# Patient Record
Sex: FEMALE | Race: WHITE | NOT HISPANIC OR LATINO | Employment: OTHER | URBAN - METROPOLITAN AREA
[De-identification: names, ages, dates, MRNs, and addresses within clinical notes are randomized per-mention and may not be internally consistent; named-entity substitution may affect disease eponyms.]

---

## 2017-01-04 ENCOUNTER — GENERIC CONVERSION - ENCOUNTER (OUTPATIENT)
Dept: OTHER | Facility: OTHER | Age: 67
End: 2017-01-04

## 2017-01-25 ENCOUNTER — HOSPITAL ENCOUNTER (OUTPATIENT)
Dept: RADIOLOGY | Facility: HOSPITAL | Age: 67
Discharge: HOME | End: 2017-01-25
Payer: MEDICARE

## 2017-01-25 DIAGNOSIS — Z13.6 ENCOUNTER FOR SCREENING FOR CARDIOVASCULAR DISORDERS: ICD-10-CM

## 2017-01-29 ENCOUNTER — GENERIC CONVERSION - ENCOUNTER (OUTPATIENT)
Dept: OTHER | Facility: OTHER | Age: 67
End: 2017-01-29

## 2017-05-25 ENCOUNTER — TRANSCRIBE ORDERS (OUTPATIENT)
Dept: ADMINISTRATIVE | Facility: HOSPITAL | Age: 67
End: 2017-05-25

## 2017-05-25 DIAGNOSIS — E04.2 MULTIPLE THYROID NODULES: Primary | ICD-10-CM

## 2017-05-30 ENCOUNTER — HOSPITAL ENCOUNTER (OUTPATIENT)
Dept: RADIOLOGY | Facility: HOSPITAL | Age: 67
Discharge: HOME | End: 2017-05-30
Payer: MEDICARE

## 2017-05-30 DIAGNOSIS — E04.2 MULTIPLE THYROID NODULES: ICD-10-CM

## 2017-05-30 PROCEDURE — 76536 US EXAM OF HEAD AND NECK: CPT

## 2017-10-18 ENCOUNTER — ALLSCRIPTS OFFICE VISIT (OUTPATIENT)
Dept: OTHER | Facility: OTHER | Age: 67
End: 2017-10-18

## 2017-10-19 NOTE — PROGRESS NOTES
Assessment  Assessed    1  GERD without esophagitis (530 81) (K21 9)   2  Fatigue (780 79) (R53 83)   3  Benign essential hypertension (401 1) (I10)   4  Overweight (BMI 25 0-29 9) (278 02) (E66 3)   5  Dyslipidemia (272 4) (E78 5)   6  Osteoporosis (733 00) (M81 0)   7  Non-toxic multinodular goiter (241 1) (E04 2)   8  Chronic kidney disease, stage III (moderate) (585 3) (N18 3)    Plan  Benign essential hypertension    · (1) LIPID PANEL, FASTING; Status:Active; Requested for:01Oct2018; Perform:[facility]; EHZ:[id number];KIVQIAP; For:Benign essential hypertension; Ordered By:Félix Brito;  Benign essential hypertension, Fatigue    · EKG/ECG- POC; Status:Complete;   Done: 64LRK0126   Perform: In Office; Due:18Oct2018; Last Updated By:Amada Bains; 10/18/2017 8:19:47 AM;Ordered; For:Benign essential hypertension, Fatigue; Ordered By:Félix Brito;  Benign essential hypertension, Fatigue, Hypercalcemia    · (1) COMPREHENSIVE METABOLIC PANEL; Status:Active; Requested for:01Oct2018; Perform:[facility]; LZZ:85KNQ5209;FFQHUYD; For:Benign essential hypertension, Fatigue, Hypercalcemia; Ordered By:Félix Brito;  GERD without esophagitis    · From  Omeprazole 40 MG Oral Capsule Delayed Release TAKE 1 CAPSULE  Daily To Omeprazole 40 MG Oral Capsule Delayed Release TAKE 1 CAPSULE every  other morning   Rx By: Sharon Jaramillo; Dispense: 0 Days ; #:30 Capsule Delayed Release; Refill: 0;For: GERD without esophagitis; ROSA = N; Record   · From  Pepcid 40 MG Oral Tablet Take 1 tablet daily To Famotidine 40 MG Oral  Tablet (Pepcid) One tablet every other evening   Rx By: Sharon Jaramillo; Dispense: 0 Days ; #:30 Tablet; Refill: 0;For: GERD without esophagitis; ROSA = N; Record  Osteoporosis    · Alendronate Sodium 70 MG Oral Tablet (Fosamax); TAKE 1 TABLET ONCE  WEEKLY   Rx By: Sharon Jaramillo; Dispense: 28 Days ; #:4 Tablet; Refill: 5;For: Osteoporosis; ROSA = N; Record      1   Consider trial of verapamil hold for several weeks to see if fatigue improves  If it does, contact Dr Alea Timmons, who prescribed this medication for migraine headaches  2  Continue present medications otherwise  3  Follow-up in one year with EKG, lipids, CMP  Discussion/Summary  Cardiology Discussion Summary Free Text Note Form [facility]:     Exertional dyspnea with stair climbing but not with exercise at [facility], probably related to progressive overweight with 18 1 pound weight gain over the past 5 years  Controlled essential hypertensionControlled dyslipidemia with atorvastatinHistory of normal exercise stress test 10/13/15 and benign echocardiogram on 5/1/13Chronic nonproductive cough, possibly caused by [facility] hypercalcemia with parathyroidectomy 11/30/11Status post nasal polypectomy October, 2012 and [facility]  October, 2012 with right cataract extraction 9/13Chronic stable multinodular goiter with latest thyroid ultrasound 5/30/17Mild chronic kidney disease, stage IIIChronic fatigue and tiredness with consideration of adverse drug reaction to verapamil  Osteoarthritis of both knees, right more than left  Goals and Barriers: The patient has the current Goals: Maintenance of cardiovascular health  The patent has the current Barriers: None  Patient's Capacity to Self-Care: Patient is able to Self-Care  Medication SE Review and Pt Understands Tx: Possible side effects of new medications were reviewed with the patient/guardian today  The treatment plan was reviewed with the patient/guardian  The patient/guardian understands and agrees with the treatment plan   Counseling Documentation With Imm: The patient was counseled regarding diagnostic results,-- instructions for management,-- risk factor reductions,-- prognosis,-- patient and family education,-- impressions,-- risks and benefits of treatment options,-- importance of compliance with treatment     Self Referrals:   Self Referrals: Yes   Transitional Care: Co-manage care with PCP/Referring Provider  Chief Complaint  Chief Complaint Free Text Note Form: Yessy Amaya is here today for a followup  She has complaints of fatigue in the office today  EKG was done  JW      History of Present Illness  Cardiology HPI Free Text Note Form [facility]:   68-year-old female complains of chronic fatigue, which predates the use of the use of atorvastatin  She gets plenty of sleep and is adherent to medication  She continues to have chronic bilateral knee discomfort, right more than left  She attends the [employer] program and the [address] 5 days a week  She has a chronic nonproductive cough, occasional exertional dyspnea when climbing stairs and lightheadedness and dyspnea on one occasion when bending over  She has occasional ankle edema when she is on her feet for a long period of time  There is been no chest discomfort, orthopnea, paroxysmal nocturnal dyspnea, wheezing, sputum, fever, chills, syncope  No severe dizziness noted  Her dress size has increased by one size over the last several years  Review of Systems  ROS Reviewed:   ROS reviewed  All other systems negative, except as noted in history of present illness  Active Problems  Problems    1  Atherosclerosis of left carotid artery (433 10) (I65 22)   2  Benign essential hypertension (401 1) (I10)   3  Colon cancer screening (V76 51) (Z12 11)   4  Dark stools (792 1) (R19 5)   5  Encounter for routine pelvic examination (V72 31) (Z01 419)   6  Encounter for screening for cardiovascular disorders (V81 2) (Z13 6)   7  Encounter for screening for vascular disease (V81 2) (Z13 6)   8  Encounter for screening mammogram for breast cancer (V76 12) (Z12 31)   9  Fatigue (780 79) (R53 83)   10  Headache (784 0) (R51)   11  Hypercalcemia (275 42) (E83 52)   12  Migraine headache (346 90) (G43 909)   13  Need for immunization against influenza (V04 81) (Z23)   14  Never A Smoker   15  Osteoporosis (733 00) (M81 0)   16   Pain in left foot (729 5) (M75 672)   17  Pain in right foot (729 5) (M79 671)   18  Primary hyperparathyroidism (252 01) (E21 0)   19  Screening for diabetes mellitus (DM) (V77 1) (Z13 1)   20  Screening for thyroid disorder (V77 0) (Z13 29)   21  Vitamin D deficiency (268 9) (E55 9)    Past Medical History  Problems    1  History of Acute upper respiratory infection (465 9) (J06 9)   2  History of Acute upper respiratory infection (465 9) (J06 9)   3  History of Cough (786 2) (R05)   4  History of Cough (786 2) (R05)   5  History of Initial Medicare annual wellness visit (V70 0) (Z00 00)   6  Medicare annual wellness visit, subsequent (V70 0) (Z00 00)   7  Screening for genitourinary condition (V81 6) (Z13 89)   8  Screening for genitourinary condition (V81 6) (Z13 89)  Active Problems And Past Medical History Reviewed: The active problems and past medical history were reviewed and updated today  Surgical History  Problems    1  History of Parathyroid Complete Parathyroidectomy  Surgical History Reviewed: The surgical history was reviewed and updated today  Family History  Mother    1  Family history of congestive heart failure (V17 49) (Z82 49)  Father    2  Family history of prostate carcinoma (V16 42) (Z80 42)  Family History Reviewed: The family history was reviewed and updated today  Social History  Problems    · Never A Smoker  Social History Reviewed: The social history was reviewed and updated today  Current Meds   1  Atorvastatin Calcium 20 MG Oral Tablet; Take 1 tablet daily  Requested for: 65CHU7934;   Last Rx:48Vkh0576 Ordered   2  Fish Oil 1000 MG Oral Capsule; take 1 capsule daily; Therapy: 59WUW3765 to Recorded   3  Glucosamine Chondr 1500 Complx Oral Capsule; TAKE 1 CAPSULE Daily @ 8am   Recorded   4  Magnesium Oxide 400 MG Oral Capsule; 1 Every Day; Therapy: 67Tba9440 to  Requested for: 56ZCS1756 Recorded   5  Multivitamin Women 50+ Oral Tablet; TAKE 1 TABLET DAILY;    Therapy: 49DMG7369 to Recorded   6  Omeprazole 40 MG Oral Capsule Delayed Release; TAKE 1 CAPSULE Daily Recorded   7  Pepcid 40 MG Oral Tablet; Take 1 tablet daily Recorded   8  PreserVision AREDS Oral Capsule; TAKE AS DIRECTED; Therapy: 20IAA9450 to Recorded   9  Super B-Complex Oral Capsule; take 1 capsule daily; Therapy: 57LGL8814 to Recorded   10  Turmeric Complex/Black Pepper 500-3 MG Oral Capsule; take 1 capsule daily; Therapy: 35XKP3471 to Recorded   11  Verapamil HCl - 120 MG Oral Tablet; 1 tab twice daily Recorded   12  Vitamin C 1000 MG Oral Tablet; TAKE 1 TABLET DAILY; Therapy: 13VYI6258 to Recorded   13  Vitamin D3 2000 UNIT Oral Capsule; TAKE 6000 UNIT Daily; Therapy: (Recorded:34Ych8000) to Recorded  Medication List Reviewed: The medication list was reviewed and updated today  Allergies  Medication    1  No Known Drug Allergies    Vitals  Vital Signs    Recorded: 32CMU1524 08:18AM   Heart Rate 55, Apical   Systolic 242, RUE, Sitting   Diastolic 70, RUE, Sitting   Height 5 ft 9 in   Weight 185 lb 1 6 oz   BMI Calculated 27 33   BSA Calculated 2   O2 Saturation 98, RA     4 5 pound weight gain in one year, 6 7 pound weight gain in 3 years, 18 1 pound weight gain in 5 years  Physical Exam    Constitutional   General appearance: Abnormal  -- Mildly overweight white female in no acute distress  She is alert, oriented, cooperative  Eyes   Conjunctiva and Sclera examination: Conjunctiva pink, sclera anicteric  Ears, Nose, Mouth, and Throat - Oropharynx: Clear, nares are clear, mucous membranes are moist    Neck   Neck and thyroid: Normal, supple, trachea midline, no thyromegaly  Pulmonary   Respiratory effort: No increased work of breathing or signs of respiratory distress  Auscultation of lungs: Clear to auscultation, no rales, no rhonchi, no wheezing, good air movement  Cardiovascular   Auscultation of heart: Normal rate and rhythm, normal S1 and S2, no murmurs      Carotid pulses: Normal, 2+ bilaterally  Peripheral vascular exam: Normal pulses throughout, no tenderness, erythema or swelling  Pedal pulses: Normal, 2+ bilaterally  Examination of extremities for edema and/or varicosities: Normal     Abdomen   Abdomen: Non-tender and no distention  Liver and spleen: No hepatomegaly or splenomegaly  Musculoskeletal Gait and station: Normal gait  -- Digits and nails: Normal without clubbing or cyanosis  -- Inspection/palpation of joints, bones, and muscles: Normal, ROM normal     Skin - Skin and subcutaneous tissue: Normal without rashes or lesions  Skin is warm and well perfused, normal turgor  Neurologic - Cranial nerves: II - XII intact  -- Speech: Normal     Psychiatric - Orientation to person, place, and time: Normal -- Mood and affect: Normal       Results/Data  Diagnostic Studies Reviewed Cardio: I personally reviewed the recording/images in the office today  My interpretation follows  Lab Review: 192, , HDL 56, LDL 111LFTs  1 06 with  3normal FVN634, TG 70, HDL 61, LDL 56       ECG Report: 10/18/17bradycardiaconduction delayR-wave progressionheart rate with no other changes compared to 10/11/16    US THYROID [id number] 11:39AM EPIC, Provider   Test ordered by: Joyce Brown     Test Name Result Flag Reference   US THYROID (Report)     THYROID ULTRASOUND     INDICATION: 10-year-old female for follow-up thyroid nodules  COMPARISON: 9/22/2015  TECHNIQUE:  Ultrasound of the thyroid was performed with a high frequency linear transducer in transverse and sagittal planes including volumetric imaging sweeps as well as traditional still imaging technique  FINDINGS:   Thyroid parenchyma is diffusely heterogeneous in echotexture with focal nodule(s) as described below  Right gland: 4 3 x 1 7 x 1 2 cm  The following dominant nodule is again evaluated:   1  Midpole, 0 5 x 0 5 x 0 5 cm well circumscribed vascular nodule stable in size and morphology   No pleomorphic calcifications  Normal vascularity throughout the lobe  Left gland: 3 9 x 1 2 x 1 5 cm  The following dominant nodules are evaluated:   1  Upper pole, 0 9 x 0 5 x 0 8 cm complex cystic nodule with no internal vascularity or echogenic foci stable in size and morphology  2  Mid pole, 0 7 x 0 5 x 0 5 cm well-circumscribed echogenic nodule with hypoechoic capsule demonstrating internal vascularity no echogenic foci  Stable in size and morphology  3  Lower pole, 0 6 x 0 4 x 0 6 cm complex solid and cystic well-circumscribed nodule, stable in size and morphology containing no echogenic foci  Normal vascularity throughout the lobe  Isthmus: The isthmus is 0 4 cm in AP dimension  IMPRESSION:      Stable multinodular thyroid gland with no lesions requiring FNA utilizing current ELÍAS guidelines  Follow-up in 12-24 months recommended  Workstation performed: [id number]     Signed by:   Eden Mcbride MD   5/31/17     VAS SCREENING [id number] 08:44AM Cathmikaelaan Rosy Order Number: [id number]    - Patient Instructions: To schedule this appointment, please contact Central Scheduling at [phone]  Test Name Result Flag Reference   VAS SCREENING (Report)     [facility] REPORT   CLINICAL:   Indications: Self Pay Vascular Screening   Clinical   Right Pressure: 160 mm Hg, Left Pressure: 158 mm Hg  FINDINGS:      Celiac Artery Expiration     AP Diam (cm): 2 6     PSV: 79   Mid CCA, Right     PSV: 79   Post Tibial, Right     BERNIE: 1 14     Pressure: 182   Prox  ICA, Right     Impression: 1  No significant carotid disease     PSV: 57     EDV: 25     ICA/CCA: 0 72     PPS: 79 00   Dist  ICA, Right     PPS: 79 00   Mid CCA, Left     PSV: 84   Post Tibial, Left     BERNIE: 1 19     Pressure: 190   Prox  ICA, Left     Impression: 2   Abnormal plaque noted but no significant stenosis     PSV: 72     EDV: 25     ICA/CCA: 0 86     PPS: 84 00   Dist  ICA, Left     PPS: 84 00            CONCLUSION:      Impression   CAROTID SCREENING:   Evaluation reveals a normal internal carotid artery on the right  Evaluation reveals a <50% stenosis of the internal carotid artery on the left  Plaque is smooth and homogenous  AORTA SCREENING:   The abdominal aorta is patent and normal in caliber with the greatest diameter   measurement of 2 6 cm (Mid)  Mild intraluminal plaque noted with no significant stenosis  PAD SCREENING:   Resting right ankle/brachial index of 1 14 is within normal limits  Resting left ankle/brachial index of 1 19 is within normal limits  SIGNATURE:   Electronically Signed by: Imtiaz Hillman on 2017-01-25 04:53:10 PM     (1) CBC/PLT/DIFF [id number] 09:12AM Kaleigh Boydm     Test Name Result Flag Reference   WBC 6 8 x10E3/uL  3 4-10 8   RBC 4 64 x10E6/uL  3 77-5 28   Hemoglobin 12 7 g/dL  11 1-15 9   Hematocrit 38 8 %  34 0-46  6   MCV 84 fL  79-97   MCH 27 4 pg  26 6-33 0   MCHC 32 7 g/dL  31 5-35 7   RDW 13 6 %  12 3-15 4   Platelets 804 E10T7/ED  150-379   Neutrophils 58 %     Lymphs 24 %     Monocytes 10 %     Eos 7 %     Basos 1 %     Neutrophils (Absolute) 4 0 x10E3/uL  1 4-7 0   Lymphs (Absolute) 1 6 x10E3/uL  0 7-3 1   Monocytes(Absolute) 0 7 x10E3/uL  0 1-0 9   Eos (Absolute) 0 5 x10E3/uL H 0 0-0 4   Baso (Absolute) 0 0 x10E3/uL  0 0-0 2   Immature Granulocytes 0 %     Immature Grans (Abs) 0 0 x10E3/uL  0 0-0 1     (1) COMPREHENSIVE METABOLIC PANEL [id number] 25:51YL Kaleigh Tank     Test Name Result Flag Reference   Glucose, Serum 94 mg/dL  65-99   BUN 15 mg/dL  8-27   Creatinine, Serum 0 88 mg/dL  0 57-1 00   BUN/Creatinine Ratio 17  11-26   Sodium, Serum 141 mmol/L  134-144   Potassium, Serum 4 6 mmol/L  3 5-5 2   Chloride, Serum 101 mmol/L     Carbon Dioxide, Total 24 mmol/L  18-29   Calcium, Serum 9 3 mg/dL  8 7-10 3   Protein, Total, Serum 6 5 g/dL  6 0-8 5   Albumin, Serum 4 3 g/dL  3 6-4 8   Globulin, Total 2 2 g/dL  1 5-4 5 A/G Ratio 2 0  1 1-2 5   Bilirubin, Total 0 3 mg/dL  0 0-1 2   Alkaline Phosphatase, S 36 IU/L L    AST (SGOT) 24 IU/L  0-40   ALT (SGPT) 29 IU/L  0-32   eGFR If NonAfricn Am 68 mL/min/1 73  >59   eGFR If Africn Am 78 mL/min/1 73  >59     (1) TSH [id number] 09:12AM Srini Hutton     Test Name Result Flag Reference   TSH 3 820 uIU/mL  0 450-4 500     Health Management  Encounter for screening for vascular disease   VAS SCREENING; every 1 year; Last 81YVW3505; Next Due: 90OQZ3021; Active  Encounter for screening mammogram for breast cancer   * MAMMO SCREENING BILATERAL W CAD; every 2 years; Last 56Qer8358; Next Due: 68Owr4244; Active    Future Appointments    Date/Time Provider Specialty Site   02/07/2018 09:30 AM LUCRETIA Saravia   Neurology NEUROLOGY [facility]   Electronically signed by : LUCRETIA Banks ; Oct 18 2017 10:18AM EST                       (Author)

## 2017-11-21 ENCOUNTER — GENERIC CONVERSION - ENCOUNTER (OUTPATIENT)
Dept: OTHER | Facility: OTHER | Age: 67
End: 2017-11-21

## 2017-12-22 ENCOUNTER — HOSPITAL ENCOUNTER (OUTPATIENT)
Dept: RADIOLOGY | Facility: HOSPITAL | Age: 67
Discharge: HOME | End: 2017-12-22
Payer: MEDICARE

## 2017-12-22 DIAGNOSIS — Z12.31 ENCOUNTER FOR SCREENING MAMMOGRAM FOR MALIGNANT NEOPLASM OF BREAST: ICD-10-CM

## 2017-12-22 PROCEDURE — G0202 SCR MAMMO BI INCL CAD: HCPCS

## 2018-01-09 ENCOUNTER — ALLSCRIPTS OFFICE VISIT (OUTPATIENT)
Dept: OTHER | Facility: OTHER | Age: 68
End: 2018-01-09

## 2018-01-11 NOTE — RESULT NOTES
Verified Results  (1) CBC/PLT/DIFF [id number] 09:12AM AXON [facility]     Test Name Result Flag Reference   WBC 6 8 x10E3/uL  3 4-10 8   RBC 4 64 x10E6/uL  3 77-5 28   Hemoglobin 12 7 g/dL  11 1-15 9   Hematocrit 38 8 %  34 0-46  6   MCV 84 fL  79-97   MCH 27 4 pg  26 6-33 0   MCHC 32 7 g/dL  31 5-35 7   RDW 13 6 %  12 3-15 4   Platelets 552 V52R8/EX  150-379   Neutrophils 58 %     Lymphs 24 %     Monocytes 10 %     Eos 7 %     Basos 1 %     Neutrophils (Absolute) 4 0 x10E3/uL  1 4-7 0   Lymphs (Absolute) 1 6 x10E3/uL  0 7-3 1   Monocytes(Absolute) 0 7 x10E3/uL  0 1-0 9   Eos (Absolute) 0 5 x10E3/uL H 0 0-0 4   Baso (Absolute) 0 0 x10E3/uL  0 0-0 2   Immature Granulocytes 0 %     Immature Grans (Abs) 0 0 x10E3/uL  0 0-0 1     (1) COMPREHENSIVE METABOLIC PANEL 45FDQ0105 00:88ZN [facility]     Test Name Result Flag Reference   Glucose, Serum 94 mg/dL  65-99   BUN 15 mg/dL  8-27   Creatinine, Serum 0 88 mg/dL  0 57-1 00   eGFR If NonAfricn Am 68 mL/min/1 73  >59   eGFR If Africn Am 78 mL/min/1 73  >59   BUN/Creatinine Ratio 17  11-26   Sodium, Serum 141 mmol/L  134-144   Potassium, Serum 4 6 mmol/L  3 5-5 2   Chloride, Serum 101 mmol/L     Carbon Dioxide, Total 24 mmol/L  18-29   Calcium, Serum 9 3 mg/dL  8 7-10 3   Protein, Total, Serum 6 5 g/dL  6 0-8 5   Albumin, Serum 4 3 g/dL  3 6-4 8   Globulin, Total 2 2 g/dL  1 5-4 5   A/G Ratio 2 0  1 1-2 5   Bilirubin, Total 0 3 mg/dL  0 0-1 2   Alkaline Phosphatase, S 36 IU/L L    AST (SGOT) 24 IU/L  0-40   ALT (SGPT) 29 IU/L  0-32     (1) TSH [id number] 09:12AM Rondi Running     Test Name Result Flag Reference   TSH 3 820 uIU/mL  0 450-4 500       Discussion/Summary   Calderon Alvarez,   Your blood count, sugar, kidney function, liver function and thyroid level is normal    Dr Paula Clay

## 2018-01-12 NOTE — RESULT NOTES
Verified Results  (19[birth year] [facility]) Pap IG, HPV-hr [id number] 03:38PM Yovana Lyn     Test Name Result Flag Reference   DIAGNOSIS: Comment     NEGATIVE FOR INTRAEPITHELIAL LESION AND MALIGNANCY  CELLULAR CHANGES ASSOCIATED WITH ATROPHY ARE PRESENT  THIS SPECIMEN WAS RESCREENED AS PART OF OUR  PROGRAM   THE CYTOLOGY PROCESSING WAS PERFORMED AT THE [facility] LOCATED AT  [facility], [address] [place] [address], [address] [place] [id number]  Specimen adequacy: Comment     Satisfactory for evaluation  Endocervical component may not be  distinguished in cases of atrophy  Clinician provided ICD10: Comment     Z01 419   Performed by: Sylvester Watkins, Cytotechnologist (ASCP)   QC reviewed by: Jerrod Meyers, Cytotechnologist (ASCP)     Tatiana Casanova Note: Comment     The Pap smear is a screening test designed to aid in the detection of  premalignant and malignant conditions of the uterine cervix  It is not a  diagnostic procedure and should not be used as the sole means of detecting  cervical cancer  Both false-positive and false-negative reports do occur  Test Methodology: Comment     This liquid based ThinPrep(R) pap test was screened with the  use of an image guided system  HPV, high-risk SEELVR     The quantity of specimen remaining in the vial after Pap slide  preparation was less than the 4 mL minimum cell suspension required  Low sample cellularity may be the cause  See HPV, low volume rfx  test result  This high-risk HPV test detects thirteen high-risk types  (16/18/31/33/35/39/45/51/52/56/58/59/68) without differentiation  HPV, low volume rfx Negative  Negative   This test detects fourteen high-risk HPV types (16,18,31,33,35,39,45,  51,52,56,58,59,66,68) without differentiation  Discussion/Summary   January Askew,    Your pap smear is normal and HPV negative     Dr Beltran Sheldon

## 2018-01-13 VITALS
SYSTOLIC BLOOD PRESSURE: 110 MMHG | DIASTOLIC BLOOD PRESSURE: 70 MMHG | WEIGHT: 185.1 LBS | HEART RATE: 55 BPM | OXYGEN SATURATION: 98 % | BODY MASS INDEX: 27.42 KG/M2 | HEIGHT: 69 IN

## 2018-01-15 NOTE — RESULT NOTES
Verified Results  VAS SCREENING [id number] 08:44AM Alma Cortez Order Number: [id number]    - Patient Instructions: To schedule this appointment, please contact Central Scheduling at [phone]  Test Name Result Flag Reference   VAS SCREENING (Report)     [facility] REPORT   CLINICAL:   Indications: Self Pay Vascular Screening   Clinical   Right Pressure: 160 mm Hg, Left Pressure: 158 mm Hg  FINDINGS:      Celiac Artery Expiration     AP Diam (cm): 2 6     PSV: 79   Mid CCA, Right     PSV: 79   Post Tibial, Right     BERNIE: 1 14     Pressure: 182   Prox  ICA, Right     Impression: 1  No significant carotid disease     PSV: 57     EDV: 25     ICA/CCA: 0 72     PPS: 79 00   Dist  ICA, Right     PPS: 79 00   Mid CCA, Left     PSV: 84   Post Tibial, Left     BERNIE: 1 19     Pressure: 190   Prox  ICA, Left     Impression: 2  Abnormal plaque noted but no significant stenosis     PSV: 72     EDV: 25     ICA/CCA: 0 86     PPS: 84 00   Dist  ICA, Left     PPS: 84 00            CONCLUSION:      Impression   CAROTID SCREENING:   Evaluation reveals a normal internal carotid artery on the right  Evaluation reveals a <50% stenosis of the internal carotid artery on the left  Plaque is smooth and homogenous  AORTA SCREENING:   The abdominal aorta is patent and normal in caliber with the greatest diameter   measurement of 2 6 cm (Mid)  Mild intraluminal plaque noted with no significant stenosis  PAD SCREENING:   Resting right ankle/brachial index of 1 14 is within normal limits  Resting left ankle/brachial index of 1 19 is within normal limits        SIGNATURE:   Electronically Signed by: Desean Ferguson on 2017-01-25 04:53:10 PM

## 2018-01-15 NOTE — RESULT NOTES
Verified Results  * MAMMO SCREENING BILATERAL W CAD [id number] 12:29PM Angelica Ruelas Order Number: [id number]    - Patient Instructions: To schedule this appointment, please contact Central Scheduling at [phone]  Do not wear any perfume, powder, lotion or deodorant on breast or underarm area  Please bring your doctors order, referral (if needed) and insurance information with you on the day of the test  Failure to bring this information may result in this test being rescheduled  Arrive 15 minutes prior to your appointment time to register  On the day of your test, please bring any prior mammogram or breast studies with you that were not performed at a [facility]  Failure to bring prior exams may result in your test needing to be rescheduled  Test Name Result Flag Reference   MAMMO SCREENING BILATERAL W CAD (Report)     Patient History:   Patient is postmenopausal and is of Ashkenazi Mormon descent  Family history of breast cancer in paternal aunt at age 54 and    breast cancer in maternal cousin at age 48  Excisional biopsy of the right breast, January 1, 1980  Took progesterone beginning at age 45  Patient's BMI is 25 8  Reason for exam: screening (asymptomatic)  Screening     Mammo Screening Bilateral W CAD: December 21, 2016 - Check In #:    [de-identified]   Bilateral CC and MLO view(s) were taken  Technologist: Samra Yuan   Prior study comparison: October 23, 2015, mammogram  July 28, 2010, mammogram  July 22, 2009, mammogram  July 21, 2008,    mammogram  January 7, 2008, mammogram      There are scattered fibroglandular densities  Bilateral digital    mammography is performed  No dominant soft tissue mass,    architectural distortion or suspicious calcifications are noted  The skin and nipple contours are within normal limits  Scattered benign appearing calcifications are noted  No evidence    of malignancy   No significant changes when compared to prior    studies  1  No evidence of malignancy  2  No significant change when compared with the prior study  ASSESSMENT: BiRad:2 - Benign     Recommendation:   Routine screening mammogram of both breasts in 1 year  A reminder letter will be scheduled   Analyzed by CAD     8-10% of cancers will be missed on mammography  Management of a    palpable abnormality must be based on clinical grounds  Patients   will be notified of their results via letter from our facility  Accredited by [employer] and FDA  Transcription Location: [facility] 98: [id number]     Risk Value(s):   Tyrer-Cuzick 10 Year: 4 549%, Tyrer-Cuzick Lifetime: 8 151%,    Myriad Table: 8 2%, JOSUE 5 Year: 1 9%, NCI Lifetime: 6 3%, MRS    : Based on personal and/or family history,    consideration of hereditary risk assessment may be warranted     Signed by:   Joyce Mendoza MD   12/21/16       Discussion/Summary   Liberty Moura,   Your mammogram is normal    Dr Shira Rosales

## 2018-01-23 VITALS
TEMPERATURE: 97.5 F | HEART RATE: 88 BPM | WEIGHT: 185 LBS | BODY MASS INDEX: 27.4 KG/M2 | DIASTOLIC BLOOD PRESSURE: 70 MMHG | SYSTOLIC BLOOD PRESSURE: 138 MMHG | RESPIRATION RATE: 16 BRPM | HEIGHT: 69 IN

## 2018-01-23 NOTE — PROGRESS NOTES
Assessment    1  Medicare annual wellness visit, subsequent (V70 0) (Z00 00)   2  Never a smoker   3  Osteoporosis (733 00) (M81 0)    Plan  Medicare annual wellness visit, subsequent    · Follow-up visit in 1 year Evaluation and Treatment  Follow-up  Status: Complete  Done:  [id number]  Osteoporosis    · * DXA BONE DENSITY SPINE HIP AND PELVIS; Status:Active; Requested  for:00Iai9025;   Screening for genitourinary condition    · *VB - Urinary Incontinence Screen (Dx Z13 89 Screen for UI); Status:Complete -  Retrospective Authorization;   Done: 60BAG3884 03:00PM    Discussion/Summary    Care plan given  Impression: Subsequent Annual Wellness Visit, with preventive exam as well as age and risk appropriate counseling completed  Cardiovascular screening and counseling: screening is current  Diabetes screening and counseling: screening is current  Colorectal cancer screening and counseling: screening is current  Breast cancer screening and counseling: screening is current  Cervical cancer screening and counseling: screening not indicated  Osteoporosis screening and counseling: screening is current and DEXA due in February  Abdominal aortic aneurysm screening and counseling: screening not indicated  Glaucoma screening and counseling: screening is current  HIV screening and counseling: screening not indicated  Immunizations: influenza vaccine is up to date this year, the lifetime pneumococcal vaccine has been completed, hepatitis B vaccination series is not indicated at this time due to the patient's low risk of irina the disease and Zostavax vaccination up to date  Advance Directive Planning: not complete, paperwork and instructions were given to the patient, she was encouraged to follow-up with me to discuss her questions and/or decisions  Patient Discussion: plan discussed with the patient, follow-up visit needed in one year        Chief Complaint  annual wellness visit      History of Present Illness  Welcome to Medicare and Wellness Visits: The patient is being seen for the subsequent annual wellness visit  Medicare Screening and Risk Factors   Hospitalizations: no previous hospitalizations and she has been hospitalized 0 times  Once per lifetime medicare screening tests: ECG has not been done and AAA screening US has not yet been done  Medicare Screening Tests Risk Questions   Osteoporosis risk assessment: , female gender and over 48years of age  HIV risk assessment: none indicated  Drug and Alcohol Use: The patient has never smoked cigarettes  The patient reports never drinking alcohol  She has never used illicit drugs  Diet and Physical Activity: Current diet includes unhealthy food choices, frequent junk food, servings of fruit per day, servings of vegetables per day, servings of meat per day and servings of dairy products per day  She exercises 5 times per week  Exercise: stretching, strength training 30 minutes per day  Mood Disorder and Cognitive Impairment Screening: Anxiety screening denies anxiety  Depression screening  negative for symptoms  She denies feeling down, depressed, or hopeless over the past two weeks  She denies feeling little interest or pleasure in doing things over the past two weeks  Cognitive impairment screening: denies difficulty learning/retaining new information, denies difficulty handling complex tasks, denies difficulty with reasoning, denies difficulty with spatial ability and orientation, denies difficulty with language and denies difficulty with behavior  Functional Ability/Level of Safety: Hearing is slightly decreased  She reports hearing difficulties  She does not use a hearing aid   Activities of daily living details: does not need help using the phone, no transportation help needed, does not need help shopping, no meal preparation help needed, does not need help doing housework, does not need help doing laundry, does not need help managing medications and does not need help managing money  Fall risk factors: The patient fell 0 times in the past 12 months  Home safety risk factors:  no unfamiliar surroundings, no loose rugs, no poor household lighting, no uneven floors, no household clutter, grab bars in the bathroom and handrails on the stairs  Advance Directives: Advance directives: no living will, no durable power of  for health care directives and no advance directives  end of life decisions were reviewed with the patient and I agree with the patient's decisions  Co-Managers and Medical Equipment/Suppliers: See Patient Care Team   Reviewed Updated Jimy Howell:   Last Medicare Wellness Visit Information was reviewed, patient interviewed and updates made to the record today  Patient Care Team    Care Team Member Role Specialty Office Number   Cara Meredith MD Specialist Endocrinology [phone]   Rimma Perez MD Specialist Ophthalmology [phone]   Reba Trivedi MD Specialist Neurology [phone]   [facility] Specialist Allergy/Immunology [phone]   Ludwin Haywood MD Specialist Cardiology [phone]   Desean Raines MD Specialist Obstetrics/Gynecology [phone]   Chelo [facility] Referring Family Medicine [phone]   Mir Mendez MD Specialist Gastroenterology Adult [phone]     Active Problems    1  Atherosclerosis of left carotid artery (433 10) (I65 22)   2  Benign essential hypertension (401 1) (I10)   3  Chronic kidney disease, stage III (moderate) (585 3) (N18 3)   4  Colon cancer screening (V76 51) (Z12 11)   5  Dark stools (792 1) (R19 5)   6  Dyslipidemia (272 4) (E78 5)   7  Encounter for routine pelvic examination (V72 31) (Z01 419)   8  Encounter for screening for cardiovascular disorders (V81 2) (Z13 6)   9  Encounter for screening for vascular disease (V81 2) (Z13 6)   10   Encounter for screening mammogram for breast cancer (V76 12) (Z12 31)   11  Fatigue (780 79) (R53 83)   12  GERD without esophagitis (530 81) (K21 9)   13  Headache (784 0) (R51)   14  Hypercalcemia (275 42) (E83 52)   15  Migraine headache (346 90) (G43 909)   16  Need for immunization against influenza (V04 81) (Z23)   17  Never a smoker   18  Non-toxic multinodular goiter (241 1) (E04 2)   19  Osteoporosis (733 00) (M81 0)   20  Overweight (BMI 25 0-29 9) (278 02) (E66 3)   21  Pain in left foot (729 5) (M79 672)   22  Pain in right foot (729 5) (M79 671)   23  Primary hyperparathyroidism (252 01) (E21 0)   24  Screening for diabetes mellitus (DM) (V77 1) (Z13 1)   25  Screening for thyroid disorder (V77 0) (Z13 29)   26  Vitamin D deficiency (268 9) (E55 9)    Past Medical History    · History of Acute upper respiratory infection (465 9) (J06 9)   · History of Acute upper respiratory infection (465 9) (J06 9)   · History of Cough (786 2) (R05)   · History of Cough (786 2) (R05)   · History of Initial Medicare annual wellness visit (V70 0) (Z00 00)   · Medicare annual wellness visit, subsequent (V70 0) (Z00 00)   · Screening for genitourinary condition (V81 6) (Z13 89)    Surgical History    · History of Parathyroid Complete Parathyroidectomy    Family History  Mother    · Family history of    · Family history of congestive heart failure (V17 49) (Z82 49)  Father    · Family history of    · Family history of prostate carcinoma (V16 42) (Z80 45)  Cousin    · Family history of breast cancer (V16 3) (Z80 3)  Family History    · Denied: Family history of mental disorder    The family history was reviewed and updated today  Social History    · Never a smoker  The social history was reviewed and updated today  Current Meds   1  Alendronate Sodium 70 MG Oral Tablet; TAKE 1 TABLET ONCE WEEKLY; Therapy: 49OKA5640 to (Evaluate:2018); Last Rx:2017 Ordered   2  Atorvastatin Calcium 20 MG Oral Tablet;  Take 1 tablet daily  Requested for: 12SXI4413;   Last Rx:30Wbf2393 Ordered   3  Famotidine 40 MG Oral Tablet; One tablet every other evening; Last Rx:18Oct2017   Ordered   4  Fish Oil 1000 MG Oral Capsule; take 1 capsule daily; Therapy: 86HFW9941 to Recorded   5  Glucosamine Chondr 1500 Complx Oral Capsule; TAKE 1 CAPSULE Daily @ 8am   Recorded   6  Magnesium Oxide 400 MG Oral Capsule; 1 Every Day; Therapy: 53Ghf9255 to  Requested for: 11FGG6455 Recorded   7  Multivitamin Women 50+ Oral Tablet; TAKE 1 TABLET DAILY; Therapy: 98HOB2338 to Recorded   8  Omeprazole 40 MG Oral Capsule Delayed Release; TAKE 1 CAPSULE every other   morning; Last Rx:18Oct2017 Ordered   9  PreserVision AREDS Oral Capsule; TAKE AS DIRECTED; Therapy: 74AUR0730 to Recorded   10  Super B-Complex Oral Capsule; take 1 capsule daily; Therapy: 56XKX9635 to Recorded   11  Turmeric Complex/Black Pepper 500-3 MG Oral Capsule; take 1 capsule daily; Therapy: 18RNC3480 to Recorded   12  Verapamil HCl - 120 MG Oral Tablet; 1 tab twice daily Recorded   13  Vitamin C 1000 MG Oral Tablet; TAKE 1 TABLET DAILY; Therapy: 24XOR3745 to Recorded   14  Vitamin D3 2000 UNIT Oral Capsule; TAKE 6000 UNIT Daily; Therapy: (Recorded:18Oct2017) to Recorded    Allergies    1  No Known Drug Allergies    Immunizations   1 2 3 4    Influenza  22-Dec-2014 13-Nov-2015 05-Nov-2016 18-Oct-2017    PCV  13-Nov-2015       PPSV  01-Nov-2013       Zoster  24-Jan-2015        Vitals  Signs    Systolic: 113  Diastolic: 70   Temperature: 97 5 F  Heart Rate: 88  Respiration: 16  Systolic: 084  Diastolic: 78  Height: 5 ft 9 in  Weight: 185 lb   BMI Calculated: 27 32  BSA Calculated: 2    Physical Exam    Constitutional   General appearance: No acute distress, well appearing and well nourished  Ears, Nose, Mouth, and Throat   External inspection of ears and nose: Normal     Otoscopic examination: Tympanic membranes translucent with normal light reflex  Canals patent without erythema      Oropharynx: Normal with no erythema, edema, exudate or lesions  Pulmonary   Auscultation of lungs: Clear to auscultation  Cardiovascular   Auscultation of heart: Normal rate and rhythm, normal S1 and S2, no murmurs  Abdomen   Abdomen: Non-tender, no masses  Liver and spleen: No hepatomegaly or splenomegaly  Musculoskeletal   Gait and station: Normal        Results/Data  *VB - Urinary Incontinence Screen (Dx Z13 89 Screen for UI) 40HOP9514 03:00PM Maribell Aguirre   negative     Test Name Result Flag Reference   Urinary Incontinence Assessment 05UIO7870       PHQ-2 Adult Depression Screening 98YSW8361 02:59PM User, s     Test Name Result Flag Reference   PHQ-2 Adult Depression Score 0     Over the last two weeks, how often have you been bothered by any of the following problems? Little interest or pleasure in doing things: Not at all - 0  Feeling down, depressed, or hopeless: Not at all - 0   PHQ-2 Adult Depression Screening Negative       Falls Risk Assessment (Dx Z13 89 Screen for Neurologic Disorder) 62VDY6533 02:58PM User, [facility]     Test Name Result Flag Reference   Falls Risk      No falls in the past year     * [facility] SCREENING BILATERAL W CAD [id number] 11:50AM Javier King Order Number: [id number]    - Patient Instructions: To schedule this appointment, please contact Central Scheduling at [phone]  Do not wear any perfume, powder, lotion or deodorant on breast or underarm area  Please bring your doctors order, referral (if needed) and insurance information with you on the day of the test  Failure to bring this information may result in this test being rescheduled  Arrive 15 minutes prior to your appointment time to register  On the day of your test, please bring any prior mammogram or breast studies with you that were not performed at a [facility]  Failure to bring prior exams may result in your test needing to be rescheduled       Test Name Result Flag Reference   [facility] SCREENING BILATERAL W CAD (Report)     Patient History:   Patient is postmenopausal and is of Ashkenazi Am[employer] descent  Family history of breast cancer at age 54 in paternal aunt,    breast cancer at age 48 in maternal cousin  Excisional biopsy of the right breast, January 1, 1980  Took progesterone beginning at age 45  Patient's BMI is 25 8  Reason for exam: screening, asymptomatic  Screening     Mammo Screening Bilateral W CAD: December 22, 2017 - Check In #:    [de-identified]   Bilateral MLO, CC, and XCCL view(s) were taken  Technologist: HUMZA Callahan   Prior study comparison: December 21, 2016, mammo screening    bilateral W CAD performed at [place] [address]  October 23, 2015, mammogram  July 28, 2010, mammogram  July 22, 2009, mammogram  July 21, 2008, mammogram      There are scattered fibroglandular densities  Bilateral digital    mammography is performed  No dominant soft tissue mass,    architectural distortion or suspicious calcifications are noted  The skin and nipple contours are within normal limits  Scattered benign appearing calcifications are noted  No evidence    of malignancy  No significant changes when compared to prior    studies  1  No evidence of malignancy  2  No significant change when compared with the prior study  ACR BI-RADSï¾® Assessments: BiRad:2 - Benign     Recommendation:   Routine screening mammogram of both breasts in 1 year  Analyzed by CAD     The patient is scheduled in a reminder system for screening    mammography  8-10% of cancers will be missed on mammography  Management of a    palpable abnormality must be based on clinical grounds  Patients   will be notified of their results via letter from our facility  Accredited by [employer] and FDA       Transcription Location: [facility] 98: [id number]     Risk Value(s):   [place] 10 Year: 4 400%, Tyrer-Cuzick Lifetime: 7 400%,    Myriad Table: 8 2%, JOSUE 5 Year: 2 0%, NCI Lifetime: 6 0%, MRS    : Based on personal and/or family history,    consideration of hereditary risk assessment may be warranted  Signed by:   Lesley León MD   12/22/17     Future Appointments    Date/Time Provider Specialty Site   02/07/2018 09:30 AM LUCRETIA Jimenez   [facility]   Electronically signed by : Lo Rosales DO; Jan 9 2018  3:39PM EST                       (Author)

## 2018-01-24 NOTE — PROGRESS NOTES
Assessment   1  Medicare annual wellness visit, subsequent (V70 0) (Z00 00)  2  Encounter for screening for vascular disease (V81 2) (Z13 6)  3  Fatigue (780 79) (R53 83)  4  Dark stools (792 1) (R19 5)  5  Encounter for routine pelvic examination (V72 31) (Z01 419)    Plan  Encounter for routine pelvic examination    · (LC) Pap IG, HPV-hr; Status: In Progress - Specimen/Data Collected;   Done: [id number]  Encounter for screening for vascular disease    · VAS SCREENING; Status:Hold For - Scheduling; Requested for:12Tkg2045;   Fatigue    · (1) CBC/PLT/DIFF; Status:Active; Requested for:10Mwl2846;    · (1) COMPREHENSIVE METABOLIC PANEL; Status:Active; Requested for:96Rkb1551;    · (1) TSH; Status:Active; Requested for:23Pzw3758;   Screening for genitourinary condition    · *VB - Urinary Incontinence Screen (Dx Z13 89 Screen for UI); Status:Resulted - Requires  Verification;   Done: [id number] 03:59PM  Unlinked    · Stop: Vitamin D3 2000 UNIT Oral Tablet    Discussion/Summary    Dark stools - referred back to Dr Vadim Trivedi, reviewed recent EGD  Care plan given  Impression: Subsequent Annual Wellness Visit, with preventive exam as well as age and risk appropriate counseling completed  Cardiovascular screening and counseling: screening is current  Diabetes screening and counseling: screening is current  Colorectal cancer screening and counseling: screening is current  Breast cancer screening and counseling: screening is current  Cervical cancer screening and counseling: due for a cervical pap smear and pap smear performed today  Osteoporosis screening and counseling: screening is current  Abdominal aortic aneurysm screening and counseling: screening not indicated  Glaucoma screening and counseling: screening is current  HIV screening and counseling: screening not indicated   Immunizations: influenza vaccine is up to date this year, the lifetime pneumococcal vaccine has been completed, hepatitis B vaccination series is not indicated at this time due to the patient's low risk of irina the disease and Zostavax vaccination up to date  Advance Directive Planning: not complete, paperwork and instructions were given to the patient  Patient Discussion: follow-up as needed  Chief Complaint  AWV rmklpn      History of Present Illness  HPI: She has been having black stool for the past 2 weeks  Welcome to [employer] and Wellness Visits: The patient is being seen for the subsequent annual wellness visit  Medicare Screening and Risk Factors   Hospitalizations: no previous hospitalizations  Once per lifetime medicare screening tests: ECG  Medicare Screening Tests Risk Questions   Osteoporosis risk assessment: , female gender and over 48years of age  HIV risk assessment: none indicated  Drug and Alcohol Use: The patient has never smoked cigarettes  The patient reports never drinking alcohol  She has never used illicit drugs  Diet and Physical Activity: Current diet includes well balanced meals and limited junk food  She exercises 5 times per week  Exercise: walking, stretching, strength training 60 minutes per day  Mood Disorder and Cognitive Impairment Screening: Anxiety screening was done using GAURANG and score was 0  She denies feeling down, depressed, or hopeless over the past two weeks  She denies feeling little interest or pleasure in doing things over the past two weeks  Cognitive impairment screening: denies difficulty learning/retaining new information, denies difficulty handling complex tasks, denies difficulty with reasoning, denies difficulty with spatial ability and orientation, denies difficulty with language and denies difficulty with behavior  Functional Ability/Level of Safety: Hearing is slightly decreased and a hearing aid is not used   The patient is currently able to do activities of daily living without limitations, able to do instrumental activities of daily living without limitations, able to participate in social activities without limitations and able to drive without limitations  Activities of daily living details: does not need help using the phone, no transportation help needed, does not need help shopping, no meal preparation help needed, does not need help doing housework, does not need help doing laundry, does not need help managing medications and does not need help managing money  Home safety risk factors:  no grab bars in the bathroom, but no unfamiliar surroundings, no loose rugs, no poor household lighting, no uneven floors, no household clutter and handrails on the stairs  Advance Directives: Advance directives: no living will, no durable power of  for health care directives and no advance directives  end of life decisions were reviewed with the patient and I disagree with the patient's decisions  Co-Managers and Medical Equipment/Suppliers: See Patient Care Team   Reviewed Updated [facility]:   Last Medicare Wellness Visit Information was reviewed, patient interviewed and updates made to the record today  Preventive Quality Program 65 and Older: Falls Risk: The patient fell 0 times in the past 12 months  The patient currently has no urinary incontinence symptoms  Patient Care Team    Care Team Member Role Specialty Office Number   Muna Awad MD Specialist Endocrinology [phone]   Catalina Steven MD Specialist Ophthalmology [phone]   Jim Osborn MD Specialist Neurology [phone]   [place] [address] Specialist Allergy/Immunology [phone]   Candy Grey MD Specialist Cardiology [phone]   Christa Langford MD Specialist Obstetrics/Gynecology [phone], [facility] Medicine [phone]   Marcus Meyer MD Specialist Gastroenterology Adult [phone]     Review of Systems    Constitutional: fatigue  Head and Face: negative  Eyes: negative  ENT: negative  Cardiovascular: negative  Respiratory: negative  Gastrointestinal: occasional black stools  Active Problems   1  Benign essential hypertension (401 1) (I10)  2  Encounter for screening for cardiovascular disorders (V81 2) (Z13 6)  3  Encounter for screening mammogram for breast cancer (V76 12) (Z12 31)  4  Headache (784 0) (R51)  5  Hypercalcemia (275 42) (E83 52)  6  Migraine headache (346 90) (G43 909)  7  Need for immunization against influenza (V04 81) (Z23)  8  Never A Smoker  9  Osteoporosis (733 00) (M81 0)  10  Pain in left foot (729 5) (M79 672)  11  Pain in right foot (729 5) (M79 671)  12  Primary hyperparathyroidism (252 01) (E21 0)  13  Screening for diabetes mellitus (DM) (V77 1) (Z13 1)  14  Screening for thyroid disorder (V77 0) (Z13 29)  15  Vitamin D deficiency (268 9) (E55 9)    Past Medical History    · History of Acute upper respiratory infection (465 9) (J06 9)   · History of Acute upper respiratory infection (465 9) (J06 9)   · History of Cough (786 2) (R05)   · History of Cough (786 2) (R05)   · History of Initial Medicare annual wellness visit (V70 0) (Z00 00)   · Screening for genitourinary condition (V81 6) (Z13 89)    Surgical History    · History of Parathyroid Complete Parathyroidectomy    Family History  Mother    · Family history of congestive heart failure (V17 49) (Z82 49)  Father    · Family history of prostate carcinoma (V16 42) (Z80 45)    Social History    · Never A Smoker    Current Meds  1  Acidophilus Probiotic CAPS; Take as directed Recorded  2  Atorvastatin Calcium 20 MG Oral Tablet; Take 1 tablet daily Recorded  3  Cinnamon 500 MG Oral Capsule; Take as directed Recorded  4  [employer] Flaxseed Oil CAPS; 1000 mg daily Recorded  5  [employer] Jber-3 CAPS; Take as directed Recorded  6  [employer] Vision Formula TABS; Take 1 tablet daily Recorded  7  Daily Value Multivitamin TABS; Take 1 tablet daily Recorded  8  Folic Acid 662 MCG Oral Tablet; 1 Every Day;    Therapy: 11Ggf9501 to Requested for: 72HYI4273 Recorded  9  Fosamax TABS; take 1 tablet once weekly; Therapy: (Recorded:14Mar2014) to Recorded  10  Glucosamine Chondr 1500 Complx Oral Capsule; TAKE 1 CAPSULE Daily @ 8am    Recorded  11  Magnesium Oxide 400 MG Oral Capsule; 1 Every Day; Therapy: 86Wyl8845 to  Requested for: 34QBU3738 Recorded  12  Omeprazole 40 MG Oral Capsule Delayed Release; TAKE 1 CAPSULE Daily Recorded  13  Pepcid 40 MG Oral Tablet; Take 1 tablet daily Recorded  14  Verapamil HCl - 120 MG Oral Tablet; 1 tab twice daily Recorded  15  Vitamin D3 2000 UNIT Oral Capsule; 1 every day Recorded  16  Vitamin D3 2000 UNIT Oral Tablet; 2 in am, 1 in pm Recorded    Allergies   1  No Known Drug Allergies    Immunizations   1 2 3    Influenza  22-Dec-2014 13-Nov-2015 05-Nov-2016    PCV  13-Nov-2015      PPSV  01-Nov-2013      Zoster  24-Jan-2015       Vitals  Signs    Temperature: 98 2 F  Heart Rate: 76  Respiration: 18  Systolic: 333  Diastolic: 72  Height: 5 ft 9 in  Weight: 183 lb   BMI Calculated: 27 02  BSA Calculated: 1 99    Physical Exam    Constitutional   General appearance: No acute distress, well appearing and well nourished  Ears, Nose, Mouth, and Throat   External inspection of ears and nose: Normal     Otoscopic examination: Tympanic membranes translucent with normal light reflex  Canals patent without erythema  Oropharynx: Normal with no erythema, edema, exudate or lesions  Neck   Neck: Supple, symmetric, trachea midline, no masses  Pulmonary   Auscultation of lungs: Clear to auscultation  Cardiovascular   Auscultation of heart: Normal rate and rhythm, normal S1 and S2, no murmurs  Chest   Breasts: Normal, no dimpling or skin changes appreciated  Palpation of breasts and axillae: Normal, no masses palpated  Abdomen   Abdomen: Non-tender, no masses  Liver and spleen: No hepatomegaly or splenomegaly  Anus, perineum, and rectum: Normal sphincter tone, no masses, no prolapse     Stool sample for occult blood: Negative  Genitourinary   External genitalia and vagina: Normal, no lesions appreciated  Urethra: Abnormal   The urethra had a urethrocele  Bladder: Not distended, no tenderness  Cervix: Normal, no lesions  Uterus: Normal size, no tenderness, no masses  Adnexa/Parametria: Normal, no masses or tenderness  Lymphatic   Palpation of lymph nodes in neck: No lymphadenopathy  Musculoskeletal   Gait and station: Normal     Skin   Skin and subcutaneous tissue: Normal without rashes or lesions  Neurologic   Cortical function: Normal mental status  Reflexes: 2+ and symmetric  Psychiatric   Mood and affect: Normal        Results/Data  *VB - Urinary Incontinence Screen (Dx Z13 89 Screen for UI) 07Gen9854 03:59PM Mozelle Guise     Test Name Result Flag Reference   Urinary Incontinence Assessment 90Gxi9633                   PHQ-2 Adult Depression Screening 07Hmq2344 03:58PM User, Ahs     Test Name Result Flag Reference   PHQ-2 Adult Depression Score 0     Over the last two weeks, how often have you been bothered by any of the following problems? Little interest or pleasure in doing things: Not at all - 0  Feeling down, depressed, or hopeless: Not at all - 0   PHQ-2 Adult Depression Screening Negative       Falls Risk Assessment (Dx Z13 89 Screen for Neurologic Disorder) 88SAI0062 03:58PM User, Ahs     Test Name Result Flag Reference   Falls Risk      No falls in the past year       Health Management  Encounter for screening mammogram for breast cancer   * MAMMO SCREENING BILATERAL W CAD; every 2 years; Last 27Ass3831; Next Due:  55Jxu8881; Active    Future Appointments    Date/Time Provider Specialty Site   04/12/2017 10:45 AM LUCRETIA Shell   Neurology NEUROLOGY [facility]   Electronically signed by : Steffany Villaseñor DO; Dec 29 2016  4:11PM EST                       (Author)

## 2018-02-15 ENCOUNTER — TELEPHONE (OUTPATIENT)
Dept: FAMILY MEDICINE CLINIC | Facility: CLINIC | Age: 68
End: 2018-02-15

## 2018-02-15 DIAGNOSIS — Z20.828 EXPOSURE TO INFLUENZA: Primary | ICD-10-CM

## 2018-02-15 DIAGNOSIS — M81.0 AGE-RELATED OSTEOPOROSIS WITHOUT CURRENT PATHOLOGICAL FRACTURE: ICD-10-CM

## 2018-02-15 RX ORDER — OSELTAMIVIR PHOSPHATE 75 MG/1
75 CAPSULE ORAL EVERY 12 HOURS SCHEDULED
Qty: 10 CAPSULE | Refills: 0 | Status: SHIPPED | OUTPATIENT
Start: 2018-02-15 | End: 2018-02-20

## 2018-02-15 NOTE — TELEPHONE ENCOUNTER
Yes, she should  New prescription for Tamiflu sent to [facility]  Please let her know it was done     Robbin Andersen, DO

## 2018-02-15 NOTE — TELEPHONE ENCOUNTER
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 Please call back

## 2018-02-21 ENCOUNTER — HOSPITAL ENCOUNTER (OUTPATIENT)
Dept: RADIOLOGY | Facility: HOSPITAL | Age: 68
Discharge: HOME | End: 2018-02-21
Payer: MEDICARE

## 2018-02-21 DIAGNOSIS — M81.0 AGE-RELATED OSTEOPOROSIS WITHOUT CURRENT PATHOLOGICAL FRACTURE: ICD-10-CM

## 2018-02-21 PROCEDURE — 77080 DXA BONE DENSITY AXIAL: CPT

## 2018-03-02 DIAGNOSIS — E78.5 HYPERLIPIDEMIA, UNSPECIFIED HYPERLIPIDEMIA TYPE: Primary | ICD-10-CM

## 2018-03-05 RX ORDER — ATORVASTATIN CALCIUM 20 MG/1
1 TABLET, FILM COATED ORAL DAILY
COMMUNITY
End: 2018-03-05 | Stop reason: SDUPTHER

## 2018-03-05 RX ORDER — ATORVASTATIN CALCIUM 20 MG/1
20 TABLET, FILM COATED ORAL DAILY
Qty: 30 TABLET | Refills: 5 | Status: SHIPPED | OUTPATIENT
Start: 2018-03-05 | End: 2018-11-12 | Stop reason: SDUPTHER

## 2018-03-28 ENCOUNTER — OFFICE VISIT (OUTPATIENT)
Dept: NEUROLOGY | Facility: CLINIC | Age: 68
End: 2018-03-28
Payer: MEDICARE

## 2018-03-28 VITALS
HEIGHT: 71 IN | HEART RATE: 88 BPM | DIASTOLIC BLOOD PRESSURE: 80 MMHG | SYSTOLIC BLOOD PRESSURE: 128 MMHG | WEIGHT: 185 LBS | BODY MASS INDEX: 25.9 KG/M2

## 2018-03-28 DIAGNOSIS — I10 ESSENTIAL HYPERTENSION: ICD-10-CM

## 2018-03-28 DIAGNOSIS — G43.709 CHRONIC MIGRAINE WITHOUT AURA WITHOUT STATUS MIGRAINOSUS, NOT INTRACTABLE: Primary | ICD-10-CM

## 2018-03-28 PROCEDURE — 99214 OFFICE O/P EST MOD 30 MIN: CPT | Performed by: PSYCHIATRY & NEUROLOGY

## 2018-03-28 RX ORDER — VERAPAMIL HYDROCHLORIDE 120 MG/1
120 TABLET, FILM COATED ORAL 2 TIMES DAILY
Qty: 180 TABLET | Refills: 0 | Status: SHIPPED | OUTPATIENT
Start: 2018-03-28 | End: 2018-08-22 | Stop reason: SDUPTHER

## 2018-03-28 RX ORDER — FAMOTIDINE 40 MG/1
1 TABLET, FILM COATED ORAL EVERY OTHER DAY
COMMUNITY
End: 2021-01-06 | Stop reason: ALTCHOICE

## 2018-03-28 RX ORDER — OMEGA-3/DHA/EPA/FISH OIL 60 MG-90MG
1 CAPSULE ORAL DAILY
COMMUNITY
Start: 2017-10-18 | End: 2021-05-06

## 2018-03-28 RX ORDER — MAGNESIUM OXIDE 400 MG/1
1 TABLET ORAL DAILY
COMMUNITY
Start: 2010-12-02

## 2018-03-28 RX ORDER — ALENDRONATE SODIUM 70 MG/1
1 TABLET ORAL WEEKLY
COMMUNITY
Start: 2018-01-29 | End: 2018-10-22

## 2018-03-28 RX ORDER — VIT A/VIT C/VIT E/ZINC/COPPER 4296-226
1 CAPSULE ORAL DAILY
COMMUNITY
Start: 2017-10-18

## 2018-03-28 RX ORDER — VERAPAMIL HYDROCHLORIDE 120 MG/1
1 TABLET, FILM COATED ORAL 2 TIMES DAILY
COMMUNITY
End: 2018-03-28 | Stop reason: SDUPTHER

## 2018-03-28 RX ORDER — OMEPRAZOLE 40 MG/1
1 CAPSULE, DELAYED RELEASE ORAL DAILY
COMMUNITY
End: 2020-01-29 | Stop reason: ALTCHOICE

## 2018-03-28 RX ORDER — MAG HYDROX/ALUMINUM HYD/SIMETH 400-400-40
1 SUSPENSION, ORAL (FINAL DOSE FORM) ORAL DAILY
COMMUNITY

## 2018-03-28 RX ORDER — MULTIVIT-MIN/IRON/FOLIC ACID/K 18-600-40
1 CAPSULE ORAL DAILY
COMMUNITY
Start: 2017-10-18 | End: 2022-01-04 | Stop reason: DRUGHIGH

## 2018-03-28 NOTE — PROGRESS NOTES
Patient ID: Freddy Shah is a 69 y o  female  Assessment/Plan:    Migraine headache without aura    Essential hypertension    Plan continue verapamil extended release 120 mg twice a day    Patient to write the diary with the dates and severity of the headache and food intake  Return to office visit in 4    [facility] with 0 refills  Subjective:    68-year-old female with history of migraine headache without aura  Patient reported average 2 headaches per month  It can start either on the vertex head region, radiating to back of the head or it can start in the front of the head and radiates to the side of the head  Pain has been described as throbbing pain without nausea vomiting or photo phonophobia  Headache is triggered predominantly by the food such as cheese, fuentes, hot dog and chocolate  Occasionally headache can be triggered by lack of sleep also  Patient does not take any medications for her headache and it last up to several hours  No associated visual aura such as spots in front of the eyes or zigzag lines  Also denies any spinning dizzy with the headache  Denies family history of migraine headache  Pt denies double vision, blurred vision, transient monocular blindness, vertigo, tinnitus, hearing loss, slurred speech, difficulty expressing and understanding, dysphasia, and focal weakness, numbness, imbalance and incoordination  Pt denies bladder and bowel urgency, frequency and incontinence  Pt denies double vision, blurred vision, transient monocular blindness, vertigo, tinnitus, hearing loss, slurred speech, difficulty expressing and understanding, dysphasia, and focal weakness, numbness, imbalance and incoordination  Pt denies bladder and bowel urgency, frequency and incontinence         Past Medical History:   Diagnosis Date    Borderline high cholesterol     Borderline hypertension     Migraine        Family History Problem Relation Age of Onset    Angina Mother     Heart failure Mother     Prostate cancer Father    ,    Social History     Social History    Marital status: /Civil Union     Spouse name: N/A    Number of children: N/A    Years of education: N/A     Occupational History    Not on file  Social History Main Topics    Smoking status: Never Smoker    Smokeless tobacco: Never Used    Alcohol use No    Drug use: No    Sexual activity: Not on file     Other Topics Concern    Not on file     Social History Narrative    No narrative on file       Current Outpatient Prescriptions on File Prior to Visit   Medication Sig Dispense Refill    atorvastatin (LIPITOR) 20 mg tablet Take 1 tablet (20 mg total) by mouth daily 30 tablet 5     No current facility-administered medications on file prior to visit  Objective:    Blood pressure 128/80, pulse 88, height 5' 10 5" (1 791 m), weight 83 9 kg (185 lb)  Physical Exam   Eyes: EOM are normal  Pupils are equal, round, and reactive to light  Neck: Normal carotid pulses present  Carotid bruit is not present  Neurological: She has normal reflexes  Gait normal    Psychiatric: Her speech is normal        Neurological Exam    Mental Status  The patient is and oriented to person, place, time, and situation  Her recent and remote memory are normal  She has no visuospatial neglect  Her speech is normal  Her language is fluent with no aphasia  She follows multi-step commands  She has a normal fund of knowledge  Cranial Nerves    CN II: The patient's visual acuity and visual fields are normal   CN III, IV, VI: The patient's pupils are equally round and reactive to light and ocular movements are normal   CN V: The patient has normal facial sensation  CN VII:  The patient has symmetric facial movement  CN VIII:  The patient's hearing is normal   CN IX, X: The patient has symmetric palate movement and normal gag reflex    CN XI: The patient's shoulder shrug strength is normal   CN XII: The patient's tongue is midline without atrophy or fasciculations  Motor  The patient has normal muscle bulk throughout  Her overall muscle tone is normal throughout  Her strength is 5/5 in all four extremities except as noted  Sensory  The patient's sensation is normal in all four extremities to light touch, pinprick and proprioception  She has normal cortical sensation    Reflexes  Deep tendon reflexes are 2+ and symmetric in all four extremities with downgoing toes bilaterally  Gait and Coordination  The patient has normal gait and station and normal casual, toe, heel, and tandem gait  She has normal right heel to shin and normal left heel to shin coordination  She has normal right finger to nose and normal left finger to nose coordination  ROS:    Review of Systems   Constitutional: Negative  HENT: Negative  Eyes: Negative  Respiratory: Negative  Cardiovascular: Negative  Gastrointestinal: Negative  Endocrine: Negative  Genitourinary: Negative  Musculoskeletal: Negative  Skin: Negative  Allergic/Immunologic: Negative  Neurological: Negative  Hematological: Negative  Psychiatric/Behavioral: Negative

## 2018-08-22 ENCOUNTER — OFFICE VISIT (OUTPATIENT)
Dept: NEUROLOGY | Facility: CLINIC | Age: 68
End: 2018-08-22
Payer: MEDICARE

## 2018-08-22 VITALS
HEART RATE: 64 BPM | BODY MASS INDEX: 26.48 KG/M2 | WEIGHT: 185 LBS | SYSTOLIC BLOOD PRESSURE: 128 MMHG | HEIGHT: 70 IN | DIASTOLIC BLOOD PRESSURE: 84 MMHG

## 2018-08-22 DIAGNOSIS — G43.709 CHRONIC MIGRAINE WITHOUT AURA WITHOUT STATUS MIGRAINOSUS, NOT INTRACTABLE: Primary | ICD-10-CM

## 2018-08-22 DIAGNOSIS — I10 ESSENTIAL HYPERTENSION: ICD-10-CM

## 2018-08-22 PROCEDURE — 99214 OFFICE O/P EST MOD 30 MIN: CPT | Performed by: PSYCHIATRY & NEUROLOGY

## 2018-08-22 RX ORDER — VERAPAMIL HYDROCHLORIDE 120 MG/1
120 TABLET, FILM COATED ORAL 2 TIMES DAILY
Qty: 180 TABLET | Refills: 1 | Status: SHIPPED | OUTPATIENT
Start: 2018-08-22 | End: 2018-12-12 | Stop reason: SDUPTHER

## 2018-08-22 NOTE — PROGRESS NOTES
Patient ID: Denae Davila is a 77 y o  female  Assessment/Plan:    Migraine headache without aura    Recurrent headache triggered by either lack of sleep, food or it could be high blood pressure  Plan continue verapamil 120 mg extended release twice a day  Patient has been advised to take the blood pressure when she has a headache  Prescription has been sent to her pharmacy three-month supply mail  in pharmacy, with 1 refill  Patient has been advised to write the dates of the headache with severity    Return to office visit in 4 months  Subjective:    68-year-old female with a migraine without aura and now however years ago she did have a visual aura in the past   Patient was last seen on March 28, 2018 and is 5 months she has only 3 headaches most of them were caused by lack of sleep however patient reports there might be contributory factor by certain food that she eats, chocolate, fuentes and cheese  Her last headache which was this morning started unilaterally on the right side front to back followed by a pain on the left side  Headache has been described as throbbing pain at scale 5 not associated with nausea, vomiting, light sensitivity or noise sensitivity  Her usual headache duration is 3 hr but today headache is lasting several hours  She denies any visual aura nor also denies any vertigo  Patient blood pressure this afternoon is 128/84 however this morning when [facility] took it at the gym it was 164/100  This morning patient was having in fact headache during the time when blood pressure was high  Pt denies double vision, blurred vision, transient monocular blindness, vertigo, tinnitus, hearing loss, slurred speech, difficulty expressing and understanding, dysphasia, and focal weakness, numbness, imbalance and incoordination  Pt denies bladder and bowel urgency, frequency and incontinence     Pt denies double vision, blurred vision, transient monocular blindness, vertigo, tinnitus, hearing loss, slurred speech, difficulty expressing and understanding, dysphasia, and focal weakness, numbness, imbalance and incoordination  Pt denies bladder and bowel urgency, frequency and incontinence  Past Medical History:   Diagnosis Date    Borderline high cholesterol     Borderline hypertension     Migraine        Family History   Problem Relation Age of Onset    Angina Mother     Heart failure Mother     Prostate cancer Father     Diabetes Sister     Sudden death Sister     Hypertension Brother     No Known Problems Son     No Known Problems Daughter     Prostate cancer Brother     Lung cancer Brother     Sudden death Brother     COPD Sister    ,    Social History     Social History    Marital status: /Civil Union     Spouse name: N/A    Number of children: N/A    Years of education: N/A     Occupational History    Not on file       Social History Main Topics    Smoking status: Never Smoker    Smokeless tobacco: Never Used    Alcohol use No    Drug use: No    Sexual activity: Not on file     Other Topics Concern    Not on file     Social History Narrative    No narrative on file       Current Outpatient Prescriptions on File Prior to Visit   Medication Sig Dispense Refill    Ascorbic Acid (VITAMIN C) 100 MG tablet Take 1 tablet by mouth daily      atorvastatin (LIPITOR) 20 mg tablet Take 1 tablet (20 mg total) by mouth daily 30 tablet 5    Cholecalciferol (VITAMIN D3) 5000 units CAPS Take 1 capsule by mouth Daily      famotidine (PEPCID) 40 MG tablet Take 1 tablet by mouth every other day        magnesium oxide (MAG-OX) 400 mg tablet Take 1 tablet by mouth daily      Misc Natural Products (GLUCOSAMINE CHOND COMPLEX/MSM PO) Take 1 tablet by mouth Daily      Multiple Vitamin (MULTI-VITAMIN DAILY PO) Take 1 tablet by mouth daily      Multiple Vitamins-Minerals (PRESERVISION AREDS) CAPS Take 1 capsule by mouth daily      Omega-3 Fatty Acids (FISH OIL) 1,000 mg Take 1 capsule by mouth daily      omeprazole (PriLOSEC) 40 MG capsule Take 1 capsule by mouth daily      [DISCONTINUED] verapamil (CALAN) 120 mg tablet Take 1 tablet (120 mg total) by mouth 2 (two) times a day 180 tablet 0    alendronate (FOSAMAX) 70 mg tablet Take 1 tablet by mouth once a week       No current facility-administered medications on file prior to visit  Objective:    Blood pressure 128/84, pulse 64, height 5' 10" (1 778 m), weight 83 9 kg (185 lb)  Physical Exam   Eyes: EOM are normal  Pupils are equal, round, and reactive to light  Neck: Normal carotid pulses present  Carotid bruit is not present  Neurological: She has normal reflexes  Gait normal    Psychiatric: Her speech is normal        Neurological Exam    Mental Status  The patient is and oriented to person, place, time, and situation  Her recent and remote memory are normal  She has no visuospatial neglect  Her speech is normal  Her language is fluent with no aphasia  She follows multi-step commands  She has a normal fund of knowledge  Cranial Nerves    CN II: The patient's visual acuity and visual fields are normal   CN III, IV, VI: The patient's pupils are equally round and reactive to light and ocular movements are normal   CN V: The patient has normal facial sensation  CN VII:  The patient has symmetric facial movement  CN VIII:  The patient's hearing is normal   CN IX, X: The patient has symmetric palate movement and normal gag reflex  CN XI: The patient's shoulder shrug strength is normal   CN XII: The patient's tongue is midline without atrophy or fasciculations  Motor  The patient has normal muscle bulk throughout  Her overall muscle tone is normal throughout  Her strength is 5/5 in all four extremities except as noted  Sensory  The patient's sensation is normal in all four extremities to light touch, pinprick and proprioception   She has normal cortical sensation    Reflexes  Deep tendon reflexes are 2+ and symmetric in all four extremities with downgoing toes bilaterally  Gait and Coordination  The patient has normal gait and station and normal casual, toe, heel, and tandem gait  She has normal right heel to shin and normal left heel to shin coordination  She has normal right finger to nose and normal left finger to nose coordination  ROS:    Review of Systems   Constitutional: Negative  HENT: Negative  Eyes: Negative  Respiratory: Negative  Cardiovascular: Negative  Gastrointestinal: Negative  Endocrine: Negative  Genitourinary: Negative  Musculoskeletal: Negative  Skin: Negative  Allergic/Immunologic: Negative  Neurological: Negative  Hematological: Negative  Psychiatric/Behavioral: Negative

## 2018-09-11 ENCOUNTER — TELEPHONE (OUTPATIENT)
Dept: FAMILY MEDICINE CLINIC | Facility: CLINIC | Age: 68
End: 2018-09-11

## 2018-09-11 DIAGNOSIS — R11.2 NAUSEA AND VOMITING, INTRACTABILITY OF VOMITING NOT SPECIFIED, UNSPECIFIED VOMITING TYPE: Primary | ICD-10-CM

## 2018-09-11 RX ORDER — ONDANSETRON 4 MG/1
4 TABLET, FILM COATED ORAL EVERY 8 HOURS PRN
Qty: 20 TABLET | Refills: 0 | Status: SHIPPED | OUTPATIENT
Start: 2018-09-11 | End: 2018-12-12

## 2018-09-11 NOTE — TELEPHONE ENCOUNTER
Talked to patient  Stated that was at bus trip from 9/6 to 9/9 and other people were sick and patient started with vomiting and diarrhea on 9 /7 and resolved on 9/9  Then restarted with diarrhea and vomiting on 9/10 , eating less but having mild chills  Denies fever, abdominal pain, sob, chest pain and dizziness  Zofran ordered for vomiting  Eat low residue diet with food low in fiber and follow bland diet  Avoid Caffeine for 2 weeks  Avoid dairy products  Increase your fluid intake  Keep a record of the number of times you urinate during the day  You may slowly resume your normal level of activity once you feel better  Pepto Bismol as needed  Follow up for no improvement and worsening of symptoms and for fever, localized and severe abdominal pain, recurrent nausea, vomiting, and diarrhea  Strict ER precautions advised for any worsening

## 2018-09-11 NOTE — TELEPHONE ENCOUNTER
DR WEBSTER    Please call back patient wants to know how to get rid of her virus of vomiting and diarrhea

## 2018-10-22 ENCOUNTER — OFFICE VISIT (OUTPATIENT)
Dept: CARDIOLOGY CLINIC | Facility: CLINIC | Age: 68
End: 2018-10-22
Payer: MEDICARE

## 2018-10-22 VITALS
SYSTOLIC BLOOD PRESSURE: 132 MMHG | DIASTOLIC BLOOD PRESSURE: 78 MMHG | WEIGHT: 184 LBS | BODY MASS INDEX: 27.25 KG/M2 | HEIGHT: 69 IN | HEART RATE: 64 BPM | OXYGEN SATURATION: 98 %

## 2018-10-22 DIAGNOSIS — E04.2 MULTINODULAR GOITER: ICD-10-CM

## 2018-10-22 DIAGNOSIS — K21.9 GASTROESOPHAGEAL REFLUX DISEASE WITHOUT ESOPHAGITIS: ICD-10-CM

## 2018-10-22 DIAGNOSIS — R06.00 DYSPNEA ON EXERTION: Primary | ICD-10-CM

## 2018-10-22 DIAGNOSIS — N18.30 CHRONIC KIDNEY DISEASE, STAGE III (MODERATE) (HCC): ICD-10-CM

## 2018-10-22 DIAGNOSIS — M17.0 PRIMARY OSTEOARTHRITIS OF BOTH KNEES: ICD-10-CM

## 2018-10-22 DIAGNOSIS — I10 ESSENTIAL HYPERTENSION: ICD-10-CM

## 2018-10-22 DIAGNOSIS — E66.3 OVERWEIGHT (BMI 25.0-29.9): ICD-10-CM

## 2018-10-22 DIAGNOSIS — E78.5 DYSLIPIDEMIA: ICD-10-CM

## 2018-10-22 PROCEDURE — 99214 OFFICE O/P EST MOD 30 MIN: CPT | Performed by: INTERNAL MEDICINE

## 2018-10-22 PROCEDURE — 93000 ELECTROCARDIOGRAM COMPLETE: CPT | Performed by: INTERNAL MEDICINE

## 2018-10-22 NOTE — PATIENT INSTRUCTIONS
1   Continue present medication  2   Recommended that patient begin exercise on stair climber at [facility] in order to improve stair climbing capacity  3   Cardiology follow-up in 1 year with EKG, lipids, CMP

## 2018-10-22 NOTE — PROGRESS NOTES
Cardiology Progress Note    ASSESSMENT:    1  Exertional dyspnea with stair climbing but not with exercise at [facility], probably related to progressive overweight with 17 pound weight gain over the past 6 years  2  Controlled essential hypertension  3  Controlled dyslipidemia with atorvastatin  4  History of normal exercise stress test 10/13/15 and benign echocardiogram on 5/1/13  5  History of chronic nonproductive cough, possibly caused by GERD  6  Resolved hypercalcemia with parathyroidectomy 11/30/11  7  Status post nasal polypectomy October, 2012 and [facility]  October, 2012 with right cataract extraction 9/13  8  Chronic stable multinodular goiter with latest thyroid ultrasound 5/30/17  9  Resolved mild chronic kidney disease, stage III  10  Chronic fatigue and tiredness with consideration of adverse drug reaction to verapamil  11  Osteoarthritis of both knees, right more than left  Plan       Patient Instructions     1  Continue present medication  2   Recommended that patient begin exercise on stair climber at [facility] in order to improve stair climbing capacity  3   Cardiology follow-up in 1 year with EKG, lipids, CMP  HPI    This 77 y o  female  denies new cardiopulmonary and medical symptoms  She continues to complain of exertional dyspnea with stair climbing, complains of chronic fatigue, has intermittent bilateral discomfort in her knees, right more than left, and continues to attend [employer] and the [facility] 5 days a week  There have been no emergent or surgical interventions since last year        Review of Systems    All other systems negative, except as noted in history of present illness    Historical Information   Past Medical History:   Diagnosis Date    Borderline high cholesterol     Borderline hypertension     Migraine      Past Surgical History:   Procedure Laterality Date    BUNIONECTOMY      CATARACT EXTRACTION, BILATERAL      PARATHYROID GLAND SURGERY       History   Alcohol Use No     History   Drug Use No     History   Smoking Status    Never Smoker   Smokeless Tobacco    Never Used       Family History:  Family History   Problem Relation Age of Onset    Angina Mother     Heart failure Mother     Prostate cancer Father     Diabetes Sister    [facility] Sudden death Sister     Hypertension Brother     No Known Problems Son     No Known Problems Daughter     Prostate cancer Brother     Lung cancer Brother     Sudden death Brother     COPD Sister          Meds/Allergies     Prior to Admission medications    Medication Sig Start Date End Date Taking?  Authorizing Provider   Ascorbic Acid (VITAMIN C) 100 MG tablet Take 1 tablet by mouth daily 10/18/17  Yes Historical Provider, MD   atorvastatin (LIPITOR) 20 mg tablet Take 1 tablet (20 mg total) by mouth daily 3/5/18  Yes Lorenza Thomas MD   Cholecalciferol (VITAMIN D3) 5000 units CAPS Take 1 capsule by mouth Daily   Yes Historical Provider, MD   famotidine (PEPCID) 40 MG tablet Take 1 tablet by mouth every other day     Yes Historical Provider, MD   magnesium oxide (MAG-OX) 400 mg tablet Take 1 tablet by mouth daily 12/2/10  Yes Historical Provider, MD   Misc Natural Products (GLUCOSAMINE CHOND COMPLEX/MSM PO) Take 1 tablet by mouth Daily   Yes Historical Provider, MD   Multiple Vitamin (MULTI-VITAMIN DAILY PO) Take 1 tablet by mouth daily 10/18/17  Yes Historical Provider, MD   Multiple Vitamins-Minerals (PRESERVISION AREDS) CAPS Take 1 capsule by mouth daily 10/18/17  Yes Historical Provider, MD   Omega-3 Fatty Acids (FISH OIL) 1,000 mg Take 1 capsule by mouth daily 10/18/17  Yes Historical Provider, MD   omeprazole (PriLOSEC) 40 MG capsule Take 1 capsule by mouth daily   Yes Historical Provider, MD   ondansetron (ZOFRAN) 4 mg tablet Take 1 tablet (4 mg total) by mouth every 8 (eight) hours as needed for nausea or vomiting 9/11/18  Yes JANETT Deshpande   verapamil (CALAN) 120 mg tablet Take 1 tablet (120 mg total) by mouth 2 (two) times a day 8/22/18  Yes Steve Mora MD   alendronate (FOSAMAX) 70 mg tablet Take 1 tablet by mouth once a week 1/29/18 10/22/18  Historical Provider, MD   verapamil (CALAN-SR) 120 mg CR tablet  8/22/18 10/22/18  Historical Provider, MD       No Known Allergies      Vitals:    10/22/18 1255   BP: 132/78   BP Location: Right arm   Patient Position: Sitting   Cuff Size: Large   Pulse: 64   SpO2: 98%   Weight: 83 5 kg (184 lb)   Height: 5' 9" (1 753 m)       Body mass index is 27 17 kg/m²    1 1 pound weight loss to 1 year ago    Physical Exam:    General Appearance:  Alert, cooperative, no distress, appears stated age   Head:  Normocephalic, without obvious abnormality, atraumatic   Eyes:  PERRL, conjunctiva/corneas clear, EOM's intact,   both eyes   Ears:  Normal TM's and external ear canals, both ears   Nose: Nares normal, septum midline, mucosa normal, no drainage or sinus tenderness   Throat: Lips, mucosa, and tongue normal; teeth and gums normal   Neck: Supple, symmetrical, trachea midline, no adenopathy, thyroid: not enlarged, symmetric, no tenderness/mass/nodules, no carotid bruit or JVD   Back:   Symmetric, no curvature, ROM normal, no CVA tenderness   Lungs:   Clear to auscultation bilaterally, respirations unlabored   Chest Wall:  No tenderness or deformity   Heart:  Regular rate and rhythm, S1, S2 normal, no murmur, rub or gallop   Abdomen:   Soft, non-tender, bowel sounds active all four quadrants,  no masses, no organomegaly   Extremities: Extremities normal, atraumatic, no cyanosis or edema   Pulses: 2+ and symmetric   Skin: Skin showed normal color, texture, turgor and no rashes or lesions   Lymph nodes: Cervical, supraclavicular, and axillary nodes normal   Neurologic: Normal         Cardiographics    ECG 10/22/2018 :    Leftward frontal plane QRS axis  LVH voltage  Small Q-wave in V2  Resolved RV conduction delay and poor R-wave progression compared to 10/18/2017    Imaging    Chest X-Ray :  No Chest XR results available for this patient            Lab Review     [facility] lab data from 10/15/2018:    Normal CMP  Cholesterol 157, , HDL 55, LDL 73    Lab Results   Component Value Date     01/03/2017    K 4 6 01/03/2017     01/03/2017    CO2 24 01/03/2017    ANIONGAP 11 7 10/09/2015    BUN 15 01/03/2017    CREATININE 0 88 01/03/2017    GLUCOSE 94 01/03/2017    CALCIUM 9 3 01/03/2017    AST 24 01/03/2017    ALT 29 01/03/2017    ALKPHOS 36 (L) 01/03/2017    PROT 6 5 01/03/2017    BILITOT 0 3 01/03/2017       Lab Results   Component Value Date    CHOL 178 10/09/2015     Lab Results   Component Value Date    HDL 59 10/09/2015     Lab Results   Component Value Date    LDLCALC 100 10/09/2015     Lab Results   Component Value Date    TRIG 95 10/09/2015     No components found for: [place], [place]    Lab Results   Component Value Date    GLUCOSE 94 01/03/2017    CALCIUM 9 3 01/03/2017     01/03/2017    K 4 6 01/03/2017    CO2 24 01/03/2017     01/03/2017    BUN 15 01/03/2017    CREATININE 0 88 01/03/2017           Syeda Catherine MD

## 2018-11-12 DIAGNOSIS — E78.5 HYPERLIPIDEMIA, UNSPECIFIED HYPERLIPIDEMIA TYPE: ICD-10-CM

## 2018-11-12 RX ORDER — ATORVASTATIN CALCIUM 20 MG/1
20 TABLET, FILM COATED ORAL DAILY
Qty: 90 TABLET | Refills: 3 | Status: SHIPPED | OUTPATIENT
Start: 2018-11-12 | End: 2020-05-04

## 2018-11-14 ENCOUNTER — OFFICE VISIT (OUTPATIENT)
Dept: ENDOCRINOLOGY | Facility: CLINIC | Age: 68
End: 2018-11-14
Payer: MEDICARE

## 2018-11-14 VITALS
HEIGHT: 69 IN | WEIGHT: 187.2 LBS | BODY MASS INDEX: 27.73 KG/M2 | SYSTOLIC BLOOD PRESSURE: 138 MMHG | DIASTOLIC BLOOD PRESSURE: 74 MMHG | HEART RATE: 70 BPM

## 2018-11-14 DIAGNOSIS — E55.9 VITAMIN D DEFICIENCY: ICD-10-CM

## 2018-11-14 DIAGNOSIS — Z86.39 H/O HYPERPARATHYROIDISM: ICD-10-CM

## 2018-11-14 DIAGNOSIS — M81.0 OSTEOPOROSIS, UNSPECIFIED OSTEOPOROSIS TYPE, UNSPECIFIED PATHOLOGICAL FRACTURE PRESENCE: ICD-10-CM

## 2018-11-14 DIAGNOSIS — E04.2 MULTINODULAR GOITER: ICD-10-CM

## 2018-11-14 DIAGNOSIS — E21.3 HYPERPARATHYROIDISM (HCC): Primary | ICD-10-CM

## 2018-11-14 PROCEDURE — 99204 OFFICE O/P NEW MOD 45 MIN: CPT | Performed by: INTERNAL MEDICINE

## 2018-11-14 RX ORDER — PNV NO.95/FERROUS FUM/FOLIC AC 28MG-0.8MG
1 TABLET ORAL DAILY
COMMUNITY
End: 2018-12-12

## 2018-11-14 RX ORDER — LANOLIN ALCOHOL/MO/W.PET/CERES
400 CREAM (GRAM) TOPICAL DAILY
COMMUNITY
End: 2018-11-14 | Stop reason: DRUGHIGH

## 2018-11-14 RX ORDER — GLUCOSAMINE/D3/BOSWELLIA SERRA 1500MG-400
1 TABLET ORAL DAILY
COMMUNITY
End: 2018-11-14 | Stop reason: DRUGHIGH

## 2018-11-14 RX ORDER — VITAMIN E 268 MG
400 CAPSULE ORAL DAILY
COMMUNITY
End: 2018-11-14 | Stop reason: DRUGHIGH

## 2018-11-14 NOTE — LETTER
November 14, 2018     Jermaine Avila DO  [address] [facility] [address]    Patient: Nicole Moore   YOB: 1950   Date of Visit: 11/14/2018       Dear Dr Caitlyn Benoit: Thank you for referring Nicole Moore to me for evaluation  Below are my notes for this consultation  If you have questions, please do not hesitate to call me  I look forward to following your patient along with you  Sincerely,        Lisa Meneses MD        CC: No Recipients  Lisa Meneses MD  11/14/2018 10:43 AM  Signed    New Patient Progress Note      Referring Provider  Jermaine Avila Do  [address] [address]     History of Present Illness:     77 yr old woman with PMH of osteoporosis, history of primary hyperparathyroidism status post parathyroid adenoma surgery in 2011, vitamin-D deficiency, hyperlipidemia is here for follow-up  She was seeing Dr Lizy Chao for endocrinology problems, however her insurance is changed and now she is going to follow up with us  I have reviewed medical records and notes from previous endocrinologist     She has history of multinodular goiter, which is monitored by yearly thyroid ultrasound  She does not give history of biopsy on thyroid nodules  She denies obstructive symptoms  Recent thyroid ultrasound reviewed from May 2017 which showed right gland is 4 3 x 1 7 x 1 2 cm  There is a mid pole nodule which is 0 5 x 0 5 x 0 5 cm stable in size and morphology  Left gland is 3 9 x 1 2 x 1 5 cm there are 3 nodules in left gland, upper pole 0 9 x 0 5 x 0 8 cm with no internal vascularity, stable in size and morphology  Midpole nodule 0 7 x 0 5 x 0 5 cm with internal vascularity, stable in size    Lower pole nodule which is 0 6 x 0 4 x 0 6 cm, stable in size, normal vascularity    Patient denies history of radiation to the neck, face or chest area, she denies history of thyroid cancer in family  She denies history of hyperthyroidism or hypothyroidism    She also has history of osteoporosis for which she was treated with oral bisphosphonates for last 7 years, she has been of the bisphosphonate since May 2018  She denies history of major fracture bones  She denies history of falls  Most recent DEXA scan from February 2018 showed lumbar spine T-score-1 7, left total hip T-score -1 9, left femoral neck T-score-2 1, total right hip T-score -2 3 femoral right hip T-score -2 3  There has been no significant change in total bone density of lumbar spine when allowing for less significant change  There has been 3% increase in mean total bone density of bilateral hips       Patient is currently taking vitamin-D supplementation 5000 International Units daily  She is currently taking calcium 500 mg daily    Reviewed most recent blood work from October 15, 2018, glucose 94 creatinine 0 9 GFR 72, sodium 139 potassium 4 7 calcium 9 5 AST 22 ALT 25 cholesterol 157, triglycerides 143 HDL 55 LDL 73  Patient Active Problem List   Diagnosis    Chronic migraine without aura without status migrainosus, not intractable    Essential hypertension     Past Medical History:   Diagnosis Date    Borderline high cholesterol     Borderline hypertension     Migraine       Past Surgical History:   Procedure Laterality Date    BUNIONECTOMY      CATARACT EXTRACTION, BILATERAL      PARATHYROID GLAND SURGERY        Family History   Problem Relation Age of Onset    Angina Mother     Heart failure Mother     Prostate cancer Father     Diabetes Sister     Sudden death Sister     Osteoporosis Sister     Hypertension Brother     No Known Problems Son     No Known Problems Daughter     Prostate cancer Brother     Lung cancer Brother     Sudden death Brother     COPD Sister      Social History   Substance Use Topics    Smoking status: Never Smoker    Smokeless tobacco: Never Used    Alcohol use No     No Known Allergies  [unfilled]    Review of Systems   Constitutional: Positive for fatigue  Negative for activity change, appetite change, chills and unexpected weight change  Respiratory: Positive for cough  Endocrine: Negative for polydipsia, polyphagia and polyuria  Genitourinary: Negative  Musculoskeletal: Positive for arthralgias and back pain  Neurological: Positive for dizziness  Hematological: Negative  Psychiatric/Behavioral: Negative  Physical Exam:  Body mass index is 27 64 kg/m²  /74   Pulse 70   Ht 5' 9" (1 753 m)   Wt 84 9 kg (187 lb 3 2 oz)   BMI 27 64 kg/m²     Wt Readings from Last 3 Encounters:   11/14/18 84 9 kg (187 lb 3 2 oz)   10/22/18 83 5 kg (184 lb)   08/22/18 83 9 kg (185 lb)        Physical Exam   Constitutional: She is oriented to person, place, and time  She appears well-developed and well-nourished  No distress  HENT:   Head: Normocephalic and atraumatic  Eyes: Conjunctivae and EOM are normal  Right eye exhibits no discharge  Left eye exhibits no discharge  Neck: Normal range of motion  Neck supple  No thyromegaly present  Cardiovascular: Normal rate, regular rhythm and normal heart sounds  No murmur heard  Pulmonary/Chest: Effort normal and breath sounds normal  No respiratory distress  She has no wheezes  Abdominal: Soft  Bowel sounds are normal  She exhibits no distension  There is no tenderness  Musculoskeletal: Normal range of motion  She exhibits no edema, tenderness or deformity  Neurological: She is alert and oriented to person, place, and time  She has normal reflexes  No cranial nerve deficit  She exhibits normal muscle tone  Coordination normal    Skin: Skin is warm and dry  No rash noted  She is not diaphoretic  No erythema  Psychiatric: She has a normal mood and affect  Her behavior is normal    Vitals reviewed      Diabetic Foot Exam    Labs:   No components found for: HA1C  No components found for: GLU    Lab Results   Component Value Date    CREATININE 0 88 01/03/2017    CREATININE 1 0 10/09/2015 BUN 15 01/03/2017     01/03/2017    K 4 6 01/03/2017     01/03/2017    CO2 24 01/03/2017     No results found for: EGFR  No components found for: [facility]    Lab Results   Component Value Date    CHOL 178 10/09/2015    HDL 59 10/09/2015    TRIG 95 10/09/2015       Lab Results   Component Value Date    ALT 29 01/03/2017    AST 24 01/03/2017    ALKPHOS 36 (L) 01/03/2017    BILITOT 0 3 01/03/2017       No results found for: TSH, FREET4, TSI    Impression:  1  Hyperparathyroidism ([facility] [address] )    2  Osteoporosis, unspecified osteoporosis type, unspecified pathological fracture presence    3  H/O hyperparathyroidism    4  Vitamin D deficiency    5  Multinodular goiter         Plan:    Diagnoses and all orders for this visit:    Hyperparathyroidism ([facility] [address] )  Resolved, status post parathyroid adenoma surgery  Most recent calcium is normal      -     Basic metabolic panel; Future  -     PTH, intact- Lab Collect; Future    Osteoporosis, unspecified osteoporosis type, unspecified pathological fracture presence  Most recent DEXA scan showed stable bone mineral density  As patient has taken bisphosphonates for 7 years, will give drug holiday  Will continue to monitor DEXA scan, BMD, every 2 years to ensure there is no worsening  Discussed with patient about weight-bearing exercises as well as fall precautions  I have ordered TSH, free T4 and thyroid antibodies as patient complains of fatigue and given history of multinodular goiter  -     TSH, 3rd generation; Future  -     T4, free; Future  -     Thyroid Antibodies Panel; Future    H/O hyperparathyroidism  Resolved  Calcium is in stable range  Will repeat PTH and vitamin-D before next appointment  -     Vitamin D 25 hydroxy; Future  -     TSH, 3rd generation; Future  -     T4, free; Future  -     Thyroid Antibodies Panel; Future  -     Basic metabolic panel; Future  -     PTH, intact- Lab Collect;  Future    Vitamin D deficiency  Obtain vitamin-D level  Continue vitamin-D supplementation 5000 International Units daily    -     Vitamin D 25 hydroxy; Future  -     Vitamin D 25 hydroxy; Future    Multinodular goiter  Will monitor thyroid nodule size by thyroid ultrasound before next appointment  No obstructive symptoms  Educated to call if she has difficulty swallowing  -     US thyroid; Future  -     T4, free; Future  -     TSH, 3rd generation; Future        Discussed with the patient and all questioned fully answered  She will call me if any problems arise          Leighton Recinos MD

## 2018-11-14 NOTE — PROGRESS NOTES
New Patient Progress Note      Referring Provider  Jim Mccormick, Do  [address] [facility] - [place], [place] [address]     History of Present Illness:     77 yr old woman with PMH of osteoporosis, history of primary hyperparathyroidism status post parathyroid adenoma surgery in 2011, vitamin-D deficiency, hyperlipidemia is here for follow-up  She was seeing Dr Aimee Calderon for endocrinology problems, however her insurance is changed and now she is going to follow up with us  I have reviewed medical records and notes from previous endocrinologist     She has history of multinodular goiter, which is monitored by yearly thyroid ultrasound  She does not give history of biopsy on thyroid nodules  She denies obstructive symptoms  Recent thyroid ultrasound reviewed from May 2017 which showed right gland is 4 3 x 1 7 x 1 2 cm  There is a mid pole nodule which is 0 5 x 0 5 x 0 5 cm stable in size and morphology  Left gland is 3 9 x 1 2 x 1 5 cm there are 3 nodules in left gland, upper pole 0 9 x 0 5 x 0 8 cm with no internal vascularity, stable in size and morphology  Midpole nodule 0 7 x 0 5 x 0 5 cm with internal vascularity, stable in size    Lower pole nodule which is 0 6 x 0 4 x 0 6 cm, stable in size, normal vascularity    Patient denies history of radiation to the neck, face or chest area, she denies history of thyroid cancer in family  She denies history of hyperthyroidism or hypothyroidism    She also has history of osteoporosis for which she was treated with oral bisphosphonates for last 7 years, she has been of the bisphosphonate since May 2018  She denies history of major fracture bones  She denies history of falls  Most recent DEXA scan from February 2018 showed lumbar spine T-score-1 7, left total hip T-score -1 9, left femoral neck T-score-2 1, total right hip T-score -2 3 femoral right hip T-score -2 3  There has been no significant change in total bone density of lumbar spine when allowing for less significant change  There has been 3% increase in mean total bone density of bilateral hips       Patient is currently taking vitamin-D supplementation 5000 International Units daily  She is currently taking calcium 500 mg daily    Reviewed most recent blood work from October 15, 2018, glucose 94 creatinine 0 9 GFR 72, sodium 139 potassium 4 7 calcium 9 5 AST 22 ALT 25 cholesterol 157, triglycerides 143 HDL 55 LDL 73  Patient Active Problem List   Diagnosis    Chronic migraine without aura without status migrainosus, not intractable    Essential hypertension     Past Medical History:   Diagnosis Date    Borderline high cholesterol     Borderline hypertension     Migraine       Past Surgical History:   Procedure Laterality Date    BUNIONECTOMY      CATARACT EXTRACTION, BILATERAL      PARATHYROID GLAND SURGERY        Family History   Problem Relation Age of Onset    Angina Mother     Heart failure Mother     Prostate cancer Father     Diabetes Sister     Sudden death Sister     Osteoporosis Sister     Hypertension Brother     No Known Problems Son     No Known Problems Daughter     Prostate cancer Brother     Lung cancer Brother     Sudden death Brother     COPD Sister      Social History   Substance Use Topics    Smoking status: Never Smoker    Smokeless tobacco: Never Used    Alcohol use No     No Known Allergies  [unfilled]    Review of Systems   Constitutional: Positive for fatigue  Negative for activity change, appetite change, chills and unexpected weight change  Respiratory: Positive for cough  Endocrine: Negative for polydipsia, polyphagia and polyuria  Genitourinary: Negative  Musculoskeletal: Positive for arthralgias and back pain  Neurological: Positive for dizziness  Hematological: Negative  Psychiatric/Behavioral: Negative  Physical Exam:  Body mass index is 27 64 kg/m²    /74   Pulse 70   Ht 5' 9" (1 753 m)   Wt 84 9 kg (187 lb 3 2 oz)   BMI 27 64 kg/m²    Wt Readings from Last 3 Encounters:   11/14/18 84 9 kg (187 lb 3 2 oz)   10/22/18 83 5 kg (184 lb)   08/22/18 83 9 kg (185 lb)        Physical Exam   Constitutional: She is oriented to person, place, and time  She appears well-developed and well-nourished  No distress  HENT:   Head: Normocephalic and atraumatic  Eyes: Conjunctivae and EOM are normal  Right eye exhibits no discharge  Left eye exhibits no discharge  Neck: Normal range of motion  Neck supple  No thyromegaly present  Cardiovascular: Normal rate, regular rhythm and normal heart sounds  No murmur heard  Pulmonary/Chest: Effort normal and breath sounds normal  No respiratory distress  She has no wheezes  Abdominal: Soft  Bowel sounds are normal  She exhibits no distension  There is no tenderness  Musculoskeletal: Normal range of motion  She exhibits no edema, tenderness or deformity  Neurological: She is alert and oriented to person, place, and time  She has normal reflexes  No cranial nerve deficit  She exhibits normal muscle tone  Coordination normal    Skin: Skin is warm and dry  No rash noted  She is not diaphoretic  No erythema  Psychiatric: She has a normal mood and affect  Her behavior is normal    Vitals reviewed  Diabetic Foot Exam    Labs:   No components found for: HA1C  No components found for: GLU    Lab Results   Component Value Date    CREATININE 0 88 01/03/2017    CREATININE 1 0 10/09/2015    BUN 15 01/03/2017     01/03/2017    K 4 6 01/03/2017     01/03/2017    CO2 24 01/03/2017     No results found for: EGFR  No components found for: [facility]    Lab Results   Component Value Date    CHOL 178 10/09/2015    HDL 59 10/09/2015    TRIG 95 10/09/2015       Lab Results   Component Value Date    ALT 29 01/03/2017    AST 24 01/03/2017    ALKPHOS 36 (L) 01/03/2017    BILITOT 0 3 01/03/2017       No results found for: TSH, FREET4, TSI    Impression:  1  Hyperparathyroidism ([address] )    2   Osteoporosis, unspecified osteoporosis type, unspecified pathological fracture presence    3  H/O hyperparathyroidism    4  Vitamin D deficiency    5  Multinodular goiter         Plan:    Diagnoses and all orders for this visit:    Hyperparathyroidism ([address] )  Resolved, status post parathyroid adenoma surgery  Most recent calcium is normal      -     Basic metabolic panel; Future  -     PTH, intact- Lab Collect; Future    Osteoporosis, unspecified osteoporosis type, unspecified pathological fracture presence  Most recent DEXA scan showed stable bone mineral density  As patient has taken bisphosphonates for 7 years, will give drug holiday  Will continue to monitor DEXA scan, BMD, every 2 years to ensure there is no worsening  Discussed with patient about weight-bearing exercises as well as fall precautions  I have ordered TSH, free T4 and thyroid antibodies as patient complains of fatigue and given history of multinodular goiter  -     TSH, 3rd generation; Future  -     T4, free; Future  -     Thyroid Antibodies Panel; Future    H/O hyperparathyroidism  Resolved  Calcium is in stable range  Will repeat PTH and vitamin-D before next appointment  -     Vitamin D 25 hydroxy; Future  -     TSH, 3rd generation; Future  -     T4, free; Future  -     Thyroid Antibodies Panel; Future  -     Basic metabolic panel; Future  -     PTH, intact- Lab Collect; Future    Vitamin D deficiency  Obtain vitamin-D level  Continue vitamin-D supplementation 5000 International Units daily    -     Vitamin D 25 hydroxy; Future  -     Vitamin D 25 hydroxy; Future    Multinodular goiter  Will monitor thyroid nodule size by thyroid ultrasound before next appointment  No obstructive symptoms  Educated to call if she has difficulty swallowing  -     US thyroid; Future  -     T4, free; Future  -     TSH, 3rd generation; Future        Discussed with the patient and all questioned fully answered  She will call me if any problems arise          Beatrice Lara, MD

## 2018-11-14 NOTE — LETTER
November 14, 2018     Lennie Valdez,   [address] [place] [address]    Patient: Rosalie James   YOB: 1950   Date of Visit: 11/14/2018     Dear Dr Michaela Proctor      Thank you for referring Rosalie James to me for evaluation  Below are the relevant portions of my assessment and plan of care  If you have questions, please do not hesitate to call me  I look forward to following Valentino Cecil along with you           Sincerely,        Abhinav Francois MD        CC: No Recipients    Progress Notes:

## 2018-11-15 LAB
25(OH)D3+25(OH)D2 SERPL-MCNC: 45.4 NG/ML (ref 30–100)
T4 FREE SERPL-MCNC: 1.22 NG/DL (ref 0.82–1.77)
THYROGLOB AB SERPL-ACNC: <1 IU/ML (ref 0–0.9)
THYROPEROXIDASE AB SERPL-ACNC: 22 IU/ML (ref 0–34)
TSH SERPL DL<=0.005 MIU/L-ACNC: 2.58 UIU/ML (ref 0.45–4.5)

## 2018-11-16 NOTE — PROGRESS NOTES
Please inform patient that thyroid blood work is normal, including thyroid antibodies and vitamin D is normal, continue current dose of vitamin D

## 2018-11-19 ENCOUNTER — TELEPHONE (OUTPATIENT)
Dept: ENDOCRINOLOGY | Facility: CLINIC | Age: 68
End: 2018-11-19

## 2018-11-19 NOTE — TELEPHONE ENCOUNTER
----- Message from Nick Coburn MD sent at 11/16/2018  9:52 AM EST -----  Please inform patient that thyroid blood work is normal, including thyroid antibodies and vitamin D is normal, continue current dose of vitamin D

## 2018-12-12 ENCOUNTER — OFFICE VISIT (OUTPATIENT)
Dept: NEUROLOGY | Facility: CLINIC | Age: 68
End: 2018-12-12
Payer: MEDICARE

## 2018-12-12 VITALS
DIASTOLIC BLOOD PRESSURE: 78 MMHG | HEART RATE: 72 BPM | SYSTOLIC BLOOD PRESSURE: 130 MMHG | RESPIRATION RATE: 18 BRPM | BODY MASS INDEX: 27.25 KG/M2 | HEIGHT: 69 IN | WEIGHT: 184 LBS

## 2018-12-12 DIAGNOSIS — G43.709 CHRONIC MIGRAINE WITHOUT AURA WITHOUT STATUS MIGRAINOSUS, NOT INTRACTABLE: Primary | ICD-10-CM

## 2018-12-12 DIAGNOSIS — I10 ESSENTIAL HYPERTENSION: ICD-10-CM

## 2018-12-12 PROCEDURE — 99214 OFFICE O/P EST MOD 30 MIN: CPT | Performed by: PSYCHIATRY & NEUROLOGY

## 2018-12-12 RX ORDER — VERAPAMIL HYDROCHLORIDE 120 MG/1
120 TABLET, FILM COATED ORAL 2 TIMES DAILY
Qty: 180 TABLET | Refills: 0 | Status: SHIPPED | OUTPATIENT
Start: 2018-12-12 | End: 2019-04-10 | Stop reason: DRUGHIGH

## 2018-12-12 NOTE — PROGRESS NOTES
Patient ID: Tenisha Aguilar is a 77 y o  female  Assessment/Plan:    Migraine without aura    Plan continue verapamil  mg twice a day    Patient has just started a 3 month supply see at least have a 2 month supply left at home so I have sent another three-month supply without any refill to her pharmacy  I will see the patient in 4 months  Patient has been advised to make a calendar with severity and frequency of the headache  Patient should also right the trigger factors for individual headaches  Subjective:    70-year-old female followed in the office for migraine without aura  Patient was last seen in the office was August 22nd and since that time she has a total of 6 headaches 4 of which were located in the back of the head and 2 were in the front of the head  Two headaches were at scale 6 and 4 headaches were at scale 4  Pain has been described as a throbbing pain associated with light sensitivity however there is no noise sensitivity or nausea or vomiting  Headache usually comes around 4-5 p m  Any it last till the patient goes to the sleep however patient refused to take any analgesic medication  She reported that this headaches are not debilitating and she can function through the headache  Denies any visual aura such as a spots in front of the eyes zigzag line or loss of visual field or any vertigo attack  Patient strongly believes that her headaches are related to lack of sleep and predominantly food such as chocolate, fuentes and sausage and lot of cheese  Pt denies double vision, blurred vision, transient monocular blindness, vertigo, tinnitus, hearing loss, slurred speech, difficulty expressing and understanding, dysphasia, and focal weakness, numbness, imbalance and incoordination  Pt denies bladder and bowel urgency, frequency and incontinence     Pt denies double vision, blurred vision, transient monocular blindness, vertigo, tinnitus, hearing loss, slurred speech, difficulty expressing and understanding, dysphasia, and focal weakness, numbness, imbalance and incoordination  Pt denies bladder and bowel urgency, frequency and incontinence  Past Medical History:   Diagnosis Date    Borderline high cholesterol     Borderline hypertension     Migraine        Family History   Problem Relation Age of Onset    Angina Mother     Heart failure Mother     Prostate cancer Father     Diabetes Sister    [facility] Sudden death Sister     Osteoporosis Sister     Hypertension Brother     No Known Problems Son     No Known Problems Daughter     Prostate cancer Brother     Lung cancer Brother     Sudden death Brother     COPD Sister    ,    Social History     Social History    Marital status: /Civil Union     Spouse name: N/A    Number of children: N/A    Years of education: N/A     Occupational History    Not on file       Social History Main Topics    Smoking status: Never Smoker    Smokeless tobacco: Never Used    Alcohol use No    Drug use: No    Sexual activity: Not on file     Other Topics Concern    Not on file     Social History Narrative    No narrative on file       Current Outpatient Prescriptions on File Prior to Visit   Medication Sig Dispense Refill    Ascorbic Acid (VITAMIN C) 500 MG CAPS Take 1 tablet by mouth daily      atorvastatin (LIPITOR) 20 mg tablet Take 1 tablet (20 mg total) by mouth daily 90 tablet 3    B Complex Vitamins (B COMPLEX PO) Take 1 tablet by mouth daily      Cholecalciferol (VITAMIN D3) 5000 units CAPS Take 1 capsule by mouth Daily      famotidine (PEPCID) 40 MG tablet Take 1 tablet by mouth every other day        magnesium oxide (MAG-OX) 400 mg tablet Take 1 tablet by mouth daily With zinc       Misc Natural Products (GLUCOSAMINE CHOND COMPLEX/MSM PO) Take 1 tablet by mouth Daily 1000 mg       Multiple Vitamin (MULTI-VITAMIN DAILY PO) Take 1 tablet by mouth daily      Multiple Vitamins-Minerals (PRESERVISION AREDS) CAPS Take 1 capsule by mouth daily      Omega-3 Fatty Acids (FISH OIL) 500 MG CAPS Take 1 capsule by mouth daily      omeprazole (PriLOSEC) 40 MG capsule Take 1 capsule by mouth daily      Probiotic Product (PROBIOTIC-10 PO) Take 1 capsule by mouth daily      [DISCONTINUED] verapamil (CALAN) 120 mg tablet Take 1 tablet (120 mg total) by mouth 2 (two) times a day 180 tablet 1    [DISCONTINUED] Ferrous Sulfate (IRON) 325 (65 Fe) MG TABS Take 1 tablet by mouth daily      [DISCONTINUED] ondansetron (ZOFRAN) 4 mg tablet Take 1 tablet (4 mg total) by mouth every 8 (eight) hours as needed for nausea or vomiting (Patient not taking: Reported on 11/14/2018 ) 20 tablet 0     No current facility-administered medications on file prior to visit  Objective:    Blood pressure 130/78, pulse 72, resp  rate 18, height 5' 9" (1 753 m), weight 83 5 kg (184 lb)  Physical Exam   Eyes: Pupils are equal, round, and reactive to light  EOM are normal    Neck: Normal carotid pulses present  Carotid bruit is not present  Neurological: She has normal reflexes  Psychiatric: Her speech is normal        Neurological Exam  Mental Status   Oriented to person, place, time and situation  Recent and remote memory are intact  Speech is normal  Language is fluent with no aphasia  Cranial Nerves  CN II: Visual fields full to confrontation  CN III, IV, VI: Extraocular movements intact bilaterally  Pupils equal round and reactive to light bilaterally  CN V: Facial sensation is normal   CN VII: Full and symmetric facial movement  CN VIII: Hearing is normal   CN IX, X: Palate elevates symmetrically  Normal gag reflex  CN XI: Shoulder shrug strength is normal   CN XII: Tongue midline without atrophy or fasciculations  Motor  Normal muscle bulk throughout  No fasciculations present  Normal muscle tone  Strength is 5/5 in all four extremities except as noted      Sensory  Sensation is intact to light touch, pinprick, vibration and proprioception in all four extremities  Light touch is normal in upper and lower extremities  Pinprick is normal in upper and lower extremities  Proprioception is normal in upper and lower extremities  Reflexes  Deep tendon reflexes are 2+ and symmetric in all four extremities with downgoing toes bilaterally  Coordination  Right: Finger-to-nose normal  Heel-to-shin normal   Left: Finger-to-nose normal  Heel-to-shin normal     Gait  Normal casual, toe, heel and tandem gait  ROS:    Review of Systems   Constitutional: Negative  HENT: Negative  Eyes: Negative  Respiratory: Negative  Cardiovascular: Negative  Gastrointestinal: Negative  Endocrine: Negative  Genitourinary: Negative  Musculoskeletal: Negative  Skin: Negative  Allergic/Immunologic: Negative  Neurological: Negative  Hematological: Negative  Psychiatric/Behavioral: Negative

## 2018-12-28 ENCOUNTER — TELEPHONE (OUTPATIENT)
Dept: FAMILY MEDICINE CLINIC | Facility: CLINIC | Age: 68
End: 2018-12-28

## 2018-12-28 DIAGNOSIS — Z12.31 SCREENING MAMMOGRAM, ENCOUNTER FOR: Primary | ICD-10-CM

## 2019-01-14 ENCOUNTER — HOSPITAL ENCOUNTER (OUTPATIENT)
Dept: RADIOLOGY | Facility: HOSPITAL | Age: 69
Discharge: HOME | End: 2019-01-14
Payer: MEDICARE

## 2019-01-14 VITALS — HEIGHT: 70 IN | BODY MASS INDEX: 26.48 KG/M2 | WEIGHT: 185 LBS

## 2019-01-14 DIAGNOSIS — Z12.31 SCREENING MAMMOGRAM, ENCOUNTER FOR: ICD-10-CM

## 2019-01-14 PROCEDURE — 77067 SCR MAMMO BI INCL CAD: CPT

## 2019-01-15 ENCOUNTER — OFFICE VISIT (OUTPATIENT)
Dept: FAMILY MEDICINE CLINIC | Facility: CLINIC | Age: 69
End: 2019-01-15
Payer: MEDICARE

## 2019-01-15 VITALS
SYSTOLIC BLOOD PRESSURE: 126 MMHG | HEIGHT: 70 IN | HEART RATE: 84 BPM | RESPIRATION RATE: 16 BRPM | BODY MASS INDEX: 26.63 KG/M2 | DIASTOLIC BLOOD PRESSURE: 74 MMHG | TEMPERATURE: 97.8 F | WEIGHT: 186 LBS

## 2019-01-15 DIAGNOSIS — Z11.59 NEED FOR HEPATITIS C SCREENING TEST: ICD-10-CM

## 2019-01-15 DIAGNOSIS — R22.9 LUMP OF SKIN: ICD-10-CM

## 2019-01-15 DIAGNOSIS — I10 ESSENTIAL HYPERTENSION: ICD-10-CM

## 2019-01-15 DIAGNOSIS — Z00.00 MEDICARE ANNUAL WELLNESS VISIT, SUBSEQUENT: Primary | ICD-10-CM

## 2019-01-15 DIAGNOSIS — Z79.899 ENCOUNTER FOR LONG-TERM CURRENT USE OF MEDICATION: ICD-10-CM

## 2019-01-15 PROCEDURE — G0439 PPPS, SUBSEQ VISIT: HCPCS | Performed by: FAMILY MEDICINE

## 2019-01-15 NOTE — PROGRESS NOTES
Assessment and Plan:    Problem List Items Addressed This Visit     Essential hypertension      Other Visit Diagnoses     Medicare annual wellness visit, subsequent    -  Primary    Encounter for long-term current use of medication        Need for hepatitis C screening test            Health Maintenance Due   Topic Date Due    Hepatitis C Screening  1950    Medicare Annual Wellness Visit (AWV)  1950    DTaP,Tdap,and Td Vaccines (1 - Tdap) 06/30/1969         HPI:  Renetta Sutherland is a 77 y o  female here for her Subsequent Wellness Visit  She feels lumpy in her upper abdomen        Patient Active Problem List   Diagnosis    Chronic migraine without aura without status migrainosus, not intractable    Essential hypertension     Past Medical History:   Diagnosis Date    Borderline high cholesterol     Borderline hypertension     Migraine      Past Surgical History:   Procedure Laterality Date    BREAST BIOPSY Right     benign    BUNIONECTOMY      CATARACT EXTRACTION, BILATERAL      PARATHYROID GLAND SURGERY       Family History   Problem Relation Age of Onset    Angina Mother     Heart failure Mother     Prostate cancer Father     Diabetes Sister    [place] Sudden death Sister     Osteoporosis Sister     Hypertension Brother     No Known Problems Son     No Known Problems Daughter     Prostate cancer Brother     Lung cancer Brother     Sudden death Brother     COPD Sister     Breast cancer Maternal Uncle     Breast cancer Cousin      History   Smoking Status    Never Smoker   Smokeless Tobacco    Never Used     History   Alcohol Use No      History   Drug Use No       Current Outpatient Prescriptions   Medication Sig Dispense Refill    Ascorbic Acid (VITAMIN C) 500 MG CAPS Take 1 tablet by mouth daily      atorvastatin (LIPITOR) 20 mg tablet Take 1 tablet (20 mg total) by mouth daily 90 tablet 3    B Complex Vitamins (B COMPLEX PO) Take 1 tablet by mouth daily      Cholecalciferol (VITAMIN D3) 5000 units CAPS Take 1 capsule by mouth Daily      famotidine (PEPCID) 40 MG tablet Take 1 tablet by mouth every other day        magnesium oxide (MAG-OX) 400 mg tablet Take 1 tablet by mouth daily With zinc       Misc Natural Products (GLUCOSAMINE CHOND COMPLEX/MSM PO) Take 1 tablet by mouth Daily 1000 mg       Multiple Vitamin (MULTI-VITAMIN DAILY PO) Take 1 tablet by mouth daily      Multiple Vitamins-Minerals (PRESERVISION AREDS) CAPS Take 1 capsule by mouth daily      Omega-3 Fatty Acids (FISH OIL) 500 MG CAPS Take 1 capsule by mouth daily      omeprazole (PriLOSEC) 40 MG capsule Take 1 capsule by mouth daily      Probiotic Product (PROBIOTIC-10 PO) Take 1 capsule by mouth daily      verapamil (CALAN) 120 mg tablet Take 1 tablet (120 mg total) by mouth 2 (two) times a day 180 tablet 0     No current facility-administered medications for this visit  No Known Allergies  Immunization History   Administered Date(s) Administered    Influenza 11/01/2018    Influenza Split High Dose Preservative Free IM 12/22/2014, 11/13/2015, 11/05/2016, 10/18/2017    Pneumococcal Conjugate 13-Valent 11/13/2015    Pneumococcal Polysaccharide PPV23 11/01/2013    Zoster 01/24/2015       Patient Care Team:  Sergey Amaya DO as PCP - Colonel Aakash MD as PCP - Endocrinology (Endocrinology)  MD Jitendra Levine MD Blanchie Ports, MD Alvino Palm, MD (Cardiology)  Katie Dorsey MD    Medicare Screening Tests and Risk Assessments:  Hattie Lewis is here for her Subsequent Wellness visit  Health Risk Assessment:  Patient rates overall health as very good  Patient feels that their physical health rating is Same  Eyesight was rated as Same  Hearing was rated as Same  Patient feels that their emotional and mental health rating is Same  Pain experienced by patient in the last 7 days has been Some  Patient's pain rating has been 3/10   (Additional comments: Lump mid abdomen -doris-elisha)    Emotional/Mental Health:  Patient has been feeling nervous/anxious  PHQ-9 Depression Screening:    Frequency of the following problems over the past two weeks:      1  Little interest or pleasure in doing things: 0 - not at all      2  Feeling down, depressed, or hopeless: 0 - not at all  PHQ-2 Score: 0          Broken Bones/Falls: Fall Risk Assessment:    In the past year, patient has experienced: No history of falling in past year          Bladder/Bowel:  Patient has leaked urine accidently in the last six months  Patient reports no loss of bowel control  Immunizations:  Patient has had a flu vaccination within the last year  Patient has received a pneumonia shot  Patient has received a shingles shot  Patient has not received tetanus/diphtheria shot  Home Safety:  Patient does not have trouble with stairs inside or outside of their home  Patient currently reports that there are no safety hazards present in home, working smoke alarms, working carbon monoxide detectors  Preventative Screenings:   Breast cancer screening performed, colon cancer screen completed, cholesterol screen completed, glaucoma eye exam completed,     Nutrition:  Current diet: Frequent junk food with servings of the following:    Medications:  Patient is currently taking over-the-counter supplements  List of OTC medications includes: see med list--  Patient is able to manage medications  Lifestyle Choices:  Patient reports no tobacco use  Patient has not smoked or used tobacco in the past   Patient reports no alcohol use  Patient drives a vehicle  Patient wears seat belt  Current level of exercise of physical activity described by patient as: active -does own yard work -house work--elisha          Activities of Daily Living:  Can get out of bed by his or her self, able to dress self, able to make own meals, able to do own shopping, able to bathe self, can do own laundry/housekeeping, can manage own money, pay bills and track expenses    Previous Hospitalizations:  No hospitalization or ED visit in past 12 months        Advanced Directives:  Patient has decided on a power of   Patient has spoken to designated power of   Patient has not completed advanced directive  Preventative Screening/Counseling:      Cardiovascular:      General: Screening Current          Diabetes:      General: Screening Current          Colorectal Cancer:      General: Screening Current          Breast Cancer:      General: Screening Current          Cervical Cancer:      General: Screening Not Indicated          Osteoporosis:      General: Screening Current          AAA:      General: Screening Not Indicated          Glaucoma:      General: Screening Current          HIV:      General: Screening Not Indicated          Hepatitis C:      General: Risks and Benefits Discussed      Counseling: has received general HCV counseling        Advanced Directives:   Patient has no living will for healthcare, Information on ACP and/or AD provided  End of life assessment reviewed with patient  Additional Comments: Has paperwork at home, and will discuss with her family    Immunizations:      Influenza: Influenza UTD This Year      Pneumococcal: Lifetime Vaccine Completed  Additional Comments: She has started her Shingrix series  Physical Exam   Constitutional: She is oriented to person, place, and time  She appears well-developed and well-nourished  HENT:   Head: Normocephalic and atraumatic  Right Ear: External ear normal    Left Ear: External ear normal    Nose: Nose normal    Mouth/Throat: Oropharynx is clear and moist    Cardiovascular: Normal rate, regular rhythm and normal heart sounds  Exam reveals no friction rub  No murmur heard  Pulmonary/Chest: Breath sounds normal  No respiratory distress  She has no wheezes  She has no rales     Abdominal: She exhibits mass (superficial in skin, upper abdomen, tender)  Musculoskeletal: She exhibits no edema  Neurological: She is oriented to person, place, and time  No cranial nerve deficit  Psychiatric: She has a normal mood and affect  Her behavior is normal  Judgment and thought content normal    Nursing note and vitals reviewed

## 2019-01-18 LAB
ALBUMIN SERPL-MCNC: 4.5 G/DL (ref 3.5–4.8)
ALBUMIN/GLOB SERPL: 2 {RATIO} (ref 1.2–2.2)
ALP SERPL-CCNC: 47 IU/L (ref 39–117)
ALT SERPL-CCNC: 19 IU/L (ref 0–32)
AST SERPL-CCNC: 22 IU/L (ref 0–40)
BASOPHILS # BLD AUTO: 0 X10E3/UL (ref 0–0.2)
BASOPHILS NFR BLD AUTO: 1 %
BILIRUB SERPL-MCNC: 0.4 MG/DL (ref 0–1.2)
BUN SERPL-MCNC: 21 MG/DL (ref 8–27)
BUN/CREAT SERPL: 22 (ref 12–28)
CALCIUM SERPL-MCNC: 9.4 MG/DL (ref 8.7–10.3)
CHLORIDE SERPL-SCNC: 103 MMOL/L (ref 96–106)
CHOLEST SERPL-MCNC: 120 MG/DL (ref 100–199)
CO2 SERPL-SCNC: 24 MMOL/L (ref 20–29)
CREAT SERPL-MCNC: 0.97 MG/DL (ref 0.57–1)
EOSINOPHIL # BLD AUTO: 0.3 X10E3/UL (ref 0–0.4)
EOSINOPHIL NFR BLD AUTO: 5 %
ERYTHROCYTE [DISTWIDTH] IN BLOOD BY AUTOMATED COUNT: 12.6 % (ref 12.3–15.4)
GLOBULIN SER-MCNC: 2.3 G/DL (ref 1.5–4.5)
GLUCOSE SERPL-MCNC: 86 MG/DL (ref 65–99)
HCT VFR BLD AUTO: 40.8 % (ref 34–46.6)
HCV AB S/CO SERPL IA: 0.1 S/CO RATIO (ref 0–0.9)
HDLC SERPL-MCNC: 54 MG/DL
HGB BLD-MCNC: 13.5 G/DL (ref 11.1–15.9)
IMM GRANULOCYTES # BLD: 0 X10E3/UL (ref 0–0.1)
IMM GRANULOCYTES NFR BLD: 0 %
LABCORP COMMENT: NORMAL
LDLC SERPL CALC-MCNC: 53 MG/DL (ref 0–99)
LYMPHOCYTES # BLD AUTO: 1.6 X10E3/UL (ref 0.7–3.1)
LYMPHOCYTES NFR BLD AUTO: 30 %
MAGNESIUM SERPL-MCNC: 2.1 MG/DL (ref 1.6–2.3)
MCH RBC QN AUTO: 27.6 PG (ref 26.6–33)
MCHC RBC AUTO-ENTMCNC: 33.1 G/DL (ref 31.5–35.7)
MCV RBC AUTO: 83 FL (ref 79–97)
MICRODELETION SYND BLD/T FISH: NORMAL
MICRODELETION SYND BLD/T FISH: NORMAL
MONOCYTES # BLD AUTO: 0.6 X10E3/UL (ref 0.1–0.9)
MONOCYTES NFR BLD AUTO: 10 %
NEUTROPHILS # BLD AUTO: 3 X10E3/UL (ref 1.4–7)
NEUTROPHILS NFR BLD AUTO: 54 %
PLATELET # BLD AUTO: 233 X10E3/UL (ref 150–379)
POTASSIUM SERPL-SCNC: 4.5 MMOL/L (ref 3.5–5.2)
PROT SERPL-MCNC: 6.8 G/DL (ref 6–8.5)
RBC # BLD AUTO: 4.9 X10E6/UL (ref 3.77–5.28)
SL AMB EGFR AFRICAN AMERICAN: 68 ML/MIN/1.73
SL AMB EGFR NON AFRICAN AMERICAN: 59 ML/MIN/1.73
SL AMB PDF IMAGE: NORMAL
SODIUM SERPL-SCNC: 142 MMOL/L (ref 134–144)
TRIGL SERPL-MCNC: 67 MG/DL (ref 0–149)
VIT B12 SERPL-MCNC: 857 PG/ML (ref 232–1245)
WBC # BLD AUTO: 5.6 X10E3/UL (ref 3.4–10.8)

## 2019-01-22 ENCOUNTER — HOSPITAL ENCOUNTER (OUTPATIENT)
Dept: RADIOLOGY | Facility: HOSPITAL | Age: 69
Discharge: HOME | End: 2019-01-22
Payer: MEDICARE

## 2019-01-22 ENCOUNTER — TELEPHONE (OUTPATIENT)
Dept: FAMILY MEDICINE CLINIC | Facility: CLINIC | Age: 69
End: 2019-01-22

## 2019-01-22 DIAGNOSIS — R22.9 LUMP OF SKIN: ICD-10-CM

## 2019-01-22 PROCEDURE — 76705 ECHO EXAM OF ABDOMEN: CPT

## 2019-01-22 NOTE — TELEPHONE ENCOUNTER
1/22/2019 9:19 AM Called Kalen Monson regarding her ultrasound results  Her ultrasound shows several benign-appearing lipomas  She has some mild discomfort, but nothing she is ready to have surgery for yet  She will let me know if her symptoms change      China Gutierrez, DO

## 2019-04-10 ENCOUNTER — OFFICE VISIT (OUTPATIENT)
Dept: NEUROLOGY | Facility: CLINIC | Age: 69
End: 2019-04-10
Payer: MEDICARE

## 2019-04-10 VITALS
BODY MASS INDEX: 25.48 KG/M2 | DIASTOLIC BLOOD PRESSURE: 92 MMHG | HEIGHT: 70 IN | SYSTOLIC BLOOD PRESSURE: 148 MMHG | HEART RATE: 75 BPM | WEIGHT: 178 LBS

## 2019-04-10 DIAGNOSIS — G43.709 CHRONIC MIGRAINE WITHOUT AURA WITHOUT STATUS MIGRAINOSUS, NOT INTRACTABLE: ICD-10-CM

## 2019-04-10 DIAGNOSIS — I10 ESSENTIAL HYPERTENSION: ICD-10-CM

## 2019-04-10 PROCEDURE — 99214 OFFICE O/P EST MOD 30 MIN: CPT | Performed by: PSYCHIATRY & NEUROLOGY

## 2019-04-16 ENCOUNTER — OFFICE VISIT (OUTPATIENT)
Dept: PODIATRY | Facility: CLINIC | Age: 69
End: 2019-04-16
Payer: MEDICARE

## 2019-04-16 VITALS
HEIGHT: 70 IN | RESPIRATION RATE: 17 BRPM | WEIGHT: 178 LBS | BODY MASS INDEX: 25.48 KG/M2 | DIASTOLIC BLOOD PRESSURE: 82 MMHG | SYSTOLIC BLOOD PRESSURE: 136 MMHG

## 2019-04-16 DIAGNOSIS — M20.11 VALGUS DEFORMITY OF BOTH GREAT TOES: Primary | ICD-10-CM

## 2019-04-16 DIAGNOSIS — M20.12 VALGUS DEFORMITY OF BOTH GREAT TOES: Primary | ICD-10-CM

## 2019-04-16 DIAGNOSIS — M79.672 PAIN IN BOTH FEET: ICD-10-CM

## 2019-04-16 DIAGNOSIS — L03.039 PARONYCHIA OF TOENAIL, UNSPECIFIED LATERALITY: ICD-10-CM

## 2019-04-16 DIAGNOSIS — M79.671 PAIN IN BOTH FEET: ICD-10-CM

## 2019-04-16 DIAGNOSIS — B35.1 ONYCHOMYCOSIS: ICD-10-CM

## 2019-04-16 PROCEDURE — 99203 OFFICE O/P NEW LOW 30 MIN: CPT | Performed by: PODIATRIST

## 2019-04-16 PROCEDURE — 73620 X-RAY EXAM OF FOOT: CPT | Performed by: PODIATRIST

## 2019-04-16 RX ORDER — TERBINAFINE HYDROCHLORIDE 250 MG/1
250 TABLET ORAL DAILY
Qty: 30 TABLET | Refills: 0 | Status: SHIPPED | OUTPATIENT
Start: 2019-04-16 | End: 2019-05-28 | Stop reason: SDUPTHER

## 2019-04-16 RX ORDER — MELOXICAM 7.5 MG/1
7.5 TABLET ORAL DAILY
Qty: 10 TABLET | Refills: 0 | Status: SHIPPED | OUTPATIENT
Start: 2019-04-16 | End: 2019-07-09 | Stop reason: ALTCHOICE

## 2019-04-19 LAB
ANA SER QL: NEGATIVE
B BURGDOR IGG+IGM SER-ACNC: <0.91 ISR (ref 0–0.9)
ERYTHROCYTE [SEDIMENTATION RATE] IN BLOOD BY WESTERGREN METHOD: 2 MM/HR (ref 0–40)
RHEUMATOID FACT SERPL-ACNC: <10 IU/ML (ref 0–13.9)

## 2019-04-30 ENCOUNTER — OFFICE VISIT (OUTPATIENT)
Dept: PODIATRY | Facility: CLINIC | Age: 69
End: 2019-04-30
Payer: MEDICARE

## 2019-04-30 VITALS
WEIGHT: 178 LBS | HEIGHT: 70 IN | DIASTOLIC BLOOD PRESSURE: 82 MMHG | BODY MASS INDEX: 25.48 KG/M2 | HEART RATE: 75 BPM | RESPIRATION RATE: 17 BRPM | SYSTOLIC BLOOD PRESSURE: 136 MMHG

## 2019-04-30 DIAGNOSIS — M20.12 VALGUS DEFORMITY OF BOTH GREAT TOES: ICD-10-CM

## 2019-04-30 DIAGNOSIS — M54.16 RADICULOPATHY OF LUMBAR REGION: Primary | ICD-10-CM

## 2019-04-30 DIAGNOSIS — M77.42 METATARSALGIA OF BOTH FEET: ICD-10-CM

## 2019-04-30 DIAGNOSIS — M77.41 METATARSALGIA OF BOTH FEET: ICD-10-CM

## 2019-04-30 DIAGNOSIS — M79.671 PAIN IN BOTH FEET: ICD-10-CM

## 2019-04-30 DIAGNOSIS — M79.672 PAIN IN BOTH FEET: ICD-10-CM

## 2019-04-30 DIAGNOSIS — M20.11 VALGUS DEFORMITY OF BOTH GREAT TOES: ICD-10-CM

## 2019-04-30 PROCEDURE — 99213 OFFICE O/P EST LOW 20 MIN: CPT | Performed by: PODIATRIST

## 2019-04-30 RX ORDER — GABAPENTIN 100 MG/1
100 CAPSULE ORAL
Qty: 30 CAPSULE | Refills: 0 | Status: SHIPPED | OUTPATIENT
Start: 2019-04-30 | End: 2019-05-28 | Stop reason: SDUPTHER

## 2019-05-28 ENCOUNTER — OFFICE VISIT (OUTPATIENT)
Dept: PODIATRY | Facility: CLINIC | Age: 69
End: 2019-05-28
Payer: MEDICARE

## 2019-05-28 VITALS
HEART RATE: 75 BPM | SYSTOLIC BLOOD PRESSURE: 136 MMHG | RESPIRATION RATE: 17 BRPM | HEIGHT: 70 IN | WEIGHT: 178 LBS | BODY MASS INDEX: 25.48 KG/M2 | DIASTOLIC BLOOD PRESSURE: 82 MMHG

## 2019-05-28 DIAGNOSIS — M77.41 METATARSALGIA OF BOTH FEET: ICD-10-CM

## 2019-05-28 DIAGNOSIS — M79.672 PAIN IN BOTH FEET: ICD-10-CM

## 2019-05-28 DIAGNOSIS — M77.42 METATARSALGIA OF BOTH FEET: ICD-10-CM

## 2019-05-28 DIAGNOSIS — L03.039 PARONYCHIA OF TOENAIL, UNSPECIFIED LATERALITY: ICD-10-CM

## 2019-05-28 DIAGNOSIS — M79.671 PAIN IN BOTH FEET: ICD-10-CM

## 2019-05-28 DIAGNOSIS — M54.16 RADICULOPATHY OF LUMBAR REGION: ICD-10-CM

## 2019-05-28 DIAGNOSIS — B35.1 ONYCHOMYCOSIS: ICD-10-CM

## 2019-05-28 PROCEDURE — 99213 OFFICE O/P EST LOW 20 MIN: CPT | Performed by: PODIATRIST

## 2019-05-28 RX ORDER — TERBINAFINE HYDROCHLORIDE 250 MG/1
250 TABLET ORAL DAILY
Qty: 30 TABLET | Refills: 0 | Status: SHIPPED | OUTPATIENT
Start: 2019-05-28 | End: 2019-06-27

## 2019-05-28 RX ORDER — GABAPENTIN 100 MG/1
100 CAPSULE ORAL
Qty: 30 CAPSULE | Refills: 0 | Status: SHIPPED | OUTPATIENT
Start: 2019-05-28 | End: 2019-07-18 | Stop reason: SDUPTHER

## 2019-05-31 ENCOUNTER — HOSPITAL ENCOUNTER (OUTPATIENT)
Dept: RADIOLOGY | Facility: HOSPITAL | Age: 69
Discharge: HOME | End: 2019-05-31
Attending: INTERNAL MEDICINE
Payer: MEDICARE

## 2019-05-31 DIAGNOSIS — E04.2 MULTINODULAR GOITER: ICD-10-CM

## 2019-05-31 PROCEDURE — 76536 US EXAM OF HEAD AND NECK: CPT

## 2019-06-03 LAB
PTH-INTACT SERPL-MCNC: 43 PG/ML (ref 15–65)
TSH SERPL DL<=0.005 MIU/L-ACNC: 2.59 UIU/ML (ref 0.45–4.5)

## 2019-06-11 LAB
25(OH)D3+25(OH)D2 SERPL-MCNC: 45.9 NG/ML (ref 30–100)
BUN SERPL-MCNC: 17 MG/DL (ref 8–27)
BUN/CREAT SERPL: 17 (ref 12–28)
CALCIUM SERPL-MCNC: 9.6 MG/DL (ref 8.7–10.3)
CHLORIDE SERPL-SCNC: 102 MMOL/L (ref 96–106)
CO2 SERPL-SCNC: 24 MMOL/L (ref 20–29)
CREAT SERPL-MCNC: 0.99 MG/DL (ref 0.57–1)
GLUCOSE SERPL-MCNC: 89 MG/DL (ref 65–99)
LABCORP COMMENT: NORMAL
POTASSIUM SERPL-SCNC: 4 MMOL/L (ref 3.5–5.2)
SL AMB EGFR AFRICAN AMERICAN: 67 ML/MIN/1.73
SL AMB EGFR NON AFRICAN AMERICAN: 58 ML/MIN/1.73
SODIUM SERPL-SCNC: 138 MMOL/L (ref 134–144)
T4 FREE SERPL-MCNC: 1.15 NG/DL (ref 0.82–1.77)

## 2019-06-12 ENCOUNTER — OFFICE VISIT (OUTPATIENT)
Dept: ENDOCRINOLOGY | Facility: CLINIC | Age: 69
End: 2019-06-12
Payer: MEDICARE

## 2019-06-12 DIAGNOSIS — E55.9 VITAMIN D DEFICIENCY: ICD-10-CM

## 2019-06-12 DIAGNOSIS — M81.0 OSTEOPOROSIS, UNSPECIFIED OSTEOPOROSIS TYPE, UNSPECIFIED PATHOLOGICAL FRACTURE PRESENCE: Primary | ICD-10-CM

## 2019-06-12 DIAGNOSIS — Z86.39 H/O HYPERPARATHYROIDISM: ICD-10-CM

## 2019-06-12 DIAGNOSIS — E04.2 MULTINODULAR GOITER: ICD-10-CM

## 2019-06-12 PROCEDURE — 99214 OFFICE O/P EST MOD 30 MIN: CPT | Performed by: PHYSICIAN ASSISTANT

## 2019-06-13 VITALS
DIASTOLIC BLOOD PRESSURE: 84 MMHG | BODY MASS INDEX: 25.91 KG/M2 | WEIGHT: 181 LBS | SYSTOLIC BLOOD PRESSURE: 140 MMHG | HEIGHT: 70 IN

## 2019-06-30 ENCOUNTER — HOSPITAL ENCOUNTER (EMERGENCY)
Facility: HOSPITAL | Age: 69
Discharge: HOME | End: 2019-06-30
Attending: EMERGENCY MEDICINE
Payer: MEDICARE

## 2019-06-30 ENCOUNTER — APPOINTMENT (EMERGENCY)
Dept: RADIOLOGY | Facility: HOSPITAL | Age: 69
End: 2019-06-30
Attending: EMERGENCY MEDICINE
Payer: MEDICARE

## 2019-06-30 VITALS
TEMPERATURE: 97.5 F | SYSTOLIC BLOOD PRESSURE: 203 MMHG | RESPIRATION RATE: 18 BRPM | DIASTOLIC BLOOD PRESSURE: 92 MMHG | OXYGEN SATURATION: 96 % | HEART RATE: 61 BPM

## 2019-06-30 DIAGNOSIS — R00.2 PALPITATIONS: Primary | ICD-10-CM

## 2019-06-30 DIAGNOSIS — N39.0 UTI (URINARY TRACT INFECTION): ICD-10-CM

## 2019-06-30 LAB
ALBUMIN SERPL BCP-MCNC: 3.6 G/DL (ref 3.5–5)
ALP SERPL-CCNC: 47 U/L (ref 46–116)
ALT SERPL W P-5'-P-CCNC: 21 U/L (ref 12–78)
ANION GAP SERPL CALCULATED.3IONS-SCNC: 6 MMOL/L (ref 4–13)
APTT PPP: 26 SECONDS (ref 23–37)
AST SERPL W P-5'-P-CCNC: 12 U/L (ref 5–45)
BACTERIA UR QL AUTO: ABNORMAL /HPF
BASOPHILS # BLD AUTO: 0.04 THOUSANDS/ΜL (ref 0–0.1)
BASOPHILS NFR BLD AUTO: 1 % (ref 0–1)
BILIRUB SERPL-MCNC: 0.2 MG/DL (ref 0.2–1)
BILIRUB UR QL STRIP: NEGATIVE
BUN SERPL-MCNC: 17 MG/DL (ref 5–25)
CALCIUM SERPL-MCNC: 8.3 MG/DL (ref 8.3–10.1)
CHLORIDE SERPL-SCNC: 107 MMOL/L (ref 100–108)
CLARITY UR: ABNORMAL
CO2 SERPL-SCNC: 29 MMOL/L (ref 21–32)
COLOR UR: YELLOW
CREAT SERPL-MCNC: 0.89 MG/DL (ref 0.6–1.3)
EOSINOPHIL # BLD AUTO: 0.49 THOUSAND/ΜL (ref 0–0.61)
EOSINOPHIL NFR BLD AUTO: 9 % (ref 0–6)
ERYTHROCYTE [DISTWIDTH] IN BLOOD BY AUTOMATED COUNT: 12.6 % (ref 11.6–15.1)
GFR SERPL CREATININE-BSD FRML MDRD: 65 ML/MIN/1.73SQ M
GLUCOSE SERPL-MCNC: 90 MG/DL (ref 65–140)
GLUCOSE UR STRIP-MCNC: NEGATIVE MG/DL
HCT VFR BLD AUTO: 37.4 % (ref 34.8–46.1)
HGB BLD-MCNC: 11.9 G/DL (ref 11.5–15.4)
HGB UR QL STRIP.AUTO: NEGATIVE
IMM GRANULOCYTES # BLD AUTO: 0.01 THOUSAND/UL (ref 0–0.2)
IMM GRANULOCYTES NFR BLD AUTO: 0 % (ref 0–2)
INR PPP: 1.03 (ref 0.86–1.16)
KETONES UR STRIP-MCNC: NEGATIVE MG/DL
LEUKOCYTE ESTERASE UR QL STRIP: ABNORMAL
LYMPHOCYTES # BLD AUTO: 2.14 THOUSANDS/ΜL (ref 0.6–4.47)
LYMPHOCYTES NFR BLD AUTO: 37 % (ref 14–44)
MAGNESIUM SERPL-MCNC: 2.1 MG/DL (ref 1.6–2.6)
MCH RBC QN AUTO: 27 PG (ref 26.8–34.3)
MCHC RBC AUTO-ENTMCNC: 31.8 G/DL (ref 31.4–37.4)
MCV RBC AUTO: 85 FL (ref 82–98)
MONOCYTES # BLD AUTO: 0.72 THOUSAND/ΜL (ref 0.17–1.22)
MONOCYTES NFR BLD AUTO: 13 % (ref 4–12)
NEUTROPHILS # BLD AUTO: 2.33 THOUSANDS/ΜL (ref 1.85–7.62)
NEUTS SEG NFR BLD AUTO: 40 % (ref 43–75)
NITRITE UR QL STRIP: POSITIVE
NON-SQ EPI CELLS URNS QL MICRO: ABNORMAL /HPF
NRBC BLD AUTO-RTO: 0 /100 WBCS
PH UR STRIP.AUTO: 7 [PH]
PHOSPHATE SERPL-MCNC: 3.9 MG/DL (ref 2.3–4.1)
PLATELET # BLD AUTO: 192 THOUSANDS/UL (ref 149–390)
PMV BLD AUTO: 10.3 FL (ref 8.9–12.7)
POTASSIUM SERPL-SCNC: 4 MMOL/L (ref 3.5–5.3)
PROT SERPL-MCNC: 6.3 G/DL (ref 6.4–8.2)
PROT UR STRIP-MCNC: NEGATIVE MG/DL
PROTHROMBIN TIME: 10.8 SECONDS (ref 9.4–11.7)
RBC # BLD AUTO: 4.41 MILLION/UL (ref 3.81–5.12)
RBC #/AREA URNS AUTO: ABNORMAL /HPF
SODIUM SERPL-SCNC: 142 MMOL/L (ref 136–145)
SP GR UR STRIP.AUTO: 1.02 (ref 1–1.03)
TROPONIN I SERPL-MCNC: <0.02 NG/ML
TSH SERPL DL<=0.05 MIU/L-ACNC: 3.71 UIU/ML (ref 0.36–3.74)
UROBILINOGEN UR QL STRIP.AUTO: 0.2 E.U./DL
WBC # BLD AUTO: 5.73 THOUSAND/UL (ref 4.31–10.16)
WBC #/AREA URNS AUTO: ABNORMAL /HPF

## 2019-06-30 PROCEDURE — 96361 HYDRATE IV INFUSION ADD-ON: CPT

## 2019-06-30 PROCEDURE — 84484 ASSAY OF TROPONIN QUANT: CPT | Performed by: EMERGENCY MEDICINE

## 2019-06-30 PROCEDURE — 87186 SC STD MICRODIL/AGAR DIL: CPT | Performed by: EMERGENCY MEDICINE

## 2019-06-30 PROCEDURE — 81001 URINALYSIS AUTO W/SCOPE: CPT | Performed by: EMERGENCY MEDICINE

## 2019-06-30 PROCEDURE — 80053 COMPREHEN METABOLIC PANEL: CPT | Performed by: EMERGENCY MEDICINE

## 2019-06-30 PROCEDURE — 85610 PROTHROMBIN TIME: CPT | Performed by: EMERGENCY MEDICINE

## 2019-06-30 PROCEDURE — 85730 THROMBOPLASTIN TIME PARTIAL: CPT | Performed by: EMERGENCY MEDICINE

## 2019-06-30 PROCEDURE — 87086 URINE CULTURE/COLONY COUNT: CPT | Performed by: EMERGENCY MEDICINE

## 2019-06-30 PROCEDURE — 83735 ASSAY OF MAGNESIUM: CPT | Performed by: EMERGENCY MEDICINE

## 2019-06-30 PROCEDURE — 84443 ASSAY THYROID STIM HORMONE: CPT | Performed by: EMERGENCY MEDICINE

## 2019-06-30 PROCEDURE — 84100 ASSAY OF PHOSPHORUS: CPT | Performed by: EMERGENCY MEDICINE

## 2019-06-30 PROCEDURE — 85025 COMPLETE CBC W/AUTO DIFF WBC: CPT | Performed by: EMERGENCY MEDICINE

## 2019-06-30 PROCEDURE — 93005 ELECTROCARDIOGRAM TRACING: CPT

## 2019-06-30 PROCEDURE — 96360 HYDRATION IV INFUSION INIT: CPT

## 2019-06-30 PROCEDURE — 87077 CULTURE AEROBIC IDENTIFY: CPT | Performed by: EMERGENCY MEDICINE

## 2019-06-30 PROCEDURE — 36415 COLL VENOUS BLD VENIPUNCTURE: CPT | Performed by: EMERGENCY MEDICINE

## 2019-06-30 PROCEDURE — 99285 EMERGENCY DEPT VISIT HI MDM: CPT

## 2019-06-30 PROCEDURE — 71046 X-RAY EXAM CHEST 2 VIEWS: CPT

## 2019-06-30 RX ORDER — CEPHALEXIN 500 MG/1
500 CAPSULE ORAL ONCE
Status: COMPLETED | OUTPATIENT
Start: 2019-06-30 | End: 2019-06-30

## 2019-06-30 RX ORDER — TERBINAFINE HYDROCHLORIDE 250 MG/1
250 TABLET ORAL DAILY
COMMUNITY
End: 2019-07-17 | Stop reason: ALTCHOICE

## 2019-06-30 RX ORDER — CEPHALEXIN 500 MG/1
500 CAPSULE ORAL EVERY 8 HOURS SCHEDULED
Qty: 21 CAPSULE | Refills: 0 | Status: SHIPPED | OUTPATIENT
Start: 2019-06-30 | End: 2019-07-07

## 2019-06-30 RX ADMIN — CEPHALEXIN 500 MG: 500 CAPSULE ORAL at 22:06

## 2019-06-30 RX ADMIN — SODIUM CHLORIDE 1000 ML: 0.9 INJECTION, SOLUTION INTRAVENOUS at 20:45

## 2019-07-01 NOTE — ED PROVIDER NOTES
History  Chief Complaint   Patient presents with    Palpitations     intermittent episodes of fluttering on L side of chest for past few days  no chest pain, no shortness of breath     77-year-old female with with no significant past medical history, presents to the ER for evaluation of palpitation episodes at home over the past 3 days  Patient felt multiple episodes of palpitations lasting only a few seconds over the past 3 days  Patient only had 1 episode today  Patient came to the ER for further evaluation  Patient denies any fevers, chills, chest pain, shortness of breath, abdominal pain, nausea, vomiting, diarrhea, dysuria, headaches, weakness, dizziness  Patient denies any previous history of cardiovascular events  History provided by:  Patient  Palpitations   Associated symptoms: no back pain, no chest pain, no cough, no dizziness, no nausea, no numbness, no shortness of breath and no weakness        Prior to Admission Medications   Prescriptions Last Dose Informant Patient Reported? Taking?    Ascorbic Acid (VITAMIN C) 500 MG CAPS 6/30/2019 at Unknown time Self Yes Yes   Sig: Take 1 tablet by mouth daily   B Complex Vitamins (B COMPLEX PO) Not Taking at Unknown time Self Yes No   Sig: Take 1 tablet by mouth daily   Cholecalciferol (VITAMIN D3) 5000 units CAPS 6/30/2019 at Unknown time Self Yes Yes   Sig: Take 1 capsule by mouth Daily   Misc Natural Products (GLUCOSAMINE CHOND COMPLEX/MSM PO) 6/30/2019 at Unknown time Self Yes Yes   Sig: Take 1 tablet by mouth Daily 1000 mg    Multiple Vitamin (MULTI-VITAMIN DAILY PO) 6/30/2019 at Unknown time Self Yes Yes   Sig: Take 1 tablet by mouth daily   Multiple Vitamins-Minerals (PRESERVISION AREDS) CAPS 6/30/2019 at Unknown time Self Yes Yes   Sig: Take 1 capsule by mouth daily   Omega-3 Fatty Acids (FISH OIL) 500 MG CAPS 6/30/2019 at Unknown time Self Yes Yes   Sig: Take 1 capsule by mouth daily   Probiotic Product (PROBIOTIC-10 PO) Past Month at Unknown time Self Yes Yes   Sig: Take 1 capsule by mouth daily   atorvastatin (LIPITOR) 20 mg tablet 6/30/2019 at Unknown time Self No Yes   Sig: Take 1 tablet (20 mg total) by mouth daily   famotidine (PEPCID) 40 MG tablet 6/30/2019 at Unknown time Self Yes Yes   Sig: Take 1 tablet by mouth every other day     gabapentin (NEURONTIN) 100 mg capsule 6/29/2019 at Unknown time  No Yes   Sig: Take 1 capsule (100 mg total) by mouth daily at bedtime for 60 days   magnesium oxide (MAG-OX) 400 mg tablet Past Month at Unknown time Self Yes Yes   Sig: Take 1 tablet by mouth daily With zinc    meloxicam (MOBIC) 7 5 mg tablet   No No   Sig: Take 1 tablet (7 5 mg total) by mouth daily for 10 days   omeprazole (PriLOSEC) 40 MG capsule 6/30/2019 at Unknown time Self Yes Yes   Sig: Take 1 capsule by mouth daily   terbinafine (LamISIL) 250 mg tablet 6/29/2019 at Unknown time  Yes Yes   Sig: Take 250 mg by mouth daily   verapamil (CALAN-SR) 180 mg CR tablet 6/30/2019 at Unknown time  No Yes   Sig: Take 1 tablet (180 mg total) by mouth 2 (two) times a day      Facility-Administered Medications: None       Past Medical History:   Diagnosis Date    Borderline high cholesterol     Borderline hypertension     Migraine        Past Surgical History:   Procedure Laterality Date    BREAST BIOPSY Right     benign    BUNIONECTOMY      CATARACT EXTRACTION, BILATERAL      PARATHYROID GLAND SURGERY         Family History   Problem Relation Age of Onset    Angina Mother     Heart failure Mother     Prostate cancer Father     Diabetes Sister     Sudden death Sister     Osteoporosis Sister     Hypertension Brother     No Known Problems Son     No Known Problems Daughter     Prostate cancer Brother     Lung cancer Brother     Sudden death Brother     COPD Sister     Breast cancer Maternal Uncle     Breast cancer Cousin      I have reviewed and agree with the history as documented      Social History     Tobacco Use    Smoking status: Never Smoker    Smokeless tobacco: Never Used   Substance Use Topics    Alcohol use: No    Drug use: No        Review of Systems   Constitutional: Negative for activity change, appetite change, chills and fever  HENT: Negative for congestion and ear pain  Eyes: Negative for pain and discharge  Respiratory: Negative for cough, chest tightness, shortness of breath, wheezing and stridor  Cardiovascular: Positive for palpitations  Negative for chest pain  Gastrointestinal: Negative for abdominal distention, abdominal pain, constipation, diarrhea and nausea  Endocrine: Negative for cold intolerance  Genitourinary: Negative for dysuria, frequency and urgency  Musculoskeletal: Negative for arthralgias and back pain  Skin: Negative for color change and rash  Allergic/Immunologic: Negative for environmental allergies and food allergies  Neurological: Negative for dizziness, weakness, numbness and headaches  Hematological: Negative for adenopathy  Psychiatric/Behavioral: Negative for agitation, behavioral problems and confusion  The patient is not nervous/anxious  All other systems reviewed and are negative  Physical Exam  Physical Exam   Constitutional: She is oriented to person, place, and time  She appears well-developed and well-nourished  HENT:   Head: Normocephalic and atraumatic  Mouth/Throat: Oropharynx is clear and moist    Eyes: Conjunctivae and EOM are normal    Neck: Normal range of motion  Neck supple  Cardiovascular: Normal rate, regular rhythm, normal heart sounds and intact distal pulses  Pulmonary/Chest: Effort normal and breath sounds normal    Abdominal: Soft  Bowel sounds are normal  She exhibits no distension  There is no tenderness  Musculoskeletal: Normal range of motion  Neurological: She is alert and oriented to person, place, and time  Skin: Skin is warm and dry  Psychiatric: She has a normal mood and affect   Her behavior is normal  Judgment and thought content normal    Nursing note and vitals reviewed  Vital Signs  ED Triage Vitals [06/30/19 2009]   Temperature Pulse Respirations Blood Pressure SpO2   97 5 °F (36 4 °C) 63 20 (!) 196/89 96 %      Temp Source Heart Rate Source Patient Position - Orthostatic VS BP Location FiO2 (%)   Tympanic Radial Sitting Right arm --      Pain Score       No Pain           Vitals:    06/30/19 2115 06/30/19 2130 06/30/19 2145 06/30/19 2200   BP:       Pulse: 62 62 62 60   Patient Position - Orthostatic VS:             Visual Acuity      ED Medications  Medications   sodium chloride 0 9 % bolus 1,000 mL (1,000 mL Intravenous New Bag 6/30/19 2045)   cephalexin (KEFLEX) capsule 500 mg (500 mg Oral Given 6/30/19 2206)       Diagnostic Studies  Results Reviewed     Procedure Component Value Units Date/Time    Urine Microscopic [[id number]]  (Abnormal) Collected:  06/30/19 2135    Lab Status:  Final result Specimen:  Urine, Clean Catch Updated:  06/30/19 2147     RBC, UA None Seen /hpf      WBC, UA 10-20 /hpf      Epithelial Cells Occasional /hpf      Bacteria, UA Innumerable /hpf     Urine culture [[id number]] Collected:  06/30/19 2135    Lab Status:   In process Specimen:  Urine, Clean Catch Updated:  06/30/19 2146    UA w Reflex to Microscopic w Reflex to Culture [[id number]]  (Abnormal) Collected:  06/30/19 2135    Lab Status:  Final result Specimen:  Urine, Clean Catch Updated:  06/30/19 2140     Color, UA Yellow     Clarity, UA Slightly Cloudy     Specific [place], UA 1 020     pH, UA 7 0     Leukocytes, UA Large     Nitrite, UA Positive     Protein, UA Negative mg/dl      Glucose, UA Negative mg/dl      Ketones, UA Negative mg/dl      Urobilinogen, UA 0 2 E U /dl      Bilirubin, UA Negative     Blood, UA Negative    TSH, 3rd generation with Free T4 reflex [[id number]]  (Normal) Collected:  06/30/19 2045    Lab Status:  Final result Specimen:  Blood from Arm, Left Updated:  06/30/19 2118     TSH 3RD GENERATON 3 714 uIU/mL Narrative:       Patients undergoing fluorescein dye angiography may retain small amounts of fluorescein in the body for 48-72 hours post procedure  Samples containing fluorescein can produce falsely depressed TSH values  If the patient had this procedure,a specimen should be resubmitted post fluorescein clearance        Magnesium [[id number]]  (Normal) Collected:  06/30/19 2045    Lab Status:  Final result Specimen:  Blood from Arm, Left Updated:  06/30/19 2118     Magnesium 2 1 mg/dL     Phosphorus [[id number]]  (Normal) Collected:  06/30/19 2045    Lab Status:  Final result Specimen:  Blood from Arm, Left Updated:  06/30/19 2118     Phosphorus 3 9 mg/dL     Troponin I [[id number]]  (Normal) Collected:  06/30/19 2045    Lab Status:  Final result Specimen:  Blood from Arm, Left Updated:  06/30/19 2115     Troponin I <0 02 ng/mL     Comprehensive metabolic panel [[id number]]  (Abnormal) Collected:  06/30/19 2045    Lab Status:  Final result Specimen:  Blood from Arm, Left Updated:  06/30/19 2110     Sodium 142 mmol/L      Potassium 4 0 mmol/L      Chloride 107 mmol/L      CO2 29 mmol/L      ANION GAP 6 mmol/L      BUN 17 mg/dL      Creatinine 0 89 mg/dL      Glucose 90 mg/dL      Calcium 8 3 mg/dL      AST 12 U/L      ALT 21 U/L      Alkaline Phosphatase 47 U/L      Total Protein 6 3 g/dL      Albumin 3 6 g/dL      Total Bilirubin 0 20 mg/dL      eGFR 65 ml/min/1 73sq m     Narrative:       [facility] guidelines for Chronic Kidney Disease (CKD):     Stage 1 with normal or high GFR (GFR > 90 mL/min/1 73 square meters)    Stage 2 Mild CKD (GFR = 60-89 mL/min/1 73 square meters)    Stage 3A Moderate CKD (GFR = 45-59 mL/min/1 73 square meters)    Stage 3B Moderate CKD (GFR = 30-44 mL/min/1 73 square meters)    Stage 4 Severe CKD (GFR = 15-29 mL/min/1 73 square meters)    Stage 5 End Stage CKD (GFR <15 mL/min/1 73 square meters)  Note: GFR calculation is accurate only with a steady state creatinine    Protime-INR [[id number]]  (Normal) Collected:  06/30/19 2045    Lab Status:  Final result Specimen:  Blood from Arm, Left Updated:  06/30/19 2108     Protime 10 8 seconds      INR 1 03    APTT [[id number]]  (Normal) Collected:  06/30/19 2045    Lab Status:  Final result Specimen:  Blood from Arm, Left Updated:  06/30/19 2108     PTT 26 seconds     CBC and differential [[id number]]  (Abnormal) Collected:  06/30/19 2045    Lab Status:  Final result Specimen:  Blood from Arm, Left Updated:  06/30/19 2054     WBC 5 73 Thousand/uL      RBC 4 41 Million/uL      Hemoglobin 11 9 g/dL      Hematocrit 37 4 %      MCV 85 fL      MCH 27 0 pg      MCHC 31 8 g/dL      RDW 12 6 %      MPV 10 3 fL      Platelets 821 Thousands/uL      nRBC 0 /100 WBCs      Neutrophils Relative 40 %      Immat GRANS % 0 %      Lymphocytes Relative 37 %      Monocytes Relative 13 %      Eosinophils Relative 9 %      Basophils Relative 1 %      Neutrophils Absolute 2 33 Thousands/µL      Immature Grans Absolute 0 01 Thousand/uL      Lymphocytes Absolute 2 14 Thousands/µL      Monocytes Absolute 0 72 Thousand/µL      Eosinophils Absolute 0 49 Thousand/µL      Basophils Absolute 0 04 Thousands/µL                  XR chest 2 views    (Results Pending)              Procedures  ECG 12 Lead Documentation Only  Date/Time: 6/30/2019 8:20 PM  Performed by: Edith Reyes DO  Authorized by: Edith Reyes DO     Indications / Diagnosis:  Palpitation  ECG reviewed by me, the ED Provider: yes    Patient location:  ED  Previous ECG:     Previous ECG:  Unavailable    Comparison to cardiac monitor: Yes    Interpretation:     Interpretation: abnormal    Comments:      Sinus rhythm, rate 63, normal axis, normal intervals, no acute ST/T-wave abnormalities noted, deep S waves noted to lead 3 and AVF suggesting inferior infarct and age, no previous EKG available for comparison             ED Course                               MDM  Number of Diagnoses or Management Options  Palpitations: new and requires workup  UTI (urinary tract infection): new and requires workup  Diagnosis management comments: Obtain blood work, EKG, troponin, chest x-ray, UA  Give IV fluids and continue to monitor patient for any episodes of palpitations in the ED  Amount and/or Complexity of Data Reviewed  Clinical lab tests: ordered and reviewed  Tests in the radiology section of CPT®: ordered and reviewed  Tests in the medicine section of CPT®: reviewed and ordered  Review and summarize past medical records: yes  Independent visualization of images, tracings, or specimens: yes    Risk of Complications, Morbidity, and/or Mortality  General comments: Patient did not have any episodes of palpitation while in the ED  Patient's blood work is unremarkable  Patient's UA did show concern for UTI  Patient placed on Keflex and discharged home with follow-up to PCP and Cardiology for further evaluation  Close return instructions given to return to the ER for any worsening symptoms  Patient agrees with discharge plan  Patient well appearing at time of discharge  Patient Progress  Patient progress: stable      Disposition  Final diagnoses:   Palpitations   UTI (urinary tract infection)     Time reflects when diagnosis was documented in both MDM as applicable and the Disposition within this note     Time User Action Codes Description Comment    6/30/2019 10:00 PM Glendy Silva Add [R00 2] Palpitations     6/30/2019 10:00 PM Art Love Add [N39 0] UTI (urinary tract infection)       ED Disposition     ED Disposition Condition Date/Time Comment    Discharge Stable Sun Jun 30, 2019 10:00 PM Marcus Sorensen discharge to home/self care              Follow-up Information     Follow up With Specialties Details Why [address],  Family Medicine In 3 days  [address] [place] [address]  [phone]      Amaya Stanley MD Cardiology  Call as soon as possible to make an appointment for further evaluation of your palpitation symptoms  [address]            Patient's Medications   Discharge Prescriptions    CEPHALEXIN (KEFLEX) 500 MG CAPSULE    Take 1 capsule (500 mg total) by mouth every 8 (eight) hours for 7 days       Start Date: 6/30/2019 End Date: 7/7/2019       Order Dose: 500 mg       Quantity: 21 capsule    Refills: 0     No discharge procedures on file      ED Provider  Electronically Signed by           Aag Eddy DO  06/30/19 2217       Aga Eddy DO  06/30/19 2222

## 2019-07-03 LAB
ATRIAL RATE: 63 BPM
BACTERIA UR CULT: ABNORMAL
BACTERIA UR CULT: ABNORMAL
P AXIS: 20 DEGREES
PR INTERVAL: 198 MS
QRS AXIS: -5 DEGREES
QRSD INTERVAL: 94 MS
QT INTERVAL: 400 MS
QTC INTERVAL: 409 MS
T WAVE AXIS: 58 DEGREES
VENTRICULAR RATE: 63 BPM

## 2019-07-03 PROCEDURE — 93010 ELECTROCARDIOGRAM REPORT: CPT | Performed by: INTERNAL MEDICINE

## 2019-07-09 ENCOUNTER — OFFICE VISIT (OUTPATIENT)
Dept: FAMILY MEDICINE CLINIC | Facility: CLINIC | Age: 69
End: 2019-07-09
Payer: MEDICARE

## 2019-07-09 VITALS
WEIGHT: 183.8 LBS | HEIGHT: 70 IN | TEMPERATURE: 97.5 F | DIASTOLIC BLOOD PRESSURE: 90 MMHG | BODY MASS INDEX: 26.31 KG/M2 | RESPIRATION RATE: 16 BRPM | HEART RATE: 72 BPM | SYSTOLIC BLOOD PRESSURE: 160 MMHG

## 2019-07-09 DIAGNOSIS — E78.5 HYPERLIPIDEMIA, UNSPECIFIED HYPERLIPIDEMIA TYPE: ICD-10-CM

## 2019-07-09 DIAGNOSIS — I10 ESSENTIAL HYPERTENSION: ICD-10-CM

## 2019-07-09 DIAGNOSIS — N39.0 ACUTE UTI: Primary | ICD-10-CM

## 2019-07-09 DIAGNOSIS — G43.709 CHRONIC MIGRAINE WITHOUT AURA WITHOUT STATUS MIGRAINOSUS, NOT INTRACTABLE: ICD-10-CM

## 2019-07-09 PROCEDURE — 99214 OFFICE O/P EST MOD 30 MIN: CPT | Performed by: NURSE PRACTITIONER

## 2019-07-09 RX ORDER — CEPHALEXIN 500 MG/1
500 CAPSULE ORAL EVERY 8 HOURS SCHEDULED
COMMUNITY
Start: 2019-07-01 | End: 2019-07-10 | Stop reason: ALTCHOICE

## 2019-07-09 NOTE — PROGRESS NOTES
Assessment/Plan:    Would want to discuss HTN medications with cardiologist tomorrow  Diagnoses and all orders for this visit:    Acute UTI  Comments: Will finish antibiotic and urine culture report discussed and currently asymptomatic    Essential hypertension  Comments:  SBP elevated and on verapamil and have appt with cardiologist tomorrow and wants to run HTN medications by him and strict ER precautions advised    Chronic migraine without aura without status migrainosus, not intractable    Hyperlipidemia, unspecified hyperlipidemia type  Comments:  on statin and tolerating it well    Other orders  -     cephalexin (KEFLEX) 500 mg capsule; Take 500 mg by mouth every 8 (eight) hours          BMI Counseling: Body mass index is 26 37 kg/m²  Discussed the patient's BMI with her  The BMI is above average  BMI counseling and education was provided to the patient  Nutrition recommendations include reducing portion sizes, decreasing overall calorie intake, 3-5 servings of fruits/vegetables daily, reducing fast food intake, consuming healthier snacks, decreasing soda and/or juice intake, moderation in carbohydrate intake, increasing intake of lean protein, reducing intake of saturated fat and trans fat and reducing intake of cholesterol  Patient Instructions:  Supportive care discussed and advised  Advised to RTO for any worsening and no improvement  Follow up for no improvement and worsening of conditions  Patient advised and educated when to see immediate medical care  Return if symptoms worsen or fail to improve        Future Appointments   Date Time Provider [facility]   7/10/2019  8:00 AM Milagro Black MD [facility]   7/17/2019 11:00 AM Zenaida Baxter DO [facility]   7/18/2019  1:30 PM Callie Mcgovern [address]   8/14/2019  9:15 AM Andre Oneill MD NEURO [facility]   3/10/2020 11:00 AM Jimmy Lopez MD DIAB [facility]           Subjective:      Patient ID: Lorin Mcallister is a 68 y o  female  Chief Complaint   Patient presents with    Follow-up     [facility] lpn       HPI  Patient was recently in ER and went in for palpitations and was r/o any arrhythmia but was found that have UTI  Started on keflex and tolerating it well  Urine culture reviewed  Denies fever, chills, sob and palpitations  Complaint with medications  Under care of cardiologist              Ary Cart:  /90   Pulse 72   Temp 97 5 °F (36 4 °C)   Resp 16   Ht 5' 10" (1 778 m)   Wt 83 4 kg (183 lb 12 8 oz)   BMI 26 37 kg/m²   BP Readings from Last 3 Encounters:   07/09/19 160/90   06/30/19 (!) 203/92   06/12/19 140/84     The following portions of the patient's history were reviewed and updated as appropriate: allergies, current medications, past family history, past medical history, past social history, past surgical history and problem list       Review of Systems   Constitutional: Positive for fatigue  Negative for activity change, appetite change, chills, diaphoresis, fever and unexpected weight change  Respiratory: Negative  Cardiovascular: Negative  Gastrointestinal: Negative  Genitourinary: Negative  Musculoskeletal: Negative  Skin: Negative  Neurological: Negative  Psychiatric/Behavioral: Negative            Objective:    Social History     Tobacco Use   Smoking Status Never Smoker   Smokeless Tobacco Never Used       Allergies: No Known Allergies      Current Outpatient Medications   Medication Sig Dispense Refill    Ascorbic Acid (VITAMIN C) 500 MG CAPS Take 1 tablet by mouth daily      atorvastatin (LIPITOR) 20 mg tablet Take 1 tablet (20 mg total) by mouth daily 90 tablet 3    B Complex Vitamins (B COMPLEX PO) Take 1 tablet by mouth daily      cephalexin (KEFLEX) 500 mg capsule Take 500 mg by mouth every 8 (eight) hours      Cholecalciferol (VITAMIN D3) 5000 units CAPS Take 1 capsule by mouth Daily      famotidine (PEPCID) 40 MG tablet Take 1 tablet by mouth every other day        gabapentin (NEURONTIN) 100 mg capsule Take 1 capsule (100 mg total) by mouth daily at bedtime for 60 days 30 capsule 0    magnesium oxide (MAG-OX) 400 mg tablet Take 1 tablet by mouth daily With zinc       Misc Natural Products (GLUCOSAMINE CHOND COMPLEX/MSM PO) Take 1 tablet by mouth Daily 1000 mg       Multiple Vitamin (MULTI-VITAMIN DAILY PO) Take 1 tablet by mouth daily      Multiple Vitamins-Minerals (PRESERVISION AREDS) CAPS Take 1 capsule by mouth daily      Omega-3 Fatty Acids (FISH OIL) 500 MG CAPS Take 1 capsule by mouth daily      omeprazole (PriLOSEC) 40 MG capsule Take 1 capsule by mouth daily      Probiotic Product (PROBIOTIC-10 PO) Take 1 capsule by mouth daily      terbinafine (LamISIL) 250 mg tablet Take 250 mg by mouth daily      verapamil (CALAN-SR) 180 mg CR tablet Take 1 tablet (180 mg total) by mouth 2 (two) times a day 180 tablet 1     No current facility-administered medications for this visit  Physical Exam   Constitutional: She is oriented to person, place, and time  She appears well-developed and well-nourished  Cardiovascular: Normal rate, regular rhythm and normal heart sounds  Pulmonary/Chest: Effort normal and breath sounds normal    Abdominal: Soft  Bowel sounds are normal  There is no tenderness  There is no CVA tenderness  Musculoskeletal: Normal range of motion  She exhibits no edema, tenderness or deformity  Neurological: She is alert and oriented to person, place, and time  Skin: Skin is warm and dry  No rash noted  Psychiatric: She has a normal mood and affect  Her behavior is normal  Judgment and thought content normal    Vitals reviewed                    JANETT Landeros

## 2019-07-09 NOTE — PATIENT INSTRUCTIONS
Supportive care discussed and advised  Advised to RTO for any worsening and no improvement  Follow up for no improvement and worsening of conditions  Patient advised and educated when to see immediate medical care

## 2019-07-10 ENCOUNTER — OFFICE VISIT (OUTPATIENT)
Dept: CARDIOLOGY CLINIC | Facility: CLINIC | Age: 69
End: 2019-07-10
Payer: MEDICARE

## 2019-07-10 VITALS
HEIGHT: 70 IN | SYSTOLIC BLOOD PRESSURE: 144 MMHG | BODY MASS INDEX: 25.91 KG/M2 | WEIGHT: 181 LBS | HEART RATE: 72 BPM | DIASTOLIC BLOOD PRESSURE: 82 MMHG | OXYGEN SATURATION: 97 %

## 2019-07-10 DIAGNOSIS — R06.00 DYSPNEA ON EXERTION: ICD-10-CM

## 2019-07-10 DIAGNOSIS — R00.2 INTERMITTENT PALPITATIONS: Primary | ICD-10-CM

## 2019-07-10 DIAGNOSIS — I10 UNCONTROLLED HYPERTENSION: ICD-10-CM

## 2019-07-10 DIAGNOSIS — E78.5 DYSLIPIDEMIA: ICD-10-CM

## 2019-07-10 DIAGNOSIS — E66.3 OVERWEIGHT (BMI 25.0-29.9): ICD-10-CM

## 2019-07-10 DIAGNOSIS — E04.2 MULTINODULAR GOITER: ICD-10-CM

## 2019-07-10 PROCEDURE — 99214 OFFICE O/P EST MOD 30 MIN: CPT | Performed by: INTERNAL MEDICINE

## 2019-07-10 NOTE — PATIENT INSTRUCTIONS
1   Purchase [employer] blood pressure unit with standard size arm cuff  2   Take blood pressures after resting for at least 15 minutes while seated in a chair or at a table with arm supported on arm rest or table  Do 3 readings, one after the other, both morning and evening for 4 consecutive days  3   Write down each and every reading with date and time and get list of readings to the office of Dr Rose Marie Pereira for his review  4   Our office will contact you with further instructions and the results of this review  5  Patient to call us if frequent recurrent flutters or vibration in chest occurs again  6  Continue present medications for now  7  Follow-up as originally scheduled in October, 2019

## 2019-07-10 NOTE — PROGRESS NOTES
Cardiology Progress Note    ASSESSMENT:    1  Stable chronic exertional dyspnea with stair climbing but not with exercise at [facility], probably related to progressive overweight with 14 pound weight gain over the past 6-3/4 years  2  Suboptimum control of essential hypertension with consideration of white coat phenomenon  3  Controlled dyslipidemia with atorvastatin  4  History of normal exercise stress test 10/13/15 and benign echocardiogram on 5/1/13  5  History of chronic nonproductive cough, possibly caused by GERD  6  Resolved hypercalcemia with parathyroidectomy 11/30/11  7  Status post nasal polypectomy October, 2012 and [facility]  October, 2012 with right cataract extraction 9/13  8  Chronic stable multinodular goiter with latest thyroid ultrasound 5/30/17, followed by Dr Georgie Mcclelland  9  Resolved mild chronic kidney disease, stage III  10  Chronic fatigue and tiredness with consideration of adverse drug reaction to verapamil  11  Osteoarthritis of both knees, right more than left  Plan       Patient Instructions     1  Purchase [employer] blood pressure unit with standard size arm cuff  2   Take blood pressures after resting for at least 15 minutes while seated in a chair or at a table with arm supported on arm rest or table  Do 3 readings, one after the other, both morning and evening for 4 consecutive days  3   Write down each and every reading with date and time and get list of readings to the office of Dr Conner Burgess for his review  4   Our office will contact you with further instructions and the results of this review  5  Patient to call us if frequent recurrent flutters or vibration in chest occurs again  6  Continue present medications for now  7  Follow-up as originally scheduled in October, 2019  HPI    This 68 y o  female  was seen in the [facility] Emergency Department on 06/30/2019 with fluttering and a vibratory buzzing sensation early her left precordium for several days  This has not happened since that day  She was discovered to have an E   coli UTI and received a 7 day course of cephalexin, which she has finished  She continues to complain of easy fatigue chronically but still exercises 5 days a week at the [facility] of [address]  She has occasional migratory stabbing pains which last for a maximum of 30 seconds and are usually considerably shorter in duration  These are longstanding  She has no other cardiopulmonary or medical symptoms  She follows with Dr Bradley Rosado, Dr Dianne Castle, and Dr Tung Turner  Review of Systems    All other systems negative, except as noted in history of present illness    Historical Information   Past Medical History:   Diagnosis Date    Borderline high cholesterol     Borderline hypertension     Migraine      Past Surgical History:   Procedure Laterality Date    BREAST BIOPSY Right     benign    BUNIONECTOMY      CATARACT EXTRACTION, BILATERAL      PARATHYROID GLAND SURGERY       Social History     Substance and Sexual Activity   Alcohol Use No     Social History     Substance and Sexual Activity   Drug Use No     Social History     Tobacco Use   Smoking Status Never Smoker   Smokeless Tobacco Never Used       Family History:  Family History   Problem Relation Age of Onset    Angina Mother     Heart failure Mother     Prostate cancer Father     Diabetes Sister     Sudden death Sister     Osteoporosis Sister     Hypertension Brother     No Known Problems Son     No Known Problems Daughter     Prostate cancer Brother     Lung cancer Brother     Sudden death Brother     COPD Sister     Breast cancer Maternal Uncle     Breast cancer Cousin          Meds/Allergies     Prior to Admission medications    Medication Sig Start Date End Date Taking?  Authorizing Provider   Ascorbic Acid (VITAMIN C) 500 MG CAPS Take 1 tablet by mouth daily 10/18/17  Yes Historical Provider, MD   atorvastatin (LIPITOR) 20 mg tablet Take 1 tablet (20 mg total) by mouth daily 11/12/18  Yes Mino Duron MD   B Complex Vitamins (B COMPLEX PO) Take 1 tablet by mouth daily   Yes Historical Provider, MD   Cholecalciferol (VITAMIN D3) 5000 units CAPS Take 1 capsule by mouth Daily   Yes Historical Provider, MD   famotidine (PEPCID) 40 MG tablet Take 1 tablet by mouth every other day     Yes Historical Provider, MD   magnesium oxide (MAG-OX) 400 mg tablet Take 1 tablet by mouth daily With zinc  12/2/10  Yes Historical Provider, MD   Misc Natural Products (GLUCOSAMINE CHOND COMPLEX/MSM PO) Take 1 tablet by mouth Daily 1000 mg    Yes Historical Provider, MD   Multiple Vitamin (MULTI-VITAMIN DAILY PO) Take 1 tablet by mouth daily 10/18/17  Yes Historical Provider, MD   Multiple Vitamins-Minerals (PRESERVISION AREDS) CAPS Take 1 capsule by mouth daily 10/18/17  Yes Historical Provider, MD   Omega-3 Fatty Acids (FISH OIL) 500 MG CAPS Take 1 capsule by mouth daily 10/18/17  Yes Historical Provider, MD   omeprazole (PriLOSEC) 40 MG capsule Take 1 capsule by mouth daily   Yes Historical Provider, MD   Probiotic Product (PROBIOTIC-10 PO) Take 1 capsule by mouth daily   Yes Historical Provider, MD   verapamil (CALAN-SR) 180 mg CR tablet Take 1 tablet (180 mg total) by mouth 2 (two) times a day 4/10/19  Yes Gonzalo Seth MD   cephalexin (KEFLEX) 500 mg capsule Take 500 mg by mouth every 8 (eight) hours 7/1/19 7/10/19 Yes Historical Provider, MD   gabapentin (NEURONTIN) 100 mg capsule Take 1 capsule (100 mg total) by mouth daily at bedtime for 60 days  Patient not taking: Reported on 7/10/2019 5/28/19 7/27/19  Ernesto Christianson DPM   terbinafine (LamISIL) 250 mg tablet Take 250 mg by mouth daily    Historical Provider, MD       No Known Allergies      Vitals:    07/10/19 0759   BP: 144/82   BP Location: Left arm   Patient Position: Sitting   Cuff Size: Standard   Pulse: 72   SpO2: 97%   Weight: 82 1 kg (181 lb)   Height: 5' 10" (1 778 m)       Body mass index is 25 97 kg/m²    3 pound weight loss in approximately 9 months    Physical Exam:    General Appearance:  Alert, cooperative, no distress, appears stated age and is mildly overweight  Head:  Normocephalic, without obvious abnormality, atraumatic   Eyes:  PERRL, conjunctiva/corneas clear, EOM's intact,   both eyes   Ears:  Normal TM's and external ear canals, both ears   Nose: Nares normal, septum midline, mucosa normal, no drainage or sinus tenderness   Throat: Lips, mucosa, and tongue normal; teeth and gums normal   Neck: Supple, symmetrical, trachea midline, no adenopathy, thyroid: not enlarged, symmetric, no tenderness/mass/nodules, no carotid bruit or JVD   Back:   Symmetric, no curvature, ROM normal, no CVA tenderness   Lungs:   Clear to auscultation bilaterally, respirations unlabored   Chest Wall:  No tenderness or deformity   Heart:  Regular rate and rhythm, S1, S2 normal, no murmur, rub or gallop   Abdomen:   Soft, non-tender, bowel sounds active all four quadrants,  no masses, no organomegaly   Extremities: Extremities normal, atraumatic, no cyanosis or edema   Pulses: 2+ and symmetric   Skin: Skin showed normal color, texture, turgor and no rashes or lesions   Lymph nodes: Cervical, supraclavicular, and axillary nodes normal   Neurologic: Normal         Cardiographics    ECG  06/30/2019:    Nondiagnostic Q-wave in V2  Otherwise normal ECG, unchanged from 10/22/2018      Imaging    Chest X-Ray :  Xr Chest 2 Views 06/30/2019:      Impression No acute cardiopulmonary disease   Workstation performed: [id number]             Lab Review       Lab Results   Component Value Date    SODIUM 142 06/30/2019    K 4 0 06/30/2019     06/30/2019    CO2 29 06/30/2019    ANIONGAP 11 7 10/09/2015    BUN 17 06/30/2019    CREATININE 0 89 06/30/2019    GLUCOSE 94 01/03/2017    CALCIUM 8 3 06/30/2019    AST 12 06/30/2019    ALT 21 06/30/2019    ALKPHOS 47 06/30/2019    PROT 6 5 01/03/2017    BILITOT 0 3 01/03/2017    EGFR 65 06/30/2019   TSH, CMP 06/30/2019:  Normal  Urine culture 06/30/2019:  Consistent with E  Coli UTI    Lab Results   Component Value Date    CHOLESTEROL 120 01/17/2019    CHOLESTEROL 152 10/20/2018     Lab Results   Component Value Date    HDL 54 01/17/2019    HDL 59 10/09/2015     LDL direct 01/17/2019:  53  Lab Results   Component Value Date    LDLCALC 100 10/09/2015       Lab Results   Component Value Date    TRIG 67 01/17/2019    TRIG 95 10/09/2015         Lab Results   Component Value Date    GLUCOSE 94 01/03/2017    CALCIUM 8 3 06/30/2019     01/03/2017    K 4 0 06/30/2019    CO2 29 06/30/2019     06/30/2019    BUN 17 06/30/2019    CREATININE 0 89 06/30/2019           Mino Duron MD

## 2019-07-17 ENCOUNTER — OFFICE VISIT (OUTPATIENT)
Dept: FAMILY MEDICINE CLINIC | Facility: CLINIC | Age: 69
End: 2019-07-17
Payer: MEDICARE

## 2019-07-17 VITALS
TEMPERATURE: 97.5 F | WEIGHT: 181.6 LBS | BODY MASS INDEX: 26 KG/M2 | HEIGHT: 70 IN | RESPIRATION RATE: 16 BRPM | OXYGEN SATURATION: 98 % | HEART RATE: 63 BPM | SYSTOLIC BLOOD PRESSURE: 110 MMHG | DIASTOLIC BLOOD PRESSURE: 84 MMHG

## 2019-07-17 DIAGNOSIS — E78.5 HYPERLIPIDEMIA, UNSPECIFIED HYPERLIPIDEMIA TYPE: ICD-10-CM

## 2019-07-17 DIAGNOSIS — K21.9 LARYNGOPHARYNGEAL REFLUX (LPR): ICD-10-CM

## 2019-07-17 DIAGNOSIS — Z79.899 ENCOUNTER FOR LONG-TERM CURRENT USE OF MEDICATION: ICD-10-CM

## 2019-07-17 DIAGNOSIS — I10 ESSENTIAL HYPERTENSION: Primary | ICD-10-CM

## 2019-07-17 PROCEDURE — 99214 OFFICE O/P EST MOD 30 MIN: CPT | Performed by: FAMILY MEDICINE

## 2019-07-17 NOTE — PATIENT INSTRUCTIONS
Take omeprazole every other day and see if it effects your cough  The goal is for you to be able to stop it

## 2019-07-17 NOTE — ASSESSMENT & PLAN NOTE
She is on both pepcid and prilosec  We discussed the risk of medications  I am concerned about the PPI long term risk of chronic kidney disease, pneumonia and osteoporosis    She will try taking the omeprazole every other day and see how she feels

## 2019-07-17 NOTE — PROGRESS NOTES
Assessment/Plan:    Problem List Items Addressed This Visit     Essential hypertension - Primary     Well controlled today  She is monitoring her pressure at home for Dr Caitlyn Collins metabolic panel    Lipid Panel with Direct LDL reflex    Hyperlipidemia     Due for labs, will check lipid levels          Laryngopharyngeal reflux (LPR)     She is on both pepcid and prilosec  We discussed the risk of medications  I am concerned about the PPI long term risk of chronic kidney disease, pneumonia and osteoporosis    She will try taking the omeprazole every other day and see how she feels  Other Visit Diagnoses     Encounter for long-term current use of medication        Relevant Orders    Vitamin B12            Patient Instructions   Take omeprazole every other day and see if it effects your cough  The goal is for you to be able to stop it  Return in about 6 months (around 1/17/2020) for Annual Wellness Visit  Subjective:      Patient ID: Hany Moncada is a 68 y o  female  Chief Complaint   Patient presents with    Follow-up     6mo f/u  [place] [facility]       She is feeling well  Hypertension   This is a chronic problem  The current episode started more than 1 year ago  The problem is controlled  Pertinent negatives include no peripheral edema or shortness of breath  The current treatment provides moderate improvement  There are no compliance problems  Hyperlipidemia   This is a chronic problem  The current episode started more than 1 year ago  The problem is controlled  Recent lipid tests were reviewed and are normal  Pertinent negatives include no shortness of breath  Current antihyperlipidemic treatment includes statins  The current treatment provides moderate improvement of lipids  There are no compliance problems          The following portions of the patient's history were reviewed and updated as appropriate:  past social history    Review of Systems Respiratory: Negative for shortness of breath  Cardiovascular: Negative  Current Outpatient Medications   Medication Sig Dispense Refill    Ascorbic Acid (VITAMIN C) 500 MG CAPS Take 1 tablet by mouth daily      atorvastatin (LIPITOR) 20 mg tablet Take 1 tablet (20 mg total) by mouth daily 90 tablet 3    B Complex Vitamins (B COMPLEX PO) Take 1 tablet by mouth daily      Cholecalciferol (VITAMIN D3) 5000 units CAPS Take 1 capsule by mouth Daily      famotidine (PEPCID) 40 MG tablet Take 1 tablet by mouth every other day        magnesium oxide (MAG-OX) 400 mg tablet Take 1 tablet by mouth daily With zinc       Misc Natural Products (GLUCOSAMINE CHOND COMPLEX/MSM PO) Take 1 tablet by mouth Daily 1000 mg       Multiple Vitamin (MULTI-VITAMIN DAILY PO) Take 1 tablet by mouth daily      Multiple Vitamins-Minerals (PRESERVISION AREDS) CAPS Take 1 capsule by mouth daily      Omega-3 Fatty Acids (FISH OIL) 500 MG CAPS Take 1 capsule by mouth daily      omeprazole (PriLOSEC) 40 MG capsule Take 1 capsule by mouth daily      Probiotic Product (PROBIOTIC-10 PO) Take 1 capsule by mouth daily      verapamil (CALAN-SR) 180 mg CR tablet Take 1 tablet (180 mg total) by mouth 2 (two) times a day 180 tablet 1    gabapentin (NEURONTIN) 100 mg capsule Take 1 capsule (100 mg total) by mouth daily at bedtime for 60 days (Patient not taking: Reported on 7/10/2019) 30 capsule 0     No current facility-administered medications for this visit  Objective:    /84   Pulse 63   Temp 97 5 °F (36 4 °C)   Resp 16   Ht 5' 10" (1 778 m)   Wt 82 4 kg (181 lb 9 6 oz)   SpO2 98%   BMI 26 06 kg/m²        Physical Exam   Constitutional: She appears well-developed and well-nourished  HENT:   Head: Normocephalic and atraumatic  Right Ear: External ear normal    Left Ear: External ear normal    Mouth/Throat: Oropharynx is clear and moist    Cardiovascular: Normal rate, regular rhythm and normal heart sounds  Exam reveals no friction rub  No murmur heard  Pulmonary/Chest: Effort normal and breath sounds normal  No respiratory distress  She has no wheezes  She has no rales  Musculoskeletal: She exhibits no edema or deformity  Nursing note and vitals reviewed               Mohsen Marquez, DO

## 2019-07-18 ENCOUNTER — OFFICE VISIT (OUTPATIENT)
Dept: PODIATRY | Facility: CLINIC | Age: 69
End: 2019-07-18
Payer: MEDICARE

## 2019-07-18 VITALS — DIASTOLIC BLOOD PRESSURE: 77 MMHG | SYSTOLIC BLOOD PRESSURE: 150 MMHG | RESPIRATION RATE: 16 BRPM | HEART RATE: 66 BPM

## 2019-07-18 DIAGNOSIS — L03.039 PARONYCHIA OF TOENAIL, UNSPECIFIED LATERALITY: ICD-10-CM

## 2019-07-18 DIAGNOSIS — M77.41 METATARSALGIA OF BOTH FEET: ICD-10-CM

## 2019-07-18 DIAGNOSIS — M20.12 VALGUS DEFORMITY OF BOTH GREAT TOES: ICD-10-CM

## 2019-07-18 DIAGNOSIS — M79.671 PAIN IN BOTH FEET: ICD-10-CM

## 2019-07-18 DIAGNOSIS — M79.672 PAIN IN BOTH FEET: ICD-10-CM

## 2019-07-18 DIAGNOSIS — M20.11 VALGUS DEFORMITY OF BOTH GREAT TOES: ICD-10-CM

## 2019-07-18 DIAGNOSIS — M77.42 METATARSALGIA OF BOTH FEET: ICD-10-CM

## 2019-07-18 DIAGNOSIS — M54.16 RADICULOPATHY OF LUMBAR REGION: ICD-10-CM

## 2019-07-18 DIAGNOSIS — B35.1 ONYCHOMYCOSIS: Primary | ICD-10-CM

## 2019-07-18 PROCEDURE — 99213 OFFICE O/P EST LOW 20 MIN: CPT | Performed by: PODIATRIST

## 2019-07-18 RX ORDER — TERBINAFINE HYDROCHLORIDE 250 MG/1
250 TABLET ORAL DAILY
Qty: 30 TABLET | Refills: 0 | Status: SHIPPED | OUTPATIENT
Start: 2019-07-18 | End: 2019-08-17

## 2019-07-18 RX ORDER — GABAPENTIN 100 MG/1
100 CAPSULE ORAL
Qty: 30 CAPSULE | Refills: 0 | Status: SHIPPED | OUTPATIENT
Start: 2019-07-18 | End: 2019-09-05 | Stop reason: SDUPTHER

## 2019-07-18 NOTE — PROGRESS NOTES
Assessment/Plan:  Rule out radiculopathy   Pes planus   The hallux valgus deformity bilateral   Pain upon ambulation      Plan   Patient educated on condition   She will use orthotics as directed   We will maintain present gabapentin dosing at 100 mg daily HS  All nails debrided    Calluses debrided      No problem-specific Assessment & Plan notes found for this encounter          There are no diagnoses linked to this encounter        Subjective:  Patient is doing better  Freda Anthony still has intermittent pain   She has pain in her feet at night   The pain goes across the ball of the foot bilateral   Patient does suffer from low back pain              Past Medical History:   Diagnosis Date    Borderline high cholesterol      Borderline hypertension      Migraine                     Past Surgical History:   Procedure Laterality Date    BREAST BIOPSY Right       benign    BUNIONECTOMY        CATARACT EXTRACTION, BILATERAL        PARATHYROID GLAND SURGERY             No Known Allergies        Current Outpatient Medications:     Ascorbic Acid (VITAMIN C) 500 MG CAPS, Take 1 tablet by mouth daily, Disp: , Rfl:     atorvastatin (LIPITOR) 20 mg tablet, Take 1 tablet (20 mg total) by mouth daily, Disp: 90 tablet, Rfl: 3    B Complex Vitamins (B COMPLEX PO), Take 1 tablet by mouth daily, Disp: , Rfl:     Cholecalciferol (VITAMIN D3) 5000 units CAPS, Take 1 capsule by mouth Daily, Disp: , Rfl:     famotidine (PEPCID) 40 MG tablet, Take 1 tablet by mouth every other day  , Disp: , Rfl:     magnesium oxide (MAG-OX) 400 mg tablet, Take 1 tablet by mouth daily With zinc , Disp: , Rfl:     meloxicam (MOBIC) 7 5 mg tablet, Take 1 tablet (7 5 mg total) by mouth daily for 10 days, Disp: 10 tablet, Rfl: 0    Misc Natural Products (GLUCOSAMINE CHOND COMPLEX/MSM PO), Take 1 tablet by mouth Daily 1000 mg , Disp: , Rfl:     Multiple Vitamin (MULTI-VITAMIN DAILY PO), Take 1 tablet by mouth daily, Disp: , Rfl:     Multiple Vitamins-Minerals (PRESERVISION AREDS) CAPS, Take 1 capsule by mouth daily, Disp: , Rfl:     Omega-3 Fatty Acids (FISH OIL) 500 MG CAPS, Take 1 capsule by mouth daily, Disp: , Rfl:     omeprazole (PriLOSEC) 40 MG capsule, Take 1 capsule by mouth daily, Disp: , Rfl:     Probiotic Product (PROBIOTIC-10 PO), Take 1 capsule by mouth daily, Disp: , Rfl:     terbinafine (LamISIL) 250 mg tablet, Take 1 tablet (250 mg total) by mouth daily for 30 days, Disp: 30 tablet, Rfl: 0    verapamil (CALAN-SR) 180 mg CR tablet, Take 1 tablet (180 mg total) by mouth 2 (two) times a day, Disp: 180 tablet, Rfl: 1           Patient Active Problem List   Diagnosis    Chronic migraine without aura without status migrainosus, not intractable    Essential hypertension    Lump of skin    Valgus deformity of both great toes    Pain in both feet    Onychomycosis    Paronychia of toenail             Patient ID: Evelin Thurston is a 68 y o  female      HPI     The following portions of the patient's history were reviewed and updated as appropriate: She  has a past medical history of Borderline high cholesterol, Borderline hypertension, and Migraine  She           Patient Active Problem List     Diagnosis Date Noted    Valgus deformity of both great toes 04/16/2019    Pain in both feet 04/16/2019    Onychomycosis 04/16/2019    Paronychia of toenail 04/16/2019    Lump of skin 01/15/2019    Chronic migraine without aura without status migrainosus, not intractable 03/28/2018    Essential hypertension 03/28/2018      She  has a past surgical history that includes Bunionectomy; Parathyroid gland surgery; Cataract extraction, bilateral; and Breast biopsy (Right)  Her family history includes Angina in her mother; Breast cancer in her cousin and maternal uncle; COPD in her sister; Diabetes in her sister;  Heart failure in her mother; Hypertension in her brother; Lung cancer in her brother; No Known Problems in her daughter and son; Osteoporosis in her sister; Prostate cancer in her brother and father; Sudden death in her brother and sister  She  reports that she has never smoked   She has never used smokeless tobacco  She reports that she does not drink alcohol or use drugs               Current Outpatient Medications   Medication Sig Dispense Refill    Ascorbic Acid (VITAMIN C) 500 MG CAPS Take 1 tablet by mouth daily        atorvastatin (LIPITOR) 20 mg tablet Take 1 tablet (20 mg total) by mouth daily 90 tablet 3    B Complex Vitamins (B COMPLEX PO) Take 1 tablet by mouth daily        Cholecalciferol (VITAMIN D3) 5000 units CAPS Take 1 capsule by mouth Daily        famotidine (PEPCID) 40 MG tablet Take 1 tablet by mouth every other day          magnesium oxide (MAG-OX) 400 mg tablet Take 1 tablet by mouth daily With zinc         meloxicam (MOBIC) 7 5 mg tablet Take 1 tablet (7 5 mg total) by mouth daily for 10 days 10 tablet 0    Misc Natural Products (GLUCOSAMINE CHOND COMPLEX/MSM PO) Take 1 tablet by mouth Daily 1000 mg         Multiple Vitamin (MULTI-VITAMIN DAILY PO) Take 1 tablet by mouth daily        Multiple Vitamins-Minerals (PRESERVISION AREDS) CAPS Take 1 capsule by mouth daily        Omega-3 Fatty Acids (FISH OIL) 500 MG CAPS Take 1 capsule by mouth daily        omeprazole (PriLOSEC) 40 MG capsule Take 1 capsule by mouth daily        Probiotic Product (PROBIOTIC-10 PO) Take 1 capsule by mouth daily        terbinafine (LamISIL) 250 mg tablet Take 1 tablet (250 mg total) by mouth daily for 30 days 30 tablet 0    verapamil (CALAN-SR) 180 mg CR tablet Take 1 tablet (180 mg total) by mouth 2 (two) times a day 180 tablet 1      No current facility-administered medications for this visit                Current Outpatient Medications on File Prior to Visit   Medication Sig    Ascorbic Acid (VITAMIN C) 500 MG CAPS Take 1 tablet by mouth daily    atorvastatin (LIPITOR) 20 mg tablet Take 1 tablet (20 mg total) by mouth daily  B Complex Vitamins (B COMPLEX PO) Take 1 tablet by mouth daily    Cholecalciferol (VITAMIN D3) 5000 units CAPS Take 1 capsule by mouth Daily    famotidine (PEPCID) 40 MG tablet Take 1 tablet by mouth every other day      magnesium oxide (MAG-OX) 400 mg tablet Take 1 tablet by mouth daily With zinc     meloxicam (MOBIC) 7 5 mg tablet Take 1 tablet (7 5 mg total) by mouth daily for 10 days    Misc Natural Products (GLUCOSAMINE CHOND COMPLEX/MSM PO) Take 1 tablet by mouth Daily 1000 mg     Multiple Vitamin (MULTI-VITAMIN DAILY PO) Take 1 tablet by mouth daily    Multiple Vitamins-Minerals (PRESERVISION AREDS) CAPS Take 1 capsule by mouth daily    Omega-3 Fatty Acids (FISH OIL) 500 MG CAPS Take 1 capsule by mouth daily    omeprazole (PriLOSEC) 40 MG capsule Take 1 capsule by mouth daily    Probiotic Product (PROBIOTIC-10 PO) Take 1 capsule by mouth daily    terbinafine (LamISIL) 250 mg tablet Take 1 tablet (250 mg total) by mouth daily for 30 days    verapamil (CALAN-SR) 180 mg CR tablet Take 1 tablet (180 mg total) by mouth 2 (two) times a day      No current facility-administered medications on file prior to visit        She has No Known Allergies              Vitals:     04/30/19 1617   BP: 136/82   Pulse: 75   Resp: 17         Review of Systems       Objective:  Patient's shoes and socks removed    Foot ExamPhysical Exam     Foot Exam     General  General Appearance: appears stated age and healthy   Orientation: alert and oriented to person, place, and time   Affect: appropriate   Gait: antalgic         Right Foot/Ankle      Inspection and Palpation  Tenderness: great toe interphalangeal joint, great toe metatarsophalangeal joint and lesser metatarsophalangeal joints   Swelling: dorsum and metatarsals   Arch: pes planus  Hammertoes: fifth toe  Hallux valgus: yes  Hallux limitus: yes  Skin Exam: callus and dry skin;      Neurovascular  Dorsalis pedis: 1+  Posterior tibial: 2+        Left Foot/Ankle       Inspection and Palpation  Tenderness: great toe interphalangeal joint, great toe metatarsophalangeal joint and lesser metatarsophalangeal joints   Swelling: dorsum and metatarsals   Arch: pes planus  Hammertoes: fifth toe  Hallux valgus: yes  Hallux limitus: yes  Skin Exam: callus and dry skin;      Neurovascular  Dorsalis pedis: 1+  Posterior tibial: 2+           Physical Exam   Constitutional: She appears well-developed and well-nourished  Cardiovascular: Normal rate and regular rhythm  Pulses:       Dorsalis pedis pulses are 1+ on the right side, and 1+ on the left side         Posterior tibial pulses are 2+ on the right side, and 2+ on the left side  Musculoskeletal:   Patient is pronated in stance and gait   She has large bunion noted bilateral   Severe hallux valgus deformity bilateral   Track bound bunions  X-rayElease Sheller hallux valgus deformity noted bilateral but   Feet:   Right Foot:   Skin Integrity: Positive for callus and dry skin  Left Foot:   Skin Integrity: Positive for callus and dry skin     Skin:   Severe mycosis of nails 1 through 5 of the left foot   Positive dystrophy noted   Vitals reviewed

## 2019-07-22 LAB
ALBUMIN SERPL-MCNC: 4.5 G/DL (ref 3.5–4.8)
ALBUMIN/GLOB SERPL: 1.8 {RATIO} (ref 1.2–2.2)
ALP SERPL-CCNC: 50 IU/L (ref 39–117)
ALT SERPL-CCNC: 21 IU/L (ref 0–32)
AST SERPL-CCNC: 21 IU/L (ref 0–40)
BILIRUB SERPL-MCNC: 0.3 MG/DL (ref 0–1.2)
BUN SERPL-MCNC: 20 MG/DL (ref 8–27)
BUN/CREAT SERPL: 19 (ref 12–28)
CALCIUM SERPL-MCNC: 9.4 MG/DL (ref 8.7–10.3)
CHLORIDE SERPL-SCNC: 102 MMOL/L (ref 96–106)
CHOLEST SERPL-MCNC: 135 MG/DL (ref 100–199)
CO2 SERPL-SCNC: 21 MMOL/L (ref 20–29)
CREAT SERPL-MCNC: 1.03 MG/DL (ref 0.57–1)
GLOBULIN SER-MCNC: 2.5 G/DL (ref 1.5–4.5)
GLUCOSE SERPL-MCNC: 98 MG/DL (ref 65–99)
HDLC SERPL-MCNC: 61 MG/DL
LDLC SERPL CALC-MCNC: 58 MG/DL (ref 0–99)
MICRODELETION SYND BLD/T FISH: NORMAL
MICRODELETION SYND BLD/T FISH: NORMAL
POTASSIUM SERPL-SCNC: 4.4 MMOL/L (ref 3.5–5.2)
PROT SERPL-MCNC: 7 G/DL (ref 6–8.5)
SL AMB EGFR AFRICAN AMERICAN: 63 ML/MIN/1.73
SL AMB EGFR NON AFRICAN AMERICAN: 55 ML/MIN/1.73
SL AMB PDF IMAGE: NORMAL
SODIUM SERPL-SCNC: 140 MMOL/L (ref 134–144)
TRIGL SERPL-MCNC: 80 MG/DL (ref 0–149)
VIT B12 SERPL-MCNC: 781 PG/ML (ref 232–1245)

## 2019-07-29 ENCOUNTER — TELEPHONE (OUTPATIENT)
Dept: CARDIOLOGY CLINIC | Facility: CLINIC | Age: 69
End: 2019-07-29

## 2019-08-02 DIAGNOSIS — I10 UNCONTROLLED HYPERTENSION: Primary | ICD-10-CM

## 2019-08-02 RX ORDER — CHLORTHALIDONE 25 MG/1
TABLET ORAL
Qty: 16 TABLET | Refills: 5 | Status: SHIPPED | OUTPATIENT
Start: 2019-08-02 | End: 2020-03-23

## 2019-08-02 NOTE — TELEPHONE ENCOUNTER
Call patient and inform her that average blood pressure from 07/19 through 07/22/2019 was 137/73  We would prefer the average systolic blood pressure to be less than 130  I recommend she begin on chlorthalidone 25 milligrams four days a week taken every Monday, Wednesday, Friday, and Sunday  This is in addition to her current medication, in an effort to further improve her blood pressure  I have sent a prescription to her local pharmacy to get started on this and would recommend follow-up with me 4-6 weeks after she begins this

## 2019-08-14 ENCOUNTER — OFFICE VISIT (OUTPATIENT)
Dept: NEUROLOGY | Facility: CLINIC | Age: 69
End: 2019-08-14
Payer: MEDICARE

## 2019-08-14 VITALS
HEIGHT: 70 IN | WEIGHT: 182 LBS | BODY MASS INDEX: 26.05 KG/M2 | HEART RATE: 64 BPM | DIASTOLIC BLOOD PRESSURE: 76 MMHG | SYSTOLIC BLOOD PRESSURE: 130 MMHG

## 2019-08-14 DIAGNOSIS — I10 ESSENTIAL HYPERTENSION: ICD-10-CM

## 2019-08-14 DIAGNOSIS — G43.709 CHRONIC MIGRAINE WITHOUT AURA WITHOUT STATUS MIGRAINOSUS, NOT INTRACTABLE: Primary | ICD-10-CM

## 2019-08-14 PROCEDURE — 99214 OFFICE O/P EST MOD 30 MIN: CPT | Performed by: PSYCHIATRY & NEUROLOGY

## 2019-08-14 NOTE — PROGRESS NOTES
Patient ID: Gerardo Randle is a 68 y o  female  Assessment/Plan:    Migraine without aura    Intermittent fluctuating hypertension    Plan continue verapamil  mg twice daily    Patient does have a in the past refill that will last her till October however I did send out prescription for 3 months starting a date is October 1st   I have not given any refill in that prescription  Return to office visit in 4 months  Patient is going to try for couple of months without eating any fuentes or aged cheese            Subjective:    47-year-old female followed in the office for migraine without aura who also carries the diagnosis of hypertension which had actually fluctuated in the past   Patient average blood pressure now with chlorthalidone started by Dr Veto Palencia is 137 over 76  Patient was last seen in the office was April 10th 2019 and since that time she has a total of 4 headache  Patient reported all 4 headaches were caused by eating fuentes and aged cheese  All headaches were between severity scale 7 to 9, located in different part of the head, pain described as throbbing pain however without any nausea, vomiting, light sensitivity or noise sensitivity  Patient did eat some chocolate but never 8 in excess amount  She denies any other trigger factors  Patient denies any other neurological symptoms upon asking each in detail and they are as follows      Pt denies double vision, blurred vision, transient monocular blindness, vertigo, tinnitus, hearing loss, slurred speech, difficulty expressing and understanding, dysphasia, and focal weakness, numbness, imbalance and incoordination  Pt denies bladder and bowel urgency, frequency and incontinence         Past Medical History:   Diagnosis Date    Borderline high cholesterol     Borderline hypertension     Migraine        Family History   Problem Relation Age of Onset    Angina Mother     Heart failure Mother     Prostate cancer Father     Diabetes Sister     Sudden death Sister     Osteoporosis Sister     Hypertension Brother     No Known Problems Son     No Known Problems Daughter     Prostate cancer Brother     Lung cancer Brother    Jasson Tyson Sudden death Brother     COPD Sister     Breast cancer Maternal Uncle     Breast cancer Cousin    ,    Social History     Socioeconomic History    Marital status: /Civil Union     Spouse name: Not on file    Number of children: Not on file    Years of education: Not on file    Highest education level: Not on file   Occupational History    Not on file   Social Needs    Financial resource strain: Not on file    Food insecurity:     Worry: Not on file     Inability: Not on file    Transportation needs:     Medical: Not on file     Non-medical: Not on file   Tobacco Use    Smoking status: Never Smoker    Smokeless tobacco: Never Used   Substance and Sexual Activity    Alcohol use: No    Drug use: No    Sexual activity: Not on file   Lifestyle    Physical activity:     Days per week: Not on file     Minutes per session: Not on file    Stress: Not on file   Relationships    Social connections:     Talks on phone: Not on file     Gets together: Not on file     Attends Cheondoism service: Not on file     Active member of club or organization: Not on file     Attends meetings of clubs or organizations: Not on file     Relationship status: Not on file    Intimate partner violence:     Fear of current or ex partner: Not on file     Emotionally abused: Not on file     Physically abused: Not on file     Forced sexual activity: Not on file   Other Topics Concern    Not on file   Social History Narrative    Not on file       Current Outpatient Medications on File Prior to Visit   Medication Sig Dispense Refill    Ascorbic Acid (VITAMIN C) 500 MG CAPS Take 1 tablet by mouth daily      atorvastatin (LIPITOR) 20 mg tablet Take 1 tablet (20 mg total) by mouth daily 90 tablet 3    chlorthalidone 25 mg tablet Take 1 tablet 4 days a week every Monday, Wednesday, Friday, and Sunday  16 tablet 5    Cholecalciferol (VITAMIN D3) 5000 units CAPS Take 1 capsule by mouth Daily      famotidine (PEPCID) 40 MG tablet Take 1 tablet by mouth every other day        gabapentin (NEURONTIN) 100 mg capsule Take 1 capsule (100 mg total) by mouth daily at bedtime for 60 days 30 capsule 0    magnesium oxide (MAG-OX) 400 mg tablet Take 1 tablet by mouth daily With zinc       Misc Natural Products (GLUCOSAMINE CHOND COMPLEX/MSM PO) Take 1 tablet by mouth Daily 1000 mg       Multiple Vitamin (MULTI-VITAMIN DAILY PO) Take 1 tablet by mouth daily      Multiple Vitamins-Minerals (PRESERVISION AREDS) CAPS Take 1 capsule by mouth daily      Omega-3 Fatty Acids (FISH OIL) 500 MG CAPS Take 1 capsule by mouth daily      omeprazole (PriLOSEC) 40 MG capsule Take 1 capsule by mouth daily      Probiotic Product (PROBIOTIC-10 PO) Take 1 capsule by mouth daily      verapamil (CALAN-SR) 180 mg CR tablet Take 1 tablet (180 mg total) by mouth 2 (two) times a day 180 tablet 1    B Complex Vitamins (B COMPLEX PO) Take 1 tablet by mouth daily      terbinafine (LamISIL) 250 mg tablet Take 1 tablet (250 mg total) by mouth daily for 30 days (Patient not taking: Reported on 8/14/2019) 30 tablet 0     No current facility-administered medications on file prior to visit  Objective:    Blood pressure 130/76, pulse 64, height 5' 10" (1 778 m), weight 82 6 kg (182 lb), not currently breastfeeding  Physical Exam   Eyes: Pupils are equal, round, and reactive to light  EOM are normal    Neck: Normal carotid pulses present  Carotid bruit is not present  Neurological: She has normal strength and normal reflexes  Psychiatric: Her speech is normal        Neurological Exam  Mental Status   Oriented to person, place, time and situation  Recent and remote memory are intact  Speech is normal  Language is fluent with no aphasia      Cranial Nerves  CN II: Visual fields full to confrontation  CN III, IV, VI: Extraocular movements intact bilaterally  Pupils equal round and reactive to light bilaterally  CN V: Facial sensation is normal   CN VII: Full and symmetric facial movement  CN VIII: Hearing is normal   CN IX, X: Palate elevates symmetrically  Normal gag reflex  CN XI: Shoulder shrug strength is normal   CN XII: Tongue midline without atrophy or fasciculations  Motor  Normal muscle bulk throughout  No fasciculations present  Normal muscle tone  Strength is 5/5 throughout all four extremities  Sensory  Sensation is intact to light touch, pinprick, vibration and proprioception in all four extremities  Light touch is normal in upper and lower extremities  Pinprick is normal in upper and lower extremities  Proprioception is normal in upper and lower extremities  Reflexes  Deep tendon reflexes are 2+ and symmetric in all four extremities with downgoing toes bilaterally  Coordination  Right: Finger-to-nose normal  Heel-to-shin normal   Left: Finger-to-nose normal  Heel-to-shin normal     Gait  Normal casual, toe, heel and tandem gait  ROS:    Review of Systems   Constitutional: Negative  HENT: Negative  Eyes: Negative  Respiratory: Negative  Cardiovascular: Negative  Gastrointestinal: Negative  Endocrine: Negative  Genitourinary: Negative  Musculoskeletal: Negative  Skin: Negative  Allergic/Immunologic: Negative  Neurological: Negative  Hematological: Negative  Psychiatric/Behavioral: Negative

## 2019-08-30 ENCOUNTER — OFFICE VISIT (OUTPATIENT)
Dept: URGENT CARE | Facility: CLINIC | Age: 69
End: 2019-08-30
Payer: MEDICARE

## 2019-08-30 VITALS
RESPIRATION RATE: 18 BRPM | OXYGEN SATURATION: 97 % | HEIGHT: 70 IN | WEIGHT: 182 LBS | SYSTOLIC BLOOD PRESSURE: 137 MMHG | DIASTOLIC BLOOD PRESSURE: 76 MMHG | TEMPERATURE: 97.2 F | BODY MASS INDEX: 26.05 KG/M2 | HEART RATE: 64 BPM

## 2019-08-30 DIAGNOSIS — Z48.02 ENCOUNTER FOR REMOVAL OF SUTURES: Primary | ICD-10-CM

## 2019-08-30 PROCEDURE — 99213 OFFICE O/P EST LOW 20 MIN: CPT | Performed by: NURSE PRACTITIONER

## 2019-09-02 NOTE — PROGRESS NOTES
[facility]        NAME: Myles Grijalva is a 68 y o  female  : 1950    MRN: [id number]  DATE: 2019  TIME: 5:28 pm    Assessment and Plan   Encounter for removal of sutures [Z48 02]  1  Encounter for removal of sutures         Suture removal  Date/Time: 2019 5:15 PM  Performed by: JANETT Dangelo  Authorized by: JANETT Dangelo     Patient location:  Clinic  Consent:     Consent obtained:  Verbal    Consent given by:  Patient    Risks discussed:  Bleeding, pain and wound separation    Alternatives discussed:  No treatment  Universal protocol:     Immediately prior to procedure, a time out was called: yes      Patient identity confirmed:  Verbally with patient  [facility]:     [facility]  [place]  Procedure details: Tools used:  Suture removal kit    Wound appearance:  No sign(s) of infection    Number of sutures removed:  1  Post-procedure details:     Post-removal:  No dressing applied    Patient tolerance of procedure: Tolerated well, no immediate complications      Patient Instructions     Follow up with surgeon as scheduled next week  Follow up with PCP in 3-5 days  Proceed to  ER if symptoms worsen  Chief Complaint     Chief Complaint   Patient presents with    Suture / Staple Removal     cyst removed 10 days ago, needs stitches removed          History of Present Illness       67 y/o female presents for suture removal   She had a cyst removed from the right lower posterior aspect of her head  She has a script wit her from the physician assistant that her sutures are to be removed today, and she has a follow up appointment next week  She denies any fevers, pain, redness, or drainage from the areas  Review of Systems   Review of Systems   Constitutional: Negative for chills and fever  Respiratory: Negative for cough  Gastrointestinal: Negative for nausea and vomiting  Skin: Positive for wound  Current Medications       Current Outpatient Medications:     Ascorbic Acid (VITAMIN C) 500 MG CAPS, Take 1 tablet by mouth daily, Disp: , Rfl:     atorvastatin (LIPITOR) 20 mg tablet, Take 1 tablet (20 mg total) by mouth daily, Disp: 90 tablet, Rfl: 3    B Complex Vitamins (B COMPLEX PO), Take 1 tablet by mouth daily, Disp: , Rfl:     chlorthalidone 25 mg tablet, Take 1 tablet 4 days a week every Monday, Wednesday, Friday, and Sunday  , Disp: 16 tablet, Rfl: 5    Cholecalciferol (VITAMIN D3) 5000 units CAPS, Take 1 capsule by mouth Daily, Disp: , Rfl:     famotidine (PEPCID) 40 MG tablet, Take 1 tablet by mouth every other day  , Disp: , Rfl:     magnesium oxide (MAG-OX) 400 mg tablet, Take 1 tablet by mouth daily With zinc , Disp: , Rfl:     Misc Natural Products (GLUCOSAMINE CHOND COMPLEX/MSM PO), Take 1 tablet by mouth Daily 1000 mg , Disp: , Rfl:     Multiple Vitamin (MULTI-VITAMIN DAILY PO), Take 1 tablet by mouth daily, Disp: , Rfl:     Multiple Vitamins-Minerals (PRESERVISION AREDS) CAPS, Take 1 capsule by mouth daily, Disp: , Rfl:     Omega-3 Fatty Acids (FISH OIL) 500 MG CAPS, Take 1 capsule by mouth daily, Disp: , Rfl:     omeprazole (PriLOSEC) 40 MG capsule, Take 1 capsule by mouth daily, Disp: , Rfl:     Probiotic Product (PROBIOTIC-10 PO), Take 1 capsule by mouth daily, Disp: , Rfl:     [START ON 10/1/2019] verapamil (CALAN-SR) 180 mg CR tablet, Take 1 tablet (180 mg total) by mouth 2 (two) times a day, Disp: 180 tablet, Rfl: 0    gabapentin (NEURONTIN) 100 mg capsule, Take 1 capsule (100 mg total) by mouth daily at bedtime for 60 days (Patient not taking: Reported on 8/30/2019), Disp: 30 capsule, Rfl: 0    Current Allergies     Allergies as of 08/30/2019    (No Known Allergies)            The following portions of the patient's history were reviewed and updated as appropriate: allergies, current medications, past family history, past medical history, past social history, past surgical history and problem list      Past Medical History:   Diagnosis Date    Borderline high cholesterol     Borderline hypertension     Migraine        Past Surgical History:   Procedure Laterality Date    BREAST BIOPSY Right     benign    BUNIONECTOMY      CATARACT EXTRACTION, BILATERAL      PARATHYROID GLAND SURGERY         Family History   Problem Relation Age of Onset    Angina Mother     Heart failure Mother     Prostate cancer Father     Diabetes Sister    [facility] Sudden death Sister     Osteoporosis Sister     Hypertension Brother     No Known Problems Son     No Known Problems Daughter     Prostate cancer Brother     Lung cancer Brother    [facility] Sudden death Brother     COPD Sister     Breast cancer Maternal Uncle     Breast cancer Cousin          Medications have been verified  Objective   /76   Pulse 64   Temp (!) 97 2 °F (36 2 °C)   Resp 18   Ht 5' 10" (1 778 m)   Wt 82 6 kg (182 lb)   SpO2 97%   BMI 26 11 kg/m²        Physical Exam     Physical Exam   Constitutional: She is oriented to person, place, and time  She appears well-developed and well-nourished  No distress  Cardiovascular: Normal rate  Pulmonary/Chest: Effort normal    Neurological: She is alert and oriented to person, place, and time  Skin: Skin is warm and dry  Laceration noted  There is a single running suture visualized, located in the lower right posterior aspect of the skull  Skin is well approximated, mild erythema is present  There is no pain, swelling, or drainage noted  Psychiatric: She has a normal mood and affect  Her speech is normal    Nursing note and vitals reviewed

## 2019-09-05 ENCOUNTER — OFFICE VISIT (OUTPATIENT)
Dept: PODIATRY | Facility: CLINIC | Age: 69
End: 2019-09-05
Payer: MEDICARE

## 2019-09-05 VITALS
DIASTOLIC BLOOD PRESSURE: 76 MMHG | SYSTOLIC BLOOD PRESSURE: 132 MMHG | HEART RATE: 64 BPM | HEIGHT: 70 IN | BODY MASS INDEX: 26.05 KG/M2 | RESPIRATION RATE: 17 BRPM | WEIGHT: 182 LBS

## 2019-09-05 DIAGNOSIS — M79.671 PAIN IN BOTH FEET: ICD-10-CM

## 2019-09-05 DIAGNOSIS — M54.16 RADICULOPATHY OF LUMBAR REGION: ICD-10-CM

## 2019-09-05 DIAGNOSIS — M77.42 METATARSALGIA OF BOTH FEET: ICD-10-CM

## 2019-09-05 DIAGNOSIS — M77.41 METATARSALGIA OF BOTH FEET: ICD-10-CM

## 2019-09-05 DIAGNOSIS — M79.672 PAIN IN BOTH FEET: ICD-10-CM

## 2019-09-05 DIAGNOSIS — B35.1 ONYCHOMYCOSIS: Primary | ICD-10-CM

## 2019-09-05 PROCEDURE — 99213 OFFICE O/P EST LOW 20 MIN: CPT | Performed by: PODIATRIST

## 2019-09-05 RX ORDER — TERBINAFINE HYDROCHLORIDE 250 MG/1
TABLET ORAL
Qty: 15 TABLET | Refills: 0 | Status: SHIPPED | OUTPATIENT
Start: 2019-09-05 | End: 2019-10-05

## 2019-09-05 RX ORDER — GABAPENTIN 100 MG/1
100 CAPSULE ORAL
Qty: 30 CAPSULE | Refills: 1 | Status: SHIPPED | OUTPATIENT
Start: 2019-09-05 | End: 2019-11-21 | Stop reason: SDUPTHER

## 2019-09-05 NOTE — PROGRESS NOTES
Assessment/Plan:  Rule out radiculopathy   Pes planus   The hallux valgus deformity bilateral   Pain upon ambulation      Plan   Patient educated on condition   She will use orthotics as directed   We will maintain present gabapentin dosing at 100 mg daily HS   All nails debrided   Calluses debrided      No problem-specific Assessment & Plan notes found for this encounter          There are no diagnoses linked to this encounter        Subjective:  Patient is doing better  Mago Butler still has intermittent pain   She has pain in her feet at night   The pain goes across the ball of the foot bilateral   Patient does suffer from low back pain              Past Medical History:   Diagnosis Date    Borderline high cholesterol      Borderline hypertension      Migraine                     Past Surgical History:   Procedure Laterality Date    BREAST BIOPSY Right       benign    BUNIONECTOMY        CATARACT EXTRACTION, BILATERAL        PARATHYROID GLAND SURGERY             No Known Allergies        Current Outpatient Medications:     Ascorbic Acid (VITAMIN C) 500 MG CAPS, Take 1 tablet by mouth daily, Disp: , Rfl:     atorvastatin (LIPITOR) 20 mg tablet, Take 1 tablet (20 mg total) by mouth daily, Disp: 90 tablet, Rfl: 3    B Complex Vitamins (B COMPLEX PO), Take 1 tablet by mouth daily, Disp: , Rfl:     Cholecalciferol (VITAMIN D3) 5000 units CAPS, Take 1 capsule by mouth Daily, Disp: , Rfl:     famotidine (PEPCID) 40 MG tablet, Take 1 tablet by mouth every other day  , Disp: , Rfl:     magnesium oxide (MAG-OX) 400 mg tablet, Take 1 tablet by mouth daily With zinc , Disp: , Rfl:     meloxicam (MOBIC) 7 5 mg tablet, Take 1 tablet (7 5 mg total) by mouth daily for 10 days, Disp: 10 tablet, Rfl: 0    Misc Natural Products (GLUCOSAMINE CHOND COMPLEX/MSM PO), Take 1 tablet by mouth Daily 1000 mg , Disp: , Rfl:     Multiple Vitamin (MULTI-VITAMIN DAILY PO), Take 1 tablet by mouth daily, Disp: , Rfl:     Multiple Vitamins-Minerals (PRESERVISION AREDS) CAPS, Take 1 capsule by mouth daily, Disp: , Rfl:     Omega-3 Fatty Acids (FISH OIL) 500 MG CAPS, Take 1 capsule by mouth daily, Disp: , Rfl:     omeprazole (PriLOSEC) 40 MG capsule, Take 1 capsule by mouth daily, Disp: , Rfl:     Probiotic Product (PROBIOTIC-10 PO), Take 1 capsule by mouth daily, Disp: , Rfl:     terbinafine (LamISIL) 250 mg tablet, Take 1 tablet (250 mg total) by mouth daily for 30 days, Disp: 30 tablet, Rfl: 0    verapamil (CALAN-SR) 180 mg CR tablet, Take 1 tablet (180 mg total) by mouth 2 (two) times a day, Disp: 180 tablet, Rfl: 1           Patient Active Problem List   Diagnosis    Chronic migraine without aura without status migrainosus, not intractable    Essential hypertension    Lump of skin    Valgus deformity of both great toes    Pain in both feet    Onychomycosis    Paronychia of toenail             Patient ID: Evelin Thurston is a 68 y o  female      HPI     The following portions of the patient's history were reviewed and updated as appropriate: She  has a past medical history of Borderline high cholesterol, Borderline hypertension, and Migraine  She           Patient Active Problem List     Diagnosis Date Noted    Valgus deformity of both great toes 04/16/2019    Pain in both feet 04/16/2019    Onychomycosis 04/16/2019    Paronychia of toenail 04/16/2019    Lump of skin 01/15/2019    Chronic migraine without aura without status migrainosus, not intractable 03/28/2018    Essential hypertension 03/28/2018      She  has a past surgical history that includes Bunionectomy; Parathyroid gland surgery; Cataract extraction, bilateral; and Breast biopsy (Right)  Her family history includes Angina in her mother; Breast cancer in her cousin and maternal uncle; COPD in her sister; Diabetes in her sister;  Heart failure in her mother; Hypertension in her brother; Lung cancer in her brother; No Known Problems in her daughter and son; Osteoporosis in her sister; Prostate cancer in her brother and father; Sudden death in her brother and sister  She  reports that she has never smoked   She has never used smokeless tobacco  She reports that she does not drink alcohol or use drugs               Current Outpatient Medications   Medication Sig Dispense Refill    Ascorbic Acid (VITAMIN C) 500 MG CAPS Take 1 tablet by mouth daily        atorvastatin (LIPITOR) 20 mg tablet Take 1 tablet (20 mg total) by mouth daily 90 tablet 3    B Complex Vitamins (B COMPLEX PO) Take 1 tablet by mouth daily        Cholecalciferol (VITAMIN D3) 5000 units CAPS Take 1 capsule by mouth Daily        famotidine (PEPCID) 40 MG tablet Take 1 tablet by mouth every other day          magnesium oxide (MAG-OX) 400 mg tablet Take 1 tablet by mouth daily With zinc         meloxicam (MOBIC) 7 5 mg tablet Take 1 tablet (7 5 mg total) by mouth daily for 10 days 10 tablet 0    Misc Natural Products (GLUCOSAMINE CHOND COMPLEX/MSM PO) Take 1 tablet by mouth Daily 1000 mg         Multiple Vitamin (MULTI-VITAMIN DAILY PO) Take 1 tablet by mouth daily        Multiple Vitamins-Minerals (PRESERVISION AREDS) CAPS Take 1 capsule by mouth daily        Omega-3 Fatty Acids (FISH OIL) 500 MG CAPS Take 1 capsule by mouth daily        omeprazole (PriLOSEC) 40 MG capsule Take 1 capsule by mouth daily        Probiotic Product (PROBIOTIC-10 PO) Take 1 capsule by mouth daily        terbinafine (LamISIL) 250 mg tablet Take 1 tablet (250 mg total) by mouth daily for 30 days 30 tablet 0    verapamil (CALAN-SR) 180 mg CR tablet Take 1 tablet (180 mg total) by mouth 2 (two) times a day 180 tablet 1      No current facility-administered medications for this visit                Current Outpatient Medications on File Prior to Visit   Medication Sig    Ascorbic Acid (VITAMIN C) 500 MG CAPS Take 1 tablet by mouth daily    atorvastatin (LIPITOR) 20 mg tablet Take 1 tablet (20 mg total) by mouth daily  B Complex Vitamins (B COMPLEX PO) Take 1 tablet by mouth daily    Cholecalciferol (VITAMIN D3) 5000 units CAPS Take 1 capsule by mouth Daily    famotidine (PEPCID) 40 MG tablet Take 1 tablet by mouth every other day      magnesium oxide (MAG-OX) 400 mg tablet Take 1 tablet by mouth daily With zinc     meloxicam (MOBIC) 7 5 mg tablet Take 1 tablet (7 5 mg total) by mouth daily for 10 days    Misc Natural Products (GLUCOSAMINE CHOND COMPLEX/MSM PO) Take 1 tablet by mouth Daily 1000 mg     Multiple Vitamin (MULTI-VITAMIN DAILY PO) Take 1 tablet by mouth daily    Multiple Vitamins-Minerals (PRESERVISION AREDS) CAPS Take 1 capsule by mouth daily    Omega-3 Fatty Acids (FISH OIL) 500 MG CAPS Take 1 capsule by mouth daily    omeprazole (PriLOSEC) 40 MG capsule Take 1 capsule by mouth daily    Probiotic Product (PROBIOTIC-10 PO) Take 1 capsule by mouth daily    terbinafine (LamISIL) 250 mg tablet Take 1 tablet (250 mg total) by mouth daily for 30 days    verapamil (CALAN-SR) 180 mg CR tablet Take 1 tablet (180 mg total) by mouth 2 (two) times a day      No current facility-administered medications on file prior to visit        She has No Known Allergies              Vitals:     04/30/19 1617   BP: 136/82   Pulse: 75   Resp: 17         Review of Systems       Objective:  Patient's shoes and socks removed    Foot ExamPhysical Exam     Foot Exam     General  General Appearance: appears stated age and healthy   Orientation: alert and oriented to person, place, and time   Affect: appropriate   Gait: antalgic         Right Foot/Ankle      Inspection and Palpation  Tenderness: great toe interphalangeal joint, great toe metatarsophalangeal joint and lesser metatarsophalangeal joints   Swelling: dorsum and metatarsals   Arch: pes planus  Hammertoes: fifth toe  Hallux valgus: yes  Hallux limitus: yes  Skin Exam: callus and dry skin;      Neurovascular  Dorsalis pedis: 1+  Posterior tibial: 2+        Left Foot/Ankle       Inspection and Palpation  Tenderness: great toe interphalangeal joint, great toe metatarsophalangeal joint and lesser metatarsophalangeal joints   Swelling: dorsum and metatarsals   Arch: pes planus  Hammertoes: fifth toe  Hallux valgus: yes  Hallux limitus: yes  Skin Exam: callus and dry skin;      Neurovascular  Dorsalis pedis: 1+  Posterior tibial: 2+           Physical Exam   Constitutional: She appears well-developed and well-nourished  Cardiovascular: Normal rate and regular rhythm  Pulses:       Dorsalis pedis pulses are 1+ on the right side, and 1+ on the left side         Posterior tibial pulses are 2+ on the right side, and 2+ on the left side  Musculoskeletal:   Patient is pronated in stance and gait   She has large bunion noted bilateral   Severe hallux valgus deformity bilateral   Track bound bunions  X-rayWoodward Balbuena hallux valgus deformity noted bilateral but   Feet:   Right Foot:   Skin Integrity: Positive for callus and dry skin  Left Foot:   Skin Integrity: Positive for callus and dry skin     Skin:   Severe mycosis of nails 1 through 5 of the left foot   Positive dystrophy noted   Vitals reviewed

## 2019-10-24 LAB
ALBUMIN SERPL-MCNC: 4.6 G/DL (ref 3.5–4.8)
ALBUMIN/GLOB SERPL: 2 {RATIO} (ref 1.2–2.2)
ALP SERPL-CCNC: 46 IU/L (ref 39–117)
ALT SERPL-CCNC: 23 IU/L (ref 0–32)
AST SERPL-CCNC: 22 IU/L (ref 0–40)
BILIRUB SERPL-MCNC: 0.2 MG/DL (ref 0–1.2)
BUN SERPL-MCNC: 18 MG/DL (ref 8–27)
BUN/CREAT SERPL: 18 (ref 12–28)
CALCIUM SERPL-MCNC: 9.8 MG/DL (ref 8.7–10.3)
CHLORIDE SERPL-SCNC: 100 MMOL/L (ref 96–106)
CHOLEST SERPL-MCNC: 139 MG/DL (ref 100–199)
CHOLEST/HDLC SERPL: 2.4 RATIO (ref 0–4.4)
CO2 SERPL-SCNC: 25 MMOL/L (ref 20–29)
CREAT SERPL-MCNC: 1.01 MG/DL (ref 0.57–1)
GLOBULIN SER-MCNC: 2.3 G/DL (ref 1.5–4.5)
GLUCOSE SERPL-MCNC: 91 MG/DL (ref 65–99)
HDLC SERPL-MCNC: 57 MG/DL
LDLC SERPL CALC-MCNC: 57 MG/DL (ref 0–99)
POTASSIUM SERPL-SCNC: 4.6 MMOL/L (ref 3.5–5.2)
PROT SERPL-MCNC: 6.9 G/DL (ref 6–8.5)
SL AMB EGFR AFRICAN AMERICAN: 65 ML/MIN/1.73
SL AMB EGFR NON AFRICAN AMERICAN: 56 ML/MIN/1.73
SL AMB VLDL CHOLESTEROL CALC: 25 MG/DL (ref 5–40)
SODIUM SERPL-SCNC: 141 MMOL/L (ref 134–144)
TRIGL SERPL-MCNC: 127 MG/DL (ref 0–149)

## 2019-10-30 ENCOUNTER — OFFICE VISIT (OUTPATIENT)
Dept: CARDIOLOGY CLINIC | Facility: CLINIC | Age: 69
End: 2019-10-30
Payer: MEDICARE

## 2019-10-30 VITALS
WEIGHT: 182 LBS | SYSTOLIC BLOOD PRESSURE: 118 MMHG | HEIGHT: 70 IN | OXYGEN SATURATION: 98 % | DIASTOLIC BLOOD PRESSURE: 68 MMHG | BODY MASS INDEX: 26.05 KG/M2 | HEART RATE: 56 BPM

## 2019-10-30 DIAGNOSIS — E04.2 MULTINODULAR GOITER: ICD-10-CM

## 2019-10-30 DIAGNOSIS — R06.00 DYSPNEA ON EXERTION: ICD-10-CM

## 2019-10-30 DIAGNOSIS — E66.3 OVERWEIGHT (BMI 25.0-29.9): ICD-10-CM

## 2019-10-30 DIAGNOSIS — N18.30 CHRONIC KIDNEY DISEASE, STAGE III (MODERATE) (HCC): ICD-10-CM

## 2019-10-30 DIAGNOSIS — E78.5 DYSLIPIDEMIA: ICD-10-CM

## 2019-10-30 DIAGNOSIS — I10 ESSENTIAL HYPERTENSION: Primary | ICD-10-CM

## 2019-10-30 DIAGNOSIS — M17.0 PRIMARY OSTEOARTHRITIS OF BOTH KNEES: ICD-10-CM

## 2019-10-30 PROCEDURE — 99214 OFFICE O/P EST MOD 30 MIN: CPT | Performed by: INTERNAL MEDICINE

## 2019-10-30 NOTE — PROGRESS NOTES
Office Cardiology Progress Note  Margaret Charles 68 y o  female MRN: [id number]  10/30/19  9:46 AM      ASSESSMENT:    1  Improved chronic exertional dyspnea with stair climbing but not with exercise at [facility], probably related to progressive overweight with 15 pound weight gain over the past 7 years  2   controlled  essential hypertension with history of white coat phenomenon  3  Controlled dyslipidemia with atorvastatin  4  History of normal exercise stress test 10/13/15 and benign echocardiogram on 5/1/13   5  History of chronic nonproductive cough, possibly caused by GERD, with patient currently weaning off omeprazole  6  Resolved hypercalcemia with parathyroidectomy 11/30/11   7  Status post nasal polypectomy October, 2012 and [facility]  October, 2012 with right cataract extraction 9/13  8  Chronic stable multinodular goiter with latest thyroid ultrasound 5/30/17, followed by Dr All Candelaria  9  Mild chronic kidney disease, stage III  10  Chronic fatigue and tiredness with consideration of adverse drug reaction to verapamil  11  Osteoarthritis of both knees, right more than left  Plan       Patient Instructions     1  Continue current medication  2  Cardiology follow-up approximately six months with EKG, lipid panel, CMP  HPI    This 68 y o  female  denies new cardiopulmonary and medical symptoms  She has occasional brief flutters in her chest and continues to complain of chronic but stable fatigue and tiredness  She complains of mild ataxia recently  She continues follow-up with Brandy Bolanos, and Preethi Davis  She continues to exercise five days a week at the [facility] at [facility]  She is seen in follow-up of the below listed diagnoses as well  Encounter Diagnoses   Name Primary?     Essential hypertension Yes    Dyspnea on exertion     Dyslipidemia     Multinodular goiter     Chronic kidney disease, stage III (moderate) (HCC)     Primary osteoarthritis of both knees  Overweight (BMI 25 0-29  9)         Review of Systems    All other systems negative, except as noted in history of present illness    Historical Information   Past Medical History:   Diagnosis Date    Borderline high cholesterol     Borderline hypertension     Migraine      Past Surgical History:   Procedure Laterality Date    BREAST BIOPSY Right     benign    BUNIONECTOMY      CATARACT EXTRACTION, BILATERAL      PARATHYROID GLAND SURGERY       Social History     Substance and Sexual Activity   Alcohol Use No     Social History     Substance and Sexual Activity   Drug Use No     Social History     Tobacco Use   Smoking Status Never Smoker   Smokeless Tobacco Never Used       Family History:  Family History   Problem Relation Age of Onset    Angina Mother     Heart failure Mother     Prostate cancer Father     Diabetes Sister     Sudden death Sister     Osteoporosis Sister     Hypertension Brother     No Known Problems Son     No Known Problems Daughter     Prostate cancer Brother     Lung cancer Brother     Sudden death Brother     COPD Sister     Breast cancer Maternal Uncle     Breast cancer Cousin          Meds/Allergies     Prior to Admission medications    Medication Sig Start Date End Date Taking? Authorizing Provider   Ascorbic Acid (VITAMIN C) 500 MG CAPS Take 1 tablet by mouth daily 10/18/17  Yes Historical Provider, MD   atorvastatin (LIPITOR) 20 mg tablet Take 1 tablet (20 mg total) by mouth daily 11/12/18  Yes Joseph Mcclure MD   B Complex Vitamins (B COMPLEX PO) Take 1 tablet by mouth daily   Yes Historical Provider, MD   chlorthalidone 25 mg tablet Take 1 tablet 4 days a week every Monday, Wednesday, Friday, and Sunday 8/2/19  Yes Joseph Mcclure MD   Cholecalciferol (VITAMIN D3) 5000 units CAPS Take 1 capsule by mouth Daily   Yes Historical Provider, MD   famotidine (PEPCID) 40 MG tablet Take 1 tablet by mouth every other day     Yes Historical Provider, MD   gabapentin (NEURONTIN) 100 mg capsule Take 1 capsule (100 mg total) by mouth daily at bedtime for 60 days 9/5/19 11/4/19 Yes Mahamed Ramsey DPM   magnesium oxide (MAG-OX) 400 mg tablet Take 1 tablet by mouth daily With zinc  12/2/10  Yes Historical Provider, MD   Misc Natural Products (GLUCOSAMINE CHOND COMPLEX/MSM PO) Take 1 tablet by mouth Daily 1000 mg    Yes Historical Provider, MD   Multiple Vitamin (MULTI-VITAMIN DAILY PO) Take 1 tablet by mouth daily 10/18/17  Yes Historical Provider, MD   Multiple Vitamins-Minerals (PRESERVISION AREDS) CAPS Take 1 capsule by mouth daily 10/18/17  Yes Historical Provider, MD   Omega-3 Fatty Acids (FISH OIL) 500 MG CAPS Take 1 capsule by mouth daily 10/18/17  Yes Historical Provider, MD   Probiotic Product (PROBIOTIC-10 PO) Take 1 capsule by mouth daily   Yes Historical Provider, MD   verapamil (CALAN-SR) 180 mg CR tablet Take 1 tablet (180 mg total) by mouth 2 (two) times a day 10/1/19  Yes Kam Jones MD   omeprazole (PriLOSEC) 40 MG capsule Take 1 capsule by mouth daily    Historical Provider, MD       No Known Allergies      Vitals:    10/30/19 0924   BP: 118/68   BP Location: Left arm   Patient Position: Sitting   Cuff Size: Standard   Pulse: 56   SpO2: 98%   Weight: 82 6 kg (182 lb)   Height: 5' 10" (1 778 m)       Body mass index is 26 11 kg/m²    1 pound weight gain in 3-1/2+ months    Physical Exam:    General Appearance:  Alert, cooperative, no distress, appears stated age   Head:  Normocephalic, without obvious abnormality, atraumatic   Eyes:  PERRL, conjunctiva/corneas clear, EOM's intact,   both eyes   Ears:  Normal TM's and external ear canals, both ears   Nose: Nares normal, septum midline, mucosa normal, no drainage or sinus tenderness   Throat: Lips, mucosa, and tongue normal; teeth and gums normal   Neck: Supple, symmetrical, trachea midline, no adenopathy, thyroid: not enlarged, symmetric, no tenderness/mass/nodules, no carotid bruit or JVD   Back:   Symmetric, no curvature, ROM normal, no CVA tenderness   Lungs:   Clear to auscultation bilaterally, respirations unlabored   Chest Wall:  No tenderness or deformity   Heart:  Regular rate and rhythm, S1, S2 normal, no murmur, rub or gallop   Abdomen:   Soft, non-tender, bowel sounds active all four quadrants,  no masses, no organomegaly   Extremities: Extremities normal, atraumatic, no cyanosis or edema   Pulses: 2+ and symmetric   Skin: Skin showed normal color, texture, turgor and no rashes or lesions   Lymph nodes: Cervical, supraclavicular, and axillary nodes normal   Neurologic: Normal         Cardiographics    ECG:    Not applicable    IMAGING:    Xr Chest 2 Views    Result Date: 7/1/2019  Impression No acute cardiopulmonary disease   Workstation performed: [id number]             LAB REVIEW:      Lab Results   Component Value Date    SODIUM 141 10/23/2019    K 4 6 10/23/2019     10/23/2019    CO2 25 10/23/2019    ANIONGAP 11 7 10/09/2015    BUN 18 10/23/2019    CREATININE 1 01 (H) 10/23/2019    GLUCOSE 94 01/03/2017    CALCIUM 8 3 06/30/2019    AST 22 10/23/2019    ALT 23 10/23/2019    ALKPHOS 47 06/30/2019    PROT 6 5 01/03/2017    BILITOT 0 3 01/03/2017    EGFR 65 06/30/2019   Glucose 10/23/2019:  91  Estimated GFR 10/23/2019:  56  CMP 10/23/2019:  Normal except for creatinine 1 01    Lab Results   Component Value Date    CHOLESTEROL 139 10/23/2019    CHOLESTEROL 135 07/19/2019    CHOLESTEROL 120 01/17/2019     Lab Results   Component Value Date    HDL 57 10/23/2019    HDL 61 07/19/2019    HDL 54 01/17/2019      LDL direct 10/23/2019:  57  Lab Results   Component Value Date    LDLCALC 100 10/09/2015       Lab Results   Component Value Date    TRIG 127 10/23/2019    TRIG 80 07/19/2019    TRIG 67 01/17/2019               Jett Lo MD

## 2019-10-30 NOTE — PATIENT INSTRUCTIONS
1  Continue current medication  2  Cardiology follow-up approximately six months with EKG, lipid panel, CMP

## 2019-11-21 ENCOUNTER — OFFICE VISIT (OUTPATIENT)
Dept: PODIATRY | Facility: CLINIC | Age: 69
End: 2019-11-21
Payer: MEDICARE

## 2019-11-21 VITALS
SYSTOLIC BLOOD PRESSURE: 126 MMHG | HEART RATE: 70 BPM | HEIGHT: 70 IN | BODY MASS INDEX: 26.05 KG/M2 | DIASTOLIC BLOOD PRESSURE: 70 MMHG | RESPIRATION RATE: 16 BRPM | WEIGHT: 182 LBS

## 2019-11-21 DIAGNOSIS — M77.41 METATARSALGIA OF BOTH FEET: Primary | ICD-10-CM

## 2019-11-21 DIAGNOSIS — I70.209 PERIPHERAL ARTERIOSCLEROSIS (HCC): ICD-10-CM

## 2019-11-21 DIAGNOSIS — M79.671 PAIN IN BOTH FEET: ICD-10-CM

## 2019-11-21 DIAGNOSIS — L84 CORNS: ICD-10-CM

## 2019-11-21 DIAGNOSIS — M54.16 RADICULOPATHY OF LUMBAR REGION: ICD-10-CM

## 2019-11-21 DIAGNOSIS — B35.1 ONYCHOMYCOSIS: ICD-10-CM

## 2019-11-21 DIAGNOSIS — M79.672 PAIN IN BOTH FEET: ICD-10-CM

## 2019-11-21 DIAGNOSIS — M77.42 METATARSALGIA OF BOTH FEET: Primary | ICD-10-CM

## 2019-11-21 PROCEDURE — 99212 OFFICE O/P EST SF 10 MIN: CPT | Performed by: PODIATRIST

## 2019-11-21 PROCEDURE — 11056 PARNG/CUTG B9 HYPRKR LES 2-4: CPT | Performed by: PODIATRIST

## 2019-11-21 RX ORDER — GABAPENTIN 100 MG/1
100 CAPSULE ORAL
Qty: 30 CAPSULE | Refills: 1 | Status: SHIPPED | OUTPATIENT
Start: 2019-11-21 | End: 2020-01-29 | Stop reason: ALTCHOICE

## 2019-11-21 NOTE — PROGRESS NOTES
Assessment/Plan:  Rule out radiculopathy   Pes planus   The hallux valgus deformity bilateral   Pain upon ambulation      Plan   Patient educated on condition   She will use orthotics as directed   We will maintain present gabapentin dosing at 100 mg daily HS   All nails debrided   Calluses debrided      No problem-specific Assessment & Plan notes found for this encounter          There are no diagnoses linked to this encounter        Subjective:  Patient is doing better  Jhonathan Chairez still has intermittent pain   She has pain in her feet at night   The pain goes across the ball of the foot bilateral   Patient does suffer from low back pain              Past Medical History:   Diagnosis Date    Borderline high cholesterol      Borderline hypertension      Migraine                     Past Surgical History:   Procedure Laterality Date    BREAST BIOPSY Right       benign    BUNIONECTOMY        CATARACT EXTRACTION, BILATERAL        PARATHYROID GLAND SURGERY             No Known Allergies        Current Outpatient Medications:     Ascorbic Acid (VITAMIN C) 500 MG CAPS, Take 1 tablet by mouth daily, Disp: , Rfl:     atorvastatin (LIPITOR) 20 mg tablet, Take 1 tablet (20 mg total) by mouth daily, Disp: 90 tablet, Rfl: 3    B Complex Vitamins (B COMPLEX PO), Take 1 tablet by mouth daily, Disp: , Rfl:     Cholecalciferol (VITAMIN D3) 5000 units CAPS, Take 1 capsule by mouth Daily, Disp: , Rfl:     famotidine (PEPCID) 40 MG tablet, Take 1 tablet by mouth every other day  , Disp: , Rfl:     magnesium oxide (MAG-OX) 400 mg tablet, Take 1 tablet by mouth daily With zinc , Disp: , Rfl:     meloxicam (MOBIC) 7 5 mg tablet, Take 1 tablet (7 5 mg total) by mouth daily for 10 days, Disp: 10 tablet, Rfl: 0    Misc Natural Products (GLUCOSAMINE CHOND COMPLEX/MSM PO), Take 1 tablet by mouth Daily 1000 mg , Disp: , Rfl:     Multiple Vitamin (MULTI-VITAMIN DAILY PO), Take 1 tablet by mouth daily, Disp: , Rfl:     Multiple Vitamins-Minerals (PRESERVISION AREDS) CAPS, Take 1 capsule by mouth daily, Disp: , Rfl:     Omega-3 Fatty Acids (FISH OIL) 500 MG CAPS, Take 1 capsule by mouth daily, Disp: , Rfl:     omeprazole (PriLOSEC) 40 MG capsule, Take 1 capsule by mouth daily, Disp: , Rfl:     Probiotic Product (PROBIOTIC-10 PO), Take 1 capsule by mouth daily, Disp: , Rfl:     terbinafine (LamISIL) 250 mg tablet, Take 1 tablet (250 mg total) by mouth daily for 30 days, Disp: 30 tablet, Rfl: 0    verapamil (CALAN-SR) 180 mg CR tablet, Take 1 tablet (180 mg total) by mouth 2 (two) times a day, Disp: 180 tablet, Rfl: 1           Patient Active Problem List   Diagnosis    Chronic migraine without aura without status migrainosus, not intractable    Essential hypertension    Lump of skin    Valgus deformity of both great toes    Pain in both feet    Onychomycosis    Paronychia of toenail             Patient ID: Evelin Thurston is a 68 y o  female      HPI     The following portions of the patient's history were reviewed and updated as appropriate: She  has a past medical history of Borderline high cholesterol, Borderline hypertension, and Migraine  She           Patient Active Problem List     Diagnosis Date Noted    Valgus deformity of both great toes 04/16/2019    Pain in both feet 04/16/2019    Onychomycosis 04/16/2019    Paronychia of toenail 04/16/2019    Lump of skin 01/15/2019    Chronic migraine without aura without status migrainosus, not intractable 03/28/2018    Essential hypertension 03/28/2018      She  has a past surgical history that includes Bunionectomy; Parathyroid gland surgery; Cataract extraction, bilateral; and Breast biopsy (Right)  Her family history includes Angina in her mother; Breast cancer in her cousin and maternal uncle; COPD in her sister; Diabetes in her sister;  Heart failure in her mother; Hypertension in her brother; Lung cancer in her brother; No Known Problems in her daughter and son; Osteoporosis in her sister; Prostate cancer in her brother and father; Sudden death in her brother and sister  She  reports that she has never smoked   She has never used smokeless tobacco  She reports that she does not drink alcohol or use drugs               Current Outpatient Medications   Medication Sig Dispense Refill    Ascorbic Acid (VITAMIN C) 500 MG CAPS Take 1 tablet by mouth daily        atorvastatin (LIPITOR) 20 mg tablet Take 1 tablet (20 mg total) by mouth daily 90 tablet 3    B Complex Vitamins (B COMPLEX PO) Take 1 tablet by mouth daily        Cholecalciferol (VITAMIN D3) 5000 units CAPS Take 1 capsule by mouth Daily        famotidine (PEPCID) 40 MG tablet Take 1 tablet by mouth every other day          magnesium oxide (MAG-OX) 400 mg tablet Take 1 tablet by mouth daily With zinc         meloxicam (MOBIC) 7 5 mg tablet Take 1 tablet (7 5 mg total) by mouth daily for 10 days 10 tablet 0    Misc Natural Products (GLUCOSAMINE CHOND COMPLEX/MSM PO) Take 1 tablet by mouth Daily 1000 mg         Multiple Vitamin (MULTI-VITAMIN DAILY PO) Take 1 tablet by mouth daily        Multiple Vitamins-Minerals (PRESERVISION AREDS) CAPS Take 1 capsule by mouth daily        Omega-3 Fatty Acids (FISH OIL) 500 MG CAPS Take 1 capsule by mouth daily        omeprazole (PriLOSEC) 40 MG capsule Take 1 capsule by mouth daily        Probiotic Product (PROBIOTIC-10 PO) Take 1 capsule by mouth daily        terbinafine (LamISIL) 250 mg tablet Take 1 tablet (250 mg total) by mouth daily for 30 days 30 tablet 0    verapamil (CALAN-SR) 180 mg CR tablet Take 1 tablet (180 mg total) by mouth 2 (two) times a day 180 tablet 1       Objective:  Patient's shoes and socks removed    Foot ExamPhysical Exam     Foot Exam     General  General Appearance: appears stated age and healthy   Orientation: alert and oriented to person, place, and time   Affect: appropriate   Gait: antalgic         Right Foot/Ankle      Inspection and Palpation  Tenderness: great toe interphalangeal joint, great toe metatarsophalangeal joint and lesser metatarsophalangeal joints   Swelling: dorsum and metatarsals   Arch: pes planus  Hammertoes: fifth toe  Hallux valgus: yes  Hallux limitus: yes  Skin Exam: callus and dry skin;      Neurovascular  Dorsalis pedis: 1+  Posterior tibial: 2+        Left Foot/Ankle       Inspection and Palpation  Tenderness: great toe interphalangeal joint, great toe metatarsophalangeal joint and lesser metatarsophalangeal joints   Swelling: dorsum and metatarsals   Arch: pes planus  Hammertoes: fifth toe  Hallux valgus: yes  Hallux limitus: yes  Skin Exam: callus and dry skin;      Neurovascular  Dorsalis pedis: 1+  Posterior tibial: 2+           Physical Exam   Constitutional: She appears well-developed and well-nourished  Cardiovascular: Normal rate and regular rhythm  Pulses:       Dorsalis pedis pulses are 1+ on the right side, and 1+ on the left side         Posterior tibial pulses are 2+ on the right side, and 2+ on the left side  Musculoskeletal:   Patient is pronated in stance and gait   She has large bunion noted bilateral   Severe hallux valgus deformity bilateral   Track bound bunions  X-rayLoraine Mercy hallux valgus deformity noted bilateral but   Feet:   Right Foot:   Skin Integrity: Positive for callus and dry skin  Left Foot:   Skin Integrity: Positive for callus and dry skin     Skin:   Severe mycosis of nails 1 through 5 of the left foot   Positive dystrophy noted   Vitals reviewed

## 2020-01-08 ENCOUNTER — TELEPHONE (OUTPATIENT)
Dept: FAMILY MEDICINE CLINIC | Facility: CLINIC | Age: 70
End: 2020-01-08

## 2020-01-08 DIAGNOSIS — Z12.31 ENCOUNTER FOR SCREENING MAMMOGRAM FOR BREAST CANCER: Primary | ICD-10-CM

## 2020-01-23 ENCOUNTER — OFFICE VISIT (OUTPATIENT)
Dept: PODIATRY | Facility: CLINIC | Age: 70
End: 2020-01-23
Payer: MEDICARE

## 2020-01-23 VITALS
RESPIRATION RATE: 16 BRPM | WEIGHT: 182 LBS | HEART RATE: 69 BPM | SYSTOLIC BLOOD PRESSURE: 130 MMHG | BODY MASS INDEX: 26.05 KG/M2 | DIASTOLIC BLOOD PRESSURE: 74 MMHG | HEIGHT: 70 IN

## 2020-01-23 DIAGNOSIS — B35.1 ONYCHOMYCOSIS: ICD-10-CM

## 2020-01-23 DIAGNOSIS — L84 CORNS: ICD-10-CM

## 2020-01-23 DIAGNOSIS — M79.672 PAIN IN BOTH FEET: ICD-10-CM

## 2020-01-23 DIAGNOSIS — I70.209 PERIPHERAL ARTERIOSCLEROSIS (HCC): Primary | ICD-10-CM

## 2020-01-23 DIAGNOSIS — M79.671 PAIN IN BOTH FEET: ICD-10-CM

## 2020-01-23 DIAGNOSIS — M54.16 RADICULOPATHY OF LUMBAR REGION: ICD-10-CM

## 2020-01-23 PROCEDURE — 99212 OFFICE O/P EST SF 10 MIN: CPT | Performed by: PODIATRIST

## 2020-01-23 PROCEDURE — 11056 PARNG/CUTG B9 HYPRKR LES 2-4: CPT | Performed by: PODIATRIST

## 2020-01-23 RX ORDER — GABAPENTIN 300 MG/1
300 CAPSULE ORAL 2 TIMES DAILY
Qty: 60 CAPSULE | Refills: 0 | Status: SHIPPED | OUTPATIENT
Start: 2020-01-23 | End: 2020-05-05

## 2020-01-23 NOTE — PROGRESS NOTES
Assessment/Plan:  Metatarsalgia  Radiculopathy  Pain upon ambulation  Mycosis of nail  Callus formation  Plan  All nails debrided  Calluses debrided without pain or complication  Will increase gabapentin dosing  No problem-specific Assessment & Plan notes found for this encounter  There are no diagnoses linked to this encounter  Subjective:  Patient has continued pain in her feet  She is getting increasing numbness though she is on gabapentin  She also has pain in her feet with ambulation and shoe wear  No history of trauma    Past Medical History:   Diagnosis Date    Borderline high cholesterol     Borderline hypertension     Migraine        Past Surgical History:   Procedure Laterality Date    BREAST BIOPSY Right     benign    BUNIONECTOMY      CATARACT EXTRACTION, BILATERAL      PARATHYROID GLAND SURGERY         No Known Allergies      Current Outpatient Medications:     Ascorbic Acid (VITAMIN C) 500 MG CAPS, Take 1 tablet by mouth daily, Disp: , Rfl:     atorvastatin (LIPITOR) 20 mg tablet, Take 1 tablet (20 mg total) by mouth daily, Disp: 90 tablet, Rfl: 3    B Complex Vitamins (B COMPLEX PO), Take 1 tablet by mouth daily, Disp: , Rfl:     chlorthalidone 25 mg tablet, Take 1 tablet 4 days a week every Monday, Wednesday, Friday, and Sunday  , Disp: 16 tablet, Rfl: 5    Cholecalciferol (VITAMIN D3) 5000 units CAPS, Take 1 capsule by mouth Daily, Disp: , Rfl:     famotidine (PEPCID) 40 MG tablet, Take 1 tablet by mouth every other day  , Disp: , Rfl:     gabapentin (NEURONTIN) 100 mg capsule, Take 1 capsule (100 mg total) by mouth daily at bedtime, Disp: 30 capsule, Rfl: 1    magnesium oxide (MAG-OX) 400 mg tablet, Take 1 tablet by mouth daily With zinc , Disp: , Rfl:     Misc Natural Products (GLUCOSAMINE CHOND COMPLEX/MSM PO), Take 1 tablet by mouth Daily 1000 mg , Disp: , Rfl:     Multiple Vitamin (MULTI-VITAMIN DAILY PO), Take 1 tablet by mouth daily, Disp: , Rfl:   Multiple Vitamins-Minerals (PRESERVISION AREDS) CAPS, Take 1 capsule by mouth daily, Disp: , Rfl:     Omega-3 Fatty Acids (FISH OIL) 500 MG CAPS, Take 1 capsule by mouth daily, Disp: , Rfl:     omeprazole (PriLOSEC) 40 MG capsule, Take 1 capsule by mouth daily, Disp: , Rfl:     Probiotic Product (PROBIOTIC-10 PO), Take 1 capsule by mouth daily, Disp: , Rfl:     verapamil (CALAN-SR) 180 mg CR tablet, Take 1 tablet (180 mg total) by mouth 2 (two) times a day, Disp: 180 tablet, Rfl: 0    Patient Active Problem List   Diagnosis    Chronic migraine without aura without status migrainosus, not intractable    Essential hypertension    Lump of skin    Valgus deformity of both great toes    Pain in both feet    Onychomycosis    Paronychia of toenail    Radiculopathy of lumbar region    Metatarsalgia of both feet    Hyperlipidemia    Laryngopharyngeal reflux (LPR)          Patient ID: Bello Mcdowell is a 68 y o  female  HPI    The following portions of the patient's history were reviewed and updated as appropriate:     family history includes Angina in her mother; Breast cancer in her cousin and maternal uncle; COPD in her sister; Diabetes in her sister; Heart failure in her mother; Hypertension in her brother; Lung cancer in her brother; No Known Problems in her daughter and son; Osteoporosis in her sister; Prostate cancer in her brother and father; Sudden death in her brother and sister  reports that she has never smoked  She has never used smokeless tobacco  She reports that she does not drink alcohol or use drugs  Vitals:    01/23/20 1328   BP: 130/74   Pulse: 69   Resp: 16       Review of Systems      Objective:  Patient's shoes and socks removed     Foot ExamPhysical Exam       Objective:  Patient's shoes and socks removed    Foot ExamPhysical Exam     Foot Exam     General  General Appearance: appears stated age and healthy   Orientation: alert and oriented to person, place, and time   Affect: appropriate   Gait: antalgic         Right Foot/Ankle      Inspection and Palpation  Tenderness: great toe interphalangeal joint, great toe metatarsophalangeal joint and lesser metatarsophalangeal joints   Swelling: dorsum and metatarsals   Arch: pes planus  Hammertoes: fifth toe  Hallux valgus: yes  Hallux limitus: yes  Skin Exam: callus and dry skin;      Neurovascular  Dorsalis pedis: 1+  Posterior tibial: 2+        Left Foot/Ankle       Inspection and Palpation  Tenderness: great toe interphalangeal joint, great toe metatarsophalangeal joint and lesser metatarsophalangeal joints   Swelling: dorsum and metatarsals   Arch: pes planus  Hammertoes: fifth toe  Hallux valgus: yes  Hallux limitus: yes  Skin Exam: callus and dry skin;      Neurovascular  Dorsalis pedis: 1+  Posterior tibial: 2+           Physical Exam   Constitutional: She appears well-developed and well-nourished  Cardiovascular: Normal rate and regular rhythm  Pulses:       Dorsalis pedis pulses are 1+ on the right side, and 1+ on the left side         Posterior tibial pulses are 2+ on the right side, and 2+ on the left side  Musculoskeletal:   Patient is pronated in stance and gait   She has large bunion noted bilateral   Severe hallux valgus deformity bilateral   Track bound bunions  X-rayPeola Alex hallux valgus deformity noted bilateral but   Feet:   Right Foot:   Skin Integrity: Positive for callus and dry skin  Left Foot:   Skin Integrity: Positive for callus and dry skin     Skin:   Severe mycosis of nails 1 through 5 of the left foot   Positive dystrophy noted   Vitals reviewed           Procedures

## 2020-01-29 ENCOUNTER — OFFICE VISIT (OUTPATIENT)
Dept: FAMILY MEDICINE CLINIC | Facility: CLINIC | Age: 70
End: 2020-01-29
Payer: MEDICARE

## 2020-01-29 VITALS
RESPIRATION RATE: 16 BRPM | DIASTOLIC BLOOD PRESSURE: 62 MMHG | WEIGHT: 183 LBS | BODY MASS INDEX: 26.2 KG/M2 | SYSTOLIC BLOOD PRESSURE: 126 MMHG | HEART RATE: 60 BPM | HEIGHT: 70 IN | TEMPERATURE: 99.5 F

## 2020-01-29 DIAGNOSIS — N18.30 CHRONIC KIDNEY DISEASE, STAGE 3 (HCC): ICD-10-CM

## 2020-01-29 DIAGNOSIS — I10 ESSENTIAL HYPERTENSION: ICD-10-CM

## 2020-01-29 DIAGNOSIS — Z00.00 MEDICARE ANNUAL WELLNESS VISIT, SUBSEQUENT: Primary | ICD-10-CM

## 2020-01-29 DIAGNOSIS — Z78.0 POSTMENOPAUSAL: ICD-10-CM

## 2020-01-29 PROBLEM — L03.039 PARONYCHIA OF TOENAIL: Status: RESOLVED | Noted: 2019-04-16 | Resolved: 2020-01-29

## 2020-01-29 PROCEDURE — 1123F ACP DISCUSS/DSCN MKR DOCD: CPT | Performed by: FAMILY MEDICINE

## 2020-01-29 PROCEDURE — G0439 PPPS, SUBSEQ VISIT: HCPCS | Performed by: FAMILY MEDICINE

## 2020-01-29 NOTE — PATIENT INSTRUCTIONS
Medicare Preventive Visit Patient Instructions  Thank you for completing your Welcome to Medicare Visit or Medicare Annual Wellness Visit today  Your next wellness visit will be due in one year (1/29/2021)  The screening/preventive services that you may require over the next 5-10 years are detailed below  Some tests may not apply to you based off risk factors and/or age  Screening tests ordered at today's visit but not completed yet may show as past due  Also, please note that scanned in results may not display below  Preventive Screenings:  Service Recommendations Previous Testing/Comments   Colorectal Cancer Screening  * Colonoscopy    * Fecal Occult Blood Test (FOBT)/Fecal Immunochemical Test (FIT)  * Fecal DNA/Cologuard Test  * Flexible Sigmoidoscopy Age: 54-65 years old   Colonoscopy: every 10 years (may be performed more frequently if at higher risk)  OR  FOBT/FIT: every 1 year  OR  Cologuard: every 3 years  OR  Sigmoidoscopy: every 5 years  Screening may be recommended earlier than age 48 if at higher risk for colorectal cancer  Also, an individualized decision between you and your healthcare provider will decide whether screening between the ages of 74-80 would be appropriate  Colonoscopy: 04/03/2013  FOBT/FIT: Not on file  Cologuard: Not on file  Sigmoidoscopy: Not on file    Screening Current     Breast Cancer Screening Age: 36 years old  Frequency: every 1-2 years  Not required if history of left and right mastectomy Mammogram: 01/14/2019    Screening Current   Cervical Cancer Screening Between the ages of 21-29, pap smear recommended once every 3 years  Between the ages of 33-67, can perform pap smear with HPV co-testing every 5 years     Recommendations may differ for women with a history of total hysterectomy, cervical cancer, or abnormal pap smears in past  Pap Smear: Not on file    Screening Not Indicated   Hepatitis C Screening Once for adults born between 1945 and 1965  More frequently in patients at high risk for Hepatitis C Hep C Antibody: 01/17/2019    Screening Current   Diabetes Screening 1-2 times per year if you're at risk for diabetes or have pre-diabetes Fasting glucose: No results in last 5 years   A1C: No results in last 5 years    Screening Current   Cholesterol Screening Once every 5 years if you don't have a lipid disorder  May order more often based on risk factors  Lipid panel: 10/23/2019    Screening Not Indicated  History Lipid Disorder     Other Preventive Screenings Covered by Medicare:  1  Abdominal Aortic Aneurysm (AAA) Screening: covered once if your at risk  You're considered to be at risk if you have a family history of AAA  2  Lung Cancer Screening: covers low dose CT scan once per year if you meet all of the following conditions: (1) Age 50-69; (2) No signs or symptoms of lung cancer; (3) Current smoker or have quit smoking within the last 15 years; (4) You have a tobacco smoking history of at least 30 pack years (packs per day multiplied by number of years you smoked); (5) You get a written order from a healthcare provider  3  Glaucoma Screening: covered annually if you're considered high risk: (1) You have diabetes OR (2) Family history of glaucoma OR (3)  aged 48 and older OR (3)  American aged 72 and older  3  Osteoporosis Screening: covered every 2 years if you meet one of the following conditions: (1) You're estrogen deficient and at risk for osteoporosis based off medical history and other findings; (2) Have a vertebral abnormality; (3) On glucocorticoid therapy for more than 3 months; (4) Have primary hyperparathyroidism; (5) On osteoporosis medications and need to assess response to drug therapy  · Last bone density test (DXA Scan): 02/21/2018  5  HIV Screening: covered annually if you're between the age of 12-76  Also covered annually if you are younger than 13 and older than 72 with risk factors for HIV infection   For pregnant patients, it is covered up to 3 times per pregnancy  Immunizations:  Immunization Recommendations   Influenza Vaccine Annual influenza vaccination during flu season is recommended for all persons aged >= 6 months who do not have contraindications   Pneumococcal Vaccine (Prevnar and Pneumovax)  * Prevnar = PCV13  * Pneumovax = PPSV23   Adults 25-60 years old: 1-3 doses may be recommended based on certain risk factors  Adults 72 years old: Prevnar (PCV13) vaccine recommended followed by Pneumovax (PPSV23) vaccine  If already received PPSV23 since turning 65, then PCV13 recommended at least one year after PPSV23 dose  Hepatitis B Vaccine 3 dose series if at intermediate or high risk (ex: diabetes, end stage renal disease, liver disease)   Tetanus (Td) Vaccine - COST NOT COVERED BY MEDICARE PART B Following completion of primary series, a booster dose should be given every 10 years to maintain immunity against tetanus  Td may also be given as tetanus wound prophylaxis  Tdap Vaccine - COST NOT COVERED BY MEDICARE PART B Recommended at least once for all adults  For pregnant patients, recommended with each pregnancy  Shingles Vaccine (Shingrix) - COST NOT COVERED BY MEDICARE PART B  2 shot series recommended in those aged 48 and above     Health Maintenance Due:      Topic Date Due    DXA SCAN  02/21/2020    MAMMOGRAM  01/14/2021    CRC Screening: Colonoscopy  04/03/2023    Hepatitis C Screening  Completed     Immunizations Due:  There are no preventive care reminders to display for this patient  Advance Directives   What are advance directives? Advance directives are legal documents that state your wishes and plans for medical care  These plans are made ahead of time in case you lose your ability to make decisions for yourself  Advance directives can apply to any medical decision, such as the treatments you want, and if you want to donate organs  What are the types of advance directives?   There are many types of advance directives, and each state has rules about how to use them  You may choose a combination of any of the following:  · Living will: This is a written record of the treatment you want  You can also choose which treatments you do not want, which to limit, and which to stop at a certain time  This includes surgery, medicine, IV fluid, and tube feedings  · Durable power of  for healthcare [facility]): This is a written record that states who you want to make healthcare choices for you when you are unable to make them for yourself  This person, called a proxy, is usually a family member or a friend  You may choose more than 1 proxy  · Do not resuscitate (DNR) order:  A DNR order is used in case your heart stops beating or you stop breathing  It is a request not to have certain forms of treatment, such as CPR  A DNR order may be included in other types of advance directives  · Medical directive: This covers the care that you want if you are in a coma, near death, or unable to make decisions for yourself  You can list the treatments you want for each condition  Treatment may include pain medicine, surgery, blood transfusions, dialysis, IV or tube feedings, and a ventilator (breathing machine)  · Values history: This document has questions about your views, beliefs, and how you feel and think about life  This information can help others choose the care that you would choose  Why are advance directives important? An advance directive helps you control your care  Although spoken wishes may be used, it is better to have your wishes written down  Spoken wishes can be misunderstood, or not followed  Treatments may be given even if you do not want them  An advance directive may make it easier for your family to make difficult choices about your care     Weight Management   Why it is important to manage your weight:  Being overweight increases your risk of health conditions such as heart disease, high blood pressure, type 2 diabetes, and certain types of cancer  It can also increase your risk for osteoarthritis, sleep apnea, and other respiratory problems  Aim for a slow, steady weight loss  Even a small amount of weight loss can lower your risk of health problems  How to lose weight safely:  A safe and healthy way to lose weight is to eat fewer calories and get regular exercise  You can lose up about 1 pound a week by decreasing the number of calories you eat by 500 calories each day  Healthy meal plan for weight management:  A healthy meal plan includes a variety of foods, contains fewer calories, and helps you stay healthy  A healthy meal plan includes the following:  · Eat whole-grain foods more often  A healthy meal plan should contain fiber  Fiber is the part of grains, fruits, and vegetables that is not broken down by your body  Whole-grain foods are healthy and provide extra fiber in your diet  Some examples of whole-grain foods are whole-wheat breads and pastas, oatmeal, brown rice, and bulgur  · Eat a variety of vegetables every day  Include dark, leafy greens such as spinach, kale, liyah greens, and mustard greens  Eat yellow and orange vegetables such as carrots, sweet potatoes, and winter squash  · Eat a variety of fruits every day  Choose fresh or canned fruit (canned in its own juice or light syrup) instead of juice  Fruit juice has very little or no fiber  · Eat low-fat dairy foods  Drink fat-free (skim) milk or 1% milk  Eat fat-free yogurt and low-fat cottage cheese  Try low-fat cheeses such as mozzarella and other reduced-fat cheeses  · Choose meat and other protein foods that are low in fat  Choose beans or other legumes such as split peas or lentils  Choose fish, skinless poultry (chicken or turkey), or lean cuts of red meat (beef or pork)  Before you cook meat or poultry, cut off any visible fat  · Use less fat and oil  Try baking foods instead of frying them   Add less fat, such as margarine, sour cream, regular salad dressing and mayonnaise to foods  Eat fewer high-fat foods  Some examples of high-fat foods include french fries, doughnuts, ice cream, and cakes  · Eat fewer sweets  Limit foods and drinks that are high in sugar  This includes candy, cookies, regular soda, and sweetened drinks  Exercise:  Exercise at least 30 minutes per day on most days of the week  Some examples of exercise include walking, biking, dancing, and swimming  You can also fit in more physical activity by taking the stairs instead of the elevator or parking farther away from stores  Ask your healthcare provider about the best exercise plan for you  © Copyright [employer] 2018 Information is for End User's use only and may not be sold, redistributed or otherwise used for commercial purposes   All illustrations and images included in CareNotes® are the copyrighted property of A D A M , Inc  or [address] [place] [address]

## 2020-01-29 NOTE — PROGRESS NOTES
Assessment and Plan:     Problem List Items Addressed This Visit     Chronic kidney disease, stage 3 (HCC)     GFR is below 61          Essential hypertension     Well controlled  Continue chlorthalidone            Other Visit Diagnoses     Medicare annual wellness visit, subsequent    -  Primary    Postmenopausal            BMI Counseling: Body mass index is 26 26 kg/m²  The BMI is above normal  Exercise recommendations include exercising 3-5 times per week  Preventive health issues were discussed with patient, and age appropriate screening tests were ordered as noted in patient's After Visit Summary  Personalized health advice and appropriate referrals for health education or preventive services given if needed, as noted in patient's After Visit Summary       History of Present Illness:     Patient presents for Medicare Annual Wellness visit    Patient Care Team:  Peyman Ceballos DO as PCP - Robert Estevez MD as PCP - Endocrinology (Endocrinology)  MD Arley Marcano MD Nadara Rav, MD Lauralyn Freestone, MD (Cardiology)  Nu Meehan MD     Problem List:     Patient Active Problem List   Diagnosis    Chronic migraine without aura without status migrainosus, not intractable    Essential hypertension    Lump of skin    Valgus deformity of both great toes    Pain in both feet    Onychomycosis    Radiculopathy of lumbar region    Metatarsalgia of both feet    Hyperlipidemia    Laryngopharyngeal reflux (LPR)    Chronic kidney disease, stage 3 ([address] )      Past Medical and Surgical History:     Past Medical History:   Diagnosis Date    Borderline high cholesterol     Borderline hypertension     Migraine      Past Surgical History:   Procedure Laterality Date    BREAST BIOPSY Right     benign    BUNIONECTOMY      CATARACT EXTRACTION, BILATERAL      PARATHYROID GLAND SURGERY        Family History:     Family History   Problem Relation Age of Onset    Angina Mother    [facility] Heart failure Mother     Prostate cancer Father     Diabetes Sister     Sudden death Sister     Osteoporosis Sister     Hypertension Brother     No Known Problems Son     No Known Problems Daughter     Prostate cancer Brother     Lung cancer Brother     Sudden death Brother     COPD Sister     Breast cancer Maternal Uncle     Breast cancer Cousin       Social History:     Social History     Socioeconomic History    Marital status: /Civil Union     Spouse name: None    Number of children: None    Years of education: None    Highest education level: None   Occupational History    None   Social Needs    Financial resource strain: None    Food insecurity:     Worry: None     Inability: None    Transportation needs:     Medical: None     Non-medical: None   Tobacco Use    Smoking status: Never Smoker    Smokeless tobacco: Never Used   Substance and Sexual Activity    Alcohol use: No    Drug use: No    Sexual activity: None   Lifestyle    Physical activity:     Days per week: None     Minutes per session: None    Stress: None   Relationships    Social connections:     Talks on phone: None     Gets together: None     Attends Faith service: None     Active member of club or organization: None     Attends meetings of clubs or organizations: None     Relationship status: None    Intimate partner violence:     Fear of current or ex partner: None     Emotionally abused: None     Physically abused: None     Forced sexual activity: None   Other Topics Concern    None   Social History Narrative    None       Medications and Allergies:     Current Outpatient Medications   Medication Sig Dispense Refill    Ascorbic Acid (VITAMIN C) 500 MG CAPS Take 1 tablet by mouth daily      atorvastatin (LIPITOR) 20 mg tablet Take 1 tablet (20 mg total) by mouth daily 90 tablet 3    B Complex Vitamins (B COMPLEX PO) Take 1 tablet by mouth daily      chlorthalidone 25 mg tablet Take 1 tablet 4 days a week every Monday, Wednesday, Friday, and Sunday  16 tablet 5    Cholecalciferol (VITAMIN D3) 5000 units CAPS Take 1 capsule by mouth Daily      famotidine (PEPCID) 40 MG tablet Take 1 tablet by mouth every other day        gabapentin (NEURONTIN) 300 mg capsule Take 1 capsule (300 mg total) by mouth 2 (two) times a day 60 capsule 0    magnesium oxide (MAG-OX) 400 mg tablet Take 1 tablet by mouth daily With zinc       Misc Natural Products (GLUCOSAMINE CHOND COMPLEX/MSM PO) Take 1 tablet by mouth Daily 1000 mg       Multiple Vitamin (MULTI-VITAMIN DAILY PO) Take 1 tablet by mouth daily      Multiple Vitamins-Minerals (PRESERVISION AREDS) CAPS Take 1 capsule by mouth daily      Omega-3 Fatty Acids (FISH OIL) 500 MG CAPS Take 1 capsule by mouth daily      Probiotic Product (PROBIOTIC-10 PO) Take 1 capsule by mouth daily      verapamil (CALAN-SR) 180 mg CR tablet Take 1 tablet (180 mg total) by mouth 2 (two) times a day 180 tablet 0     No current facility-administered medications for this visit  No Known Allergies   Immunizations:     Immunization History   Administered Date(s) Administered    INFLUENZA 11/01/2018    Influenza Split High Dose Preservative Free IM 12/22/2014, 11/13/2015, 11/05/2016, 10/18/2017    Pneumococcal Conjugate 13-Valent 11/13/2015    Pneumococcal Polysaccharide PPV23 11/01/2013, 10/24/2018    Zoster 01/24/2015    Zoster Vaccine Recombinant 09/12/2019, 01/16/2020    influenza, trivalent, adjuvanted 10/31/2019      Health Maintenance:         Topic Date Due    DXA SCAN  02/21/2020    MAMMOGRAM  01/14/2021    CRC Screening: Colonoscopy  04/03/2023    Hepatitis C Screening  Completed     There are no preventive care reminders to display for this patient  Medicare Health Risk Assessment:     /62   Pulse 60   Temp 99 5 °F (37 5 °C)   Resp 16   Ht 5' 10" (1 778 m)   Wt 83 kg (183 lb)   BMI 26 26 kg/m²      Linneaosiris Alberts is here for her Subsequent Wellness visit  Health Risk Assessment:   Patient rates overall health as very good  Patient feels that their physical health rating is same  Eyesight was rated as slightly worse  Hearing was rated as same  Patient feels that their emotional and mental health rating is same  Pain experienced in the last 7 days has been some  Patient's pain rating has been 6/10  Patient states that she has experienced no weight loss or gain in last 6 months  Pt states she had sharp pains in her abdomen, which has been discussed  Pt states when she is having the pains, sometimes the pain is worse than others depending on where the pain is  Ac/[facility]     Depression Screening:   PHQ-2 Score: 0      Fall Risk Screening: In the past year, patient has experienced: no history of falling in past year      Urinary Incontinence Screening:   Patient has not leaked urine accidently in the last six months  Pt reports on 10/17/2019 that when she was using the restroom she wiped and had blood and a small clot on the toilet paper  Has not happened since per pt  Ac/[facility]     Home Safety:  Patient does not have trouble with stairs inside or outside of their home  Patient has working smoke alarms and has working carbon monoxide detector  Home safety hazards include: household clutter  Nutrition:   Current diet is Regular and Unhealthy  Medications:   Patient is currently taking over-the-counter supplements  OTC medications include: see medication list  Patient is able to manage medications  Activities of Daily Living (ADLs)/Instrumental Activities of Daily Living (IADLs):   Walk and transfer into and out of bed and chair?: Yes  Dress and groom yourself?: Yes    Bathe or shower yourself?: Yes    Feed yourself?  Yes  Do your laundry/housekeeping?: Yes  Manage your money, pay your bills and track your expenses?: Yes  Make your own meals?: Yes    Do your own shopping?: Yes    Previous Hospitalizations:   Any hospitalizations or ED visits within the last 12 months?: Yes    How many hospitalizations have you had in the last year?: 1-2    Advance Care Planning:   Living will: No    Durable POA for healthcare: No    Advanced directive counseling given: Yes    End of Life Decisions reviewed with patient: Yes    Provider agrees with end of life decisions: Yes      Cognitive Screening:   Provider or family/friend/caregiver concerned regarding cognition?: No    PREVENTIVE SCREENINGS      Cardiovascular Screening:    General: Screening Not Indicated and History Lipid Disorder      Diabetes Screening:     General: Screening Current      Colorectal Cancer Screening:     General: Screening Current      Breast Cancer Screening:     General: Screening Current      Cervical Cancer Screening:    General: Screening Not Indicated      Osteoporosis Screening:    General: Screening Not Indicated and History Osteoporosis      Abdominal Aortic Aneurysm (AAA) Screening:        General: Screening Not Indicated      Lung Cancer Screening:     General: Screening Not Indicated      Hepatitis C Screening:    General: Screening Current    Physical Exam   Constitutional: She is oriented to person, place, and time  She appears well-developed and well-nourished  HENT:   Head: Normocephalic and atraumatic  Right Ear: External ear normal    Left Ear: External ear normal    Nose: Nose normal    Mouth/Throat: Oropharynx is clear and moist    Cardiovascular: Normal rate, regular rhythm and normal heart sounds  Exam reveals no friction rub  No murmur heard  Pulmonary/Chest: Breath sounds normal  No respiratory distress  She has no wheezes  She has no rales  Musculoskeletal: She exhibits no edema  Neurological: She is oriented to person, place, and time  No cranial nerve deficit  Nursing note and vitals reviewed          Eleonora Quick DO

## 2020-02-19 ENCOUNTER — OFFICE VISIT (OUTPATIENT)
Dept: NEUROLOGY | Facility: CLINIC | Age: 70
End: 2020-02-19
Payer: MEDICARE

## 2020-02-19 VITALS
HEIGHT: 70 IN | SYSTOLIC BLOOD PRESSURE: 142 MMHG | WEIGHT: 185 LBS | BODY MASS INDEX: 26.48 KG/M2 | HEART RATE: 57 BPM | DIASTOLIC BLOOD PRESSURE: 92 MMHG

## 2020-02-19 DIAGNOSIS — G43.709 CHRONIC MIGRAINE WITHOUT AURA WITHOUT STATUS MIGRAINOSUS, NOT INTRACTABLE: Primary | ICD-10-CM

## 2020-02-19 DIAGNOSIS — I10 ESSENTIAL HYPERTENSION: ICD-10-CM

## 2020-02-19 DIAGNOSIS — G43.709 CHRONIC MIGRAINE WITHOUT AURA WITHOUT STATUS MIGRAINOSUS, NOT INTRACTABLE: ICD-10-CM

## 2020-02-19 PROCEDURE — 3077F SYST BP >= 140 MM HG: CPT | Performed by: PSYCHIATRY & NEUROLOGY

## 2020-02-19 PROCEDURE — 1170F FXNL STATUS ASSESSED: CPT | Performed by: PSYCHIATRY & NEUROLOGY

## 2020-02-19 PROCEDURE — 1036F TOBACCO NON-USER: CPT | Performed by: PSYCHIATRY & NEUROLOGY

## 2020-02-19 PROCEDURE — 1160F RVW MEDS BY RX/DR IN RCRD: CPT | Performed by: PSYCHIATRY & NEUROLOGY

## 2020-02-19 PROCEDURE — 99214 OFFICE O/P EST MOD 30 MIN: CPT | Performed by: PSYCHIATRY & NEUROLOGY

## 2020-02-19 PROCEDURE — 3080F DIAST BP >= 90 MM HG: CPT | Performed by: PSYCHIATRY & NEUROLOGY

## 2020-02-19 PROCEDURE — 4040F PNEUMOC VAC/ADMIN/RCVD: CPT | Performed by: PSYCHIATRY & NEUROLOGY

## 2020-02-19 PROCEDURE — 3008F BODY MASS INDEX DOCD: CPT | Performed by: PSYCHIATRY & NEUROLOGY

## 2020-02-19 NOTE — PROGRESS NOTES
Patient ID: Ciaran Panda is a 68 y o  female  Assessment/Plan:    Migraine without aura    Plan continue verapamil  twice daily  I will see the patient in 4 months and I have advised the patient that I will be retiring thereafter  Subjective:    68-year-old female followed in the office for migraine without aura  Patient was last seen in the office was August 14, 2019  Since then she has total of 6 headaches, located either on the temples or on 1 side of the head however she reported last 3 headaches were around the eyes described as a throbbing pain, at severity scale 5 without nausea or vomiting  She also denies any light or noise sensitivity with the headache  She normally gets this headache around 4 or 5:00 p m  And she it will last till she falls asleep  Patient does not take any additional over-the-counter medication for this headache  Patient denies any visual aura or vertigo prior to or during the headache    Upon asking patient denies any other neurological symptoms  except patient reported during her exercise and during the lunges see may who some balance  Pt denies double vision, blurred vision, transient monocular blindness, vertigo, tinnitus, hearing loss, slurred speech, difficulty expressing and understanding, dysphasia, and focal weakness, numbness, imbalance and incoordination  Pt denies bladder and bowel urgency, frequency and incontinence         Past Medical History:   Diagnosis Date    Borderline high cholesterol     Borderline hypertension     Migraine        Family History   Problem Relation Age of Onset    Angina Mother     Heart failure Mother     Prostate cancer Father     Diabetes Sister    Ethelyn Osprey Sudden death Sister     Osteoporosis Sister     Hypertension Brother     No Known Problems Son     No Known Problems Daughter     Prostate cancer Brother     Lung cancer Brother     Sudden death Brother     COPD Sister     Breast cancer Maternal Uncle     Breast cancer Cousin    ,    Social History     Socioeconomic History    Marital status: /Civil Union     Spouse name: Not on file    Number of children: Not on file    Years of education: Not on file    Highest education level: Not on file   Occupational History    Not on file   Social Needs    Financial resource strain: Not on file    Food insecurity:     Worry: Not on file     Inability: Not on file    Transportation needs:     Medical: Not on file     Non-medical: Not on file   Tobacco Use    Smoking status: Never Smoker    Smokeless tobacco: Never Used   Substance and Sexual Activity    Alcohol use: No    Drug use: No    Sexual activity: Not on file   Lifestyle    Physical activity:     Days per week: Not on file     Minutes per session: Not on file    Stress: Not on file   Relationships    Social connections:     Talks on phone: Not on file     Gets together: Not on file     Attends Temple service: Not on file     Active member of club or organization: Not on file     Attends meetings of clubs or organizations: Not on file     Relationship status: Not on file    Intimate partner violence:     Fear of current or ex partner: Not on file     Emotionally abused: Not on file     Physically abused: Not on file     Forced sexual activity: Not on file   Other Topics Concern    Not on file   Social History Narrative    Not on file       Current Outpatient Medications on File Prior to Visit   Medication Sig Dispense Refill    Ascorbic Acid (VITAMIN C) 500 MG CAPS Take 1 tablet by mouth daily      atorvastatin (LIPITOR) 20 mg tablet Take 1 tablet (20 mg total) by mouth daily 90 tablet 3    B Complex Vitamins (B COMPLEX PO) Take 1 tablet by mouth daily      chlorthalidone 25 mg tablet Take 1 tablet 4 days a week every Monday, Wednesday, Friday, and Sunday   16 tablet 5    Cholecalciferol (VITAMIN D3) 5000 units CAPS Take 1 capsule by mouth Daily      famotidine (PEPCID) 40 MG tablet Take 1 tablet by mouth every other day        magnesium oxide (MAG-OX) 400 mg tablet Take 1 tablet by mouth daily With zinc       Misc Natural Products (GLUCOSAMINE CHOND COMPLEX/MSM PO) Take 1 tablet by mouth Daily 1000 mg       Multiple Vitamin (MULTI-VITAMIN DAILY PO) Take 1 tablet by mouth daily      Multiple Vitamins-Minerals (PRESERVISION AREDS) CAPS Take 1 capsule by mouth daily      Omega-3 Fatty Acids (FISH OIL) 500 MG CAPS Take 1 capsule by mouth daily      Probiotic Product (PROBIOTIC-10 PO) Take 1 capsule by mouth daily      verapamil (CALAN-SR) 180 mg CR tablet Take 1 tablet (180 mg total) by mouth 2 (two) times a day 180 tablet 0    gabapentin (NEURONTIN) 300 mg capsule Take 1 capsule (300 mg total) by mouth 2 (two) times a day 60 capsule 0     No current facility-administered medications on file prior to visit  Objective:    Blood pressure 142/92, pulse 57, height 5' 10" (1 778 m), weight 83 9 kg (185 lb), not currently breastfeeding  Physical Exam   Eyes: Pupils are equal, round, and reactive to light  EOM are normal    Neck: Normal carotid pulses present  Carotid bruit is not present  Neurological: She has normal strength and normal reflexes  Psychiatric: Her speech is normal        Neurological Exam  Mental Status   Oriented to person, place, time and situation  Recent and remote memory are intact  Speech is normal  Language is fluent with no aphasia  Cranial Nerves  CN II: Visual fields full to confrontation  CN III, IV, VI: Extraocular movements intact bilaterally  Pupils equal round and reactive to light bilaterally  CN V: Facial sensation is normal   CN VII: Full and symmetric facial movement  CN VIII: Hearing is normal   CN IX, X: Palate elevates symmetrically  Normal gag reflex  CN XI: Shoulder shrug strength is normal   CN XII: Tongue midline without atrophy or fasciculations  Motor  Normal muscle bulk throughout  No fasciculations present   Normal muscle tone  Strength is 5/5 throughout all four extremities  Sensory  Sensation is intact to light touch, pinprick, vibration and proprioception in all four extremities  Light touch is normal in upper and lower extremities  Pinprick is normal in upper and lower extremities  Proprioception is normal in upper and lower extremities  Reflexes  Deep tendon reflexes are 2+ and symmetric in all four extremities with downgoing toes bilaterally  Coordination  Right: Finger-to-nose normal  Heel-to-shin normal   Left: Finger-to-nose normal  Heel-to-shin normal     Gait  Normal casual, toe, heel and tandem gait  ROS:    Review of Systems   Constitutional: Negative  HENT: Negative  Eyes: Negative  Respiratory: Negative  Cardiovascular: Negative  Gastrointestinal: Negative  Endocrine: Negative  Genitourinary: Negative  Musculoskeletal: Negative  Skin: Negative  Allergic/Immunologic: Negative  Neurological: Negative  Hematological: Negative  Psychiatric/Behavioral: Negative

## 2020-02-20 DIAGNOSIS — G43.709 CHRONIC MIGRAINE WITHOUT AURA WITHOUT STATUS MIGRAINOSUS, NOT INTRACTABLE: ICD-10-CM

## 2020-02-20 DIAGNOSIS — I10 ESSENTIAL HYPERTENSION: ICD-10-CM

## 2020-03-09 ENCOUNTER — LAB (OUTPATIENT)
Dept: LAB | Facility: CLINIC | Age: 70
End: 2020-03-09
Payer: MEDICARE

## 2020-03-09 ENCOUNTER — TELEPHONE (OUTPATIENT)
Dept: ENDOCRINOLOGY | Facility: CLINIC | Age: 70
End: 2020-03-09

## 2020-03-09 DIAGNOSIS — E55.9 VITAMIN D DEFICIENCY: ICD-10-CM

## 2020-03-09 DIAGNOSIS — Z86.39 H/O HYPERPARATHYROIDISM: ICD-10-CM

## 2020-03-09 DIAGNOSIS — M81.0 OSTEOPOROSIS, UNSPECIFIED OSTEOPOROSIS TYPE, UNSPECIFIED PATHOLOGICAL FRACTURE PRESENCE: ICD-10-CM

## 2020-03-09 LAB
25(OH)D3 SERPL-MCNC: 50 NG/ML (ref 30–100)
ANION GAP SERPL CALCULATED.3IONS-SCNC: 7 MMOL/L (ref 4–13)
BUN SERPL-MCNC: 29 MG/DL (ref 5–25)
CALCIUM SERPL-MCNC: 9.3 MG/DL (ref 8.3–10.1)
CHLORIDE SERPL-SCNC: 107 MMOL/L (ref 100–108)
CO2 SERPL-SCNC: 27 MMOL/L (ref 21–32)
CREAT SERPL-MCNC: 0.93 MG/DL (ref 0.6–1.3)
GFR SERPL CREATININE-BSD FRML MDRD: 62 ML/MIN/1.73SQ M
GLUCOSE P FAST SERPL-MCNC: 95 MG/DL (ref 65–99)
POTASSIUM SERPL-SCNC: 3.9 MMOL/L (ref 3.5–5.3)
SODIUM SERPL-SCNC: 141 MMOL/L (ref 136–145)

## 2020-03-09 PROCEDURE — 82306 VITAMIN D 25 HYDROXY: CPT

## 2020-03-09 PROCEDURE — 36415 COLL VENOUS BLD VENIPUNCTURE: CPT

## 2020-03-09 PROCEDURE — 80048 BASIC METABOLIC PNL TOTAL CA: CPT

## 2020-03-09 NOTE — TELEPHONE ENCOUNTER
Pt left message Friday afternoon for appointment date/time  Returned call this morning but had to leave VM for patient to return call to office  I left info on date and time of her upcoming appt and I also mentioned I noticed she lives in [place], and wanted to see if she is interested in our [address]  [facility] location

## 2020-03-11 ENCOUNTER — OFFICE VISIT (OUTPATIENT)
Dept: ENDOCRINOLOGY | Facility: CLINIC | Age: 70
End: 2020-03-11
Payer: MEDICARE

## 2020-03-11 VITALS
SYSTOLIC BLOOD PRESSURE: 110 MMHG | BODY MASS INDEX: 26.05 KG/M2 | DIASTOLIC BLOOD PRESSURE: 72 MMHG | HEIGHT: 70 IN | WEIGHT: 182 LBS

## 2020-03-11 DIAGNOSIS — Z86.39 H/O HYPERPARATHYROIDISM: ICD-10-CM

## 2020-03-11 DIAGNOSIS — E55.9 VITAMIN D DEFICIENCY: ICD-10-CM

## 2020-03-11 DIAGNOSIS — M81.0 OSTEOPOROSIS, UNSPECIFIED OSTEOPOROSIS TYPE, UNSPECIFIED PATHOLOGICAL FRACTURE PRESENCE: Primary | ICD-10-CM

## 2020-03-11 DIAGNOSIS — E04.2 MULTINODULAR GOITER: ICD-10-CM

## 2020-03-11 PROCEDURE — 4040F PNEUMOC VAC/ADMIN/RCVD: CPT | Performed by: PHYSICIAN ASSISTANT

## 2020-03-11 PROCEDURE — 3074F SYST BP LT 130 MM HG: CPT | Performed by: PHYSICIAN ASSISTANT

## 2020-03-11 PROCEDURE — 99214 OFFICE O/P EST MOD 30 MIN: CPT | Performed by: PHYSICIAN ASSISTANT

## 2020-03-11 PROCEDURE — 3008F BODY MASS INDEX DOCD: CPT | Performed by: PHYSICIAN ASSISTANT

## 2020-03-11 PROCEDURE — 1036F TOBACCO NON-USER: CPT | Performed by: PHYSICIAN ASSISTANT

## 2020-03-11 PROCEDURE — 1160F RVW MEDS BY RX/DR IN RCRD: CPT | Performed by: PHYSICIAN ASSISTANT

## 2020-03-11 PROCEDURE — 3078F DIAST BP <80 MM HG: CPT | Performed by: PHYSICIAN ASSISTANT

## 2020-03-11 NOTE — PROGRESS NOTES
Patient Progress Note    CC: osteoporosis, vitamin D deficiency, thyroid nodules    Referring Provider  Toña Fulton Do  [address] [address]     History of Present Illness:     Patient is a 47-year-old female here for follow-up of osteoporosis, vitamin-D deficiency, thyroid nodules  She also has a history of primary hyperparathyroidism and underwent a parathyroid adenoma resection in 2011  Most recent PTH level on May 2019 was within normal range at 43 and most recent calcium in March 2020 was normal at 9 3  For osteoporosis she was treated with bisphosphonate therapy for 7 years, so she was given a drug holiday in November 2018  She is taking vitamin D3 5000 International Units daily and a multivitamin daily  Most recent vitamin-D level normal at 50  Last DEXA scan done in February 2018 showed osteopenia the right hip  She is scheduled to have her next DEXA scan in May 2020  No recent falls or fractures  She is doing weight-bearing exercises routinely  She also has a history of thyroid nodules  Last thyroid ultrasound was done in May 2019  A left upper pole nodule and left lower pole nodule were measured but did not meet criteria for biopsy  No history of external radiation to head/neck/chest   No family history of thyroid cancer  No recent Iodine loading in form of medication, erbs or kelp supplements or radiological diagnostic studies  Thyroid ultrasound results: May 2019  FINDINGS:  Thyroid parenchyma is diffusely heterogeneous in echotexture with focal nodule(s) as described below      Right lobe:  4 7 x 1 3 x 1 5 cm  Left lobe:  4 6 x 1 6 x 1 4 cm  Isthmus:  0 4 cm      Nodule #1  Image 32  LEFT upper pole nodule measuring 0 9 x 0 6 x 0 8 cm  Unchanged from prior  COMPOSITION:  1 point, mixed cystic and solid  ECHOGENICITY:  1 point, hyperechoic or isoechoic  SHAPE:  0 points, wider-than-tall  MARGIN: 0 points, smooth    ECHOGENIC FOCI:  0 points, none or large comet-tail artifacts  TI-RADS Classification: TR 2 (2 points), Not suspious  No FNA        Nodule #2  Image 39  LEFT lower pole nodule measuring 0 5 x 0 7 x 0 4 cm  Unchanged from prior  COMPOSITION:  2 points, solid or almost completely solid   ECHOGENICITY:  1 point, hyperechoic or isoechoic  SHAPE:  0 points, wider-than-tall  MARGIN: 0 points, smooth  ECHOGENIC FOCI:  0 points, none or large comet-tail artifacts  TI-RADS Classification: TR 3 (3 points), Mildly suspicious  FNA if >2 5 cm  Follow if >1 5 cm      There are additional nodules of lesser size and/or TI-RADS score  These do not necessitate additional evaluation based on ACR criteria       IMPRESSION:     No nodule meets current ACR criteria for requiring biopsy or followup ultrasounds       Patient Active Problem List   Diagnosis    Chronic migraine without aura without status migrainosus, not intractable    Essential hypertension    Lump of skin    Valgus deformity of both great toes    Pain in both feet    Onychomycosis    Radiculopathy of lumbar region    Metatarsalgia of both feet    Hyperlipidemia    Laryngopharyngeal reflux (LPR)    Chronic kidney disease, stage 3 (HCC)     Past Medical History:   Diagnosis Date    Borderline high cholesterol     Borderline hypertension     Migraine       Past Surgical History:   Procedure Laterality Date    BREAST BIOPSY Right     benign    BUNIONECTOMY      CATARACT EXTRACTION, BILATERAL      PARATHYROID GLAND SURGERY        Family History   Problem Relation Age of Onset    Angina Mother     Heart failure Mother     Prostate cancer Father     Diabetes Sister     Sudden death Sister     Osteoporosis Sister     Hypertension Brother     No Known Problems Son     No Known Problems Daughter     Prostate cancer Brother     Lung cancer Brother    Alanna Reza Sudden death Brother     COPD Sister     Breast cancer Maternal Uncle     Breast cancer Cousin      Social History Tobacco Use    Smoking status: Never Smoker    Smokeless tobacco: Never Used   Substance Use Topics    Alcohol use: No     No Known Allergies  Current Outpatient Medications   Medication Sig Dispense Refill    Ascorbic Acid (VITAMIN C) 500 MG CAPS Take 1 tablet by mouth daily      atorvastatin (LIPITOR) 20 mg tablet Take 1 tablet (20 mg total) by mouth daily 90 tablet 3    B Complex Vitamins (B COMPLEX PO) Take 1 tablet by mouth daily      chlorthalidone 25 mg tablet Take 1 tablet 4 days a week every Monday, Wednesday, Friday, and Sunday  16 tablet 5    Cholecalciferol (VITAMIN D3) 5000 units CAPS Take 1 capsule by mouth Daily      famotidine (PEPCID) 40 MG tablet Take 1 tablet by mouth every other day        gabapentin (NEURONTIN) 300 mg capsule Take 1 capsule (300 mg total) by mouth 2 (two) times a day 60 capsule 0    magnesium oxide (MAG-OX) 400 mg tablet Take 1 tablet by mouth daily With zinc       Misc Natural Products (GLUCOSAMINE CHOND COMPLEX/MSM PO) Take 1 tablet by mouth Daily 1000 mg       Multiple Vitamin (MULTI-VITAMIN DAILY PO) Take 1 tablet by mouth daily      Multiple Vitamins-Minerals (PRESERVISION AREDS) CAPS Take 1 capsule by mouth daily      Omega-3 Fatty Acids (FISH OIL) 500 MG CAPS Take 1 capsule by mouth daily      Probiotic Product (PROBIOTIC-10 PO) Take 1 capsule by mouth daily      verapamil (CALAN-SR) 180 mg CR tablet Take 1 tablet (180 mg total) by mouth 2 (two) times a day 180 tablet 0     No current facility-administered medications for this visit  Review of Systems   Constitutional: Positive for fatigue (often related to poor sleep)  Negative for activity change, appetite change and unexpected weight change  HENT: Negative for trouble swallowing  Eyes: Negative for visual disturbance  Respiratory: Negative for shortness of breath  Cardiovascular: Negative for chest pain and palpitations  Gastrointestinal: Positive for constipation (occasional)  Negative for diarrhea  Endocrine: Negative for cold intolerance and heat intolerance  Musculoskeletal: Negative  Skin: Negative  Neurological: Positive for numbness (feet, taking gabapentin)  Negative for tremors  Psychiatric/Behavioral: Negative  Physical Exam:  Body mass index is 26 11 kg/m²  /72   Ht 5' 10" (1 778 m)   Wt 82 6 kg (182 lb)   BMI 26 11 kg/m²    Wt Readings from Last 3 Encounters:   03/11/20 82 6 kg (182 lb)   02/19/20 83 9 kg (185 lb)   01/29/20 83 kg (183 lb)       Physical Exam   Constitutional: She appears well-developed and well-nourished  HENT:   Head: Normocephalic  Eyes: Pupils are equal, round, and reactive to light  EOM are normal  No scleral icterus  Neck: Neck supple  No thyromegaly present  Cardiovascular: Normal rate and regular rhythm  No murmur heard  Pulses:       Radial pulses are 2+ on the right side, and 2+ on the left side  Pulmonary/Chest: Effort normal and breath sounds normal  No respiratory distress  She has no wheezes  Neurological: She is alert  She has normal reflexes  Skin: Skin is warm and dry  Psychiatric: She has a normal mood and affect  Nursing note and vitals reviewed  Patient's shoes and socks were not removed        Labs:   Component      Latest Ref Rng & Units 5/31/2019 6/10/2019 6/30/2019 3/9/2020   Sodium      136 - 145 mmol/L  138 142 141   Potassium      3 5 - 5 3 mmol/L  4 0 4 0 3 9   Chloride      100 - 108 mmol/L  102 107 107   CO2      21 - 32 mmol/L  24 29 27   Anion Gap      4 - 13 mmol/L   6 7   BUN      5 - 25 mg/dL  17 17 29 (H)   Creatinine      0 60 - 1 30 mg/dL  0 99 0 89 0 93   Glucose, Random      65 - 140 mg/dL  89 90    Calcium      8 3 - 10 1 mg/dL   8 3 9 3   AST      5 - 45 U/L   12    ALT      12 - 78 U/L   21    Alkaline Phosphatase      46 - 116 U/L   47    Total Protein      6 4 - 8 2 g/dL   6 3 (L)    Albumin      3 5 - 5 0 g/dL   3 6    TOTAL BILIRUBIN      0 20 - 1 00 mg/dL   0 20 eGFR      ml/min/1 73sq m   65 62   eGFR Non       >59 mL/min/1 73  58 (L)     eGFR       >59 mL/min/1 73  67     SL AMB BUN/CREATININE RATIO      12 - 28  17     CALCIUM      8 7 - 10 3 mg/dL  9 6     GLUCOSE FASTING      65 - 99 mg/dL    95   TSH, POC      0 450 - 4 500 uIU/mL 2 590      PTH, Intact      15 - 65 pg/mL 43      Free T4      0 82 - 1 77 ng/dL  1 15     25-Hydroxy, Vitamin D      30 0 - 100 0 ng/mL  45 9     TSH 3RD GENERATON      0 358 - 3 740 uIU/mL   3 714    Vit D, 25-Hydroxy      30 0 - 100 0 ng/mL    50 0       Plan:     Diagnoses and all orders for this visit:    Osteoporosis  On drug holiday since November 2018  Previously treated with bisphosphonate therapy for 7 years  Continue current supplementation of vitamin D3  Take precautions to avoid falls  Recommended weight-bearing exercises as tolerated  DEXA scan is scheduled for May 2020  -     Basic metabolic panel; Future  -     Vitamin D 25 hydroxy; Future    Vitamin D deficiency  Vitamin-D normal range at 50  Continue current supplementation of vitamin D3  -     Vitamin D 25 hydroxy; Future    Multinodular goiter  Thyroid ultrasound done in May 2019 showed 2 left-sided nodules which did not meet criteria for biopsy    H/O hyperparathyroidism  Resolved  Status post parathyroid adenoma removal in 2011  Most recent calcium normal at 9 3      Discussed with the patient and all questions fully answered  She will call me if any problems arise      Counseled patient on diagnostic results, prognosis, risk and benefit of treatment options, instruction for management, importance of treatment compliance, risk  factor reduction and impressions      Sumaya Wheeler PA-C

## 2020-03-12 ENCOUNTER — HOSPITAL ENCOUNTER (OUTPATIENT)
Dept: RADIOLOGY | Facility: HOSPITAL | Age: 70
Discharge: HOME | End: 2020-03-12

## 2020-03-21 DIAGNOSIS — I10 UNCONTROLLED HYPERTENSION: ICD-10-CM

## 2020-03-23 RX ORDER — CHLORTHALIDONE 25 MG/1
TABLET ORAL
Qty: 16 TABLET | Refills: 5 | Status: SHIPPED | OUTPATIENT
Start: 2020-03-23 | End: 2020-03-30

## 2020-03-29 DIAGNOSIS — I10 UNCONTROLLED HYPERTENSION: ICD-10-CM

## 2020-03-30 RX ORDER — CHLORTHALIDONE 25 MG/1
TABLET ORAL
Qty: 16 TABLET | Refills: 5 | Status: SHIPPED | OUTPATIENT
Start: 2020-03-30 | End: 2021-03-30

## 2020-04-07 ENCOUNTER — HOSPITAL ENCOUNTER (OUTPATIENT)
Dept: RADIOLOGY | Facility: HOSPITAL | Age: 70
Discharge: HOME | End: 2020-04-07

## 2020-05-04 DIAGNOSIS — M54.16 RADICULOPATHY OF LUMBAR REGION: ICD-10-CM

## 2020-05-04 DIAGNOSIS — E78.5 HYPERLIPIDEMIA, UNSPECIFIED HYPERLIPIDEMIA TYPE: ICD-10-CM

## 2020-05-04 RX ORDER — ATORVASTATIN CALCIUM 20 MG/1
TABLET, FILM COATED ORAL
Qty: 90 TABLET | Refills: 3 | Status: SHIPPED | OUTPATIENT
Start: 2020-05-04 | End: 2021-03-10 | Stop reason: SDUPTHER

## 2020-05-05 RX ORDER — GABAPENTIN 300 MG/1
CAPSULE ORAL
Qty: 60 CAPSULE | Refills: 0 | Status: SHIPPED | OUTPATIENT
Start: 2020-05-05 | End: 2020-10-02

## 2020-05-19 ENCOUNTER — HOSPITAL ENCOUNTER (OUTPATIENT)
Dept: RADIOLOGY | Facility: HOSPITAL | Age: 70
Discharge: HOME | End: 2020-05-19

## 2020-06-04 ENCOUNTER — APPOINTMENT (OUTPATIENT)
Dept: LAB | Facility: CLINIC | Age: 70
End: 2020-06-04
Payer: MEDICARE

## 2020-06-04 DIAGNOSIS — I10 ESSENTIAL HYPERTENSION: ICD-10-CM

## 2020-06-04 LAB
ALBUMIN SERPL BCP-MCNC: 3.9 G/DL (ref 3.5–5)
ALP SERPL-CCNC: 46 U/L (ref 46–116)
ALT SERPL W P-5'-P-CCNC: 21 U/L (ref 12–78)
ANION GAP SERPL CALCULATED.3IONS-SCNC: 5 MMOL/L (ref 4–13)
AST SERPL W P-5'-P-CCNC: 11 U/L (ref 5–45)
BILIRUB SERPL-MCNC: 0.41 MG/DL (ref 0.2–1)
BUN SERPL-MCNC: 26 MG/DL (ref 5–25)
CALCIUM SERPL-MCNC: 9.6 MG/DL (ref 8.3–10.1)
CHLORIDE SERPL-SCNC: 105 MMOL/L (ref 100–108)
CHOLEST SERPL-MCNC: 173 MG/DL (ref 50–200)
CO2 SERPL-SCNC: 28 MMOL/L (ref 21–32)
CREAT SERPL-MCNC: 1.05 MG/DL (ref 0.6–1.3)
GFR SERPL CREATININE-BSD FRML MDRD: 54 ML/MIN/1.73SQ M
GLUCOSE P FAST SERPL-MCNC: 87 MG/DL (ref 65–99)
HDLC SERPL-MCNC: 50 MG/DL
LDLC SERPL CALC-MCNC: 103 MG/DL (ref 0–100)
POTASSIUM SERPL-SCNC: 3.4 MMOL/L (ref 3.5–5.3)
PROT SERPL-MCNC: 7 G/DL (ref 6.4–8.2)
SODIUM SERPL-SCNC: 138 MMOL/L (ref 136–145)
TRIGL SERPL-MCNC: 102 MG/DL

## 2020-06-04 PROCEDURE — 80061 LIPID PANEL: CPT

## 2020-06-04 PROCEDURE — 36415 COLL VENOUS BLD VENIPUNCTURE: CPT

## 2020-06-04 PROCEDURE — 80053 COMPREHEN METABOLIC PANEL: CPT

## 2020-06-05 ENCOUNTER — TELEPHONE (OUTPATIENT)
Dept: FAMILY MEDICINE CLINIC | Facility: CLINIC | Age: 70
End: 2020-06-05

## 2020-06-30 ENCOUNTER — OFFICE VISIT (OUTPATIENT)
Dept: NEUROLOGY | Facility: CLINIC | Age: 70
End: 2020-06-30
Payer: MEDICARE

## 2020-06-30 VITALS
HEIGHT: 70 IN | WEIGHT: 183 LBS | TEMPERATURE: 98.9 F | HEART RATE: 65 BPM | BODY MASS INDEX: 26.2 KG/M2 | DIASTOLIC BLOOD PRESSURE: 72 MMHG | SYSTOLIC BLOOD PRESSURE: 112 MMHG

## 2020-06-30 DIAGNOSIS — G43.709 CHRONIC MIGRAINE WITHOUT AURA WITHOUT STATUS MIGRAINOSUS, NOT INTRACTABLE: ICD-10-CM

## 2020-06-30 DIAGNOSIS — I10 ESSENTIAL HYPERTENSION: ICD-10-CM

## 2020-06-30 PROCEDURE — 3008F BODY MASS INDEX DOCD: CPT | Performed by: NURSE PRACTITIONER

## 2020-06-30 PROCEDURE — 4040F PNEUMOC VAC/ADMIN/RCVD: CPT | Performed by: NURSE PRACTITIONER

## 2020-06-30 PROCEDURE — 99214 OFFICE O/P EST MOD 30 MIN: CPT | Performed by: NURSE PRACTITIONER

## 2020-06-30 PROCEDURE — 1160F RVW MEDS BY RX/DR IN RCRD: CPT | Performed by: NURSE PRACTITIONER

## 2020-06-30 PROCEDURE — 1036F TOBACCO NON-USER: CPT | Performed by: NURSE PRACTITIONER

## 2020-06-30 PROCEDURE — 3074F SYST BP LT 130 MM HG: CPT | Performed by: NURSE PRACTITIONER

## 2020-06-30 PROCEDURE — 3078F DIAST BP <80 MM HG: CPT | Performed by: NURSE PRACTITIONER

## 2020-07-01 ENCOUNTER — OFFICE VISIT (OUTPATIENT)
Dept: CARDIOLOGY CLINIC | Facility: CLINIC | Age: 70
End: 2020-07-01
Payer: MEDICARE

## 2020-07-01 VITALS
HEIGHT: 70 IN | HEART RATE: 54 BPM | WEIGHT: 180 LBS | BODY MASS INDEX: 25.77 KG/M2 | SYSTOLIC BLOOD PRESSURE: 138 MMHG | TEMPERATURE: 98.7 F | DIASTOLIC BLOOD PRESSURE: 78 MMHG | OXYGEN SATURATION: 98 %

## 2020-07-01 DIAGNOSIS — I10 ESSENTIAL HYPERTENSION: Primary | ICD-10-CM

## 2020-07-01 DIAGNOSIS — R00.2 INTERMITTENT PALPITATIONS: ICD-10-CM

## 2020-07-01 DIAGNOSIS — E04.2 MULTINODULAR GOITER: ICD-10-CM

## 2020-07-01 DIAGNOSIS — E78.5 DYSLIPIDEMIA: ICD-10-CM

## 2020-07-01 DIAGNOSIS — R06.00 DYSPNEA ON EXERTION: ICD-10-CM

## 2020-07-01 DIAGNOSIS — N18.30 CHRONIC KIDNEY DISEASE, STAGE III (MODERATE) (HCC): ICD-10-CM

## 2020-07-01 PROCEDURE — 93000 ELECTROCARDIOGRAM COMPLETE: CPT | Performed by: INTERNAL MEDICINE

## 2020-07-01 PROCEDURE — 99214 OFFICE O/P EST MOD 30 MIN: CPT | Performed by: INTERNAL MEDICINE

## 2020-07-01 PROCEDURE — 1160F RVW MEDS BY RX/DR IN RCRD: CPT | Performed by: INTERNAL MEDICINE

## 2020-07-01 PROCEDURE — 3078F DIAST BP <80 MM HG: CPT | Performed by: INTERNAL MEDICINE

## 2020-07-01 PROCEDURE — 4040F PNEUMOC VAC/ADMIN/RCVD: CPT | Performed by: INTERNAL MEDICINE

## 2020-07-01 PROCEDURE — 3008F BODY MASS INDEX DOCD: CPT | Performed by: INTERNAL MEDICINE

## 2020-07-01 PROCEDURE — 3075F SYST BP GE 130 - 139MM HG: CPT | Performed by: INTERNAL MEDICINE

## 2020-07-01 PROCEDURE — 1036F TOBACCO NON-USER: CPT | Performed by: INTERNAL MEDICINE

## 2020-07-01 NOTE — PATIENT INSTRUCTIONS
1   Take blood pressures at home after resting for at least 15 minutes while seated in a chair or at a table with arm supported on arm rest or table  Do 3 readings, one after the other, both morning and evening for 4 consecutive days  2   Write down each and every reading with date and time and get list of readings to the office of Dr Stalin Hines for his review  3   Our office will contact you with further instructions and the results of this review  The  4  Begin outdoors walking during the cool part of the day for a total duration of 45-60 minutes at least four days a week or more, if possible  5  We will recheck lipid panel in approximately three months and consider an increase in dose of atorvastatin if LDL cholesterol has not improved significantly  6  Cardiology follow-up approximately six months with EKG, lipids, CMP

## 2020-07-21 ENCOUNTER — TRANSCRIBE ORDERS (OUTPATIENT)
Dept: ADMINISTRATIVE | Facility: HOSPITAL | Age: 70
End: 2020-07-21

## 2020-07-21 ENCOUNTER — HOSPITAL ENCOUNTER (OUTPATIENT)
Dept: RADIOLOGY | Facility: HOSPITAL | Age: 70
Discharge: HOME | End: 2020-07-21
Payer: MEDICARE

## 2020-07-21 VITALS — BODY MASS INDEX: 25.77 KG/M2 | WEIGHT: 180 LBS | HEIGHT: 70 IN

## 2020-07-21 DIAGNOSIS — M81.0 OSTEOPOROSIS, UNSPECIFIED OSTEOPOROSIS TYPE, UNSPECIFIED PATHOLOGICAL FRACTURE PRESENCE: ICD-10-CM

## 2020-07-21 DIAGNOSIS — Z12.31 ENCOUNTER FOR SCREENING MAMMOGRAM FOR BREAST CANCER: ICD-10-CM

## 2020-07-21 PROCEDURE — 77063 BREAST TOMOSYNTHESIS BI: CPT

## 2020-07-21 PROCEDURE — 77067 SCR MAMMO BI INCL CAD: CPT

## 2020-07-21 PROCEDURE — 77080 DXA BONE DENSITY AXIAL: CPT

## 2020-07-22 ENCOUNTER — TELEPHONE (OUTPATIENT)
Dept: ENDOCRINOLOGY | Facility: CLINIC | Age: 70
End: 2020-07-22

## 2020-07-22 NOTE — TELEPHONE ENCOUNTER
----- Message from María Brandon PA-C sent at 7/22/2020  8:10 AM EDT -----  Please call the patient regarding her abnormal result  DEXA scan shows osteopenia     Can further discuss results at next appointment

## 2020-08-05 ENCOUNTER — OFFICE VISIT (OUTPATIENT)
Dept: FAMILY MEDICINE CLINIC | Facility: CLINIC | Age: 70
End: 2020-08-05
Payer: MEDICARE

## 2020-08-05 VITALS
OXYGEN SATURATION: 96 % | DIASTOLIC BLOOD PRESSURE: 80 MMHG | TEMPERATURE: 98.5 F | HEIGHT: 70 IN | HEART RATE: 66 BPM | RESPIRATION RATE: 16 BRPM | WEIGHT: 184 LBS | SYSTOLIC BLOOD PRESSURE: 130 MMHG | BODY MASS INDEX: 26.34 KG/M2

## 2020-08-05 DIAGNOSIS — E78.5 HYPERLIPIDEMIA, UNSPECIFIED HYPERLIPIDEMIA TYPE: ICD-10-CM

## 2020-08-05 DIAGNOSIS — I10 ESSENTIAL HYPERTENSION: Primary | ICD-10-CM

## 2020-08-05 DIAGNOSIS — K21.9 LARYNGOPHARYNGEAL REFLUX (LPR): ICD-10-CM

## 2020-08-05 PROCEDURE — 3079F DIAST BP 80-89 MM HG: CPT | Performed by: FAMILY MEDICINE

## 2020-08-05 PROCEDURE — 1036F TOBACCO NON-USER: CPT | Performed by: FAMILY MEDICINE

## 2020-08-05 PROCEDURE — 99214 OFFICE O/P EST MOD 30 MIN: CPT | Performed by: FAMILY MEDICINE

## 2020-08-05 PROCEDURE — 1160F RVW MEDS BY RX/DR IN RCRD: CPT | Performed by: FAMILY MEDICINE

## 2020-08-05 PROCEDURE — 3008F BODY MASS INDEX DOCD: CPT | Performed by: FAMILY MEDICINE

## 2020-08-05 PROCEDURE — 3075F SYST BP GE 130 - 139MM HG: CPT | Performed by: FAMILY MEDICINE

## 2020-08-05 PROCEDURE — 4040F PNEUMOC VAC/ADMIN/RCVD: CPT | Performed by: FAMILY MEDICINE

## 2020-08-05 NOTE — PROGRESS NOTES
Assessment/Plan:    1  Essential hypertension  Assessment & Plan:  Well controlled       2  Hyperlipidemia, unspecified hyperlipidemia type  Assessment & Plan:  Well controlled  Continued atorvastatin 20 mg       3  Laryngopharyngeal reflux (LPR)  Assessment & Plan:  Cough is stable  Will continue Pepcid as needed           There are no Patient Instructions on file for this visit  Return in about 6 months (around 2/5/2021) for Annual Wellness Visit  Subjective:      Patient ID: Ana Maria Ross is a 65 y o  female  Chief Complaint   Patient presents with    Follow-up     6 month [id number]       Reflux- she is taking her Pepcid every other day and is controlling her cough  Hypertension - patient has been taking her blood pressure medication regularly  Associated symptoms:  Swelling:  no  Leg cramps: no    she  has been taking their cholesterol medication regularly  Associated symptoms:  Myalgias: no      The following portions of the patient's history were reviewed and updated as appropriate:  past social history    Review of Systems   Respiratory: Negative  Cardiovascular: Negative            Current Outpatient Medications   Medication Sig Dispense Refill    Ascorbic Acid (VITAMIN C) 500 MG CAPS Take 1 tablet by mouth daily      atorvastatin (LIPITOR) 20 mg tablet take 1 tablet by mouth once daily 90 tablet 3    B Complex Vitamins (B COMPLEX PO) Take 1 tablet by mouth daily      chlorthalidone 25 mg tablet take 1 tablet by mouth 4 days A WEEK EVERY MONDAY, WEDNESDAY, AND FRIDAY, AND SUNDAY 16 tablet 5    Cholecalciferol (VITAMIN D3) 5000 units CAPS Take 1 capsule by mouth Daily      famotidine (PEPCID) 40 MG tablet Take 1 tablet by mouth every other day        gabapentin (NEURONTIN) 300 mg capsule take 1 capsule by mouth twice a day 60 capsule 0    magnesium oxide (MAG-OX) 400 mg tablet Take 1 tablet by mouth daily With zinc       Misc Natural Products (GLUCOSAMINE CHOND COMPLEX/MSM PO) Take 1 tablet by mouth Daily 1000 mg       Multiple Vitamin (MULTI-VITAMIN DAILY PO) Take 1 tablet by mouth daily      Multiple Vitamins-Minerals (PRESERVISION AREDS) CAPS Take 1 capsule by mouth daily      Multiple Vitamins-Minerals (ZINC PO) Take by mouth daily Pt takes with magnesium      Omega-3 Fatty Acids (FISH OIL) 500 MG CAPS Take 1 capsule by mouth daily      Probiotic Product (PROBIOTIC-10 PO) Take 1 capsule by mouth daily      verapamil (CALAN-SR) 120 mg CR tablet Take 1 tablet (120 mg total) by mouth 2 (two) times a day 60 tablet 2     No current facility-administered medications for this visit  Objective:    /80   Pulse 66   Temp 98 5 °F (36 9 °C)   Resp 16   Ht 5' 10" (1 778 m)   Wt 83 5 kg (184 lb)   SpO2 96%   BMI 26 40 kg/m²      Physical Exam   Constitutional: She appears well-developed  HENT:   Head: Normocephalic and atraumatic  Right Ear: External ear normal    Left Ear: External ear normal    Cardiovascular: Normal rate, regular rhythm and normal heart sounds  Exam reveals no friction rub  No murmur heard  Pulmonary/Chest: Effort normal and breath sounds normal  No respiratory distress  She has no wheezes  She has no rales  Musculoskeletal:         General: No deformity  Nursing note and vitals reviewed            Fab Pantoja DO

## 2020-08-13 ENCOUNTER — DOCUMENTATION (OUTPATIENT)
Dept: CARDIOLOGY CLINIC | Facility: CLINIC | Age: 70
End: 2020-08-13

## 2020-08-13 DIAGNOSIS — I10 ESSENTIAL HYPERTENSION: Primary | ICD-10-CM

## 2020-08-13 RX ORDER — SPIRONOLACTONE 25 MG/1
25 TABLET ORAL DAILY
Qty: 30 TABLET | Refills: 5 | Status: SHIPPED | OUTPATIENT
Start: 2020-08-13 | End: 2021-03-10 | Stop reason: SDUPTHER

## 2020-08-13 NOTE — PROGRESS NOTES
Dr Roberth Romero calculated average home blood pressure submitted by patient between 7/13 and 07/16/2020  Average home blood pressure during that time was 144/80, which is above target level of less than 130/80  I recommend she start on spironolactone 25 milligrams once daily, which will help lower her blood pressure and also help keep her potassium level in a normal range  Most recently, her potassium was a bit low  I have sent a prescription for spironolactone to her local pharmacy  I would like her to repeat her blood pressures  4-5 weeks after starting that medication and record them again on a blood pressure data sheet, three readings one after the other morning and evening for four consecutive days  Submit those again to our office so that Dr Roberth Romero can review them and we will let her know the results and if any additional measures are needed

## 2020-08-14 ENCOUNTER — TELEPHONE (OUTPATIENT)
Dept: CARDIOLOGY CLINIC | Facility: CLINIC | Age: 70
End: 2020-08-14

## 2020-08-14 NOTE — TELEPHONE ENCOUNTER
----- Message from Claude Leaf, MD sent at 8/13/2020  5:40 PM EDT -----  Regarding: Elevated blood pressure on home readings    Notify patient that average home blood pressure in July was 144/80, which is elevated above target level of less than 130/80  I have prescribed spironolactone 25 milligrams one tablet daily, which should help bring her blood pressure down as well as bring her low potassium up  She should add this to her current medication  After she is on this medication for 4-5 weeks, she should remeasure her blood pressure, three readings one after the other, morning and evening for four consecutive days  She should write the readings down on our office blood pressure data sheet and get that back to us so Dr Julian Estrada can review the readings  We will let her know the results and give further advice at that time  I sent the prescription to her local pharmacy

## 2020-09-16 ENCOUNTER — OFFICE VISIT (OUTPATIENT)
Dept: ENDOCRINOLOGY | Facility: CLINIC | Age: 70
End: 2020-09-16
Payer: MEDICARE

## 2020-09-16 VITALS
SYSTOLIC BLOOD PRESSURE: 120 MMHG | HEART RATE: 58 BPM | TEMPERATURE: 97.4 F | DIASTOLIC BLOOD PRESSURE: 88 MMHG | WEIGHT: 182 LBS | HEIGHT: 70 IN | BODY MASS INDEX: 26.05 KG/M2

## 2020-09-16 DIAGNOSIS — E21.3 HYPERPARATHYROIDISM (HCC): ICD-10-CM

## 2020-09-16 DIAGNOSIS — E04.2 MULTINODULAR GOITER: ICD-10-CM

## 2020-09-16 DIAGNOSIS — M81.0 OSTEOPOROSIS, UNSPECIFIED OSTEOPOROSIS TYPE, UNSPECIFIED PATHOLOGICAL FRACTURE PRESENCE: ICD-10-CM

## 2020-09-16 DIAGNOSIS — E55.9 VITAMIN D DEFICIENCY: Primary | ICD-10-CM

## 2020-09-16 PROCEDURE — 99214 OFFICE O/P EST MOD 30 MIN: CPT | Performed by: INTERNAL MEDICINE

## 2020-09-16 RX ORDER — PHENOL 1.4 %
AEROSOL, SPRAY (ML) MUCOUS MEMBRANE
Start: 2020-09-16

## 2020-09-16 NOTE — ASSESSMENT & PLAN NOTE
· S/P parathyroid adenoma resection in 2011  · Most recent calcium stable at 9 6   · PTH level from 2019 at 43  · Patient has not been taking calcium supplements lately  · Will prescribe Calcium carbonate 600 mg daily  Patient agrees with this  · Repeat BMP and PTH level prior to next appointment

## 2020-09-16 NOTE — ASSESSMENT & PLAN NOTE
· Thyroid US from 05/2019 with evidence of 2 nodules in the left upper and lower pole  These are not suspicious for malignancy and do not require FNA  · Patient is not taking any thyroid medications and denies any symptoms associated to thyroid disease    · TSH and free T4 within normal limits from 06/2020 at 3 71 and 1 15 respectively

## 2020-09-16 NOTE — PROGRESS NOTES
Endocrinology Progress Note  Mehran Matta  1950        CC: Follow up for osteoporosis, vitamin D deficiency, thyroid nodules  Referring Provider  Lamin Murphy Do  [address] [place] [address]     History of Present Illness:   Patient is a 65year old female with a past medical history of CKD stage 3, HLD, HTN, and parathyroid adenoma resection in 2011, who comes to the office for a follow up of osteoporosis, vitamin D, and thyroid nodules  Patient states she misplaced her lab orders and for this reason she forgot to due lab workup prior to today's visit  She will have them done tomorrow instead  Most recent PTH from 05/2019 is 43  Ca level from 06/20 within normal at 9 6  Vitamin D from 03/2020 stable at 50  TSH from 06/2020 within normal limits at 3 714 with free T4 1 15 and thyroglobulin antibody <1 0  Recent DXA scan from 07/2020 shows evidence of osteopenia with no statistically significant change since 2018  She is on a lab holiday for bisphosphonates for treatment of osteoporosis since 2018  Patient denies any recent falls or fractures  She has been practicing weight bearing exercises for the past few months  She had to stop during COVID pandemia due to the gym being closed  Additionally, patient has a history of thyroid nodules found on thyroid US from 05/2019  These are not concerning for malignancy and have not warranted biopsy  Patient has no family history of thyroid cancer and denies exposure to head/neck radiation  Patient denies heat/cold intolerance, diarrhea or constipation, hair loss  She also denies symptoms of hypocalcemia  Thyroid US from 05/2019:  Right lobe:  4 7 x 1 3 x 1 5 cm  Left lobe:  4 6 x 1 6 x 1 4 cm  Isthmus:  0 4 cm      Nodule #1   Image 32  LEFT upper pole nodule measuring 0 9 x 0 6 x 0 8 cm   Unchanged from prior  COMPOSITION:  1 point, mixed cystic and solid    ECHOGENICITY:  1 point, hyperechoic or isoechoic     SHAPE:  0 points, wider-than-tall     MARGIN: 0 points, smooth  ECHOGENIC FOCI:  0 points, none or large comet-tail artifacts  TI-RADS Classification: TR 2 (2 points), Not suspious  No FNA        Nodule #2   Image 39  LEFT lower pole nodule measuring 0 5 x 0 7 x 0 4 cm   Unchanged from prior  COMPOSITION:  2 points, solid or almost completely solid   ECHOGENICITY:  1 point, hyperechoic or isoechoic     SHAPE:  0 points, wider-than-tall     MARGIN: 0 points, smooth  ECHOGENIC FOCI:  0 points, none or large comet-tail artifacts  TI-RADS Classification: TR 3 (3 points), Mildly suspicious  FNA if >2 5 cm   Follow if >1 5 cm          Patient Active Problem List   Diagnosis    Chronic migraine without aura without status migrainosus, not intractable    Essential hypertension    Lump of skin    Valgus deformity of both great toes    Pain in both feet    Onychomycosis    Radiculopathy of lumbar region    Metatarsalgia of both feet    Hyperlipidemia    Laryngopharyngeal reflux (LPR)    Chronic kidney disease, stage 3 (HCC)      Past Medical History:   Diagnosis Date    Borderline high cholesterol     Borderline hypertension     Migraine       Past Surgical History:   Procedure Laterality Date    BREAST BIOPSY Right     benign ~ 1990s    BUNIONECTOMY      CATARACT EXTRACTION, BILATERAL      PARATHYROID GLAND SURGERY        Family History   Problem Relation Age of Onset    Angina Mother     Heart failure Mother     Prostate cancer Father     Diabetes Sister     Sudden death Sister     Osteoporosis Sister     Hypertension Brother     No Known Problems Son     No Known Problems Daughter     Prostate cancer Brother     Lung cancer Brother     Sudden death Brother     COPD Sister     Breast cancer Maternal Uncle     Breast cancer Cousin      Social History     Tobacco Use    Smoking status: Never Smoker    Smokeless tobacco: Never Used   Substance Use Topics    Alcohol use: No     No Known Allergies    Review of Systems   Constitutional: Negative for appetite change, chills, diaphoresis, fatigue and fever  HENT: Negative  Eyes: Negative  Respiratory: Negative for cough, shortness of breath, wheezing and stridor  Cardiovascular: Negative for chest pain, palpitations and leg swelling  Gastrointestinal: Negative for constipation, diarrhea, nausea and vomiting  Endocrine: Negative for cold intolerance and heat intolerance  Genitourinary: Negative for dysuria and hematuria  Musculoskeletal: Negative for arthralgias, myalgias and neck stiffness  Skin: Negative for pallor  Neurological: Negative for dizziness, tremors, weakness and numbness  Physical Exam:  Body mass index is 26 11 kg/m²  /88   Pulse 58   Temp (!) 97 4 °F (36 3 °C)   Ht 5' 10" (1 778 m)   Wt 82 6 kg (182 lb)   BMI 26 11 kg/m²        Physical Exam  Vitals signs reviewed  Constitutional:       General: She is not in acute distress  Appearance: She is normal weight  She is not toxic-appearing  HENT:      Head: Normocephalic  Comments: Negative [place] and Trousseau sign  Eyes:      General: No scleral icterus  Pupils: Pupils are equal, round, and reactive to light  Neck:      Musculoskeletal: Normal range of motion and neck supple  No muscular tenderness  Thyroid: No thyroid mass, thyromegaly or thyroid tenderness  Cardiovascular:      Rate and Rhythm: Normal rate and regular rhythm  Pulses: Normal pulses  Heart sounds: No murmur  No gallop  Pulmonary:      Effort: Pulmonary effort is normal  No respiratory distress  Breath sounds: No wheezing or rales  Abdominal:      General: Bowel sounds are normal  There is no distension  Palpations: Abdomen is soft  Tenderness: There is no abdominal tenderness  There is no guarding  Musculoskeletal: Normal range of motion  Right lower leg: No edema  Left lower leg: No edema     Lymphadenopathy: Cervical: No cervical adenopathy  Skin:     General: Skin is warm  Capillary Refill: Capillary refill takes less than 2 seconds  Neurological:      General: No focal deficit present  Mental Status: She is alert and oriented to person, place, and time  Cranial Nerves: No cranial nerve deficit  Motor: No weakness  Psychiatric:         Mood and Affect: Mood normal         Labs:   No components found for: HA1C  No components found for: GLU    Lab Results   Component Value Date    CREATININE 1 05 06/04/2020    CREATININE 0 93 03/09/2020    CREATININE 1 01 (H) 10/23/2019    BUN 26 (H) 06/04/2020     01/03/2017    K 3 4 (L) 06/04/2020     06/04/2020    CO2 28 06/04/2020     eGFR   Date Value Ref Range Status   06/04/2020 54 ml/min/1 73sq m Final     No components found for: [facility]    Lab Results   Component Value Date    CHOL 178 10/09/2015    HDL 50 06/04/2020    TRIG 102 06/04/2020    CHOLHDL 2 4 10/23/2019       Lab Results   Component Value Date    ALT 21 06/04/2020    AST 11 06/04/2020    ALKPHOS 46 06/04/2020    BILITOT 0 3 01/03/2017       Lab Results   Component Value Date    TSH 2 590 05/31/2019    FREET4 1 15 06/10/2019       Impression:  1  Vitamin D deficiency    2  Hyperparathyroidism ([address] )    3  Osteoporosis, unspecified osteoporosis type, unspecified pathological fracture presence    4  Multinodular goiter             Plan:   Diagnoses and all orders for this visit:    Vitamin D deficiency  · Latest Vitamin D  from 03/2020 stable at 48  · Patient has been advised to continue current regimen  · Will check vitamin D levels prior to next visit  -     Basic metabolic panel; Future  -     Vitamin D 25 hydroxy; Future    Hyperparathyroidism ([address] )  · S/P parathyroid adenoma resection in 2011  · Most recent calcium stable at 9 6   · PTH level from 2019 at 43  · Patient has not been taking calcium supplements lately  · Will prescribe Calcium carbonate 600 mg daily  Patient agrees with this  · Repeat BMP and PTH level prior to next appointment    -     PTH, intact- Lab Collect; Future      Osteoporosis, unspecified osteoporosis type, unspecified pathological fracture presence  · Patient is on a lab holiday from bisphosphonates since 2018  · Recent DXA scan from 07/2020 shows evidence of osteopenia with no statistically significant change since 2018  · She was advised on fall/fracture precaution and importance of weight bearing exercise  · Patient will continue Vitamin D and calcium has now been added to her treatment as well  · She will need repeat DXA scan in July 2022  -     DXA bone density spine hip and pelvis; Future  -     calcium carbonate (OS-FLORIAN) 600 MG tablet; Take one tablet daily    Multinodular goiter  · Thyroid US from 05/2019 with evidence of 2 nodules in the left upper and lower pole  These are not suspicious for malignancy and do not require FNA  · Patient is not taking any thyroid medications and denies any symptoms associated to thyroid disease  · TSH and free T4 within normal limits from 06/2020 at 3 71 and 1 15 respectively    Other orders  -     ELDERBERRY PO; Take by mouth          Discussed with the patient and all questioned fully answered  She will call me if any problems arise      Counseled patient on diagnostic results, prognosis, risk and benefit of treatment options, instruction for management, importance of treatment compliance, Risk  factor reduction and impressions      Arnold Jones MD

## 2020-09-16 NOTE — ASSESSMENT & PLAN NOTE
· Latest Vitamin D  from 03/2020 stable at 50  · Patient has been advised to continue current regimen  · Will check vitamin D levels prior to next visit

## 2020-09-16 NOTE — ASSESSMENT & PLAN NOTE
· Patient is on a lab holiday from bisphosphonates since 2018  · Recent DXA scan from 07/2020 shows evidence of osteopenia with no statistically significant change since 2018  · She was advised on fall/fracture precaution and importance of weight bearing exercise  · Patient will continue Vitamin D and calcium has now been added to her treatment as well  · She will need repeat DXA scan in July 2022

## 2020-09-17 ENCOUNTER — APPOINTMENT (OUTPATIENT)
Dept: LAB | Facility: CLINIC | Age: 70
End: 2020-09-17
Payer: MEDICARE

## 2020-09-17 DIAGNOSIS — E55.9 VITAMIN D DEFICIENCY: ICD-10-CM

## 2020-09-17 DIAGNOSIS — M81.0 OSTEOPOROSIS, UNSPECIFIED OSTEOPOROSIS TYPE, UNSPECIFIED PATHOLOGICAL FRACTURE PRESENCE: ICD-10-CM

## 2020-09-17 LAB
25(OH)D3 SERPL-MCNC: 50.9 NG/ML (ref 30–100)
ANION GAP SERPL CALCULATED.3IONS-SCNC: 6 MMOL/L (ref 4–13)
BUN SERPL-MCNC: 21 MG/DL (ref 5–25)
CALCIUM SERPL-MCNC: 9.2 MG/DL (ref 8.3–10.1)
CHLORIDE SERPL-SCNC: 104 MMOL/L (ref 100–108)
CO2 SERPL-SCNC: 28 MMOL/L (ref 21–32)
CREAT SERPL-MCNC: 1 MG/DL (ref 0.6–1.3)
GFR SERPL CREATININE-BSD FRML MDRD: 56 ML/MIN/1.73SQ M
GLUCOSE P FAST SERPL-MCNC: 105 MG/DL (ref 65–99)
POTASSIUM SERPL-SCNC: 3.7 MMOL/L (ref 3.5–5.3)
SODIUM SERPL-SCNC: 138 MMOL/L (ref 136–145)

## 2020-09-17 PROCEDURE — 36415 COLL VENOUS BLD VENIPUNCTURE: CPT

## 2020-09-17 PROCEDURE — 82306 VITAMIN D 25 HYDROXY: CPT

## 2020-09-17 PROCEDURE — 80048 BASIC METABOLIC PNL TOTAL CA: CPT

## 2020-09-18 ENCOUNTER — TELEPHONE (OUTPATIENT)
Dept: ENDOCRINOLOGY | Facility: CLINIC | Age: 70
End: 2020-09-18

## 2020-09-18 NOTE — TELEPHONE ENCOUNTER
----- Message from Chen Lazaro MD sent at 9/17/2020  3:24 PM EDT -----  Please call the patient regarding her results, her fasting glucose is slightly elevated, she should cut down on sweets as well as free sugars, vitamin-D is normal, calcium is normal

## 2020-09-24 ENCOUNTER — APPOINTMENT (OUTPATIENT)
Dept: LAB | Facility: CLINIC | Age: 70
End: 2020-09-24
Payer: MEDICARE

## 2020-09-24 DIAGNOSIS — E78.5 DYSLIPIDEMIA: ICD-10-CM

## 2020-09-24 LAB
CHOLEST SERPL-MCNC: 113 MG/DL (ref 50–200)
HDLC SERPL-MCNC: 54 MG/DL
LDLC SERPL CALC-MCNC: 36 MG/DL (ref 0–100)
NONHDLC SERPL-MCNC: 59 MG/DL
TRIGL SERPL-MCNC: 115 MG/DL

## 2020-09-24 PROCEDURE — 36415 COLL VENOUS BLD VENIPUNCTURE: CPT

## 2020-09-24 PROCEDURE — 80061 LIPID PANEL: CPT

## 2020-09-26 DIAGNOSIS — I10 ESSENTIAL HYPERTENSION: ICD-10-CM

## 2020-09-26 DIAGNOSIS — G43.709 CHRONIC MIGRAINE WITHOUT AURA WITHOUT STATUS MIGRAINOSUS, NOT INTRACTABLE: ICD-10-CM

## 2020-09-28 ENCOUNTER — TELEPHONE (OUTPATIENT)
Dept: CARDIOLOGY CLINIC | Facility: CLINIC | Age: 70
End: 2020-09-28

## 2020-09-28 NOTE — TELEPHONE ENCOUNTER
Patient called and was notified of blood work results as per the doctors instructions  No further questions at this time

## 2020-09-28 NOTE — TELEPHONE ENCOUNTER
----- Message from Julian Vale MD sent at 9/25/2020  4:19 PM EDT -----  Notify patient that her cholesterol panel has improved markedly compared to three months ago  Her cholesterol has declined from 173 to 113, and her LDL cholesterol has declined from 103 to 36  Continue present medication

## 2020-10-01 DIAGNOSIS — M54.16 RADICULOPATHY OF LUMBAR REGION: ICD-10-CM

## 2020-10-02 ENCOUNTER — OFFICE VISIT (OUTPATIENT)
Dept: NEUROLOGY | Facility: CLINIC | Age: 70
End: 2020-10-02
Payer: MEDICARE

## 2020-10-02 VITALS
HEART RATE: 65 BPM | DIASTOLIC BLOOD PRESSURE: 78 MMHG | BODY MASS INDEX: 26.48 KG/M2 | WEIGHT: 185 LBS | TEMPERATURE: 97.6 F | HEIGHT: 70 IN | SYSTOLIC BLOOD PRESSURE: 124 MMHG

## 2020-10-02 DIAGNOSIS — G43.709 CHRONIC MIGRAINE WITHOUT AURA WITHOUT STATUS MIGRAINOSUS, NOT INTRACTABLE: ICD-10-CM

## 2020-10-02 DIAGNOSIS — I10 ESSENTIAL HYPERTENSION: ICD-10-CM

## 2020-10-02 PROCEDURE — 99214 OFFICE O/P EST MOD 30 MIN: CPT | Performed by: NURSE PRACTITIONER

## 2020-10-02 RX ORDER — GABAPENTIN 300 MG/1
CAPSULE ORAL
Qty: 60 CAPSULE | Refills: 0 | Status: SHIPPED | OUTPATIENT
Start: 2020-10-02 | End: 2021-07-28 | Stop reason: ALTCHOICE

## 2020-10-22 ENCOUNTER — TELEPHONE (OUTPATIENT)
Dept: FAMILY MEDICINE CLINIC | Facility: CLINIC | Age: 70
End: 2020-10-22

## 2020-12-17 DIAGNOSIS — G43.709 CHRONIC MIGRAINE WITHOUT AURA WITHOUT STATUS MIGRAINOSUS, NOT INTRACTABLE: ICD-10-CM

## 2020-12-23 ENCOUNTER — LAB (OUTPATIENT)
Dept: LAB | Facility: CLINIC | Age: 70
End: 2020-12-23
Payer: MEDICARE

## 2020-12-23 DIAGNOSIS — E78.5 DYSLIPIDEMIA: ICD-10-CM

## 2020-12-23 DIAGNOSIS — N18.30 CHRONIC KIDNEY DISEASE, STAGE III (MODERATE) (HCC): ICD-10-CM

## 2020-12-23 DIAGNOSIS — I10 ESSENTIAL HYPERTENSION: ICD-10-CM

## 2020-12-23 LAB
ALBUMIN SERPL BCP-MCNC: 4.1 G/DL (ref 3.5–5)
ALP SERPL-CCNC: 45 U/L (ref 46–116)
ALT SERPL W P-5'-P-CCNC: 26 U/L (ref 12–78)
ANION GAP SERPL CALCULATED.3IONS-SCNC: 3 MMOL/L (ref 4–13)
AST SERPL W P-5'-P-CCNC: 15 U/L (ref 5–45)
BILIRUB SERPL-MCNC: 0.32 MG/DL (ref 0.2–1)
BUN SERPL-MCNC: 24 MG/DL (ref 5–25)
CALCIUM SERPL-MCNC: 9.3 MG/DL (ref 8.3–10.1)
CHLORIDE SERPL-SCNC: 106 MMOL/L (ref 100–108)
CHOLEST SERPL-MCNC: 117 MG/DL (ref 50–200)
CO2 SERPL-SCNC: 32 MMOL/L (ref 21–32)
CREAT SERPL-MCNC: 0.84 MG/DL (ref 0.6–1.3)
GFR SERPL CREATININE-BSD FRML MDRD: 70 ML/MIN/1.73SQ M
GLUCOSE P FAST SERPL-MCNC: 99 MG/DL (ref 65–99)
HDLC SERPL-MCNC: 49 MG/DL
LDLC SERPL CALC-MCNC: 44 MG/DL (ref 0–100)
NONHDLC SERPL-MCNC: 68 MG/DL
POTASSIUM SERPL-SCNC: 3.5 MMOL/L (ref 3.5–5.3)
PROT SERPL-MCNC: 7.1 G/DL (ref 6.4–8.2)
SODIUM SERPL-SCNC: 141 MMOL/L (ref 136–145)
TRIGL SERPL-MCNC: 118 MG/DL

## 2020-12-23 PROCEDURE — 80053 COMPREHEN METABOLIC PANEL: CPT

## 2020-12-23 PROCEDURE — 80061 LIPID PANEL: CPT

## 2020-12-23 PROCEDURE — 36415 COLL VENOUS BLD VENIPUNCTURE: CPT

## 2020-12-28 NOTE — TELEPHONE ENCOUNTER
[place],   I can not done task, says it has pending medication  Please sign off if you can       Thanks,   [facility]

## 2021-01-06 ENCOUNTER — OFFICE VISIT (OUTPATIENT)
Dept: CARDIOLOGY CLINIC | Facility: CLINIC | Age: 71
End: 2021-01-06
Payer: MEDICARE

## 2021-01-06 VITALS
DIASTOLIC BLOOD PRESSURE: 72 MMHG | OXYGEN SATURATION: 96 % | TEMPERATURE: 98 F | BODY MASS INDEX: 25.77 KG/M2 | HEART RATE: 58 BPM | HEIGHT: 70 IN | WEIGHT: 180 LBS | SYSTOLIC BLOOD PRESSURE: 132 MMHG

## 2021-01-06 DIAGNOSIS — R00.2 INTERMITTENT PALPITATIONS: ICD-10-CM

## 2021-01-06 DIAGNOSIS — M17.0 PRIMARY OSTEOARTHRITIS OF BOTH KNEES: ICD-10-CM

## 2021-01-06 DIAGNOSIS — E04.2 MULTINODULAR GOITER: ICD-10-CM

## 2021-01-06 DIAGNOSIS — E78.5 DYSLIPIDEMIA: ICD-10-CM

## 2021-01-06 DIAGNOSIS — I10 UNCONTROLLED HYPERTENSION: Primary | ICD-10-CM

## 2021-01-06 DIAGNOSIS — R06.00 DYSPNEA ON EXERTION: ICD-10-CM

## 2021-01-06 PROBLEM — R06.09 DYSPNEA ON EXERTION: Status: ACTIVE | Noted: 2021-01-06

## 2021-01-06 PROCEDURE — 93000 ELECTROCARDIOGRAM COMPLETE: CPT | Performed by: INTERNAL MEDICINE

## 2021-01-06 PROCEDURE — 99214 OFFICE O/P EST MOD 30 MIN: CPT | Performed by: INTERNAL MEDICINE

## 2021-01-06 NOTE — PROGRESS NOTES
Office Cardiology Progress Note  Charito Lara 65 y o  female MRN: [id number]  01/06/21  9:15 AM      ASSESSMENT:    1  Improved chronic exertional dyspnea with stair climbing but not with exercise at [facility], probably related to progressive overweight with 13 pound weight gain over the past 8+ years  2   Mildly uncontrolled  essential hypertension with history of white coat phenomenon and latest home blood pressure averaging 139/77 three months ago  3  Controlled dyslipidemia with atorvastatin  4  History of normal exercise stress test 10/13/15 and benign echocardiogram on 5/1/13   5  History of  now suppressed chronic nonproductive cough, possibly caused by GERD, with patient currently off Pepcid for three months  Cough has recently been less frequent  6  Resolved hypercalcemia with parathyroidectomy 11/30/11   7  Status post nasal polypectomy October, 2012 and [facility]  October, 2012 with right cataract extraction 9/13  8  Chronic stable multinodular goiter with latest thyroid ultrasound 5/30/17, followed by Dr Villafana  9   Resolved chronic kidney disease, stage IIIa  Current estimated GFR is 70    10  Chronic fatigue and tiredness with consideration of adverse drug reaction to verapamil  11  Osteoarthritis of both knees, right more than left  12  Osteopenia, stable, based on DEXA scan 07/21/2020  Plan       Patient Instructions     1  Patient would like to try dietary alteration and will remeasure home blood pressures in approximately three weeks and return blood pressure data sheet to us for analysis  Patient was asked to make extra copies of the blank blood pressure data sheets for future use  2   Take blood pressures after resting for at least 15 minutes while seated in a chair or at a table with arm supported on arm rest or table  Do 3 readings, one after the other, both morning and evening for 4 consecutive days    3   Write down each and every reading with date and time on blood pressure data sheet given to you by Dr Edwin Trevino and get list of readings to the office of Dr Edwin Trevino for his review  4   Our office will contact you with further instructions and the results of this review  5  Cardiology follow-up approximately six months with EKG  6  Patient was offered the option of increasing her verapamil back to either 120 milligrams twice a day or to 240 milligrams once a day, but she decided she would like to try dietary alteration with recheck of her blood pressure in 3-4 weeks  HPI    This 65 y o  female  denies new cardiopulmonary and medical symptoms  She does have some easy fatigue  Despite that, she and her  attend the wellness center for exercise 4-5 days per week  The patient was taken off famotidine about three months ago, which he had been using chronically to suppress cough related to GERD  Her cough has remained under control since being off the famotidine  Approximately 6 months ago at end of June, 2020, the patient reduced her verapamil dose to 100 milligrams once daily from twice daily  This was at her request in an effort to reduce her total medications  This did not result in any increase in her migraine headaches  However, her average home blood pressure between 10/01/2020 and 10/04/2020, calculated by me, was 139/77 with target of less than 130/80  The patient admits to frequent eating food from outside the home, which tends to be high in sodium content  She also prefers [employer], which also tends to be high in sodium  She claims she does not like to cook, but also states that her  is more than willing to cook  The patient is being seen in in follow-up of the below listed diagnoses  Encounter Diagnoses   Name Primary?     Uncontrolled hypertension Yes    Dyspnea on exertion     Dyslipidemia     Intermittent palpitations     Multinodular goiter     Primary osteoarthritis of both knees         Review of Systems    All other systems negative, except as noted in history of present illness    Historical Information   Past Medical History:   Diagnosis Date    Borderline high cholesterol     Borderline hypertension     Migraine      Past Surgical History:   Procedure Laterality Date    BREAST BIOPSY Right     benign ~ 1990s    BUNIONECTOMY      CATARACT EXTRACTION, BILATERAL      PARATHYROID GLAND SURGERY       Social History     Substance and Sexual Activity   Alcohol Use No     Social History     Substance and Sexual Activity   Drug Use No     Social History     Tobacco Use   Smoking Status Never Smoker   Smokeless Tobacco Never Used       Family History:  Family History   Problem Relation Age of Onset    Angina Mother     Heart failure Mother     Prostate cancer Father     Diabetes Sister     Sudden death Sister     Osteoporosis Sister     Hypertension Brother     No Known Problems Son     No Known Problems Daughter     Prostate cancer Brother     Lung cancer Brother     Sudden death Brother     COPD Sister     Breast cancer Maternal Uncle     Breast cancer Cousin          Meds/Allergies     Prior to Admission medications    Medication Sig Start Date End Date Taking?  Authorizing Provider   Ascorbic Acid (VITAMIN C) 500 MG CAPS Take 1 tablet by mouth daily 10/18/17  Yes Historical Provider, MD   atorvastatin (LIPITOR) 20 mg tablet take 1 tablet by mouth once daily 5/4/20  Yes Markus Amador MD   B Complex Vitamins (B COMPLEX PO) Take 1 tablet by mouth daily   Yes Historical Provider, MD   calcium carbonate (OS-FLORIAN) 600 MG tablet Take one tablet daily 9/16/20  Yes Jim Suresh MD   chlorthalidone 25 mg tablet take 1 tablet by mouth 4 days [address], WEDNESDAY, [address], AND MYNOR 3/30/20  Yes Markus Amador MD   Cholecalciferol (VITAMIN D3) 5000 units CAPS Take 1 capsule by mouth Daily   Yes Historical Provider, MD BOURGEOISBERRY PO Take by mouth   Yes Historical Provider, MD   Ferrous Sulfate (IRON PO) Take 325 mg by mouth daily   Yes Historical Provider, MD   gabapentin (NEURONTIN) 300 mg capsule take 1 capsule by mouth twice a day 10/2/20  Yes Any Phillips DPM   magnesium oxide (MAG-OX) 400 mg tablet Take 1 tablet by mouth daily With zinc  12/2/10  Yes Historical Provider, MD   Misc Natural Products (GLUCOSAMINE CHOND COMPLEX/MSM PO) Take 1 tablet by mouth Daily 1000 mg    Yes Historical Provider, MD   Multiple Vitamin (MULTI-VITAMIN DAILY PO) Take 1 tablet by mouth daily 10/18/17  Yes Historical Provider, MD   Multiple Vitamins-Minerals (PRESERVISION AREDS) CAPS Take 1 capsule by mouth daily 10/18/17  Yes Historical Provider, MD   Multiple Vitamins-Minerals (ZINC PO) Take by mouth daily Pt takes with magnesium   Yes Historical Provider, MD   Omega-3 Fatty Acids (FISH OIL) 500 MG CAPS Take 1 capsule by mouth daily 10/18/17  Yes Historical Provider, MD   Probiotic Product (PROBIOTIC-10 PO) Take 1 capsule by mouth daily   Yes Historical Provider, MD   spironolactone (ALDACTONE) 25 mg tablet Take 1 tablet (25 mg total) by mouth daily 8/13/20  Yes Arelis Cintron MD   verapamil (CALAN-SR) 120 mg CR tablet take 1 tablet by mouth once daily at bedtime 12/17/20  Yes JANETT Hui   Zinc Sulfate (ZINC 15 PO) Take by mouth   Yes Historical Provider, MD   famotidine (PEPCID) 40 MG tablet Take 1 tablet by mouth every other day    1/6/21  Historical Provider, MD       No Known Allergies      Vitals:    01/06/21 0802   BP: 132/72   BP Location: Right arm   Patient Position: Sitting   Cuff Size: Standard   Pulse: 58   Temp: 98 °F (36 7 °C)   SpO2: 96%   Weight: 81 6 kg (180 lb)   Height: 5' 10" (1 778 m)       Body mass index is 25 83 kg/m²  no change in weight compared to six months ago      Physical Exam:    General Appearance:  Alert, cooperative, no distress, appears stated age   Head:  Normocephalic, without obvious abnormality, atraumatic   Eyes:  PERRL, conjunctiva/corneas clear, EOM's intact,   both eyes   Ears:  Normal TM's and external ear canals, both ears   Nose: Nares normal, septum midline, mucosa normal, no drainage or sinus tenderness   Throat: Lips, mucosa, and tongue normal; teeth and gums normal   Neck: Supple, symmetrical, trachea midline, no adenopathy, thyroid: not enlarged, symmetric, no tenderness/mass/nodules, no carotid bruit or JVD   Back:   Symmetric, no curvature, ROM normal, no CVA tenderness   Lungs:   Clear to auscultation bilaterally, respirations unlabored   Chest Wall:  No tenderness or deformity   Heart:  Regular rate and rhythm, S1, S2 normal, no murmur, rub or gallop   Abdomen:   Soft, non-tender, bowel sounds active all four quadrants,  no masses, no organomegaly   Extremities: Extremities normal, atraumatic, no cyanosis or edema   Pulses: 2+ and symmetric   Skin: Skin showed normal color, texture, turgor and no rashes or lesions   Lymph nodes: Cervical, supraclavicular, and axillary nodes normal   Neurologic: Normal         Cardiographics    ECG 01/06/21:    Normal sinus rhythm at 60 bpm   LAFB  Minimal LVH voltage   Septal Q-waves of uncertain significance   No significant change since 07/01/2020    IMAGING:    No Chest XR results available for this patient  DXA  Scan 07/21/2020:    Consistent with osteopenia, but unchanged from 02/21/2018    LAB REVIEW:      Lab Results   Component Value Date    SODIUM 141 12/23/2020    K 3 5 12/23/2020     12/23/2020    CO2 32 12/23/2020    ANIONGAP 11 7 10/09/2015    BUN 24 12/23/2020    CREATININE 0 84 12/23/2020    GLUCOSE 94 01/03/2017    GLUF 99 12/23/2020    CALCIUM 9 3 12/23/2020    AST 15 12/23/2020    ALT 26 12/23/2020    ALKPHOS 45 (L) 12/23/2020    PROT 6 5 01/03/2017    BILITOT 0 3 01/03/2017    EGFR 70 12/23/2020     Lab Results   Component Value Date    CHOLESTEROL 117 12/23/2020    CHOLESTEROL 113 09/24/2020    CHOLESTEROL 173 06/04/2020     Lab Results   Component Value Date    HDL 49 12/23/2020    HDL 54 09/24/2020    HDL 50 06/04/2020       Lab Results   Component Value Date    LDLCALC 44 12/23/2020    LDLCALC 36 09/24/2020    LDLCALC 103 (H) 06/04/2020     No components found for: [facility]  Lab Results   Component Value Date    TRIG 118 12/23/2020    TRIG 115 09/24/2020    TRIG 102 06/04/2020               Acosta Brown MD

## 2021-01-06 NOTE — PATIENT INSTRUCTIONS
1  Patient would like to try dietary alteration and will remeasure home blood pressures in approximately three weeks and return blood pressure data sheet to us for analysis  Patient was asked to make extra copies of the blank blood pressure data sheets for future use  2   Take blood pressures after resting for at least 15 minutes while seated in a chair or at a table with arm supported on arm rest or table  Do 3 readings, one after the other, both morning and evening for 4 consecutive days  3   Write down each and every reading with date and time on blood pressure data sheet given to you by Dr Purnima Torres and get list of readings to the office of Dr Purnima Torres for his review  4   Our office will contact you with further instructions and the results of this review  5  Cardiology follow-up approximately six months with EKG

## 2021-02-02 ENCOUNTER — TELEMEDICINE (OUTPATIENT)
Dept: NEUROLOGY | Facility: CLINIC | Age: 71
End: 2021-02-02
Payer: MEDICARE

## 2021-02-02 VITALS — BODY MASS INDEX: 25.77 KG/M2 | HEIGHT: 70 IN | WEIGHT: 180 LBS

## 2021-02-02 DIAGNOSIS — I10 ESSENTIAL HYPERTENSION: ICD-10-CM

## 2021-02-02 DIAGNOSIS — G43.709 CHRONIC MIGRAINE WITHOUT AURA WITHOUT STATUS MIGRAINOSUS, NOT INTRACTABLE: Primary | ICD-10-CM

## 2021-02-02 PROCEDURE — 99214 OFFICE O/P EST MOD 30 MIN: CPT | Performed by: NURSE PRACTITIONER

## 2021-02-02 NOTE — PROGRESS NOTES
Virtual Brief Visit    Assessment/Plan:    Problem List Items Addressed This Visit        Cardiovascular and Mediastinum    Chronic migraine without aura without status migrainosus, not intractable - Primary    Relevant Medications    verapamil (CALAN-SR) 120 mg CR tablet    Essential hypertension    Relevant Medications    verapamil (CALAN-SR) 120 mg CR tablet        Can increase Verapamil SR 120mg to twice a day  Follow up with Cardiology for any further episode of palpitations  Follow up with Outpatient Neurology in 5 months        Reason for visit is   Chief Complaint   Patient presents with    Migraine    Virtual Brief Visit        Encounter provider Dylan Merino [facility]    Provider located at [address] [place] [id number] [phone]    Recent Visits  No visits were found meeting these conditions  Showing recent visits within past 7 days and meeting all other requirements     Today's Visits  Date Type Provider Dept   02/02/21 Telemedicine Addison Merino 1 today's visits and meeting all other requirements     Future Appointments  No visits were found meeting these conditions  Showing future appointments within next 150 days and meeting all other requirements        After connecting through telephone, the patient was identified by name and date of birth  Conor Culver was informed that this is a telemedicine visit and that the visit is being conducted through telephone  My office door was closed  No one else was in the room  She acknowledged consent and understanding of privacy and security of the platform  The patient has agreed to participate and understands she can discontinue the visit at any time  It was my intent to perform this visit via video technology but the patient was not able to do a video connection so the visit was completed via audio telephone only    Patient is aware this is a billable service  Subjective  Abner Scruggs is a 65 y o  female who follows with OP Neurology for medication management of migraines  Pt had reported that her migranes were triggered by certain foods with associated nausea and photosensitivities  She reported that they started in the temporal region and radiated to the posterior portion of her head and down into her neck  Pt stated that she wakes with them and are generally gone by noon  Last office appointment, pt reported that she is doing well  She reports that she has had a few but the intensity was low and that they were because of some foods she ate  She stated that she saw Mckenna Luis for Cardiology and he had asked to increased the Verapamil again for increasing BP  Current BP is 133/78 with HR of 63  Pt also reported having one episode of palpitations that did not last long and it resolved  Pt stated that this has not happened before to her  She is on new diuretic per Cardiology therefore, advised she follow up with Dr Edwin Trevino with this change  D/W pt and given some resumption of some migrainous activity and Cardiology request to increase the dosing, will resume 120mg Verapamil twice a day  Refill update sent to [facility] pharmacy in [address] [place] [address] which is a new pharmacy for her  She will follow up with them re: her insurance information  Follow up with OP Neurology office in 5 months         Past Medical History:   Diagnosis Date    Borderline high cholesterol     Borderline hypertension     Migraine        Past Surgical History:   Procedure Laterality Date    BREAST BIOPSY Right     benign ~ 1990s    BUNIONECTOMY      CATARACT EXTRACTION, BILATERAL      PARATHYROID GLAND SURGERY         Current Outpatient Medications   Medication Sig Dispense Refill    Ascorbic Acid (VITAMIN C) 500 MG CAPS Take 1 tablet by mouth daily      atorvastatin (LIPITOR) 20 mg tablet take 1 tablet by mouth once daily 90 tablet 3    B Complex Vitamins (B COMPLEX PO) Take 1 tablet by mouth daily      calcium carbonate (OS-FLORIAN) 600 MG tablet Take one tablet daily      chlorthalidone 25 mg tablet take 1 tablet by mouth 4 days A WEEK EVERY MONDAY, WEDNESDAY, AND FRIDAY, AND SUNDAY 16 tablet 5    Cholecalciferol (VITAMIN D3) 5000 units CAPS Take 1 capsule by mouth Daily      ELDERBERRY PO Take by mouth      Ferrous Sulfate (IRON PO) Take 325 mg by mouth daily      magnesium oxide (MAG-OX) 400 mg tablet Take 1 tablet by mouth daily With zinc       Misc Natural Products (GLUCOSAMINE CHOND COMPLEX/MSM PO) Take 1 tablet by mouth Daily 1000 mg       Multiple Vitamin (MULTI-VITAMIN DAILY PO) Take 1 tablet by mouth daily      Multiple Vitamins-Minerals (PRESERVISION AREDS) CAPS Take 1 capsule by mouth daily      Multiple Vitamins-Minerals (ZINC PO) Take by mouth daily Pt takes with magnesium      Omega-3 Fatty Acids (FISH OIL) 500 MG CAPS Take 1 capsule by mouth daily      Probiotic Product (PROBIOTIC-10 PO) Take 1 capsule by mouth daily      spironolactone (ALDACTONE) 25 mg tablet Take 1 tablet (25 mg total) by mouth daily 30 tablet 5    verapamil (CALAN-SR) 120 mg CR tablet Take 1 tablet (120 mg total) by mouth 2 (two) times a day 60 tablet 4    Zinc Sulfate (ZINC 15 PO) Take by mouth      gabapentin (NEURONTIN) 300 mg capsule take 1 capsule by mouth twice a day (Patient not taking: Reported on 2/2/2021) 60 capsule 0     No current facility-administered medications for this visit  No Known Allergies    Review of Systems   Constitutional: Negative  HENT: Negative  Eyes: Negative  Negative for photophobia  Respiratory: Negative  Cardiovascular: Negative  Brief episode of palpitation during sitting and was transient    Gastrointestinal: Positive for blood in stool  One episode of BRB with her stool, only one time in 2 yrs, is due to coloscopy   Endocrine: Negative  Genitourinary: Negative      Musculoskeletal: Positive for back pain and neck pain  Occasionally   Skin: Negative  Allergic/Immunologic: Negative  Neurological: Positive for headaches  Hematological: Negative  Psychiatric/Behavioral: Negative  Vitals:    02/02/21 0959   Weight: 81 6 kg (180 lb)   Height: 5' 10" (1 778 m)         I spent 25 minutes directly with the patient during this visit    VIRTUAL VISIT DISCLAIMER    Tressa Escudero acknowledges that she has consented to an online visit or consultation  She understands that the online visit is based solely on information provided by her, and that, in the absence of a face-to-face physical evaluation by the physician, the diagnosis she receives is both limited and provisional in terms of accuracy and completeness  This is not intended to replace a full medical face-to-face evaluation by the physician  Tressa Escudero understands and accepts these terms

## 2021-02-02 NOTE — PATIENT INSTRUCTIONS
Can increase Verapamil SR 120mg to twice a day  Follow up with Cardiology for any further episode of palpitations  Follow up with Outpatient Neurology in 5 months

## 2021-02-03 ENCOUNTER — TELEPHONE (OUTPATIENT)
Dept: NEUROLOGY | Facility: CLINIC | Age: 71
End: 2021-02-03

## 2021-02-09 ENCOUNTER — TELEPHONE (OUTPATIENT)
Dept: CARDIOLOGY CLINIC | Facility: CLINIC | Age: 71
End: 2021-02-09

## 2021-02-09 ENCOUNTER — OFFICE VISIT (OUTPATIENT)
Dept: FAMILY MEDICINE CLINIC | Facility: CLINIC | Age: 71
End: 2021-02-09
Payer: MEDICARE

## 2021-02-09 VITALS
OXYGEN SATURATION: 95 % | BODY MASS INDEX: 25.48 KG/M2 | TEMPERATURE: 97.4 F | HEIGHT: 70 IN | SYSTOLIC BLOOD PRESSURE: 126 MMHG | HEART RATE: 61 BPM | WEIGHT: 178 LBS | RESPIRATION RATE: 18 BRPM | DIASTOLIC BLOOD PRESSURE: 72 MMHG

## 2021-02-09 DIAGNOSIS — Z00.00 MEDICARE ANNUAL WELLNESS VISIT, SUBSEQUENT: Primary | ICD-10-CM

## 2021-02-09 DIAGNOSIS — E21.3 HYPERPARATHYROIDISM (HCC): ICD-10-CM

## 2021-02-09 DIAGNOSIS — Z13.6 SCREENING FOR CARDIOVASCULAR CONDITION: ICD-10-CM

## 2021-02-09 DIAGNOSIS — R06.6 HICCUP: ICD-10-CM

## 2021-02-09 DIAGNOSIS — I10 ESSENTIAL HYPERTENSION: ICD-10-CM

## 2021-02-09 PROBLEM — N18.30 CHRONIC KIDNEY DISEASE, STAGE 3 (HCC): Status: RESOLVED | Noted: 2020-01-29 | Resolved: 2021-02-09

## 2021-02-09 PROCEDURE — 1123F ACP DISCUSS/DSCN MKR DOCD: CPT | Performed by: FAMILY MEDICINE

## 2021-02-09 PROCEDURE — G0439 PPPS, SUBSEQ VISIT: HCPCS | Performed by: FAMILY MEDICINE

## 2021-02-09 NOTE — PROGRESS NOTES
Assessment and Plan:     Problem List Items Addressed This Visit     Essential hypertension    Relevant Orders    CBC    Comprehensive metabolic panel    Lipid Panel with Direct LDL reflex    Hyperparathyroidism ([address] )     Stable  Following with endocrinology           Other Visit Diagnoses     Medicare annual wellness visit, subsequent    -  Primary    Hiccup        Relevant Orders    XR chest pa & lateral    Screening for cardiovascular condition        Relevant Orders    VAS screening          Return in about 6 months (around 8/9/2021) for Next scheduled follow up  BMI Counseling: Body mass index is 25 54 kg/m²  The BMI is above normal  Exercise recommendations include exercising 3-5 times per week  Preventive health issues were discussed with patient, and age appropriate screening tests were ordered as noted in patient's After Visit Summary  Personalized health advice and appropriate referrals for health education or preventive services given if needed, as noted in patient's After Visit Summary  History of Present Illness:     Patient presents for Medicare Annual Wellness visit     she reports that she has been having episodes of hiccups pretty much every day  Is not necessary related to eating  It lasts a few minutes  This is been going on for months      Patient Care Team:  Eleonora Quick DO as PCP - Sheryle Janus, MD as PCP - Endocrinology (Endocrinology)  MD Namrata Lundy MD Adora Hal, MD Waunita Poche, MD (Cardiology)  Remy Hinton MD     Problem List:     Patient Active Problem List   Diagnosis    Chronic migraine without aura without status migrainosus, not intractable    Essential hypertension    Lump of skin    Valgus deformity of both great toes    Pain in both feet    Onychomycosis    Radiculopathy of lumbar region    Metatarsalgia of both feet    Hyperlipidemia    Laryngopharyngeal reflux (LPR)    Vitamin D deficiency    Hyperparathyroidism ([facility] [address] )    Osteoporosis    Multinodular goiter    Dyspnea on exertion    Dyslipidemia    Intermittent palpitations    Primary osteoarthritis of both knees      Past Medical and Surgical History:     Past Medical History:   Diagnosis Date    Borderline high cholesterol     Borderline hypertension     Chronic kidney disease, stage 3 1/29/2020    Migraine      Past Surgical History:   Procedure Laterality Date    BREAST BIOPSY Right     benign ~ 1990s    BUNIONECTOMY      CATARACT EXTRACTION, BILATERAL      PARATHYROID GLAND SURGERY        Family History:     Family History   Problem Relation Age of Onset    Angina Mother     Heart failure Mother     Prostate cancer Father     Diabetes Sister     Sudden death Sister     Osteoporosis Sister     Hypertension Brother     No Known Problems Son     No Known Problems Daughter     Prostate cancer Brother     Lung cancer Brother     Sudden death Brother     COPD Sister     Breast cancer Maternal Uncle     Breast cancer Cousin       Social History:     E-Cigarette/Vaping    E-Cigarette Use Never User      E-Cigarette/Vaping Substances    Nicotine No     THC No     CBD No     Flavoring No     Other No     Unknown No      Social History     Socioeconomic History    Marital status: /Civil Union     Spouse name: None    Number of children: None    Years of education: None    Highest education level: None   Occupational History    None   Social Needs    Financial resource strain: None    Food insecurity     Worry: None     Inability: None    Transportation needs     Medical: None     Non-medical: None   Tobacco Use    Smoking status: Never Smoker    Smokeless tobacco: Never Used   Substance and Sexual Activity    Alcohol use: No    Drug use: No    Sexual activity: None   Lifestyle    Physical activity     Days per week: None     Minutes per session: None    Stress: None   Relationships    Social connections Talks on phone: None     Gets together: None     Attends Yarsani service: None     Active member of club or organization: None     Attends meetings of clubs or organizations: None     Relationship status: None    Intimate partner violence     Fear of current or ex partner: None     Emotionally abused: None     Physically abused: None     Forced sexual activity: None   Other Topics Concern    None   Social History Narrative    None      Medications and Allergies:     Current Outpatient Medications   Medication Sig Dispense Refill    Ascorbic Acid (VITAMIN C) 500 MG CAPS Take 1 tablet by mouth daily      atorvastatin (LIPITOR) 20 mg tablet take 1 tablet by mouth once daily 90 tablet 3    B Complex Vitamins (B COMPLEX PO) Take 1 tablet by mouth daily      calcium carbonate (OS-FLORIAN) 600 MG tablet Take one tablet daily      chlorthalidone 25 mg tablet take 1 tablet by mouth 4 days A WEEK EVERY MONDAY, WEDNESDAY, AND FRIDAY, AND SUNDAY 16 tablet 5    Cholecalciferol (VITAMIN D3) 5000 units CAPS Take 1 capsule by mouth Daily      ELDERBERRY PO Take by mouth      Ferrous Sulfate (IRON PO) Take 325 mg by mouth daily      magnesium oxide (MAG-OX) 400 mg tablet Take 1 tablet by mouth daily With zinc       Misc Natural Products (GLUCOSAMINE CHOND COMPLEX/MSM PO) Take 1 tablet by mouth Daily 1000 mg       Multiple Vitamin (MULTI-VITAMIN DAILY PO) Take 1 tablet by mouth daily      Multiple Vitamins-Minerals (PRESERVISION AREDS) CAPS Take 1 capsule by mouth daily      Omega-3 Fatty Acids (FISH OIL) 500 MG CAPS Take 1 capsule by mouth daily      Probiotic Product (PROBIOTIC-10 PO) Take 1 capsule by mouth daily      spironolactone (ALDACTONE) 25 mg tablet Take 1 tablet (25 mg total) by mouth daily 30 tablet 5    verapamil (CALAN-SR) 120 mg CR tablet Take 1 tablet (120 mg total) by mouth 2 (two) times a day 60 tablet 4    Zinc Sulfate (ZINC 15 PO) Take by mouth      gabapentin (NEURONTIN) 300 mg capsule take 1 capsule by mouth twice a day (Patient not taking: Reported on 2/2/2021) 60 capsule 0    Multiple Vitamins-Minerals (ZINC PO) Take by mouth daily Pt takes with magnesium       No current facility-administered medications for this visit  No Known Allergies   Immunizations:     Immunization History   Administered Date(s) Administered    INFLUENZA 11/01/2018    Influenza Split High Dose Preservative Free IM 12/22/2014, 11/13/2015, 11/05/2016, 10/18/2017    Influenza, high dose seasonal 0 7 mL 10/17/2020    Pneumococcal Conjugate 13-Valent 11/13/2015    Pneumococcal Polysaccharide PPV23 11/01/2013, 10/24/2018    Zoster 01/24/2015    Zoster Vaccine Recombinant 09/12/2019, 01/16/2020    influenza, trivalent, adjuvanted 10/31/2019      Health Maintenance:         Topic Date Due    MAMMOGRAM  07/21/2022    DXA SCAN  07/21/2022    Colorectal Cancer Screening  04/03/2023    Hepatitis C Screening  Completed         Topic Date Due    DTaP,Tdap,and Td Vaccines (1 - Tdap) 06/30/1971      Medicare Health Risk Assessment:     /72   Pulse 61   Temp (!) 97 4 °F (36 3 °C)   Resp 18   Ht 5' 10" (1 778 m)   Wt 80 7 kg (178 lb)   SpO2 95%   BMI 25 54 kg/m²      Mathew Michaels is here for her Subsequent Wellness visit  Health Risk Assessment:   Patient rates overall health as good  Patient feels that their physical health rating is same  Eyesight was rated as same  Hearing was rated as same  Patient feels that their emotional and mental health rating is same  Pain experienced in the last 7 days has been none  Patient states that she has experienced no weight loss or gain in last 6 months  Depression Screening:   PHQ-2 Score: 0      Fall Risk Screening: In the past year, patient has experienced: no history of falling in past year      Urinary Incontinence Screening:   Patient has leaked urine accidently in the last six months       Home Safety:  Patient has trouble with stairs inside or outside of their home  Patient has working smoke alarms and has working carbon monoxide detector  Home safety hazards include: none  Nutrition:   Current diet is Regular  Medications:   Patient is currently taking over-the-counter supplements  OTC medications include: see medication list  Patient is able to manage medications  Activities of Daily Living (ADLs)/Instrumental Activities of Daily Living (IADLs):   Walk and transfer into and out of bed and chair?: Yes  Dress and groom yourself?: Yes    Bathe or shower yourself?: Yes    Feed yourself?  Yes  Do your laundry/housekeeping?: Yes  Manage your money, pay your bills and track your expenses?: Yes  Make your own meals?: Yes    Do your own shopping?: Yes    Previous Hospitalizations:   Any hospitalizations or ED visits within the last 12 months?: No    How many hospitalizations have you had in the last year?: 1-2    Advance Care Planning:   Living will: No    Durable POA for healthcare: No    Advanced directive: No    Advanced directive counseling given: Yes    Five wishes given: Yes    End of Life Decisions reviewed with patient: Yes    Provider agrees with end of life decisions: Yes      Cognitive Screening:   Provider or family/friend/caregiver concerned regarding cognition?: No    PREVENTIVE SCREENINGS      Cardiovascular Screening:    General: Screening Not Indicated and History Lipid Disorder      Diabetes Screening:     General: Screening Current      Colorectal Cancer Screening:     General: Screening Current      Breast Cancer Screening:     General: Screening Current      Cervical Cancer Screening:    General: Screening Not Indicated      Osteoporosis Screening:    General: Screening Not Indicated and History Osteoporosis      Abdominal Aortic Aneurysm (AAA) Screening:        General: Screening Not Indicated      Lung Cancer Screening:     General: Screening Not Indicated      Hepatitis C Screening:    General: Screening Current      Preventive Screening Comments: Reminded to call Dr Rodolfo Cantrell for follow up of colonoscopy  Physical Exam  Vitals signs and nursing note reviewed  Constitutional:       Appearance: She is well-developed  HENT:      Head: Normocephalic and atraumatic  Right Ear: External ear normal       Left Ear: External ear normal       Nose: Nose normal    Cardiovascular:      Rate and Rhythm: Normal rate and regular rhythm  Heart sounds: Normal heart sounds  No murmur  No friction rub  Pulmonary:      Effort: No respiratory distress  Breath sounds: Normal breath sounds  No wheezing or rales  Musculoskeletal:      Right lower leg: No edema  Left lower leg: No edema  Neurological:      Mental Status: She is oriented to person, place, and time  Cranial Nerves: No cranial nerve deficit          Talib Walker,

## 2021-02-09 NOTE — PATIENT INSTRUCTIONS
Medicare Preventive Visit Patient Instructions  Thank you for completing your Welcome to Medicare Visit or Medicare Annual Wellness Visit today  Your next wellness visit will be due in one year (2/9/2022)  The screening/preventive services that you may require over the next 5-10 years are detailed below  Some tests may not apply to you based off risk factors and/or age  Screening tests ordered at today's visit but not completed yet may show as past due  Also, please note that scanned in results may not display below  Preventive Screenings:  Service Recommendations Previous Testing/Comments   Colorectal Cancer Screening  * Colonoscopy    * Fecal Occult Blood Test (FOBT)/Fecal Immunochemical Test (FIT)  * Fecal DNA/Cologuard Test  * Flexible Sigmoidoscopy Age: 54-65 years old   Colonoscopy: every 10 years (may be performed more frequently if at higher risk)  OR  FOBT/FIT: every 1 year  OR  Cologuard: every 3 years  OR  Sigmoidoscopy: every 5 years  Screening may be recommended earlier than age 48 if at higher risk for colorectal cancer  Also, an individualized decision between you and your healthcare provider will decide whether screening between the ages of 74-80 would be appropriate  Colonoscopy: 04/03/2013  FOBT/FIT: Not on file  Cologuard: Not on file  Sigmoidoscopy: Not on file    Screening Current     Breast Cancer Screening Age: 36 years old  Frequency: every 1-2 years  Not required if history of left and right mastectomy Mammogram: 07/21/2020    Screening Current   Cervical Cancer Screening Between the ages of 21-29, pap smear recommended once every 3 years  Between the ages of 33-67, can perform pap smear with HPV co-testing every 5 years     Recommendations may differ for women with a history of total hysterectomy, cervical cancer, or abnormal pap smears in past  Pap Smear: Not on file    Screening Not Indicated   Hepatitis C Screening Once for adults born between 1945 and 1965  More frequently in patients at high risk for Hepatitis C Hep C Antibody: 01/17/2019    Screening Current   Diabetes Screening 1-2 times per year if you're at risk for diabetes or have pre-diabetes Fasting glucose: 99 mg/dL   A1C: No results in last 5 years    Screening Current   Cholesterol Screening Once every 5 years if you don't have a lipid disorder  May order more often based on risk factors  Lipid panel: 12/23/2020    Screening Not Indicated  History Lipid Disorder     Other Preventive Screenings Covered by Medicare:  1  Abdominal Aortic Aneurysm (AAA) Screening: covered once if your at risk  You're considered to be at risk if you have a family history of AAA  2  Lung Cancer Screening: covers low dose CT scan once per year if you meet all of the following conditions: (1) Age 50-69; (2) No signs or symptoms of lung cancer; (3) Current smoker or have quit smoking within the last 15 years; (4) You have a tobacco smoking history of at least 30 pack years (packs per day multiplied by number of years you smoked); (5) You get a written order from a healthcare provider  3  Glaucoma Screening: covered annually if you're considered high risk: (1) You have diabetes OR (2) Family history of glaucoma OR (3)  aged 48 and older OR (3)  American aged 72 and older  3  Osteoporosis Screening: covered every 2 years if you meet one of the following conditions: (1) You're estrogen deficient and at risk for osteoporosis based off medical history and other findings; (2) Have a vertebral abnormality; (3) On glucocorticoid therapy for more than 3 months; (4) Have primary hyperparathyroidism; (5) On osteoporosis medications and need to assess response to drug therapy  · Last bone density test (DXA Scan): 07/21/2020   5  HIV Screening: covered annually if you're between the age of 15-65  Also covered annually if you are younger than 13 and older than 72 with risk factors for HIV infection   For pregnant patients, it is covered up to 3 times per pregnancy  Immunizations:  Immunization Recommendations   Influenza Vaccine Annual influenza vaccination during flu season is recommended for all persons aged >= 6 months who do not have contraindications   Pneumococcal Vaccine (Prevnar and Pneumovax)  * Prevnar = PCV13  * Pneumovax = PPSV23   Adults 25-60 years old: 1-3 doses may be recommended based on certain risk factors  Adults 72 years old: Prevnar (PCV13) vaccine recommended followed by Pneumovax (PPSV23) vaccine  If already received PPSV23 since turning 65, then PCV13 recommended at least one year after PPSV23 dose  Hepatitis B Vaccine 3 dose series if at intermediate or high risk (ex: diabetes, end stage renal disease, liver disease)   Tetanus (Td) Vaccine - COST NOT COVERED BY MEDICARE PART B Following completion of primary series, a booster dose should be given every 10 years to maintain immunity against tetanus  Td may also be given as tetanus wound prophylaxis  Tdap Vaccine - COST NOT COVERED BY MEDICARE PART B Recommended at least once for all adults  For pregnant patients, recommended with each pregnancy  Shingles Vaccine (Shingrix) - COST NOT COVERED BY MEDICARE PART B  2 shot series recommended in those aged 48 and above     Health Maintenance Due:      Topic Date Due    MAMMOGRAM  07/21/2022    DXA SCAN  07/21/2022    Colorectal Cancer Screening  04/03/2023    Hepatitis C Screening  Completed     Immunizations Due:      Topic Date Due    DTaP,Tdap,and Td Vaccines (1 - Tdap) 06/30/1971     Advance Directives   What are advance directives? Advance directives are legal documents that state your wishes and plans for medical care  These plans are made ahead of time in case you lose your ability to make decisions for yourself  Advance directives can apply to any medical decision, such as the treatments you want, and if you want to donate organs  What are the types of advance directives?   There are many types of advance directives, and each state has rules about how to use them  You may choose a combination of any of the following:  · Living will: This is a written record of the treatment you want  You can also choose which treatments you do not want, which to limit, and which to stop at a certain time  This includes surgery, medicine, IV fluid, and tube feedings  · Durable power of  for healthcare [facility]): This is a written record that states who you want to make healthcare choices for you when you are unable to make them for yourself  This person, called a proxy, is usually a family member or a friend  You may choose more than 1 proxy  · Do not resuscitate (DNR) order:  A DNR order is used in case your heart stops beating or you stop breathing  It is a request not to have certain forms of treatment, such as CPR  A DNR order may be included in other types of advance directives  · Medical directive: This covers the care that you want if you are in a coma, near death, or unable to make decisions for yourself  You can list the treatments you want for each condition  Treatment may include pain medicine, surgery, blood transfusions, dialysis, IV or tube feedings, and a ventilator (breathing machine)  · Values history: This document has questions about your views, beliefs, and how you feel and think about life  This information can help others choose the care that you would choose  Why are advance directives important? An advance directive helps you control your care  Although spoken wishes may be used, it is better to have your wishes written down  Spoken wishes can be misunderstood, or not followed  Treatments may be given even if you do not want them  An advance directive may make it easier for your family to make difficult choices about your care  Urinary Incontinence   Urinary incontinence (UI)  is when you lose control of your bladder  UI develops because your bladder cannot store or empty urine properly   The 3 most common types of UI are stress incontinence, urge incontinence, or both  Medicines:   · May be given to help strengthen your bladder control  Report any side effects of medication to your healthcare provider  Do pelvic muscle exercises often:  Your pelvic muscles help you stop urinating  Squeeze these muscles tight for 5 seconds, then relax for 5 seconds  Gradually work up to squeezing for 10 seconds  Do 3 sets of 15 repetitions a day, or as directed  This will help strengthen your pelvic muscles and improve bladder control  Train your bladder:  Go to the bathroom at set times, such as every 2 hours, even if you do not feel the urge to go  You can also try to hold your urine when you feel the urge to go  For example, hold your urine for 5 minutes when you feel the urge to go  As that becomes easier, hold your urine for 10 minutes  Self-care:   · Keep a UI record  Write down how often you leak urine and how much you leak  Make a note of what you were doing when you leaked urine  · Drink liquids as directed  You may need to limit the amount of liquid you drink to help control your urine leakage  Do not drink any liquid right before you go to bed  Limit or do not have drinks that contain caffeine or alcohol  · Prevent constipation  Eat a variety of high-fiber foods  Good examples are high-fiber cereals, beans, vegetables, and whole-grain breads  Walking is the best way to trigger your intestines to have a bowel movement  · Exercise regularly and maintain a healthy weight  Weight loss and exercise will decrease pressure on your bladder and help you control your leakage  · Use a catheter as directed  to help empty your bladder  A catheter is a tiny, plastic tube that is put into your bladder to drain your urine  · Go to behavior therapy as directed  Behavior therapy may be used to help you learn to control your urge to urinate      Weight Management   Why it is important to manage your weight: Being overweight increases your risk of health conditions such as heart disease, high blood pressure, type 2 diabetes, and certain types of cancer  It can also increase your risk for osteoarthritis, sleep apnea, and other respiratory problems  Aim for a slow, steady weight loss  Even a small amount of weight loss can lower your risk of health problems  How to lose weight safely:  A safe and healthy way to lose weight is to eat fewer calories and get regular exercise  You can lose up about 1 pound a week by decreasing the number of calories you eat by 500 calories each day  Healthy meal plan for weight management:  A healthy meal plan includes a variety of foods, contains fewer calories, and helps you stay healthy  A healthy meal plan includes the following:  · Eat whole-grain foods more often  A healthy meal plan should contain fiber  Fiber is the part of grains, fruits, and vegetables that is not broken down by your body  Whole-grain foods are healthy and provide extra fiber in your diet  Some examples of whole-grain foods are whole-wheat breads and pastas, oatmeal, brown rice, and bulgur  · Eat a variety of vegetables every day  Include dark, leafy greens such as spinach, kale, liyah greens, and mustard greens  Eat yellow and orange vegetables such as carrots, sweet potatoes, and winter squash  · Eat a variety of fruits every day  Choose fresh or canned fruit (canned in its own juice or light syrup) instead of juice  Fruit juice has very little or no fiber  · Eat low-fat dairy foods  Drink fat-free (skim) milk or 1% milk  Eat fat-free yogurt and low-fat cottage cheese  Try low-fat cheeses such as mozzarella and other reduced-fat cheeses  · Choose meat and other protein foods that are low in fat  Choose beans or other legumes such as split peas or lentils  Choose fish, skinless poultry (chicken or turkey), or lean cuts of red meat (beef or pork)   Before you cook meat or poultry, cut off any visible fat    · Use less fat and oil  Try baking foods instead of frying them  Add less fat, such as margarine, sour cream, regular salad dressing and mayonnaise to foods  Eat fewer high-fat foods  Some examples of high-fat foods include french fries, doughnuts, ice cream, and cakes  · Eat fewer sweets  Limit foods and drinks that are high in sugar  This includes candy, cookies, regular soda, and sweetened drinks  Exercise:  Exercise at least 30 minutes per day on most days of the week  Some examples of exercise include walking, biking, dancing, and swimming  You can also fit in more physical activity by taking the stairs instead of the elevator or parking farther away from stores  Ask your healthcare provider about the best exercise plan for you  © Copyright [employer] 2018 Information is for End User's use only and may not be sold, redistributed or otherwise used for commercial purposes   All illustrations and images included in CareNotes® are the copyrighted property of [employer]  or [address] [place] [address]

## 2021-02-10 ENCOUNTER — DOCUMENTATION (OUTPATIENT)
Dept: CARDIOLOGY CLINIC | Facility: CLINIC | Age: 71
End: 2021-02-10

## 2021-02-10 ENCOUNTER — IMMUNIZATIONS (OUTPATIENT)
Dept: FAMILY MEDICINE CLINIC | Facility: HOSPITAL | Age: 71
End: 2021-02-10

## 2021-02-10 DIAGNOSIS — Z23 ENCOUNTER FOR IMMUNIZATION: Primary | ICD-10-CM

## 2021-02-10 PROCEDURE — 0011A SARS-COV-2 / COVID-19 MRNA VACCINE (MODERNA) 100 MCG: CPT

## 2021-02-10 PROCEDURE — 91301 SARS-COV-2 / COVID-19 MRNA VACCINE (MODERNA) 100 MCG: CPT

## 2021-02-10 NOTE — PROGRESS NOTES
Average blood pressure between 1/30 and 02/02/2021 was 131/72  This is probably close enough to our target that we do not have to make any additional medication changes at this time  Follow-up as indicated in my note from 01/06/2021  Patient was notified

## 2021-02-10 NOTE — TELEPHONE ENCOUNTER
Notify patient that average blood pressure between 1/30 and 02/02/2021 was 131/72  This is probably close enough to our target that we do not have to make any additional medication changes at this time  Follow-up as indicated in my note from 01/06/2021

## 2021-02-15 ENCOUNTER — TELEPHONE (OUTPATIENT)
Dept: NON INVASIVE DIAGNOSTICS | Facility: HOSPITAL | Age: 71
End: 2021-02-15

## 2021-02-16 ENCOUNTER — HOSPITAL ENCOUNTER (OUTPATIENT)
Dept: RADIOLOGY | Facility: HOSPITAL | Age: 71
Discharge: HOME | End: 2021-02-16
Payer: MEDICARE

## 2021-02-16 ENCOUNTER — TRANSCRIBE ORDERS (OUTPATIENT)
Dept: ADMINISTRATIVE | Facility: HOSPITAL | Age: 71
End: 2021-02-16

## 2021-02-16 ENCOUNTER — HOSPITAL ENCOUNTER (OUTPATIENT)
Dept: RADIOLOGY | Facility: HOSPITAL | Age: 71
Discharge: HOME | End: 2021-02-16
Payer: COMMERCIAL

## 2021-02-16 DIAGNOSIS — Z13.6 SCREENING FOR CARDIOVASCULAR CONDITION: ICD-10-CM

## 2021-02-16 DIAGNOSIS — R06.6 HICCUP: ICD-10-CM

## 2021-02-16 PROCEDURE — 71046 X-RAY EXAM CHEST 2 VIEWS: CPT

## 2021-02-16 PROCEDURE — 93922 UPR/L XTREMITY ART 2 LEVELS: CPT

## 2021-02-16 PROCEDURE — VASC: Performed by: SURGERY

## 2021-03-10 ENCOUNTER — IMMUNIZATIONS (OUTPATIENT)
Dept: FAMILY MEDICINE CLINIC | Facility: HOSPITAL | Age: 71
End: 2021-03-10

## 2021-03-10 DIAGNOSIS — I10 ESSENTIAL HYPERTENSION: ICD-10-CM

## 2021-03-10 DIAGNOSIS — Z23 ENCOUNTER FOR IMMUNIZATION: Primary | ICD-10-CM

## 2021-03-10 DIAGNOSIS — E78.5 HYPERLIPIDEMIA, UNSPECIFIED HYPERLIPIDEMIA TYPE: ICD-10-CM

## 2021-03-10 PROCEDURE — 91301 SARS-COV-2 / COVID-19 MRNA VACCINE (MODERNA) 100 MCG: CPT

## 2021-03-10 PROCEDURE — 0012A SARS-COV-2 / COVID-19 MRNA VACCINE (MODERNA) 100 MCG: CPT

## 2021-03-10 RX ORDER — SPIRONOLACTONE 25 MG/1
25 TABLET ORAL DAILY
Qty: 90 TABLET | Refills: 3 | Status: SHIPPED | OUTPATIENT
Start: 2021-03-10 | End: 2022-02-07

## 2021-03-10 RX ORDER — ATORVASTATIN CALCIUM 20 MG/1
20 TABLET, FILM COATED ORAL DAILY
Qty: 90 TABLET | Refills: 3 | Status: SHIPPED | OUTPATIENT
Start: 2021-03-10 | End: 2022-04-14

## 2021-03-19 ENCOUNTER — TELEPHONE (OUTPATIENT)
Dept: GASTROENTEROLOGY | Facility: CLINIC | Age: 71
End: 2021-03-19

## 2021-03-19 NOTE — TELEPHONE ENCOUNTER
Patient had left message asking for a call to schedule colonoscopy  Her last colon was done in 2013 and it was normal  Dr Armando Shannon indicated it could be repeated in 7-10 yrs  I returned her call and left message for her to call back  If there are no problems going on and there is no family hx of colon ca, we can extend another 2 years  Asked patient to call back

## 2021-03-30 DIAGNOSIS — I10 UNCONTROLLED HYPERTENSION: ICD-10-CM

## 2021-03-30 RX ORDER — CHLORTHALIDONE 25 MG/1
TABLET ORAL
Qty: 16 TABLET | Refills: 5 | Status: SHIPPED | OUTPATIENT
Start: 2021-03-30 | End: 2021-09-13

## 2021-05-06 RX ORDER — MAGNESIUM GLUCONATE 30 MG(550)
TABLET ORAL EVERY MORNING
COMMUNITY
End: 2021-07-28 | Stop reason: ALTCHOICE

## 2021-05-06 RX ORDER — CALCIUM CARBONATE/VITAMIN D3 600 MG-10
TABLET ORAL EVERY MORNING
COMMUNITY

## 2021-05-06 RX ORDER — MULTIVIT WITH MINERALS/LUTEIN
1000 TABLET ORAL DAILY
COMMUNITY

## 2021-05-06 NOTE — PRE-PROCEDURE INSTRUCTIONS
Pre-Surgery Instructions:   Medication Instructions    Ascorbic Acid (vitamin C) 1000 MG tablet Instructed patient per Anesthesia Guidelines   Ascorbic Acid (VITAMIN C) 500 MG CAPS Not Taking    atorvastatin (LIPITOR) 20 mg tablet Instructed patient per Anesthesia Guidelines   B Complex Vitamins (B COMPLEX PO) Instructed patient per Anesthesia Guidelines   bisacodyl (DULCOLAX) 5 mg EC tablet Patient was instructed by Physician and understands   Black Pepper-Turmeric (TURMERIC COMPLEX/BLACK PEPPER PO) Pt to stop 5/6/21    calcium carbonate (OS-FLORIAN) 600 MG tablet Instructed patient per Anesthesia Guidelines   chlorthalidone 25 mg tablet Instructed patient per Anesthesia Guidelines   Cholecalciferol (VITAMIN D3) 5000 units CAPS Instructed patient per Anesthesia Guidelines   Coenzyme Q10 (Co Q 10) 100 MG CAPS Pt to stop 5/6/21    ELDERBERRY PO Instructed patient per Anesthesia Guidelines   Ferrous Sulfate (IRON PO) Pt to stop as of 5/6/21    magnesium oxide (MAG-OX) 400 mg tablet Instructed patient per Anesthesia Guidelines   Misc Natural Products (GLUCOSAMINE CHOND COMPLEX/MSM PO) Instructed patient per Anesthesia Guidelines   Multiple Vitamins-Minerals (PRESERVISION AREDS) CAPS Instructed patient per Anesthesia Guidelines   Multiple Vitamins-Minerals (ZINC PO) Instructed patient per Anesthesia Guidelines   Omega 3 1200 MG CAPS Pt to stop as of 5/6/21    Polyethylene Glycol 3350 (MIRALAX PO) Patient was instructed by Physician and understands   Potassium Gluconate 595 (99 K) MG TABS Instructed patient per Anesthesia Guidelines   Probiotic Product (PROBIOTIC-10 PO) Instructed patient per Anesthesia Guidelines   spironolactone (ALDACTONE) 25 mg tablet Instructed patient per Anesthesia Guidelines   verapamil (CALAN-SR) 120 mg CR tablet Pt to take the am of the procedure    Zinc Sulfate (ZINC 15 PO) Instructed patient per Anesthesia Guidelines      Pt to follow   Gill's instructions  Pt to have the covid screening on 5/7/21 at the [facility]     Cynthia Jose Ramon [phone]

## 2021-05-07 DIAGNOSIS — U07.1 COVID-19: Primary | ICD-10-CM

## 2021-05-07 DIAGNOSIS — Z01.818 ENCOUNTER FOR PREADMISSION TESTING: ICD-10-CM

## 2021-05-07 PROCEDURE — U0003 INFECTIOUS AGENT DETECTION BY NUCLEIC ACID (DNA OR RNA); SEVERE ACUTE RESPIRATORY SYNDROME CORONAVIRUS 2 (SARS-COV-2) (CORONAVIRUS DISEASE [COVID-19]), AMPLIFIED PROBE TECHNIQUE, MAKING USE OF HIGH THROUGHPUT TECHNOLOGIES AS DESCRIBED BY CMS-2020-01-R: HCPCS

## 2021-05-07 PROCEDURE — U0005 INFEC AGEN DETEC AMPLI PROBE: HCPCS

## 2021-05-08 LAB — SARS-COV-2 RNA RESP QL NAA+PROBE: NEGATIVE

## 2021-05-11 ENCOUNTER — ANESTHESIA EVENT (OUTPATIENT)
Dept: GASTROENTEROLOGY | Facility: AMBULARY SURGERY CENTER | Age: 71
End: 2021-05-11

## 2021-05-13 ENCOUNTER — ANESTHESIA (OUTPATIENT)
Dept: GASTROENTEROLOGY | Facility: AMBULARY SURGERY CENTER | Age: 71
End: 2021-05-13

## 2021-05-13 ENCOUNTER — HOSPITAL ENCOUNTER (OUTPATIENT)
Dept: GASTROENTEROLOGY | Facility: AMBULARY SURGERY CENTER | Age: 71
Setting detail: OUTPATIENT SURGERY
Discharge: HOME | End: 2021-05-13
Attending: INTERNAL MEDICINE | Admitting: INTERNAL MEDICINE
Payer: MEDICARE

## 2021-05-13 VITALS
OXYGEN SATURATION: 99 % | WEIGHT: 180 LBS | SYSTOLIC BLOOD PRESSURE: 140 MMHG | RESPIRATION RATE: 18 BRPM | HEIGHT: 71 IN | DIASTOLIC BLOOD PRESSURE: 80 MMHG | BODY MASS INDEX: 25.2 KG/M2 | TEMPERATURE: 97 F | HEART RATE: 67 BPM

## 2021-05-13 DIAGNOSIS — Z86.010 HISTORY OF COLON POLYPS: ICD-10-CM

## 2021-05-13 PROCEDURE — G0105 COLORECTAL SCRN; HI RISK IND: HCPCS | Performed by: INTERNAL MEDICINE

## 2021-05-13 RX ORDER — SODIUM CHLORIDE, SODIUM LACTATE, POTASSIUM CHLORIDE, CALCIUM CHLORIDE 600; 310; 30; 20 MG/100ML; MG/100ML; MG/100ML; MG/100ML
100 INJECTION, SOLUTION INTRAVENOUS CONTINUOUS
Status: DISCONTINUED | OUTPATIENT
Start: 2021-05-13 | End: 2021-05-17 | Stop reason: HOSPADM

## 2021-05-13 RX ORDER — PROPOFOL 10 MG/ML
INJECTION, EMULSION INTRAVENOUS CONTINUOUS PRN
Status: DISCONTINUED | OUTPATIENT
Start: 2021-05-13 | End: 2021-05-13

## 2021-05-13 RX ORDER — SODIUM CHLORIDE, SODIUM LACTATE, POTASSIUM CHLORIDE, CALCIUM CHLORIDE 600; 310; 30; 20 MG/100ML; MG/100ML; MG/100ML; MG/100ML
INJECTION, SOLUTION INTRAVENOUS CONTINUOUS PRN
Status: DISCONTINUED | OUTPATIENT
Start: 2021-05-13 | End: 2021-05-13

## 2021-05-13 RX ORDER — PROPOFOL 10 MG/ML
INJECTION, EMULSION INTRAVENOUS AS NEEDED
Status: DISCONTINUED | OUTPATIENT
Start: 2021-05-13 | End: 2021-05-13

## 2021-05-13 RX ORDER — ONDANSETRON 2 MG/ML
4 INJECTION INTRAMUSCULAR; INTRAVENOUS ONCE AS NEEDED
Status: DISCONTINUED | OUTPATIENT
Start: 2021-05-13 | End: 2021-05-17 | Stop reason: HOSPADM

## 2021-05-13 RX ADMIN — PROPOFOL 120 MCG/KG/MIN: 10 INJECTION, EMULSION INTRAVENOUS at 08:09

## 2021-05-13 RX ADMIN — SODIUM CHLORIDE, SODIUM LACTATE, POTASSIUM CHLORIDE, AND CALCIUM CHLORIDE: .6; .31; .03; .02 INJECTION, SOLUTION INTRAVENOUS at 08:06

## 2021-05-13 RX ADMIN — LIDOCAINE HYDROCHLORIDE 40 MG: 20 INJECTION, SOLUTION INTRAVENOUS at 08:09

## 2021-05-13 RX ADMIN — SODIUM CHLORIDE, SODIUM LACTATE, POTASSIUM CHLORIDE, AND CALCIUM CHLORIDE 100 ML/HR: .6; .31; .03; .02 INJECTION, SOLUTION INTRAVENOUS at 07:47

## 2021-05-13 RX ADMIN — PROPOFOL 100 MG: 10 INJECTION, EMULSION INTRAVENOUS at 08:09

## 2021-05-13 NOTE — DISCHARGE SUMMARY
Discharge Summary - Paco Noonan 65 y o  female MRN: [id number]    Unit/Bed#:  Encounter: [id number]    Admission Date:  05/13/2021    Admitting Diagnosis: History of colon polyps [Z86 010]    HPI:  History of colon polyps    Procedures Performed: No orders of the defined types were placed in this encounter  Summary of Hospital Course:  Colonoscopy done  Significant Findings, Care, Treatment and Services Provided:  Recurrent polyp not seen  Internal hemorrhoid noted  Complications:  None    Discharge Diagnosis:  Hemorrhoid  No polyp    Resolved Problems  Date Reviewed: 5/13/2021    None          Condition at Discharge: good         Discharge instructions/Information to patient and family:   See after visit summary for information provided to patient and family  Provisions for Follow-Up Care:  See after visit summary for information related to follow-up care and any pertinent home health orders        PCP: Lexus Olvera DO    Disposition: Home

## 2021-05-13 NOTE — INTERVAL H&P NOTE
H&P reviewed  After examining the patient I find no changes in the patients condition since the H&P had been written      Vitals:    05/13/21 0727   BP: 148/73   Pulse: 56   Resp: 18   Temp: (!) 97 °F (36 1 °C)   SpO2: 98%

## 2021-05-13 NOTE — H&P
History and Physical -  [facility]  Olvin Mccabe 65 y o  female MRN: [id number]                  HPI: Olvin Mccabe is a 65y o  year old female who presents for screening colonoscopy  Patient has history of colon polyp  Last colonoscopy five years ago  Recent rectal bleeding  REVIEW OF SYSTEMS: Per the HPI, and otherwise unremarkable      Historical Information   Past Medical History:   Diagnosis Date    Arthritis     Chronic kidney disease     stage 3    Hyperlipidemia     borderline    Hypertension     borderline    Migraine     Migraine     controlled with verapamil    Osteopenia     Snores      Past Surgical History:   Procedure Laterality Date    BREAST BIOPSY Right     benign ~ 1990s    BUNIONECTOMY Left     3 hammertoes, bunion    CATARACT EXTRACTION Bilateral 2012    COLONOSCOPY      PARATHYROID GLAND SURGERY Right     front gland removed     Social History   Social History     Substance and Sexual Activity   Alcohol Use No     Social History     Substance and Sexual Activity   Drug Use No     Social History     Tobacco Use   Smoking Status Never Smoker   Smokeless Tobacco Never Used     Family History   Problem Relation Age of Onset    Angina Mother     Heart failure Mother     Prostate cancer Father     Cancer Father         prostate    Diabetes Sister     Sudden death Sister     Osteoporosis Sister     Heart disease Sister         MI    Hypertension Brother     Cancer Brother         lung    No Known Problems Son     Hypertension Daughter     Diabetes Brother     Cancer Brother         prostate    COPD Sister     Kidney disease Sister     Heart disease Sister         CHF    Breast cancer Maternal Uncle     Breast cancer Cousin        Meds/Allergies       Current Outpatient Medications:     Ascorbic Acid (vitamin C) 1000 MG tablet    Ascorbic Acid (VITAMIN C) 500 MG CAPS    atorvastatin (LIPITOR) 20 mg tablet    B Complex Vitamins (B COMPLEX PO)   bisacodyl (DULCOLAX) 5 mg EC tablet    Black Pepper-Turmeric (TURMERIC COMPLEX/BLACK PEPPER PO)    calcium carbonate (OS-FLORIAN) 600 MG tablet    chlorthalidone 25 mg tablet    Cholecalciferol (VITAMIN D3) 5000 units CAPS    Coenzyme Q10 (Co Q 10) 100 MG CAPS    ELDERBERRY PO    Ferrous Sulfate (IRON PO)    magnesium oxide (MAG-OX) 400 mg tablet    Misc Natural Products (GLUCOSAMINE CHOND COMPLEX/MSM PO)    Multiple Vitamins-Minerals (PRESERVISION AREDS) CAPS    Multiple Vitamins-Minerals (ZINC PO)    Omega 3 1200 MG CAPS    Polyethylene Glycol 3350 (MIRALAX PO)    Potassium Gluconate 595 (99 K) MG TABS    Probiotic Product (PROBIOTIC-10 PO)    spironolactone (ALDACTONE) 25 mg tablet    verapamil (CALAN-SR) 120 mg CR tablet    Zinc Sulfate (ZINC 15 PO)    gabapentin (NEURONTIN) 300 mg capsule    Current Facility-Administered Medications:     lactated ringers infusion, 100 mL/hr, Intravenous, Continuous, 100 mL/hr at 05/13/21 0747    No Known Allergies    Objective     /73   Pulse 56   Temp (!) 97 °F (36 1 °C) (Tympanic)   Resp 18   Ht 5' 10 5" (1 791 m)   Wt 81 6 kg (180 lb)   SpO2 98%   BMI 25 46 kg/m²       PHYSICAL EXAM    Gen: NAD  Head: NCAT  CV: RRR  CHEST: Clear  ABD: soft, NT/ND  EXT: no edema      ASSESSMENT/PLAN:  This is a 65y o  year old female here for colonoscopy, and she is stable and optimized for her procedure

## 2021-05-13 NOTE — ANESTHESIA PREPROCEDURE EVALUATION
Procedure:  COLONOSCOPY    Relevant Problems   ANESTHESIA (within normal limits)      CARDIO   (+) Dyspnea on exertion   (+) Essential hypertension   (+) Hyperlipidemia      ENDO   (+) Hyperparathyroidism (HCC)      MUSCULOSKELETAL   (+) Primary osteoarthritis of both knees      PULMONARY   (+) Dyspnea on exertion        Physical Exam    Airway    Mallampati score: II  TM Distance: >3 FB  Neck ROM: full     Dental   No notable dental hx     Cardiovascular  Rhythm: regular, Rate: normal,     Pulmonary  Breath sounds clear to auscultation,     Other Findings        Anesthesia Plan  ASA Score- 2     Anesthesia Type- IV sedation with anesthesia with ASA Monitors  Additional Monitors:   Airway Plan:           Plan Factors-Exercise tolerance (METS): >4 METS  Chart reviewed  Existing labs reviewed  Patient summary reviewed  Patient is not a current smoker  Induction- intravenous  Postoperative Plan-     Informed Consent- Anesthetic plan and risks discussed with patient  I personally reviewed this patient with the CRNA  Discussed and agreed on the Anesthesia Plan with the CRNA  Johnny Thompson

## 2021-05-13 NOTE — ANESTHESIA POSTPROCEDURE EVALUATION
Post-Op Assessment Note    CV Status:  Stable  Pain Score: 0    Pain management: adequate     Mental Status:  Sleepy   Hydration Status:  Stable and euvolemic   PONV Controlled:  None   Airway Patency:  Patent       Staff: CRNA         No complications documented      BP  151/66   Temp 36 0C   Pulse 70   Resp 20   SpO2 99%6lfm

## 2021-07-21 ENCOUNTER — APPOINTMENT (OUTPATIENT)
Dept: LAB | Facility: CLINIC | Age: 71
End: 2021-07-21
Payer: MEDICARE

## 2021-07-21 DIAGNOSIS — I10 ESSENTIAL HYPERTENSION: ICD-10-CM

## 2021-07-21 LAB
ALBUMIN SERPL BCP-MCNC: 3.9 G/DL (ref 3.5–5)
ALP SERPL-CCNC: 40 U/L (ref 46–116)
ALT SERPL W P-5'-P-CCNC: 31 U/L (ref 12–78)
ANION GAP SERPL CALCULATED.3IONS-SCNC: 4 MMOL/L (ref 4–13)
AST SERPL W P-5'-P-CCNC: 17 U/L (ref 5–45)
BILIRUB SERPL-MCNC: 0.41 MG/DL (ref 0.2–1)
BUN SERPL-MCNC: 20 MG/DL (ref 5–25)
CALCIUM SERPL-MCNC: 9.8 MG/DL (ref 8.3–10.1)
CHLORIDE SERPL-SCNC: 105 MMOL/L (ref 100–108)
CHOLEST SERPL-MCNC: 133 MG/DL (ref 50–200)
CO2 SERPL-SCNC: 27 MMOL/L (ref 21–32)
CREAT SERPL-MCNC: 0.89 MG/DL (ref 0.6–1.3)
ERYTHROCYTE [DISTWIDTH] IN BLOOD BY AUTOMATED COUNT: 12.2 % (ref 11.6–15.1)
GFR SERPL CREATININE-BSD FRML MDRD: 65 ML/MIN/1.73SQ M
GLUCOSE P FAST SERPL-MCNC: 92 MG/DL (ref 65–99)
HCT VFR BLD AUTO: 42.8 % (ref 34.8–46.1)
HDLC SERPL-MCNC: 60 MG/DL
HGB BLD-MCNC: 13.8 G/DL (ref 11.5–15.4)
LDLC SERPL CALC-MCNC: 61 MG/DL (ref 0–100)
MCH RBC QN AUTO: 28.2 PG (ref 26.8–34.3)
MCHC RBC AUTO-ENTMCNC: 32.2 G/DL (ref 31.4–37.4)
MCV RBC AUTO: 88 FL (ref 82–98)
PLATELET # BLD AUTO: 243 THOUSANDS/UL (ref 149–390)
PMV BLD AUTO: 11 FL (ref 8.9–12.7)
POTASSIUM SERPL-SCNC: 3.7 MMOL/L (ref 3.5–5.3)
PROT SERPL-MCNC: 7.4 G/DL (ref 6.4–8.2)
RBC # BLD AUTO: 4.89 MILLION/UL (ref 3.81–5.12)
SODIUM SERPL-SCNC: 136 MMOL/L (ref 136–145)
TRIGL SERPL-MCNC: 60 MG/DL
WBC # BLD AUTO: 7.3 THOUSAND/UL (ref 4.31–10.16)

## 2021-07-21 PROCEDURE — 80061 LIPID PANEL: CPT

## 2021-07-21 PROCEDURE — 85027 COMPLETE CBC AUTOMATED: CPT

## 2021-07-21 PROCEDURE — 36415 COLL VENOUS BLD VENIPUNCTURE: CPT

## 2021-07-21 PROCEDURE — 80053 COMPREHEN METABOLIC PANEL: CPT

## 2021-07-27 ENCOUNTER — HOSPITAL ENCOUNTER (OUTPATIENT)
Dept: RADIOLOGY | Facility: HOSPITAL | Age: 71
Discharge: HOME | End: 2021-07-27
Payer: MEDICARE

## 2021-07-27 VITALS — HEIGHT: 71 IN | WEIGHT: 180 LBS | BODY MASS INDEX: 25.2 KG/M2

## 2021-07-27 DIAGNOSIS — Z12.31 ENCOUNTER FOR SCREENING MAMMOGRAM FOR MALIGNANT NEOPLASM OF BREAST: ICD-10-CM

## 2021-07-27 PROCEDURE — 77063 BREAST TOMOSYNTHESIS BI: CPT

## 2021-07-27 PROCEDURE — 77067 SCR MAMMO BI INCL CAD: CPT

## 2021-07-28 ENCOUNTER — OFFICE VISIT (OUTPATIENT)
Dept: CARDIOLOGY CLINIC | Facility: CLINIC | Age: 71
End: 2021-07-28
Payer: MEDICARE

## 2021-07-28 VITALS
OXYGEN SATURATION: 97 % | SYSTOLIC BLOOD PRESSURE: 138 MMHG | HEIGHT: 71 IN | DIASTOLIC BLOOD PRESSURE: 80 MMHG | TEMPERATURE: 97.8 F | RESPIRATION RATE: 18 BRPM | BODY MASS INDEX: 24.92 KG/M2 | HEART RATE: 60 BPM | WEIGHT: 178 LBS

## 2021-07-28 DIAGNOSIS — E78.5 DYSLIPIDEMIA: ICD-10-CM

## 2021-07-28 DIAGNOSIS — I10 ESSENTIAL HYPERTENSION: Primary | ICD-10-CM

## 2021-07-28 DIAGNOSIS — E04.2 MULTINODULAR GOITER: ICD-10-CM

## 2021-07-28 DIAGNOSIS — M17.0 PRIMARY OSTEOARTHRITIS OF BOTH KNEES: ICD-10-CM

## 2021-07-28 PROCEDURE — 93000 ELECTROCARDIOGRAM COMPLETE: CPT | Performed by: INTERNAL MEDICINE

## 2021-07-28 PROCEDURE — 99214 OFFICE O/P EST MOD 30 MIN: CPT | Performed by: INTERNAL MEDICINE

## 2021-07-28 NOTE — PROGRESS NOTES
Office Cardiology Progress Note  Lorin Mcallister 66 y o  female MRN: [id number]  07/28/21  10:08 AM      ASSESSMENT:    1  Improved chronic exertional dyspnea with stair climbing but not with exercise at [facility], probably related to progressive overweight with 11 pound weight gain over the past 8-1/2+ years  2   Mildly uncontrolled  essential hypertension with history of white coat phenomenon and latest home blood pressure averaging 131/72 6 months ago  3  Controlled dyslipidemia with atorvastatin  4  History of normal exercise stress test 10/13/15 and benign echocardiogram on 5/1/13   5  History of  now suppressed chronic nonproductive cough, possibly caused by GERD, with patient currently off Pepcid for 10 months  Linford Sine has recently been less frequent  6  Resolved hypercalcemia with parathyroidectomy 11/30/11   7  Status post nasal polypectomy October, 2012 and [facility]  October, 2012 with right cataract extraction 9/13  8  Chronic stable multinodular goiter with latest thyroid ultrasound 5/30/17, followed by Dr Villafana  9   Resolved chronic kidney disease, stage IIIa    Current estimated GFR is 65    10  Chronic fatigue and tiredness with consideration of adverse drug reaction to verapamil  11  Osteoarthritis of both knees, right more than left  12  Osteopenia, stable, based on DEXA scan 07/21/2020  Plan       Patient Instructions     1  Do another set of home blood pressures for four days in a row starting in August or September and send written record of blood pressures taken morning and evening, three readings each time, recorded on blood pressure data sheet given to you by Dr Ji Van and e-mail to [email or web address]   2  Continue current medications  3  Cardiology follow-up approximately six months with EKG  HPI    This 66 y o  female  denies new cardiopulmonary and medical symptoms  The patient has stable chronic tiredness, which she blames on inadequate sleep    She continues to go to the [address] at [facility] five days a week starting at 5:00 a m  in the morning  The patient's most recent average home blood pressure was 131/72 between 1/30 and 02/02/2021  She and her  are fully vaccinated against COVID-19  They vacation in [place] in April, 2021 and in the [address] in July, 2021  She denies any major change in their eating habits work, which included frequent eating of food from outside her home, frequent [address] cooking  The patient is being seen in follow-up of the below listed diagnoses  Encounter Diagnoses   Name Primary?     Essential hypertension Yes    Dyslipidemia     Multinodular goiter     Primary osteoarthritis of both knees         Review of Systems    All other systems negative, except as noted in history of present illness    Historical Information   Past Medical History:   Diagnosis Date    Arthritis     Chronic kidney disease     stage 3    Hyperlipidemia     borderline    Hypertension     borderline    Migraine     Migraine     controlled with verapamil    Osteopenia     Snores      Past Surgical History:   Procedure Laterality Date    BREAST BIOPSY Right     benign ~ 1990s    BUNIONECTOMY Left     3 hammertoes, bunion    CATARACT EXTRACTION Bilateral 2012    COLONOSCOPY      PARATHYROID GLAND SURGERY Right     front gland removed     Social History     Substance and Sexual Activity   Alcohol Use No     Social History     Substance and Sexual Activity   Drug Use No     Social History     Tobacco Use   Smoking Status Never Smoker   Smokeless Tobacco Never Used       Family History:  Family History   Problem Relation Age of Onset    Angina Mother     Heart failure Mother     Prostate cancer Father     Cancer Father         prostate    Diabetes Sister    Swapna Birch Sudden death Sister     Osteoporosis Sister     Heart disease Sister         MI    Hypertension Brother     Cancer Brother         lung    No Known Problems Son     Hypertension Daughter     Diabetes Brother     Cancer Brother         prostate    COPD Sister     Kidney disease Sister     Heart disease Sister         CHF    Lung cancer Maternal Uncle     Breast cancer Cousin 48         Meds/Allergies     Prior to Admission medications    Medication Sig Start Date End Date Taking?  Authorizing Provider   Ascorbic Acid (vitamin C) 1000 MG tablet Take 1,000 mg by mouth daily   Yes Historical Provider, MD   Ascorbic Acid (VITAMIN C) 500 MG CAPS Take 1 tablet by mouth daily 10/18/17  Yes Historical Provider, MD   atorvastatin (LIPITOR) 20 mg tablet Take 1 tablet (20 mg total) by mouth daily 3/10/21  Yes Alana Samuel MD   B Complex Vitamins (B COMPLEX PO) Take 1 tablet by mouth daily   Yes Historical Provider, MD   Black Pepper-Turmeric (TURMERIC COMPLEX/BLACK PEPPER PO) Take by mouth daily 600 mg turmeric, 5 mg black pepper   Yes Historical Provider, MD   calcium carbonate (OS-FLORIAN) 600 MG tablet Take one tablet daily 9/16/20  Yes Martine Roach MD   chlorthalidone 25 mg tablet TAKE 1 TABLET BY MOUTH 4 DAYS A WEEK MON, WED, FRI, MYNOR 3/30/21  Yes Alana Samuel MD   Cholecalciferol (VITAMIN D3) 5000 units CAPS Take 1 capsule by mouth Daily   Yes Historical Provider, MD   Coenzyme Q10 (Co Q 10) 100 MG CAPS Take by mouth every morning   Yes Historical Provider, MD   ELDERBERRY PO Take by mouth every morning    Yes Historical Provider, MD   Ferrous Sulfate (IRON PO) Take 325 mg by mouth daily   Yes Historical Provider, MD   magnesium oxide (MAG-OX) 400 mg tablet Take 1 tablet by mouth daily With zinc  12/2/10  Yes Historical Provider, MD   Misc Natural Products (GLUCOSAMINE CHOND COMPLEX/MSM PO) Take 1 tablet by mouth Daily 1000 mg    Yes Historical Provider, MD   Multiple Vitamins-Minerals (PRESERVISION AREDS) CAPS Take 1 capsule by mouth daily 10/18/17  Yes Historical Provider, MD   Multiple Vitamins-Minerals (ZINC PO) Take by mouth daily Pt takes with magnesium   Yes Historical Provider, MD   Omega 3 1200 MG CAPS Take by mouth every morning   Yes Historical Provider, MD   Probiotic Product (PROBIOTIC-10 PO) Take 1 capsule by mouth daily   Yes Historical Provider, MD   spironolactone (ALDACTONE) 25 mg tablet Take 1 tablet (25 mg total) by mouth daily  Patient taking differently: Take 25 mg by mouth every morning  3/10/21  Yes Fernando Gallego MD   verapamil (CALAN-SR) 120 mg CR tablet Take 1 tablet (120 mg total) by mouth 2 (two) times a day 2/2/21  Yes JANETT Agosto   Zinc Sulfate (ZINC 15 PO) Take by mouth daily With copper 1mg   Yes Historical Provider, MD   bisacodyl (DULCOLAX) 5 mg EC tablet Take 20 mg by mouth once  Patient not taking: Reported on 7/28/2021 7/28/21  Historical Provider, MD   gabapentin (NEURONTIN) 300 mg capsule take 1 capsule by mouth twice a day  Patient not taking: Reported on 2/2/2021 10/2/20 7/28/21  Nahun Jane DPM   Polyethylene Glycol 3350 (MIRALAX PO) Take by mouth once  Patient not taking: Reported on 7/28/2021 7/28/21  Historical Provider, MD   Potassium Gluconate 595 (99 K) MG TABS Take by mouth every morning  Patient not taking: Reported on 7/28/2021 7/28/21  Historical Provider, MD       No Known Allergies      Vitals:    07/28/21 0841   BP: 138/80   BP Location: Left arm   Patient Position: Sitting   Cuff Size: Large   Pulse: 60   Resp: 18   Temp: 97 8 °F (36 6 °C)   SpO2: 97%   Weight: 80 7 kg (178 lb)   Height: 5' 10 5" (1 791 m)       Body mass index is 25 18 kg/m²   2 pound weight loss in approximately seven months    Physical Exam:    General Appearance:  Alert, cooperative, no distress, appears stated age   Head:  Normocephalic, without obvious abnormality, atraumatic   Eyes:  PERRL, conjunctiva/corneas clear, EOM's intact,   both eyes   Ears:  Normal TM's and external ear canals, both ears   Nose: Nares normal, septum midline, mucosa normal, no drainage or sinus tenderness   Throat: Lips, mucosa, and tongue normal; teeth and gums normal   Neck: Supple, symmetrical, trachea midline, no adenopathy, thyroid: not enlarged, symmetric, no tenderness/mass/nodules, no carotid bruit or JVD with horizontal scar in central low neck  Back:   Symmetric, no curvature, ROM normal, no CVA tenderness   Lungs:   Clear to auscultation bilaterally, respirations unlabored   Chest Wall:  No tenderness or deformity   Heart:  Regular rate and rhythm, S1, S2 normal, no murmur, rub or gallop   Abdomen:   Soft, non-tender, bowel sounds active all four quadrants,  no masses, no organomegaly   Extremities: Extremities normal, atraumatic, no cyanosis or edema   Pulses: 2+ and symmetric   Skin: Skin showed normal color, texture, turgor and no rashes or lesions   Lymph nodes: Cervical, supraclavicular, and axillary nodes normal   Neurologic: Normal         Cardiographics    ECG 07/28/21:    Sinus bradycardia and sinus arrhythmia with average heart rate 58 bpm   Cannot rule out old septal infarct   Resolved LAFB and no other changes since 01/06/2021    IMAGING:    XR chest pa & lateral 02/16/2021:    Result Date: 2/18/2021  Impression No acute cardiopulmonary disease  Workstation performed: [id number]     Vascular screening study 02/16/2021:    Normal carotid, aortic, and PAD screening      LAB REVIEW:      Lab Results   Component Value Date    SODIUM 136 07/21/2021    K 3 7 07/21/2021     07/21/2021    CO2 27 07/21/2021    ANIONGAP 11 7 10/09/2015    BUN 20 07/21/2021    CREATININE 0 89 07/21/2021    GLUCOSE 94 01/03/2017    GLUF 92 07/21/2021    CALCIUM 9 8 07/21/2021    AST 17 07/21/2021    ALT 31 07/21/2021    ALKPHOS 40 (L) 07/21/2021    PROT 6 5 01/03/2017    BILITOT 0 3 01/03/2017    EGFR 65 07/21/2021     Lab Results   Component Value Date    CHOLESTEROL 133 07/21/2021    CHOLESTEROL 117 12/23/2020    CHOLESTEROL 113 09/24/2020     Lab Results   Component Value Date    HDL 60 07/21/2021    HDL 49 12/23/2020    HDL 54 09/24/2020 Lab Results   Component Value Date    LDLCALC 61 07/21/2021    LDLCALC 44 12/23/2020    LDLCALC 36 09/24/2020     No components found for: [facility]  Lab Results   Component Value Date    TRIG 60 07/21/2021    TRIG 118 12/23/2020    TRIG 115 09/24/2020     CBC 07/21/2021: Normal          Td Hood MD

## 2021-08-04 DIAGNOSIS — G43.709 CHRONIC MIGRAINE WITHOUT AURA WITHOUT STATUS MIGRAINOSUS, NOT INTRACTABLE: ICD-10-CM

## 2021-08-04 NOTE — TELEPHONE ENCOUNTER
Patient called in asking for  a refill on her medication Verapamil 120 MG BID  Patient has appointment scheduled

## 2021-08-05 DIAGNOSIS — G43.709 CHRONIC MIGRAINE WITHOUT AURA WITHOUT STATUS MIGRAINOSUS, NOT INTRACTABLE: ICD-10-CM

## 2021-08-09 NOTE — TELEPHONE ENCOUNTER
Received a call from [facility] requesting verapamil rx (they will need the attending DRs info on the new rx)    Most recent rx was sent to [facility] on 8/5     [facility] for patient to call back to confirm if she needs a rx for verapamil sent to [facility] instead

## 2021-08-10 ENCOUNTER — OFFICE VISIT (OUTPATIENT)
Dept: FAMILY MEDICINE CLINIC | Facility: CLINIC | Age: 71
End: 2021-08-10
Payer: MEDICARE

## 2021-08-10 VITALS
SYSTOLIC BLOOD PRESSURE: 130 MMHG | OXYGEN SATURATION: 99 % | WEIGHT: 176 LBS | RESPIRATION RATE: 18 BRPM | TEMPERATURE: 96.8 F | HEIGHT: 71 IN | BODY MASS INDEX: 24.64 KG/M2 | DIASTOLIC BLOOD PRESSURE: 70 MMHG | HEART RATE: 57 BPM

## 2021-08-10 DIAGNOSIS — I10 ESSENTIAL HYPERTENSION: Primary | ICD-10-CM

## 2021-08-10 DIAGNOSIS — E78.5 HYPERLIPIDEMIA, UNSPECIFIED HYPERLIPIDEMIA TYPE: ICD-10-CM

## 2021-08-10 DIAGNOSIS — R26.89 POOR BALANCE: ICD-10-CM

## 2021-08-10 DIAGNOSIS — H91.93 BILATERAL HEARING LOSS, UNSPECIFIED HEARING LOSS TYPE: ICD-10-CM

## 2021-08-10 PROCEDURE — 99214 OFFICE O/P EST MOD 30 MIN: CPT | Performed by: FAMILY MEDICINE

## 2021-08-10 NOTE — PATIENT INSTRUCTIONS
Recent Results (from the past 672 hour(s))   CBC    Collection Time: 07/21/21 10:20 AM   Result Value Ref Range    WBC 7 30 4 31 - 10 16 Thousand/uL    RBC 4 89 3 81 - 5 12 Million/uL    Hemoglobin 13 8 11 5 - 15 4 g/dL    Hematocrit 42 8 34 8 - 46 1 %    MCV 88 82 - 98 fL    MCH 28 2 26 8 - 34 3 pg    MCHC 32 2 31 4 - 37 4 g/dL    RDW 12 2 11 6 - 15 1 %    Platelets 959 580 - 529 Thousands/uL    MPV 11 0 8 9 - 12 7 fL   Comprehensive metabolic panel    Collection Time: 07/21/21 10:20 AM   Result Value Ref Range    Sodium 136 136 - 145 mmol/L    Potassium 3 7 3 5 - 5 3 mmol/L    Chloride 105 100 - 108 mmol/L    CO2 27 21 - 32 mmol/L    ANION GAP 4 4 - 13 mmol/L    BUN 20 5 - 25 mg/dL    Creatinine 0 89 0 60 - 1 30 mg/dL    Glucose, Fasting 92 65 - 99 mg/dL    Calcium 9 8 8 3 - 10 1 mg/dL    AST 17 5 - 45 U/L    ALT 31 12 - 78 U/L    Alkaline Phosphatase 40 (L) 46 - 116 U/L    Total Protein 7 4 6 4 - 8 2 g/dL    Albumin 3 9 3 5 - 5 0 g/dL    Total Bilirubin 0 41 0 20 - 1 00 mg/dL    eGFR 65 ml/min/1 73sq m   Lipid Panel with Direct LDL reflex    Collection Time: 07/21/21 10:20 AM   Result Value Ref Range    Cholesterol 133 50 - 200 mg/dL    Triglycerides 60 <=150 mg/dL    HDL, Direct 60 >=40 mg/dL    LDL Calculated 61 0 - 100 mg/dL

## 2021-08-10 NOTE — PROGRESS NOTES
Assessment/Plan:    1  Essential hypertension  Assessment & Plan:  Well controlled  Continue current medication    Orders:  -     CBC; Future; Expected date: 01/22/2022  -     Comprehensive metabolic panel; Future; Expected date: 01/22/2022  -     Lipid Panel with Direct LDL reflex; Future; Expected date: 01/22/2022    2  Poor balance  Comments:  new  Orders:  -     Ambulatory referral to Physical Therapy; Future    3  Hyperlipidemia, unspecified hyperlipidemia type  Assessment & Plan:  Well controlled  Continue atorvastatin 20 mg daily    Orders:  -     Comprehensive metabolic panel; Future; Expected date: 01/22/2022  -     Lipid Panel with Direct LDL reflex; Future; Expected date: 01/22/2022    4  Bilateral hearing loss, unspecified hearing loss type  -     Ambulatory referral to Audiology;  Future          Patient Instructions     Recent Results (from the past 672 hour(s))   CBC    Collection Time: 07/21/21 10:20 AM   Result Value Ref Range    WBC 7 30 4 31 - 10 16 Thousand/uL    RBC 4 89 3 81 - 5 12 Million/uL    Hemoglobin 13 8 11 5 - 15 4 g/dL    Hematocrit 42 8 34 8 - 46 1 %    MCV 88 82 - 98 fL    MCH 28 2 26 8 - 34 3 pg    MCHC 32 2 31 4 - 37 4 g/dL    RDW 12 2 11 6 - 15 1 %    Platelets 871 523 - 600 Thousands/uL    MPV 11 0 8 9 - 12 7 fL   Comprehensive metabolic panel    Collection Time: 07/21/21 10:20 AM   Result Value Ref Range    Sodium 136 136 - 145 mmol/L    Potassium 3 7 3 5 - 5 3 mmol/L    Chloride 105 100 - 108 mmol/L    CO2 27 21 - 32 mmol/L    ANION GAP 4 4 - 13 mmol/L    BUN 20 5 - 25 mg/dL    Creatinine 0 89 0 60 - 1 30 mg/dL    Glucose, Fasting 92 65 - 99 mg/dL    Calcium 9 8 8 3 - 10 1 mg/dL    AST 17 5 - 45 U/L    ALT 31 12 - 78 U/L    Alkaline Phosphatase 40 (L) 46 - 116 U/L    Total Protein 7 4 6 4 - 8 2 g/dL    Albumin 3 9 3 5 - 5 0 g/dL    Total Bilirubin 0 41 0 20 - 1 00 mg/dL    eGFR 65 ml/min/1 73sq m   Lipid Panel with Direct LDL reflex    Collection Time: 07/21/21 10:20 AM   Result Value Ref Range    Cholesterol 133 50 - 200 mg/dL    Triglycerides 60 <=150 mg/dL    HDL, Direct 60 >=40 mg/dL    LDL Calculated 61 0 - 100 mg/dL           Return in about 6 months (around 2/10/2022) for Annual Wellness Visit  Subjective:      Patient ID: Hany Moncada is a 66 y o  female  Chief Complaint   Patient presents with    Follow-up     lab work review [id number]       She is concerned about her balance and would like to go to the balance center  She is also having issues with her hearing and thinks she may need hearing aids  Hyperlipidemia-  she  has been taking their cholesterol medication regularly  Associated symptoms:  Myalgias: no      The following portions of the patient's history were reviewed and updated as appropriate: allergies, current medications, past family history, past medical history, past social history, past surgical history and problem list     Review of Systems   Respiratory: Positive for shortness of breath (chronic, following with Dr Kade Bueno)  Cardiovascular: Negative            Current Outpatient Medications   Medication Sig Dispense Refill    Ascorbic Acid (vitamin C) 1000 MG tablet Take 1,000 mg by mouth daily      atorvastatin (LIPITOR) 20 mg tablet Take 1 tablet (20 mg total) by mouth daily 90 tablet 3    B Complex Vitamins (B COMPLEX PO) Take 1 tablet by mouth daily      Black Pepper-Turmeric (TURMERIC COMPLEX/BLACK PEPPER PO) Take by mouth daily 600 mg turmeric, 5 mg black pepper      calcium carbonate (OS-FLORIAN) 600 MG tablet Take one tablet daily      chlorthalidone 25 mg tablet TAKE 1 TABLET BY MOUTH 4 DAYS A WEEK MON, WED, FRI, SUNDAY 16 tablet 5    Cholecalciferol (VITAMIN D3) 5000 units CAPS Take 1 capsule by mouth Daily      Coenzyme Q10 (Co Q 10) 100 MG CAPS Take by mouth every morning      ELDERBERRY PO Take by mouth every morning       Ferrous Sulfate (IRON PO) Take 325 mg by mouth daily      magnesium oxide (MAG-OX) 400 mg tablet Take 1 tablet by mouth daily With zinc       Misc Natural Products (GLUCOSAMINE CHOND COMPLEX/MSM PO) Take 1 tablet by mouth Daily 1000 mg       Multiple Vitamins-Minerals (PRESERVISION AREDS) CAPS Take 1 capsule by mouth daily      Omega 3 1200 MG CAPS Take by mouth every morning      Probiotic Product (PROBIOTIC-10 PO) Take 1 capsule by mouth daily      spironolactone (ALDACTONE) 25 mg tablet Take 1 tablet (25 mg total) by mouth daily (Patient taking differently: Take 25 mg by mouth every morning ) 90 tablet 3    verapamil (CALAN-SR) 120 mg CR tablet Take 1 tablet (120 mg total) by mouth 2 (two) times a day 60 tablet 4    Zinc Sulfate (ZINC 15 PO) Take by mouth daily With copper 1mg      Ascorbic Acid (VITAMIN C) 500 MG CAPS Take 1 tablet by mouth daily       No current facility-administered medications for this visit  Objective:    /70   Pulse 57   Temp (!) 96 8 °F (36 °C)   Resp 18   Ht 5' 10 5" (1 791 m)   Wt 79 8 kg (176 lb)   SpO2 99%   BMI 24 90 kg/m²        Physical Exam  Vitals and nursing note reviewed  Constitutional:       Appearance: She is well-developed  HENT:      Head: Normocephalic and atraumatic  Right Ear: Tympanic membrane and external ear normal       Left Ear: Tympanic membrane and external ear normal    Cardiovascular:      Rate and Rhythm: Normal rate and regular rhythm  Heart sounds: Normal heart sounds  No murmur heard  No friction rub  Pulmonary:      Effort: Pulmonary effort is normal  No respiratory distress  Breath sounds: Normal breath sounds  No wheezing or rales  Musculoskeletal:      Right lower leg: No edema  Left lower leg: No edema                  Lemond Led, DO

## 2021-09-15 NOTE — RESULT NOTES
Discussion/Summary   Jose Pierre,   Your mammogram is normal    Dr Srinivas Limon CAD [id number] 11:50AM Radhika Smoke Order Number: [id number]    - Patient Instructions: To schedule this appointment, please contact Central Scheduling at [phone]  Do not wear any perfume, powder, lotion or deodorant on breast or underarm area  Please bring your doctors order, referral (if needed) and insurance information with you on the day of the test  Failure to bring this information may result in this test being rescheduled  Arrive 15 minutes prior to your appointment time to register  On the day of your test, please bring any prior mammogram or breast studies with you that were not performed at a [facility]  Failure to bring prior exams may result in your test needing to be rescheduled  Test Name Result Flag Reference   MAMMO SCREENING BILATERAL W CAD (Report)     Patient History:   Patient is postmenopausal and is of [place] descent  Family history of breast cancer at age 54 in paternal aunt,    breast cancer at age 48 in maternal cousin  Excisional biopsy of the right breast, January 1, 1980  Took progesterone beginning at age 45  Patient's BMI is 25 8  Reason for exam: screening, asymptomatic  Screening     Mammo Screening Bilateral W CAD: December 22, 2017 - Check In #:    [de-identified]   Bilateral MLO, CC, and XCCL view(s) were taken  Technologist: HUMZA Bishop   Prior study comparison: December 21, 2016, mammo screening    bilateral W CAD performed at [place] [address]  October 23, 2015, mammogram  July 28, 2010, mammogram  July 22, 2009, mammogram  July 21, 2008, mammogram      There are scattered fibroglandular densities  Bilateral digital    mammography is performed  No dominant soft tissue mass,    architectural distortion or suspicious calcifications are noted      The skin and nipple contours are within normal limits  Scattered benign appearing calcifications are noted  No evidence    of malignancy  No significant changes when compared to prior    studies  1  No evidence of malignancy  2  No significant change when compared with the prior study  ACR BI-RADSï¾® Assessments: BiRad:2 - Benign     Recommendation:   Routine screening mammogram of both breasts in 1 year  Analyzed by CAD     The patient is scheduled in a reminder system for screening    mammography  8-10% of cancers will be missed on mammography  Management of a    palpable abnormality must be based on clinical grounds  Patients   will be notified of their results via letter from our facility  Accredited by [employer] of Radiology and FDA  Transcription Location: [facility] [address]: [id number]     Risk Value(s):   Tyrer-Cuzick 10 Year: 4 400%, Tyrer-Cuzick Lifetime: 7 400%,    Myriad Table: 8 2%, JOSUE 5 Year: 2 0%, NCI Lifetime: 6 0%, MRS    : Based on personal and/or family history,    consideration of hereditary risk assessment may be warranted  Signed by:   Jhonathan Herrera MD   12/22/17       Plan  Encounter for screening mammogram for breast cancer    · * MAMMO SCREENING BILATERAL W CAD ; every 2 years;  Last [id number]; Next  [id number]; Status:Active Statement Selected

## 2021-09-16 ENCOUNTER — APPOINTMENT (OUTPATIENT)
Dept: RADIOLOGY | Facility: CLINIC | Age: 71
End: 2021-09-16
Payer: MEDICARE

## 2021-09-16 ENCOUNTER — OFFICE VISIT (OUTPATIENT)
Dept: OBGYN CLINIC | Facility: CLINIC | Age: 71
End: 2021-09-16
Payer: MEDICARE

## 2021-09-16 VITALS
SYSTOLIC BLOOD PRESSURE: 138 MMHG | DIASTOLIC BLOOD PRESSURE: 78 MMHG | HEIGHT: 71 IN | BODY MASS INDEX: 24.84 KG/M2 | HEART RATE: 63 BPM | WEIGHT: 177.4 LBS

## 2021-09-16 DIAGNOSIS — M17.11 ARTHRITIS OF KNEE, RIGHT: ICD-10-CM

## 2021-09-16 DIAGNOSIS — Z01.89 ENCOUNTER FOR OTHER SPECIFIED SPECIAL EXAMINATIONS: ICD-10-CM

## 2021-09-16 DIAGNOSIS — M25.561 RIGHT KNEE PAIN, UNSPECIFIED CHRONICITY: ICD-10-CM

## 2021-09-16 DIAGNOSIS — M22.2X1 PATELLOFEMORAL SYNDROME, RIGHT: Primary | ICD-10-CM

## 2021-09-16 PROCEDURE — 99204 OFFICE O/P NEW MOD 45 MIN: CPT | Performed by: ORTHOPAEDIC SURGERY

## 2021-09-16 PROCEDURE — 73560 X-RAY EXAM OF KNEE 1 OR 2: CPT

## 2021-09-16 PROCEDURE — 73562 X-RAY EXAM OF KNEE 3: CPT

## 2021-09-16 NOTE — PROGRESS NOTES
Assessment/Plan:  1  Patellofemoral syndrome, right  Ambulatory referral to Physical Therapy   2  Arthritis of knee, right  Ambulatory referral to Physical Therapy   3  Right knee pain, unspecified chronicity  XR knee 3 vw right non injury    Ambulatory referral to Physical Therapy     January Lux is a very pleasant 41-year-old female with mild patellofemoral osteoarthritis and patellofemoral syndrome  Patient I reviewed her x-rays that demonstrate mild patellofemoral osteoarthritis  I discussed with her that we can proceed with non operative treatment of physical therapy for overall strengthening as well over-the-counter Tylenol 650 mg  We discussed she may take this as needed for her pain  She may continue activities tolerated  At this point I would not recommend any surgical intervention  I will see her back in 3 months for re-evaluation  Subjective: right knee pain     Patient ID: Flower Alcala is a 66 y o  female  HPI  January Lux is a 41-year-old female who presents to the office today for an initial evaluation of her right knee  She states she has been experiencing right knee pain for about 1 month now  She notes no incident of injury at that time  Patient states her pain is intermittent in nature and more at the underside of her patella  She does occasionally experience medial-sided knee pain as well  Patient cannot correlate the pain with any specific activity  She states today she is not experiencing any pain  She is not taking any medication regards to her pain  She does occasionally use Voltaren gel about the knee with mild relief  She denies any numbness and tingling  She is very active and likes to go to the gym every day  Review of Systems   Constitutional: Negative for chills, fever and unexpected weight change  HENT: Negative for hearing loss, nosebleeds and sore throat  Eyes: Negative for pain, redness and visual disturbance     Respiratory: Negative for cough, shortness of breath and wheezing  Cardiovascular: Negative for chest pain, palpitations and leg swelling  Gastrointestinal: Negative for abdominal pain, nausea and vomiting  Endocrine: Negative for polydipsia and polyuria  Genitourinary: Negative for dyspareunia and hematuria  Musculoskeletal: Positive for arthralgias  Negative for joint swelling and myalgias  Skin: Negative for rash and wound  Neurological: Negative for dizziness, numbness and headaches  Psychiatric/Behavioral: Negative for decreased concentration and suicidal ideas  The patient is not nervous/anxious            Past Medical History:   Diagnosis Date    Arthritis     Chronic kidney disease     stage 3    Hyperlipidemia     borderline    Hypertension     borderline    Migraine     Migraine     controlled with verapamil    Osteopenia     Snores        Past Surgical History:   Procedure Laterality Date    BREAST BIOPSY Right     benign ~ 1990s    BUNIONECTOMY Left     3 hammertoes, bunion    CATARACT EXTRACTION Bilateral 2012    COLONOSCOPY      PARATHYROID GLAND SURGERY Right     front gland removed       Family History   Problem Relation Age of Onset    Angina Mother     Heart failure Mother     Prostate cancer Father     Cancer Father         prostate    Diabetes Sister    Lacrina Sunny Sudden death Sister     Osteoporosis Sister     Heart disease Sister         MI    Hypertension Brother     Cancer Brother         lung    No Known Problems Son     Hypertension Daughter     Diabetes Brother     Cancer Brother         prostate    COPD Sister     Kidney disease Sister     Heart disease Sister         CHF    Lung cancer Maternal Uncle     Breast cancer Cousin 48       Social History     Occupational History    Not on file   Tobacco Use    Smoking status: Never Smoker    Smokeless tobacco: Never Used   Vaping Use    Vaping Use: Never used   Substance and Sexual Activity    Alcohol use: No    Drug use: No    Sexual activity: Not on file         Current Outpatient Medications:     Ascorbic Acid (vitamin C) 1000 MG tablet, Take 1,000 mg by mouth daily, Disp: , Rfl:     atorvastatin (LIPITOR) 20 mg tablet, Take 1 tablet (20 mg total) by mouth daily, Disp: 90 tablet, Rfl: 3    B Complex Vitamins (B COMPLEX PO), Take 1 tablet by mouth daily, Disp: , Rfl:     Black Pepper-Turmeric (TURMERIC COMPLEX/BLACK PEPPER PO), Take by mouth daily 600 mg turmeric, 5 mg black pepper, Disp: , Rfl:     calcium carbonate (OS-FLORIAN) 600 MG tablet, Take one tablet daily, Disp: , Rfl:     chlorthalidone 25 mg tablet, TAKE 1 TABLET BY MOUTH 4 DAYS A WEEK MON, WED, FRI, SUNDAY, Disp: 52 tablet, Rfl: 3    Cholecalciferol (VITAMIN D3) 5000 units CAPS, Take 1 capsule by mouth Daily, Disp: , Rfl:     Coenzyme Q10 (Co Q 10) 100 MG CAPS, Take by mouth every morning, Disp: , Rfl:     ELDERBERRY PO, Take by mouth every morning , Disp: , Rfl:     Ferrous Sulfate (IRON PO), Take 325 mg by mouth daily, Disp: , Rfl:     magnesium oxide (MAG-OX) 400 mg tablet, Take 1 tablet by mouth daily With zinc , Disp: , Rfl:     Misc Natural Products (GLUCOSAMINE CHOND COMPLEX/MSM PO), Take 1 tablet by mouth Daily 1000 mg , Disp: , Rfl:     Multiple Vitamins-Minerals (PRESERVISION AREDS) CAPS, Take 1 capsule by mouth daily, Disp: , Rfl:     Omega 3 1200 MG CAPS, Take by mouth every morning, Disp: , Rfl:     Probiotic Product (PROBIOTIC-10 PO), Take 1 capsule by mouth daily, Disp: , Rfl:     spironolactone (ALDACTONE) 25 mg tablet, Take 1 tablet (25 mg total) by mouth daily (Patient taking differently: Take 25 mg by mouth every morning ), Disp: 90 tablet, Rfl: 3    verapamil (CALAN-SR) 120 mg CR tablet, Take 1 tablet (120 mg total) by mouth 2 (two) times a day, Disp: 60 tablet, Rfl: 4    Zinc Sulfate (ZINC 15 PO), Take by mouth daily With copper 1mg, Disp: , Rfl:     Ascorbic Acid (VITAMIN C) 500 MG CAPS, Take 1 tablet by mouth daily, Disp: , Rfl:     No Known Allergies    Objective:  Vitals:    09/16/21 1349   BP: 138/78   Pulse: 63       Body mass index is 25 09 kg/m²  Right Knee Exam     Tenderness   The patient is experiencing no tenderness  Range of Motion   Extension: 0   Flexion: 120     Tests   Varus: negative Valgus: negative    Other   Erythema: absent  Scars: absent  Sensation: normal  Pulse: present  Swelling: none  Effusion: no effusion present    Comments:  Stable at 0,30, 90   Mild parapatella crepitus   equivocal patellofemoral grind          Observations     Right Knee   Negative for effusion  Physical Exam  Vitals reviewed  Constitutional:       Appearance: She is well-developed  HENT:      Head: Normocephalic and atraumatic  Eyes:      General: No scleral icterus  Conjunctiva/sclera: Conjunctivae normal    Cardiovascular:      Rate and Rhythm: Normal rate  Pulses: Normal pulses  Pulmonary:      Effort: Pulmonary effort is normal  No respiratory distress  Abdominal:      General: Bowel sounds are normal    Musculoskeletal:      Cervical back: Normal range of motion and neck supple  Right knee: No effusion  Comments: As noted in HPI   Skin:     General: Skin is warm and dry  Neurological:      Mental Status: She is alert and oriented to person, place, and time  Psychiatric:         Behavior: Behavior normal          I have personally reviewed pertinent films in PACS      X-rays of the right knee obtained on 9/16/21 demonstrate mild patellofemoral arthritis with no acute fracture or lytic lesion present

## 2021-09-17 ENCOUNTER — TELEPHONE (OUTPATIENT)
Dept: NEUROLOGY | Facility: CLINIC | Age: 71
End: 2021-09-17

## 2021-09-17 NOTE — TELEPHONE ENCOUNTER
[facility] for patient to [facility] and confirm appointment with Apolonia Prieto  Gave direct numbers in [place]

## 2021-09-20 ENCOUNTER — OFFICE VISIT (OUTPATIENT)
Dept: NEUROLOGY | Facility: CLINIC | Age: 71
End: 2021-09-20
Payer: MEDICARE

## 2021-09-20 VITALS
SYSTOLIC BLOOD PRESSURE: 152 MMHG | WEIGHT: 177 LBS | BODY MASS INDEX: 24.78 KG/M2 | HEART RATE: 61 BPM | TEMPERATURE: 97.6 F | HEIGHT: 71 IN | DIASTOLIC BLOOD PRESSURE: 74 MMHG

## 2021-09-20 DIAGNOSIS — G43.709 CHRONIC MIGRAINE WITHOUT AURA WITHOUT STATUS MIGRAINOSUS, NOT INTRACTABLE: Primary | ICD-10-CM

## 2021-09-20 PROCEDURE — 99214 OFFICE O/P EST MOD 30 MIN: CPT | Performed by: NURSE PRACTITIONER

## 2021-09-20 NOTE — PATIENT INSTRUCTIONS
Continue with Verapamil SR 120mg twice   [facility] with [facility] for strength and coordination  Follow up with Neurology office in 6 months

## 2021-09-20 NOTE — PROGRESS NOTES
Patient ID: Ziggy Kelly is a 66 y o  female  Assessment/Plan:       Diagnoses and all orders for this visit:    Chronic migraine without aura without status migrainosus, not intractable       Continue with Verapamil SR 120mg twice   Follow [facility] with [facility] for strength and coordination  Follow up with Neurology office in 6 month    Subjective/HPI:  Ziggy Kelly is a 71yoFemale who follows in the outpatient neurology office for medical management of her migraines  Patient's migraines are triggered by certain foods with caffeine and nitrates and has associated nausea and photo sensitivities  They generally start in the temporal region radiating to the posterior portion of her head and down into her neck  Patient often wakes with them and they are generally gone by noon  Patient has been on verapamil 120 mg twice a day since her last appointment 02/02/2021  Patient's cardiologist had discussed possibly increasing her verapamil dose to twice a day, patient deferred at that time to make lifestyle changes 1st   Patient then saw Neurology in February, had some continued episodes of migrainous activity therefore agreeable to increase her verapamil 220 mg twice a day  Pt reports taht the increase in her Verapamil was beneficial and helped a little, but Cardiologist did add some other diuretics and has improved her BP and her palpitations  She does have occasional SOB but is transient  She reports she has had about 5 migraine/headaches and were low in intensity, felt no needs to medicate them  Pt stated that they generally come at night time so she just goes to bed and they are gone by morning  Overall she is happy with her current dosing, no changes will be made at this time  Pt will follow up with Neurology office in 6 months         The following portions of the patient's history were reviewed and updated as appropriate: allergies, current medications, past family history, past medical history, past social history, past surgical history and problem list       Past Medical History:   Diagnosis Date    Arthritis     Chronic kidney disease     stage 3    Hyperlipidemia     borderline    Hypertension     borderline    Migraine     Migraine     controlled with verapamil    Osteopenia     Snores        Past Surgical History:   Procedure Laterality Date    BREAST BIOPSY Right     benign ~ 1990s    BUNIONECTOMY Left     3 hammertoes, bunion    CATARACT EXTRACTION Bilateral 2012    COLONOSCOPY      PARATHYROID GLAND SURGERY Right     front gland removed       Social History     Socioeconomic History    Marital status: /Civil Union     Spouse name: None    Number of children: None    Years of education: None    Highest education level: None   Occupational History    None   Tobacco Use    Smoking status: Never Smoker    Smokeless tobacco: Never Used   Vaping Use    Vaping Use: Never used   Substance and Sexual Activity    Alcohol use: No    Drug use: No    Sexual activity: None   Other Topics Concern    None   Social History Narrative    None     Social Determinants of Health     Financial Resource Strain:     Difficulty of Paying Living Expenses:    Food Insecurity:     Worried About Running Out of Food in the Last Year:     Ran Out of Food in the Last Year:    Transportation Needs:     Lack of Transportation (Medical):      Lack of Transportation (Non-Medical):    Physical Activity:     Days of Exercise per Week:     Minutes of Exercise per Session:    Stress:     Feeling of Stress :    Social Connections:     Frequency of Communication with Friends and Family:     Frequency of Social Gatherings with Friends and Family:     Attends Yazdanism Services:     Active Member of Clubs or Organizations:     Attends Club or Organization Meetings:     Marital Status:    Intimate Partner Violence:     Fear of Current or Ex-Partner:     Emotionally Abused:     by mouth daily, Disp: , Rfl:     spironolactone (ALDACTONE) 25 mg tablet, Take 1 tablet (25 mg total) by mouth daily (Patient taking differently: Take 25 mg by mouth every morning ), Disp: 90 tablet, Rfl: 3    verapamil (CALAN-SR) 120 mg CR tablet, Take 1 tablet (120 mg total) by mouth 2 (two) times a day, Disp: 60 tablet, Rfl: 4    Zinc Sulfate (ZINC 15 PO), Take by mouth daily With copper 1mg, Disp: , Rfl:     Ascorbic Acid (VITAMIN C) 500 MG CAPS, Take 1 tablet by mouth daily (Patient not taking: Reported on 9/20/2021), Disp: , Rfl:     calcium carbonate (OS-FLORIAN) 600 MG tablet, Take one tablet daily, Disp: , Rfl:     No Known Allergies     Blood pressure 152/74, pulse 61, temperature 97 6 °F (36 4 °C), temperature source Tympanic, height 5' 10 5" (1 791 m), weight 80 3 kg (177 lb), not currently breastfeeding  Objective:    Blood pressure 152/74, pulse 61, temperature 97 6 °F (36 4 °C), temperature source Tympanic, height 5' 10 5" (1 791 m), weight 80 3 kg (177 lb), not currently breastfeeding  Physical Exam  Vitals reviewed  Constitutional:       Appearance: She is well-developed  HENT:      Head: Normocephalic  Nose: Nose normal       Mouth/Throat:      Mouth: Mucous membranes are moist    Eyes:      General: Lids are normal       Extraocular Movements: Extraocular movements intact  Pupils: Pupils are equal, round, and reactive to light  Cardiovascular:      Rate and Rhythm: Normal rate  Pulmonary:      Effort: Pulmonary effort is normal    Abdominal:      Palpations: Abdomen is soft  Musculoskeletal:      Cervical back: Normal range of motion  Skin:     General: Skin is warm and dry  Neurological:      Mental Status: She is alert  Coordination: Romberg sign negative  Deep Tendon Reflexes: Strength normal and reflexes are normal and symmetric  Psychiatric:         Speech: Speech normal          Behavior: Behavior normal          Thought Content:  Thought content normal          Judgment: Judgment normal          Neurological Exam  Mental Status  Alert  Oriented to person, place, time and situation  Recent and remote memory are intact  Speech is normal  Language is fluent with no aphasia  Attention and concentration are normal  Fund of knowledge is appropriate for level of education  Cranial Nerves  CN II: Visual acuity is normal  Visual fields full to confrontation  CN III, IV, VI: Extraocular movements intact bilaterally  Normal lids and orbits bilaterally  Pupils equal round and reactive to light bilaterally  CN V: Facial sensation is normal   CN VII: Full and symmetric facial movement  CN VIII: Hearing is normal   CN IX, X: Palate elevates symmetrically  Normal gag reflex  CN XI: Shoulder shrug strength is normal   CN XII: Tongue midline without atrophy or fasciculations  Motor  Normal muscle bulk throughout  Normal muscle tone  No abnormal involuntary movements  Strength is 5/5 throughout all four extremities  Sensory  Light touch is normal in upper and lower extremities  Temperature is normal in upper and lower extremities  Vibration is normal in upper and lower extremities  Proprioception is normal in upper and lower extremities  Reflexes  Deep tendon reflexes are 2+ and symmetric in all four extremities with downgoing toes bilaterally  Right pathological reflexes: Donna's absent  Left pathological reflexes: Donna's absent  Coordination  Right: Finger-to-nose normal  Rapid alternating movement normal  Heel-to-shin normal   Left: Finger-to-nose normal  Rapid alternating movement normal  Heel-to-shin normal     Gait  Casual gait is normal including stance, stride, and arm swing  Normal toe walking  Normal heel walking  Normal tandem gait  Romberg is absent  Able to rise from chair without using arms  ROS:    Review of Systems   Constitutional: Negative  Negative for appetite change and fever  HENT: Negative    Negative for hearing loss, tinnitus, trouble swallowing and voice change  Eyes: Negative  Negative for photophobia and pain  Respiratory: Negative  Negative for shortness of breath  Cardiovascular: Negative  Negative for palpitations  Gastrointestinal: Negative  Negative for nausea and vomiting  Endocrine: Negative  Negative for cold intolerance  Genitourinary: Negative  Negative for dysuria, frequency and urgency  Musculoskeletal: Negative  Negative for myalgias and neck pain  Skin: Negative  Negative for rash  Neurological: Positive for headaches  Negative for dizziness, tremors, seizures, syncope, facial asymmetry, speech difficulty, weakness, light-headedness and numbness  Hematological: Negative  Does not bruise/bleed easily  Psychiatric/Behavioral: Negative  Negative for confusion, hallucinations and sleep disturbance  ROS reviewed and discussed with the patient

## 2021-09-22 ENCOUNTER — OFFICE VISIT (OUTPATIENT)
Dept: AUDIOLOGY | Facility: CLINIC | Age: 71
End: 2021-09-22
Payer: MEDICARE

## 2021-09-22 DIAGNOSIS — H90.3 SENSORY HEARING LOSS, BILATERAL: Primary | ICD-10-CM

## 2021-09-22 DIAGNOSIS — H91.93 BILATERAL HEARING LOSS, UNSPECIFIED HEARING LOSS TYPE: ICD-10-CM

## 2021-09-22 PROCEDURE — 92567 TYMPANOMETRY: CPT | Performed by: AUDIOLOGIST

## 2021-09-22 PROCEDURE — 92557 COMPREHENSIVE HEARING TEST: CPT | Performed by: AUDIOLOGIST

## 2021-09-22 NOTE — PROGRESS NOTES
HEARING EVALUATION    Name:  Rai Bethea  :  1950  Age:  66 y o  Date of Evaluation: 21     History: Difficulty Understanding  Reason for visit: Rai Bethea is being seen today at the request of Dr Mayela Robertson for an evaluation of hearing  Patient reports difficulty hearing and "mixing up" words  Patient denies tinnitus, otalgia, aural fullness and any episodes of dizziness  Mrs Rehan Prieto reports that she has been diagnosed with chronic rhinitis but does not take medication for this regularly  She does take 2 water pills and cholesterol medication daily  Mrs Rehan Prieto also reports that she had thyroid surgery in   EVALUATION:    Otoscopic Evaluation:   Right Ear: Moderate cerumen, very dark and partially occluding   Left Ear: Clear and healthy ear canal and tympanic membrane     Mrs Rehan Prieto reported that she saw Dr Dominick Yip yesterday for ear cleaning  Tympanometry:   Right: Type A - normal middle ear pressure and compliance despite the  partially occluding cerumen   Left: Type A - normal middle ear pressure and compliance    Audiogram Results:  Pure tone testing revealed an essentially symmetrical normal sloping to severe sensorineural hearing loss bilaterally  SRT and PTA are in agreement indicating good test reliability  Word recognition scores were excellent bilaterally  Please note that the cerumen did not interfere with the audiologic evaluation  This degree of hearing loss can interfere with Mrs Mercie Bernheim ability to hear and understand in some listening environments  Information about hearing aids was given to Mrs Rehan Prieto but she it was decided that she could delay getting fit with hearing aids at this time  *see attached audiogram      RECOMMENDATIONS:  Annual hearing evaluation to monitor hearing sensitivity  Return to ENT for cerumen removal     PATIENT EDUCATION:   These results and recommendations were discussed with Rai Bethea    Questions were addressed and the patient was encouraged to contact our department should concerns arise        Bridget Gonzalez, Deepti , [address] [place] #[id number]

## 2021-10-05 ENCOUNTER — EVALUATION (OUTPATIENT)
Dept: PHYSICAL THERAPY | Facility: CLINIC | Age: 71
End: 2021-10-05
Payer: MEDICARE

## 2021-10-05 DIAGNOSIS — M25.561 RIGHT KNEE PAIN, UNSPECIFIED CHRONICITY: ICD-10-CM

## 2021-10-05 DIAGNOSIS — R26.89 POOR BALANCE: ICD-10-CM

## 2021-10-05 DIAGNOSIS — M17.11 ARTHRITIS OF KNEE, RIGHT: Primary | ICD-10-CM

## 2021-10-05 DIAGNOSIS — M22.2X1 PATELLOFEMORAL SYNDROME, RIGHT: ICD-10-CM

## 2021-10-05 PROCEDURE — 97161 PT EVAL LOW COMPLEX 20 MIN: CPT

## 2021-10-11 ENCOUNTER — OFFICE VISIT (OUTPATIENT)
Dept: PHYSICAL THERAPY | Facility: CLINIC | Age: 71
End: 2021-10-11
Payer: MEDICARE

## 2021-10-11 DIAGNOSIS — M25.561 RIGHT KNEE PAIN, UNSPECIFIED CHRONICITY: ICD-10-CM

## 2021-10-11 DIAGNOSIS — R26.89 POOR BALANCE: ICD-10-CM

## 2021-10-11 DIAGNOSIS — M17.11 ARTHRITIS OF KNEE, RIGHT: ICD-10-CM

## 2021-10-11 DIAGNOSIS — M22.2X1 PATELLOFEMORAL SYNDROME, RIGHT: Primary | ICD-10-CM

## 2021-10-11 PROCEDURE — 97110 THERAPEUTIC EXERCISES: CPT

## 2021-10-13 ENCOUNTER — OFFICE VISIT (OUTPATIENT)
Dept: PHYSICAL THERAPY | Facility: CLINIC | Age: 71
End: 2021-10-13
Payer: MEDICARE

## 2021-10-13 DIAGNOSIS — R26.89 POOR BALANCE: ICD-10-CM

## 2021-10-13 DIAGNOSIS — M17.11 ARTHRITIS OF KNEE, RIGHT: ICD-10-CM

## 2021-10-13 DIAGNOSIS — M25.561 RIGHT KNEE PAIN, UNSPECIFIED CHRONICITY: Primary | ICD-10-CM

## 2021-10-13 PROCEDURE — 97110 THERAPEUTIC EXERCISES: CPT

## 2021-10-13 PROCEDURE — 97112 NEUROMUSCULAR REEDUCATION: CPT

## 2021-10-18 ENCOUNTER — OFFICE VISIT (OUTPATIENT)
Dept: PHYSICAL THERAPY | Facility: CLINIC | Age: 71
End: 2021-10-18
Payer: MEDICARE

## 2021-10-18 DIAGNOSIS — M17.11 ARTHRITIS OF KNEE, RIGHT: ICD-10-CM

## 2021-10-18 DIAGNOSIS — R26.89 POOR BALANCE: ICD-10-CM

## 2021-10-18 DIAGNOSIS — M25.561 RIGHT KNEE PAIN, UNSPECIFIED CHRONICITY: Primary | ICD-10-CM

## 2021-10-18 PROCEDURE — 97112 NEUROMUSCULAR REEDUCATION: CPT

## 2021-10-18 PROCEDURE — 97110 THERAPEUTIC EXERCISES: CPT

## 2021-10-20 ENCOUNTER — OFFICE VISIT (OUTPATIENT)
Dept: PHYSICAL THERAPY | Facility: CLINIC | Age: 71
End: 2021-10-20
Payer: MEDICARE

## 2021-10-20 DIAGNOSIS — M25.561 RIGHT KNEE PAIN, UNSPECIFIED CHRONICITY: Primary | ICD-10-CM

## 2021-10-20 DIAGNOSIS — R26.89 POOR BALANCE: ICD-10-CM

## 2021-10-20 DIAGNOSIS — M17.11 ARTHRITIS OF KNEE, RIGHT: ICD-10-CM

## 2021-10-20 PROCEDURE — 97110 THERAPEUTIC EXERCISES: CPT

## 2021-10-25 ENCOUNTER — OFFICE VISIT (OUTPATIENT)
Dept: PHYSICAL THERAPY | Facility: CLINIC | Age: 71
End: 2021-10-25
Payer: MEDICARE

## 2021-10-25 DIAGNOSIS — M17.11 ARTHRITIS OF KNEE, RIGHT: ICD-10-CM

## 2021-10-25 DIAGNOSIS — M54.16 LUMBAR RADICULOPATHY: ICD-10-CM

## 2021-10-25 DIAGNOSIS — M25.561 RIGHT KNEE PAIN, UNSPECIFIED CHRONICITY: Primary | ICD-10-CM

## 2021-10-25 DIAGNOSIS — R26.89 POOR BALANCE: ICD-10-CM

## 2021-10-25 PROCEDURE — 97110 THERAPEUTIC EXERCISES: CPT

## 2021-10-25 PROCEDURE — 97112 NEUROMUSCULAR REEDUCATION: CPT

## 2021-10-27 ENCOUNTER — OFFICE VISIT (OUTPATIENT)
Dept: PHYSICAL THERAPY | Facility: CLINIC | Age: 71
End: 2021-10-27
Payer: MEDICARE

## 2021-10-27 DIAGNOSIS — R26.89 POOR BALANCE: ICD-10-CM

## 2021-10-27 DIAGNOSIS — M17.11 ARTHRITIS OF KNEE, RIGHT: ICD-10-CM

## 2021-10-27 DIAGNOSIS — M25.561 RIGHT KNEE PAIN, UNSPECIFIED CHRONICITY: Primary | ICD-10-CM

## 2021-10-27 DIAGNOSIS — M22.2X1 PATELLOFEMORAL SYNDROME, RIGHT: ICD-10-CM

## 2021-10-27 DIAGNOSIS — M54.16 LUMBAR RADICULOPATHY: ICD-10-CM

## 2021-10-27 PROCEDURE — 97110 THERAPEUTIC EXERCISES: CPT

## 2021-11-01 ENCOUNTER — OFFICE VISIT (OUTPATIENT)
Dept: PHYSICAL THERAPY | Facility: CLINIC | Age: 71
End: 2021-11-01
Payer: MEDICARE

## 2021-11-01 DIAGNOSIS — M54.16 LUMBAR RADICULOPATHY: ICD-10-CM

## 2021-11-01 DIAGNOSIS — M25.561 RIGHT KNEE PAIN, UNSPECIFIED CHRONICITY: Primary | ICD-10-CM

## 2021-11-01 DIAGNOSIS — R26.89 POOR BALANCE: ICD-10-CM

## 2021-11-01 DIAGNOSIS — M17.11 ARTHRITIS OF KNEE, RIGHT: ICD-10-CM

## 2021-11-01 PROCEDURE — 97110 THERAPEUTIC EXERCISES: CPT

## 2021-11-01 PROCEDURE — 97112 NEUROMUSCULAR REEDUCATION: CPT

## 2021-11-03 ENCOUNTER — OFFICE VISIT (OUTPATIENT)
Dept: PHYSICAL THERAPY | Facility: CLINIC | Age: 71
End: 2021-11-03
Payer: MEDICARE

## 2021-11-03 DIAGNOSIS — R26.89 POOR BALANCE: ICD-10-CM

## 2021-11-03 DIAGNOSIS — M17.11 ARTHRITIS OF KNEE, RIGHT: ICD-10-CM

## 2021-11-03 DIAGNOSIS — M25.561 RIGHT KNEE PAIN, UNSPECIFIED CHRONICITY: ICD-10-CM

## 2021-11-03 DIAGNOSIS — M22.2X1 PATELLOFEMORAL SYNDROME, RIGHT: Primary | ICD-10-CM

## 2021-11-03 DIAGNOSIS — M54.16 LUMBAR RADICULOPATHY: ICD-10-CM

## 2021-11-03 PROCEDURE — 97110 THERAPEUTIC EXERCISES: CPT

## 2021-11-03 PROCEDURE — 97112 NEUROMUSCULAR REEDUCATION: CPT

## 2021-11-08 ENCOUNTER — OFFICE VISIT (OUTPATIENT)
Dept: PHYSICAL THERAPY | Facility: CLINIC | Age: 71
End: 2021-11-08
Payer: MEDICARE

## 2021-11-08 DIAGNOSIS — M25.561 RIGHT KNEE PAIN, UNSPECIFIED CHRONICITY: ICD-10-CM

## 2021-11-08 DIAGNOSIS — M22.2X1 PATELLOFEMORAL SYNDROME, RIGHT: Primary | ICD-10-CM

## 2021-11-08 DIAGNOSIS — R26.89 POOR BALANCE: ICD-10-CM

## 2021-11-08 DIAGNOSIS — M17.11 ARTHRITIS OF KNEE, RIGHT: ICD-10-CM

## 2021-11-08 DIAGNOSIS — M54.16 LUMBAR RADICULOPATHY: ICD-10-CM

## 2021-11-08 PROCEDURE — 97112 NEUROMUSCULAR REEDUCATION: CPT

## 2021-11-08 PROCEDURE — 97110 THERAPEUTIC EXERCISES: CPT

## 2021-11-10 ENCOUNTER — EVALUATION (OUTPATIENT)
Dept: PHYSICAL THERAPY | Facility: CLINIC | Age: 71
End: 2021-11-10
Payer: MEDICARE

## 2021-11-10 DIAGNOSIS — M22.2X1 PATELLOFEMORAL SYNDROME, RIGHT: ICD-10-CM

## 2021-11-10 DIAGNOSIS — M25.561 RIGHT KNEE PAIN, UNSPECIFIED CHRONICITY: ICD-10-CM

## 2021-11-10 DIAGNOSIS — R26.89 POOR BALANCE: Primary | ICD-10-CM

## 2021-11-10 DIAGNOSIS — M54.16 LUMBAR RADICULOPATHY: ICD-10-CM

## 2021-11-10 DIAGNOSIS — M17.11 ARTHRITIS OF KNEE, RIGHT: ICD-10-CM

## 2021-11-10 PROCEDURE — 97112 NEUROMUSCULAR REEDUCATION: CPT

## 2021-11-10 PROCEDURE — 97110 THERAPEUTIC EXERCISES: CPT

## 2021-11-15 ENCOUNTER — OFFICE VISIT (OUTPATIENT)
Dept: PHYSICAL THERAPY | Facility: CLINIC | Age: 71
End: 2021-11-15
Payer: MEDICARE

## 2021-11-15 DIAGNOSIS — M54.16 LUMBAR RADICULOPATHY: ICD-10-CM

## 2021-11-15 DIAGNOSIS — M17.11 ARTHRITIS OF KNEE, RIGHT: ICD-10-CM

## 2021-11-15 DIAGNOSIS — M25.561 RIGHT KNEE PAIN, UNSPECIFIED CHRONICITY: ICD-10-CM

## 2021-11-15 DIAGNOSIS — R26.89 POOR BALANCE: Primary | ICD-10-CM

## 2021-11-15 DIAGNOSIS — M22.2X1 PATELLOFEMORAL SYNDROME, RIGHT: ICD-10-CM

## 2021-11-15 PROCEDURE — 97110 THERAPEUTIC EXERCISES: CPT | Performed by: PHYSICAL THERAPIST

## 2021-11-15 PROCEDURE — 97112 NEUROMUSCULAR REEDUCATION: CPT | Performed by: PHYSICAL THERAPIST

## 2021-11-17 ENCOUNTER — OFFICE VISIT (OUTPATIENT)
Dept: PHYSICAL THERAPY | Facility: CLINIC | Age: 71
End: 2021-11-17
Payer: MEDICARE

## 2021-11-17 DIAGNOSIS — M54.16 LUMBAR RADICULOPATHY: Primary | ICD-10-CM

## 2021-11-17 DIAGNOSIS — R26.89 POOR BALANCE: ICD-10-CM

## 2021-11-17 DIAGNOSIS — M25.561 RIGHT KNEE PAIN, UNSPECIFIED CHRONICITY: ICD-10-CM

## 2021-11-17 PROCEDURE — 97112 NEUROMUSCULAR REEDUCATION: CPT

## 2021-11-17 PROCEDURE — 97110 THERAPEUTIC EXERCISES: CPT

## 2021-11-22 ENCOUNTER — OFFICE VISIT (OUTPATIENT)
Dept: PHYSICAL THERAPY | Facility: CLINIC | Age: 71
End: 2021-11-22
Payer: MEDICARE

## 2021-11-22 DIAGNOSIS — M54.16 LUMBAR RADICULOPATHY: Primary | ICD-10-CM

## 2021-11-22 DIAGNOSIS — M25.561 RIGHT KNEE PAIN, UNSPECIFIED CHRONICITY: ICD-10-CM

## 2021-11-22 DIAGNOSIS — R26.89 POOR BALANCE: ICD-10-CM

## 2021-11-22 DIAGNOSIS — M22.2X1 PATELLOFEMORAL SYNDROME, RIGHT: ICD-10-CM

## 2021-11-22 DIAGNOSIS — M17.11 ARTHRITIS OF KNEE, RIGHT: ICD-10-CM

## 2021-11-22 PROCEDURE — 97110 THERAPEUTIC EXERCISES: CPT | Performed by: PHYSICAL THERAPIST

## 2021-11-22 PROCEDURE — 97112 NEUROMUSCULAR REEDUCATION: CPT | Performed by: PHYSICAL THERAPIST

## 2021-11-24 ENCOUNTER — OFFICE VISIT (OUTPATIENT)
Dept: PHYSICAL THERAPY | Facility: CLINIC | Age: 71
End: 2021-11-24
Payer: MEDICARE

## 2021-11-24 DIAGNOSIS — M22.2X1 PATELLOFEMORAL SYNDROME, RIGHT: ICD-10-CM

## 2021-11-24 DIAGNOSIS — M17.11 ARTHRITIS OF KNEE, RIGHT: Primary | ICD-10-CM

## 2021-11-24 DIAGNOSIS — M25.561 RIGHT KNEE PAIN, UNSPECIFIED CHRONICITY: ICD-10-CM

## 2021-11-24 DIAGNOSIS — M54.16 LUMBAR RADICULOPATHY: ICD-10-CM

## 2021-11-24 DIAGNOSIS — R26.89 POOR BALANCE: ICD-10-CM

## 2021-11-24 PROCEDURE — 97112 NEUROMUSCULAR REEDUCATION: CPT

## 2021-11-29 ENCOUNTER — OFFICE VISIT (OUTPATIENT)
Dept: PHYSICAL THERAPY | Facility: CLINIC | Age: 71
End: 2021-11-29
Payer: MEDICARE

## 2021-11-29 DIAGNOSIS — R26.89 POOR BALANCE: ICD-10-CM

## 2021-11-29 DIAGNOSIS — M54.16 LUMBAR RADICULOPATHY: ICD-10-CM

## 2021-11-29 DIAGNOSIS — M17.11 ARTHRITIS OF KNEE, RIGHT: Primary | ICD-10-CM

## 2021-11-29 DIAGNOSIS — M25.561 RIGHT KNEE PAIN, UNSPECIFIED CHRONICITY: ICD-10-CM

## 2021-11-29 PROCEDURE — 97112 NEUROMUSCULAR REEDUCATION: CPT

## 2021-12-01 ENCOUNTER — OFFICE VISIT (OUTPATIENT)
Dept: PHYSICAL THERAPY | Facility: CLINIC | Age: 71
End: 2021-12-01
Payer: MEDICARE

## 2021-12-01 DIAGNOSIS — M17.11 ARTHRITIS OF KNEE, RIGHT: Primary | ICD-10-CM

## 2021-12-01 DIAGNOSIS — M54.16 LUMBAR RADICULOPATHY: ICD-10-CM

## 2021-12-01 DIAGNOSIS — R26.89 POOR BALANCE: ICD-10-CM

## 2021-12-01 DIAGNOSIS — M25.561 RIGHT KNEE PAIN, UNSPECIFIED CHRONICITY: ICD-10-CM

## 2021-12-01 DIAGNOSIS — M22.2X1 PATELLOFEMORAL SYNDROME, RIGHT: ICD-10-CM

## 2021-12-01 PROCEDURE — 97112 NEUROMUSCULAR REEDUCATION: CPT

## 2021-12-01 PROCEDURE — 97110 THERAPEUTIC EXERCISES: CPT

## 2022-01-04 ENCOUNTER — OFFICE VISIT (OUTPATIENT)
Dept: CARDIOLOGY CLINIC | Facility: CLINIC | Age: 72
End: 2022-01-04
Payer: MEDICARE

## 2022-01-04 VITALS
TEMPERATURE: 97.8 F | BODY MASS INDEX: 24.5 KG/M2 | WEIGHT: 175 LBS | SYSTOLIC BLOOD PRESSURE: 128 MMHG | HEIGHT: 71 IN | OXYGEN SATURATION: 98 % | DIASTOLIC BLOOD PRESSURE: 75 MMHG | HEART RATE: 56 BPM

## 2022-01-04 DIAGNOSIS — E04.2 MULTINODULAR GOITER: ICD-10-CM

## 2022-01-04 DIAGNOSIS — E78.5 DYSLIPIDEMIA: ICD-10-CM

## 2022-01-04 DIAGNOSIS — R00.2 INTERMITTENT PALPITATIONS: ICD-10-CM

## 2022-01-04 DIAGNOSIS — I10 ESSENTIAL HYPERTENSION: Primary | ICD-10-CM

## 2022-01-04 PROCEDURE — 99214 OFFICE O/P EST MOD 30 MIN: CPT | Performed by: INTERNAL MEDICINE

## 2022-01-04 PROCEDURE — 93000 ELECTROCARDIOGRAM COMPLETE: CPT | Performed by: INTERNAL MEDICINE

## 2022-01-04 NOTE — PATIENT INSTRUCTIONS
1  Please get COVID vaccine booster shots as soon as possible  2  Continue current medication  3  Cardiology follow-up approximately six months with lipid panel and CMP , as well as EKG  Lipid panel and CMP have already been ordered by primary care physician

## 2022-01-04 NOTE — PROGRESS NOTES
Office Cardiology Progress Note  Ashanti Vann 66 y o  female MRN: [id number]  01/04/22  9:50 AM      ASSESSMENT:    1  Improved chronic exertional dyspnea with stair climbing but not with exercise at [facility], probably related to progressive overweight with 8 pound weight gain over the past 9 years  Has lost 5 lbs in the past 1+ year  2   Controlled  essential hypertension with history of white coat phenomenon and latest home blood pressure averaging 131/72 6 months ago  3  Controlled dyslipidemia with atorvastatin  4  History of normal exercise stress test 10/13/15 and benign echocardiogram on 5/1/13   5  History of  now suppressed chronic nonproductive cough, possibly caused by GERD, with patient currently off Pepcid for 15+ months  Sol Eriberto has recently been less frequent  6  Resolved hypercalcemia with parathyroidectomy 11/30/11   7  Status post nasal polypectomy October, 2012 and [facility]  October, 2012 with right cataract extraction 9/13  8  Chronic stable multinodular goiter with latest thyroid ultrasound 5/30/17, followed by Dr Villafana  9   Resolved chronic kidney disease, stage IIIa    Current estimated GFR is 65  10  Chronic fatigue and tiredness with consideration of adverse drug reaction to verapamil, but more likely related to inadequate sleep duration  11  Osteoarthritis of both knees, right more than left  12  Osteopenia, stable, based on DEXA scan 07/21/2020  Plan       Patient Instructions     1  Please get COVID vaccine booster shots as soon as possible  2  Continue current medication  3  Cardiology follow-up approximately six months with lipid panel and CMP , as well as EKG  Lipid panel and CMP have already been ordered by primary care physician  HPI    This 66 y o  female  denies new cardiopulmonary and medical symptoms      She complains of tiredness, which she attributes to inadequate sleep duration, going to bed at 11:00 p m  or 12 midnight and getting up at 4:15 a m  in the morning in order to get to [facility] for 5:00 a m  workouts four or five days a week  She is fully vaccinated against COVID-19 but she and her  have not yet received booster shots  The patient is being seen in follow-up of the below listed diagnoses  Encounter Diagnoses   Name Primary?     Essential hypertension Yes    Dyslipidemia     Multinodular goiter     Intermittent palpitations         Review of Systems    All other systems negative, except as noted in history of present illness    Historical Information   Past Medical History:   Diagnosis Date    Arthritis     Chronic kidney disease     stage 3    Hyperlipidemia     borderline    Hypertension     borderline    Migraine     Migraine     controlled with verapamil    Osteopenia     Snores      Past Surgical History:   Procedure Laterality Date    BREAST BIOPSY Right     benign ~ 1990s    BUNIONECTOMY Left     3 hammertoes, bunion    CATARACT EXTRACTION Bilateral 2012    COLONOSCOPY      PARATHYROID GLAND SURGERY Right     front gland removed     Social History     Substance and Sexual Activity   Alcohol Use No     Social History     Substance and Sexual Activity   Drug Use No     Social History     Tobacco Use   Smoking Status Never Smoker   Smokeless Tobacco Never Used       Family History:  Family History   Problem Relation Age of Onset    Angina Mother     Heart failure Mother     Prostate cancer Father     Cancer Father         prostate    Diabetes Sister     Sudden death Sister     Osteoporosis Sister     Heart disease Sister         MI    Hypertension Brother     Cancer Brother         lung    No Known Problems Son     Hypertension Daughter     Diabetes Brother     Cancer Brother         prostate    COPD Sister     Kidney disease Sister     Heart disease Sister         CHF    Lung cancer Maternal Uncle     Breast cancer Cousin 48         Meds/Allergies     Prior to Admission medications Medication Sig Start Date End Date Taking?  Authorizing Provider   Ascorbic Acid (vitamin C) 1000 MG tablet Take 1,000 mg by mouth daily   Yes Historical Provider, MD   atorvastatin (LIPITOR) 20 mg tablet Take 1 tablet (20 mg total) by mouth daily 3/10/21  Yes Syeda Catherine MD   B Complex Vitamins (B COMPLEX PO) Take 1 tablet by mouth daily   Yes Historical Provider, MD   Black Pepper-Turmeric (TURMERIC COMPLEX/BLACK PEPPER PO) Take by mouth daily 600 mg turmeric, 5 mg black pepper   Yes Historical Provider, MD   calcium carbonate (OS-FLORIAN) 600 MG tablet Take one tablet daily 9/16/20  Yes Tiffanie Nelson MD   chlorthalidone 25 mg tablet TAKE 1 TABLET BY MOUTH 4 DAYS A WEEK MON, WED, FRI, SUNDAY 9/13/21  Yes Syeda Catherine MD   Cholecalciferol (VITAMIN D3) 5000 units CAPS Take 1 capsule by mouth Daily   Yes Historical Provider, MD   Coenzyme Q10 (Co Q 10) 100 MG CAPS Take by mouth every morning   Yes Historical Provider, MD   ELDERBERRY PO Take by mouth every morning    Yes Historical Provider, MD   Ferrous Sulfate (IRON PO) Take 325 mg by mouth daily   Yes Historical Provider, MD   magnesium oxide (MAG-OX) 400 mg tablet Take 1 tablet by mouth daily With zinc  12/2/10  Yes Historical Provider, MD   Misc Natural Products (GLUCOSAMINE CHOND COMPLEX/MSM PO) Take 1 tablet by mouth Daily 1000 mg    Yes Historical Provider, MD   Multiple Vitamins-Minerals (PRESERVISION AREDS) CAPS Take 1 capsule by mouth daily 10/18/17  Yes Historical Provider, MD   Omega 3 1200 MG CAPS Take by mouth every morning   Yes Historical Provider, MD   Probiotic Product (PROBIOTIC-10 PO) Take 1 capsule by mouth daily   Yes Historical Provider, MD   spironolactone (ALDACTONE) 25 mg tablet Take 1 tablet (25 mg total) by mouth daily  Patient taking differently: Take 25 mg by mouth every morning  3/10/21  Yes Syeda Catherine MD   verapamil (CALAN-SR) 120 mg CR tablet Take 1 tablet (120 mg total) by mouth 2 (two) times a day 8/5/21  Yes Landy Arteaga JANETT Hayes   Zinc Sulfate (ZINC 15 PO) Take by mouth daily With copper 1mg   Yes Historical Provider, MD   Ascorbic Acid (VITAMIN C) 500 MG CAPS Take 1 tablet by mouth daily  Patient not taking: Reported on 9/20/2021 10/18/17 1/4/22  Historical Provider, MD       No Known Allergies      Vitals:    01/04/22 0851   BP: 128/75   BP Location: Left arm   Patient Position: Sitting   Cuff Size: Standard   Pulse: 56   Temp: 97 8 °F (36 6 °C)   SpO2: 98%   Weight: 79 4 kg (175 lb)   Height: 5' 10 5" (1 791 m)       Body mass index is 24 75 kg/m²  3 pound weight loss in approximately 5+ months and 5 pound weight loss in approximately 1+ year      Physical Exam:    General Appearance:  Alert, cooperative, no distress, appears stated age   Head:  Normocephalic, without obvious abnormality, atraumatic   Eyes:  PERRL, conjunctiva/corneas clear, EOM's intact,   both eyes   Ears:  Normal TM's and external ear canals, both ears   Nose: Nares normal, septum midline, mucosa normal, no drainage or sinus tenderness   Throat: Lips, mucosa, and tongue normal; teeth and gums normal   Neck: Supple, symmetrical, trachea midline, no adenopathy, thyroid: not enlarged, symmetric, no tenderness/mass/nodules, no carotid bruit or JVD   Back:   Symmetric, no curvature, ROM normal, no CVA tenderness   Lungs:   Clear to auscultation bilaterally, respirations unlabored   Chest Wall:  No tenderness or deformity   Heart:  Regular rate and rhythm, S1, S2 normal, no murmur, rub or gallop   Abdomen:   Soft, non-tender, bowel sounds active all four quadrants,  no masses, no organomegaly   Extremities: Extremities normal, atraumatic, no cyanosis or edema   Pulses: 1+ and symmetric   Skin: Skin showed normal color, texture, turgor and no rashes or lesions   Lymph nodes: Cervical, supraclavicular, and axillary nodes normal   Neurologic: Normal         Cardiographics    ECG 01/04/22:    Sinus bradycardia at 56 bpm   Septal Q-waves   Otherwise normal ECG, unchanged from 07/28/2021  IMAGING:    XR chest pa & lateral    Result Date: 2/18/2021  Impression No acute cardiopulmonary disease   Workstation performed: [id number]             LAB REVIEW:      Lab Results   Component Value Date    SODIUM 136 07/21/2021    K 3 7 07/21/2021     07/21/2021    CO2 27 07/21/2021    ANIONGAP 11 7 10/09/2015    BUN 20 07/21/2021    CREATININE 0 89 07/21/2021    GLUCOSE 94 01/03/2017    GLUF 92 07/21/2021    CALCIUM 9 8 07/21/2021    AST 17 07/21/2021    ALT 31 07/21/2021    ALKPHOS 40 (L) 07/21/2021    PROT 6 5 01/03/2017    BILITOT 0 3 01/03/2017    EGFR 65 07/21/2021     Lab Results   Component Value Date    CHOLESTEROL 133 07/21/2021    CHOLESTEROL 117 12/23/2020    CHOLESTEROL 113 09/24/2020     Lab Results   Component Value Date    HDL 60 07/21/2021    HDL 49 12/23/2020    HDL 54 09/24/2020       Lab Results   Component Value Date    LDLCALC 61 07/21/2021    LDLCALC 44 12/23/2020    LDLCALC 36 09/24/2020     No components found for: [facility]  Lab Results   Component Value Date    TRIG 60 07/21/2021    TRIG 118 12/23/2020    TRIG 115 09/24/2020               Mario Hood MD

## 2022-01-24 ENCOUNTER — APPOINTMENT (OUTPATIENT)
Dept: LAB | Facility: CLINIC | Age: 72
End: 2022-01-24
Payer: MEDICARE

## 2022-01-24 DIAGNOSIS — E78.5 HYPERLIPIDEMIA, UNSPECIFIED HYPERLIPIDEMIA TYPE: ICD-10-CM

## 2022-01-24 DIAGNOSIS — I10 ESSENTIAL HYPERTENSION: ICD-10-CM

## 2022-01-24 DIAGNOSIS — G43.709 CHRONIC MIGRAINE WITHOUT AURA WITHOUT STATUS MIGRAINOSUS, NOT INTRACTABLE: ICD-10-CM

## 2022-01-24 LAB
ALBUMIN SERPL BCP-MCNC: 3.9 G/DL (ref 3.5–5)
ALP SERPL-CCNC: 47 U/L (ref 46–116)
ALT SERPL W P-5'-P-CCNC: 24 U/L (ref 12–78)
ANION GAP SERPL CALCULATED.3IONS-SCNC: 3 MMOL/L (ref 4–13)
AST SERPL W P-5'-P-CCNC: 12 U/L (ref 5–45)
BILIRUB SERPL-MCNC: 0.38 MG/DL (ref 0.2–1)
BUN SERPL-MCNC: 21 MG/DL (ref 5–25)
CALCIUM SERPL-MCNC: 9.2 MG/DL (ref 8.3–10.1)
CHLORIDE SERPL-SCNC: 107 MMOL/L (ref 100–108)
CHOLEST SERPL-MCNC: 109 MG/DL
CO2 SERPL-SCNC: 30 MMOL/L (ref 21–32)
CREAT SERPL-MCNC: 0.88 MG/DL (ref 0.6–1.3)
ERYTHROCYTE [DISTWIDTH] IN BLOOD BY AUTOMATED COUNT: 12.4 % (ref 11.6–15.1)
GFR SERPL CREATININE-BSD FRML MDRD: 65 ML/MIN/1.73SQ M
GLUCOSE P FAST SERPL-MCNC: 88 MG/DL (ref 65–99)
HCT VFR BLD AUTO: 43.7 % (ref 34.8–46.1)
HDLC SERPL-MCNC: 50 MG/DL
HGB BLD-MCNC: 14.1 G/DL (ref 11.5–15.4)
LDLC SERPL CALC-MCNC: 44 MG/DL (ref 0–100)
MCH RBC QN AUTO: 27.9 PG (ref 26.8–34.3)
MCHC RBC AUTO-ENTMCNC: 32.3 G/DL (ref 31.4–37.4)
MCV RBC AUTO: 87 FL (ref 82–98)
PLATELET # BLD AUTO: 270 THOUSANDS/UL (ref 149–390)
PMV BLD AUTO: 10.1 FL (ref 8.9–12.7)
POTASSIUM SERPL-SCNC: 3.7 MMOL/L (ref 3.5–5.3)
PROT SERPL-MCNC: 7.3 G/DL (ref 6.4–8.2)
RBC # BLD AUTO: 5.05 MILLION/UL (ref 3.81–5.12)
SODIUM SERPL-SCNC: 140 MMOL/L (ref 136–145)
TRIGL SERPL-MCNC: 77 MG/DL
WBC # BLD AUTO: 7.14 THOUSAND/UL (ref 4.31–10.16)

## 2022-01-24 PROCEDURE — 85027 COMPLETE CBC AUTOMATED: CPT

## 2022-01-24 PROCEDURE — 80061 LIPID PANEL: CPT

## 2022-01-24 PROCEDURE — 36415 COLL VENOUS BLD VENIPUNCTURE: CPT

## 2022-01-24 PROCEDURE — 80053 COMPREHEN METABOLIC PANEL: CPT

## 2022-01-24 NOTE — TELEPHONE ENCOUNTER
Medication refill requested for verapamil 120 mg tablets    Last refilled on 08/05/21  L  O V 09/20/21  Medication pended

## 2022-02-07 DIAGNOSIS — I10 ESSENTIAL HYPERTENSION: ICD-10-CM

## 2022-02-07 RX ORDER — SPIRONOLACTONE 25 MG/1
25 TABLET ORAL EVERY MORNING
Qty: 90 TABLET | Refills: 3 | Status: SHIPPED | OUTPATIENT
Start: 2022-02-07

## 2022-02-15 ENCOUNTER — OFFICE VISIT (OUTPATIENT)
Dept: FAMILY MEDICINE CLINIC | Facility: CLINIC | Age: 72
End: 2022-02-15
Payer: MEDICARE

## 2022-02-15 ENCOUNTER — TELEPHONE (OUTPATIENT)
Dept: ADMINISTRATIVE | Facility: HOSPITAL | Age: 72
End: 2022-02-15

## 2022-02-15 VITALS
BODY MASS INDEX: 24.22 KG/M2 | DIASTOLIC BLOOD PRESSURE: 70 MMHG | RESPIRATION RATE: 16 BRPM | HEART RATE: 56 BPM | WEIGHT: 173 LBS | HEIGHT: 71 IN | TEMPERATURE: 97 F | SYSTOLIC BLOOD PRESSURE: 138 MMHG

## 2022-02-15 DIAGNOSIS — E78.5 HYPERLIPIDEMIA, UNSPECIFIED HYPERLIPIDEMIA TYPE: ICD-10-CM

## 2022-02-15 DIAGNOSIS — Z12.31 SCREENING MAMMOGRAM, ENCOUNTER FOR: ICD-10-CM

## 2022-02-15 DIAGNOSIS — Z00.00 MEDICARE ANNUAL WELLNESS VISIT, SUBSEQUENT: Primary | ICD-10-CM

## 2022-02-15 DIAGNOSIS — Z13.0 SCREENING FOR DEFICIENCY ANEMIA: ICD-10-CM

## 2022-02-15 DIAGNOSIS — E21.3 HYPERPARATHYROIDISM (HCC): ICD-10-CM

## 2022-02-15 PROCEDURE — G0439 PPPS, SUBSEQ VISIT: HCPCS | Performed by: FAMILY MEDICINE

## 2022-02-15 PROCEDURE — 1123F ACP DISCUSS/DSCN MKR DOCD: CPT | Performed by: FAMILY MEDICINE

## 2022-02-15 NOTE — TELEPHONE ENCOUNTER
Left message for pt regarding mammo  Requested pt call back if she would like me to schedule her mammo for her

## 2022-02-15 NOTE — PATIENT INSTRUCTIONS
Recent Results (from the past 672 hour(s))   CBC    Collection Time: 01/24/22  9:04 AM   Result Value Ref Range    WBC 7 14 4 31 - 10 16 Thousand/uL    RBC 5 05 3 81 - 5 12 Million/uL    Hemoglobin 14 1 11 5 - 15 4 g/dL    Hematocrit 43 7 34 8 - 46 1 %    MCV 87 82 - 98 fL    MCH 27 9 26 8 - 34 3 pg    MCHC 32 3 31 4 - 37 4 g/dL    RDW 12 4 11 6 - 15 1 %    Platelets 483 937 - 481 Thousands/uL    MPV 10 1 8 9 - 12 7 fL   Comprehensive metabolic panel    Collection Time: 01/24/22  9:04 AM   Result Value Ref Range    Sodium 140 136 - 145 mmol/L    Potassium 3 7 3 5 - 5 3 mmol/L    Chloride 107 100 - 108 mmol/L    CO2 30 21 - 32 mmol/L    ANION GAP 3 (L) 4 - 13 mmol/L    BUN 21 5 - 25 mg/dL    Creatinine 0 88 0 60 - 1 30 mg/dL    Glucose, Fasting 88 65 - 99 mg/dL    Calcium 9 2 8 3 - 10 1 mg/dL    AST 12 5 - 45 U/L    ALT 24 12 - 78 U/L    Alkaline Phosphatase 47 46 - 116 U/L    Total Protein 7 3 6 4 - 8 2 g/dL    Albumin 3 9 3 5 - 5 0 g/dL    Total Bilirubin 0 38 0 20 - 1 00 mg/dL    eGFR 65 ml/min/1 73sq m   Lipid Panel with Direct LDL reflex    Collection Time: 01/24/22  9:04 AM   Result Value Ref Range    Cholesterol 109 See Comment mg/dL    Triglycerides 77 See Comment mg/dL    HDL, Direct 50 >=50 mg/dL    LDL Calculated 44 0 - 100 mg/dL       Medicare Preventive Visit Patient Instructions  Thank you for completing your Welcome to Medicare Visit or Medicare Annual Wellness Visit today  Your next wellness visit will be due in one year (2/16/2023)  The screening/preventive services that you may require over the next 5-10 years are detailed below  Some tests may not apply to you based off risk factors and/or age  Screening tests ordered at today's visit but not completed yet may show as past due  Also, please note that scanned in results may not display below    Preventive Screenings:  Service Recommendations Previous Testing/Comments   Colorectal Cancer Screening  * Colonoscopy    * Fecal Occult Blood Test (FOBT)/Fecal Immunochemical Test (FIT)  * Fecal DNA/Cologuard Test  * Flexible Sigmoidoscopy Age: 54-65 years old   Colonoscopy: every 10 years (may be performed more frequently if at higher risk)  OR  FOBT/FIT: every 1 year  OR  Cologuard: every 3 years  OR  Sigmoidoscopy: every 5 years  Screening may be recommended earlier than age 48 if at higher risk for colorectal cancer  Also, an individualized decision between you and your healthcare provider will decide whether screening between the ages of 74-80 would be appropriate  Colonoscopy: 05/13/2021  FOBT/FIT: Not on file  Cologuard: Not on file  Sigmoidoscopy: Not on file    Screening Current     Breast Cancer Screening Age: 36 years old  Frequency: every 1-2 years  Not required if history of left and right mastectomy Mammogram: 07/27/2021    Screening Current   Cervical Cancer Screening Between the ages of 21-29, pap smear recommended once every 3 years  Between the ages of 33-67, can perform pap smear with HPV co-testing every 5 years  Recommendations may differ for women with a history of total hysterectomy, cervical cancer, or abnormal pap smears in past  Pap Smear: Not on file    Screening Not Indicated   Hepatitis C Screening Once for adults born between 1945 and 1965  More frequently in patients at high risk for Hepatitis C Hep C Antibody: 01/17/2019    Screening Current   Diabetes Screening 1-2 times per year if you're at risk for diabetes or have pre-diabetes Fasting glucose: 88 mg/dL   A1C: No results in last 5 years    Screening Current   Cholesterol Screening Once every 5 years if you don't have a lipid disorder  May order more often based on risk factors  Lipid panel: 01/24/2022    Screening Not Indicated  History Lipid Disorder     Other Preventive Screenings Covered by Medicare:  1  Abdominal Aortic Aneurysm (AAA) Screening: covered once if your at risk  You're considered to be at risk if you have a family history of AAA    2  Lung Cancer Screening: covers low dose CT scan once per year if you meet all of the following conditions: (1) Age 50-69; (2) No signs or symptoms of lung cancer; (3) Current smoker or have quit smoking within the last 15 years; (4) You have a tobacco smoking history of at least 30 pack years (packs per day multiplied by number of years you smoked); (5) You get a written order from a healthcare provider  3  Glaucoma Screening: covered annually if you're considered high risk: (1) You have diabetes OR (2) Family history of glaucoma OR (3)  aged 48 and older OR (3)  American aged 72 and older  3  Osteoporosis Screening: covered every 2 years if you meet one of the following conditions: (1) You're estrogen deficient and at risk for osteoporosis based off medical history and other findings; (2) Have a vertebral abnormality; (3) On glucocorticoid therapy for more than 3 months; (4) Have primary hyperparathyroidism; (5) On osteoporosis medications and need to assess response to drug therapy  · Last bone density test (DXA Scan): 07/21/2020   5  HIV Screening: covered annually if you're between the age of 15-65  Also covered annually if you are younger than 13 and older than 72 with risk factors for HIV infection  For pregnant patients, it is covered up to 3 times per pregnancy  Immunizations:  Immunization Recommendations   Influenza Vaccine Annual influenza vaccination during flu season is recommended for all persons aged >= 6 months who do not have contraindications   Pneumococcal Vaccine (Prevnar and Pneumovax)  * Prevnar = PCV13  * Pneumovax = PPSV23   Adults 25-60 years old: 1-3 doses may be recommended based on certain risk factors  Adults 72 years old: Prevnar (PCV13) vaccine recommended followed by Pneumovax (PPSV23) vaccine  If already received PPSV23 since turning 65, then PCV13 recommended at least one year after PPSV23 dose     Hepatitis B Vaccine 3 dose series if at intermediate or high risk (ex: diabetes, end stage renal disease, liver disease)   Tetanus (Td) Vaccine - COST NOT COVERED BY MEDICARE PART B Following completion of primary series, a booster dose should be given every 10 years to maintain immunity against tetanus  Td may also be given as tetanus wound prophylaxis  Tdap Vaccine - COST NOT COVERED BY MEDICARE PART B Recommended at least once for all adults  For pregnant patients, recommended with each pregnancy  Shingles Vaccine (Shingrix) - COST NOT COVERED BY MEDICARE PART B  2 shot series recommended in those aged 48 and above     Health Maintenance Due:      Topic Date Due    DXA SCAN  07/21/2022    Breast Cancer Screening: Mammogram  07/27/2023    Colorectal Cancer Screening  05/13/2028    Hepatitis C Screening  Completed     Immunizations Due:      Topic Date Due    DTaP,Tdap,and Td Vaccines (1 - Tdap) Never done    COVID-19 Vaccine (3 - Booster for Deryl Lakhwinder series) 08/10/2021     Advance Directives   What are advance directives? Advance directives are legal documents that state your wishes and plans for medical care  These plans are made ahead of time in case you lose your ability to make decisions for yourself  Advance directives can apply to any medical decision, such as the treatments you want, and if you want to donate organs  What are the types of advance directives? There are many types of advance directives, and each state has rules about how to use them  You may choose a combination of any of the following:  · Living will: This is a written record of the treatment you want  You can also choose which treatments you do not want, which to limit, and which to stop at a certain time  This includes surgery, medicine, IV fluid, and tube feedings  · Durable power of  for healthcare [place] [facility]): This is a written record that states who you want to make healthcare choices for you when you are unable to make them for yourself   This person, called a proxy, is usually a family member or a friend  You may choose more than 1 proxy  · Do not resuscitate (DNR) order:  A DNR order is used in case your heart stops beating or you stop breathing  It is a request not to have certain forms of treatment, such as CPR  A DNR order may be included in other types of advance directives  · Medical directive: This covers the care that you want if you are in a coma, near death, or unable to make decisions for yourself  You can list the treatments you want for each condition  Treatment may include pain medicine, surgery, blood transfusions, dialysis, IV or tube feedings, and a ventilator (breathing machine)  · Values history: This document has questions about your views, beliefs, and how you feel and think about life  This information can help others choose the care that you would choose  Why are advance directives important? An advance directive helps you control your care  Although spoken wishes may be used, it is better to have your wishes written down  Spoken wishes can be misunderstood, or not followed  Treatments may be given even if you do not want them  An advance directive may make it easier for your family to make difficult choices about your care  Fall Prevention    Fall prevention  includes ways to make your home and other areas safer  It also includes ways you can move more carefully to prevent a fall  Health conditions that cause changes in your blood pressure, vision, or muscle strength and coordination may increase your risk for falls  Medicines may also increase your risk for falls if they make you dizzy, weak, or sleepy  Fall prevention tips:   · Stand or sit up slowly  · Use assistive devices as directed  · Wear shoes that fit well and have soles that   · Wear a personal alarm  · Stay active  · Manage your medical conditions  Home Safety Tips:  · Add items to prevent falls in the bathroom  · Keep paths clear      · Install bright lights in your home     · Keep items you use often on shelves within reach  · Paint or place reflective tape on the edges of your stairs  Urinary Incontinence   Urinary incontinence (UI)  is when you lose control of your bladder  UI develops because your bladder cannot store or empty urine properly  The 3 most common types of UI are stress incontinence, urge incontinence, or both  Medicines:   · May be given to help strengthen your bladder control  Report any side effects of medication to your healthcare provider  Do pelvic muscle exercises often:  Your pelvic muscles help you stop urinating  Squeeze these muscles tight for 5 seconds, then relax for 5 seconds  Gradually work up to squeezing for 10 seconds  Do 3 sets of 15 repetitions a day, or as directed  This will help strengthen your pelvic muscles and improve bladder control  Train your bladder:  Go to the bathroom at set times, such as every 2 hours, even if you do not feel the urge to go  You can also try to hold your urine when you feel the urge to go  For example, hold your urine for 5 minutes when you feel the urge to go  As that becomes easier, hold your urine for 10 minutes  Self-care:   · Keep a UI record  Write down how often you leak urine and how much you leak  Make a note of what you were doing when you leaked urine  · Drink liquids as directed  You may need to limit the amount of liquid you drink to help control your urine leakage  Do not drink any liquid right before you go to bed  Limit or do not have drinks that contain caffeine or alcohol  · Prevent constipation  Eat a variety of high-fiber foods  Good examples are high-fiber cereals, beans, vegetables, and whole-grain breads  Walking is the best way to trigger your intestines to have a bowel movement  · Exercise regularly and maintain a healthy weight  Weight loss and exercise will decrease pressure on your bladder and help you control your leakage     · Use a catheter as directed  to help empty your bladder  A catheter is a tiny, plastic tube that is put into your bladder to drain your urine  · Go to behavior therapy as directed  Behavior therapy may be used to help you learn to control your urge to urinate  © Copyright [facility] 2018 Information is for End User's use only and may not be sold, redistributed or otherwise used for commercial purposes   All illustrations and images included in CareNotes® are the copyrighted property of A D A M , Inc  or [address] [place] [address]

## 2022-02-15 NOTE — PROGRESS NOTES
Assessment and Plan:     Problem List Items Addressed This Visit     Hyperlipidemia    Relevant Orders    Comprehensive metabolic panel    Lipid Panel with Direct LDL reflex    Hyperparathyroidism ([facility] [address] )     Calcium is normal  Had parathyroid surgery in 2011  Following with endocrinology           Other Visit Diagnoses     Medicare annual wellness visit, subsequent    -  Primary    Screening mammogram, encounter for        Relevant Orders    Mammo screening bilateral w 3d & cad    Screening for deficiency anemia        Relevant Orders    CBC      Return in about 6 months (around 8/15/2022) for Next scheduled follow up  Depression Screening and Follow-up Plan: Patient was screened for depression during today's encounter  They screened negative with a PHQ-2 score of 0  Preventive health issues were discussed with patient, and age appropriate screening tests were ordered as noted in patient's After Visit Summary  Personalized health advice and appropriate referrals for health education or preventive services given if needed, as noted in patient's After Visit Summary       History of Present Illness:     Patient presents for Medicare Annual Wellness visit    Patient Care Team:  Poonam Guzman DO as PCP - Gio Valenzuela MD as PCP - Endocrinology (Endocrinology)  MD Leonardo Arvizu MD Dorethia Gibbs, MD Jacqualine Ratel, MD (Cardiology)  Reyes Becerra MD     Problem List:     Patient Active Problem List   Diagnosis    Chronic migraine without aura without status migrainosus, not intractable    Essential hypertension    Lump of skin    Valgus deformity of both great toes    Pain in both feet    Onychomycosis    Radiculopathy of lumbar region    Metatarsalgia of both feet    Hyperlipidemia    Laryngopharyngeal reflux (LPR)    Vitamin D deficiency    Hyperparathyroidism ([facility] [address] )    Osteoporosis    Multinodular goiter    Dyspnea on exertion    Dyslipidemia    Intermittent palpitations    Primary osteoarthritis of both knees      Past Medical and Surgical History:     Past Medical History:   Diagnosis Date    Arthritis     Chronic kidney disease     stage 3    Hyperlipidemia     borderline    Hypertension     borderline    Migraine     Migraine     controlled with verapamil    Osteopenia     Snores      Past Surgical History:   Procedure Laterality Date    BREAST BIOPSY Right     benign ~ 1990s    BUNIONECTOMY Left     3 hammertoes, bunion    CATARACT EXTRACTION Bilateral 2012    COLONOSCOPY      PARATHYROID GLAND SURGERY Right     front gland removed      Family History:     Family History   Problem Relation Age of Onset    Angina Mother     Heart failure Mother     Prostate cancer Father     Cancer Father         prostate    Diabetes Sister    Delcie Jimmy Sudden death Sister     Osteoporosis Sister     Heart disease Sister         MI    Hypertension Brother     Cancer Brother         lung    No Known Problems Son     Hypertension Daughter     Diabetes Brother     Cancer Brother         prostate    COPD Sister     Kidney disease Sister     Heart disease Sister         CHF    Lung cancer Maternal Uncle     Breast cancer Cousin 48      Social History:     Social History     Socioeconomic History    Marital status: /Civil Union     Spouse name: None    Number of children: None    Years of education: None    Highest education level: None   Occupational History    None   Tobacco Use    Smoking status: Never Smoker    Smokeless tobacco: Never Used   Vaping Use    Vaping Use: Never used   Substance and Sexual Activity    Alcohol use: No    Drug use: No    Sexual activity: None   Other Topics Concern    None   Social History Narrative    None     Social Determinants of Health     Financial Resource Strain: Not on file   Food Insecurity: Not on file   Transportation Needs: Not on file   Physical Activity: Not on file   Stress: Not on file   Social Connections: Not on file   Intimate Partner Violence: Not on file   Housing Stability: Not on file      Medications and Allergies:     Current Outpatient Medications   Medication Sig Dispense Refill    Ascorbic Acid (vitamin C) 1000 MG tablet Take 1,000 mg by mouth daily      atorvastatin (LIPITOR) 20 mg tablet Take 1 tablet (20 mg total) by mouth daily 90 tablet 3    B Complex Vitamins (B COMPLEX PO) Take 1 tablet by mouth daily      Black Pepper-Turmeric (TURMERIC COMPLEX/BLACK PEPPER PO) Take by mouth daily 600 mg turmeric, 5 mg black pepper      calcium carbonate (OS-FLORIAN) 600 MG tablet Take one tablet daily      chlorthalidone 25 mg tablet TAKE 1 TABLET BY MOUTH 4 DAYS A WEEK MON, WED, FRI, SUNDAY 52 tablet 3    Cholecalciferol (VITAMIN D3) 5000 units CAPS Take 1 capsule by mouth Daily      Coenzyme Q10 (Co Q 10) 100 MG CAPS Take by mouth every morning      ELDERBERRY PO Take by mouth every morning       Ferrous Sulfate (IRON PO) Take 325 mg by mouth daily      magnesium oxide (MAG-OX) 400 mg tablet Take 1 tablet by mouth daily With zinc       Misc Natural Products (GLUCOSAMINE CHOND COMPLEX/MSM PO) Take 1 tablet by mouth Daily 1000 mg       Multiple Vitamins-Minerals (PRESERVISION AREDS) CAPS Take 1 capsule by mouth daily      Omega 3 1200 MG CAPS Take by mouth every morning      Probiotic Product (PROBIOTIC-10 PO) Take 1 capsule by mouth daily      spironolactone (ALDACTONE) 25 mg tablet Take 1 tablet (25 mg total) by mouth every morning 90 tablet 3    verapamil (CALAN-SR) 120 mg CR tablet Take 1 tablet (120 mg total) by mouth 2 (two) times a day 60 tablet 4    Zinc Sulfate (ZINC 15 PO) Take by mouth daily With copper 1mg       No current facility-administered medications for this visit       No Known Allergies   Immunizations:     Immunization History   Administered Date(s) Administered    COVID-19 MODERNA VACC 0 5 ML IM 02/10/2021, 03/10/2021    INFLUENZA 11/01/2018, 10/30/2021    Influenza Split High Dose Preservative Free IM 12/22/2014, 11/13/2015, 11/05/2016, 10/18/2017    Influenza, high dose seasonal 0 7 mL 10/17/2020    Pneumococcal Conjugate 13-Valent 11/13/2015    Pneumococcal Polysaccharide PPV23 11/01/2013, 10/24/2018    Zoster 01/24/2015    Zoster Vaccine Recombinant 09/12/2019, 01/16/2020    influenza, trivalent, adjuvanted 10/31/2019      Health Maintenance:         Topic Date Due    DXA SCAN  07/21/2022    Breast Cancer Screening: Mammogram  07/27/2022    Colorectal Cancer Screening  05/13/2028    Hepatitis C Screening  Completed         Topic Date Due    DTaP,Tdap,and Td Vaccines (1 - Tdap) Never done    COVID-19 Vaccine (3 - Booster for Moderna series) 08/10/2021      Medicare Health Risk Assessment:     /70   Pulse 56   Temp (!) 97 °F (36 1 °C)   Resp 16   Ht 5' 10 5" (1 791 m)   Wt 78 5 kg (173 lb)   BMI 24 47 kg/m²      Gina Marsh is here for her Subsequent Wellness visit  Health Risk Assessment:   Patient rates overall health as very good  Patient feels that their physical health rating is same  Patient is very satisfied with their life  Eyesight was rated as same  Hearing was rated as same  Patient feels that their emotional and mental health rating is same  Patients states they are sometimes angry  Patient states they are often unusually tired/fatigued  Pain experienced in the last 7 days has been some  Patient's pain rating has been 3/10  Patient states that she has experienced no weight loss or gain in last 6 months  Depression Screening:   PHQ-2 Score: 0      Fall Risk Screening: In the past year, patient has experienced: history of falling in past year    Number of falls: 1  Injured during fall?: No    Feels unsteady when standing or walking?: No    Worried about falling?: No      Urinary Incontinence Screening:   Patient has leaked urine accidently in the last six months       Home Safety:  Patient does not have trouble with stairs inside or outside of their home  Patient has working smoke alarms and has working carbon monoxide detector  Home safety hazards include: household clutter  Nutrition:   Current diet is Regular  Medications:   Patient is currently taking over-the-counter supplements  OTC medications include: see medication list  Patient is able to manage medications  Activities of Daily Living (ADLs)/Instrumental Activities of Daily Living (IADLs):   Walk and transfer into and out of bed and chair?: Yes  Dress and groom yourself?: Yes    Bathe or shower yourself?: Yes    Feed yourself?  Yes  Do your laundry/housekeeping?: Yes  Manage your money, pay your bills and track your expenses?: Yes  Make your own meals?: Yes    Do your own shopping?: Yes    Previous Hospitalizations:   Any hospitalizations or ED visits within the last 12 months?: No      Advance Care Planning:   Living will: No    Durable POA for healthcare: No    Advanced directive: No    Advanced directive counseling given: Yes    End of Life Decisions reviewed with patient: Yes    Provider agrees with end of life decisions: Yes      Comments: Plans on meeting with her      Cognitive Screening:   Provider or family/friend/caregiver concerned regarding cognition?: No    PREVENTIVE SCREENINGS      Cardiovascular Screening:    General: Screening Not Indicated and History Lipid Disorder      Diabetes Screening:     General: Screening Current      Colorectal Cancer Screening:     General: Screening Current      Breast Cancer Screening:     General: Screening Current      Cervical Cancer Screening:    General: Screening Not Indicated      Osteoporosis Screening:    General: Screening Not Indicated and History Osteoporosis      Abdominal Aortic Aneurysm (AAA) Screening:        General: Screening Not Indicated      Lung Cancer Screening:     General: Screening Not Indicated      Hepatitis C Screening:    General: Screening Current    Screening, Brief Intervention, and Referral to Treatment (SBIRT)    Screening      AUDIT-C Screenin) How often did you have a drink containing alcohol in the past year? never  2) How many drinks did you have on a typical day when you were drinking in the past year? 0  3) How often did you have 6 or more drinks on one occasion in the past year? never    AUDIT-C Score: 0  Interpretation: Score 0-2 (female): Negative screen for alcohol misuse    Single Item Drug Screening:  How often have you used an illegal drug (including marijuana) or a prescription medication for non-medical reasons in the past year? never    Single Item Drug Screen Score: 0  Interpretation: Negative screen for possible drug use disorder    Brief Intervention  Alcohol & drug use screenings were reviewed  No concerns regarding substance use disorder identified  Physical Exam  Vitals and nursing note reviewed  Constitutional:       Appearance: She is well-developed  HENT:      Head: Normocephalic and atraumatic  Right Ear: External ear normal       Left Ear: External ear normal       Nose: Nose normal    Cardiovascular:      Rate and Rhythm: Normal rate and regular rhythm  Heart sounds: Normal heart sounds  No murmur heard  No friction rub  Pulmonary:      Effort: No respiratory distress  Breath sounds: Normal breath sounds  No wheezing or rales  Musculoskeletal:      Right lower leg: No edema  Left lower leg: No edema  Neurological:      Mental Status: She is oriented to person, place, and time  Cranial Nerves: No cranial nerve deficit          Lamin Murphy, DO

## 2022-03-09 ENCOUNTER — TELEPHONE (OUTPATIENT)
Dept: FAMILY MEDICINE CLINIC | Facility: CLINIC | Age: 72
End: 2022-03-09

## 2022-03-09 NOTE — TELEPHONE ENCOUNTER
Patient dropping off a form to be filled out for her ostoeporosis class  She would like for it to be mailed back to her when complete  In Dr Barnes Alva folder for review    Rolanda Whitt MA

## 2022-03-11 ENCOUNTER — TELEPHONE (OUTPATIENT)
Dept: NEUROLOGY | Facility: CLINIC | Age: 72
End: 2022-03-11

## 2022-03-21 ENCOUNTER — OFFICE VISIT (OUTPATIENT)
Dept: NEUROLOGY | Facility: CLINIC | Age: 72
End: 2022-03-21
Payer: MEDICARE

## 2022-03-21 VITALS
BODY MASS INDEX: 24.53 KG/M2 | TEMPERATURE: 97.1 F | HEART RATE: 62 BPM | SYSTOLIC BLOOD PRESSURE: 144 MMHG | HEIGHT: 71 IN | WEIGHT: 175.2 LBS | DIASTOLIC BLOOD PRESSURE: 78 MMHG | OXYGEN SATURATION: 97 %

## 2022-03-21 DIAGNOSIS — I10 ESSENTIAL HYPERTENSION: ICD-10-CM

## 2022-03-21 DIAGNOSIS — G43.709 CHRONIC MIGRAINE WITHOUT AURA WITHOUT STATUS MIGRAINOSUS, NOT INTRACTABLE: Primary | ICD-10-CM

## 2022-03-21 PROCEDURE — 99214 OFFICE O/P EST MOD 30 MIN: CPT | Performed by: NURSE PRACTITIONER

## 2022-03-21 NOTE — PROGRESS NOTES
Patient ID: Tolu Berger is a 66 y o  female  Assessment/Plan:     Diagnoses and all orders for this visit:    Chronic migraine without aura without status migrainosus, not intractable    Essential hypertension       Continue with Verapamil SR 120mg twice a day  Follow up with Neurology office in 6 months  Subjective/HPI:  Tolu Berger is a 68-year-old female follows with outpatient neurology for medical management of her migraines  Patient's migraines are triggered by certain foods such as caffeine and nitrates, she gets associated nausea photo sensitivities  Patient's migraines start temporal region radiating to the posterior portion of her head and down into her neck  They often wake her in the morning in her generally gone by noon  Patient has been on verapamil 120 mg twice a day  Patient's reported her current verapamil dose being beneficial, did improve her blood pressure and palpitations as well  Patient reports her last migraine was approximately 2 weeks ago, however was not disruptive or caused her distress  Pt stated that she has only had that one migraine for the past few months  She noted that her migraines have been limited  She noted no abortive medications  She lets the migraine run its course  Patient is currently happy with her medication regimen, has been relatively migraine free since our last office appointment  No changes will be made to her verapamil dosing, patient will follow-up in outpatient neurology office in 6 months          The following portions of the patient's history were reviewed and updated as appropriate: allergies, current medications, past family history, past medical history, past social history, past surgical history and problem list              Past Medical History:   Diagnosis Date    Arthritis     Chronic kidney disease     stage 3    Hyperlipidemia     borderline    Hypertension     borderline    Migraine     Migraine     controlled with verapamil    Osteopenia     Snores        Past Surgical History:   Procedure Laterality Date    BREAST BIOPSY Right     benign ~ 1990s    BUNIONECTOMY Left     3 hammertoes, bunion    CATARACT EXTRACTION Bilateral 2012    COLONOSCOPY      PARATHYROID GLAND SURGERY Right     front gland removed       Social History     Socioeconomic History    Marital status: /Civil Union     Spouse name: None    Number of children: None    Years of education: None    Highest education level: None   Occupational History    None   Tobacco Use    Smoking status: Never Smoker    Smokeless tobacco: Never Used   Vaping Use    Vaping Use: Never used   Substance and Sexual Activity    Alcohol use: No    Drug use: No    Sexual activity: None   Other Topics Concern    None   Social History Narrative    None     Social Determinants of Health     Financial Resource Strain: Not on file   Food Insecurity: Not on file   Transportation Needs: Not on file   Physical Activity: Not on file   Stress: Not on file   Social Connections: Not on file   Intimate Partner Violence: Not on file   Housing Stability: Not on file       Family History   Problem Relation Age of Onset    Angina Mother     Heart failure Mother     Prostate cancer Father     Cancer Father         prostate    Diabetes Sister     Sudden death Sister     Osteoporosis Sister     Heart disease Sister         MI    Hypertension Brother     Cancer Brother         lung    No Known Problems Son     Hypertension Daughter     Diabetes Brother     Cancer Brother         prostate    COPD Sister     Kidney disease Sister     Heart disease Sister         CHF    Lung cancer Maternal Uncle     Breast cancer Cousin 50         Current Outpatient Medications:     Ascorbic Acid (vitamin C) 1000 MG tablet, Take 1,000 mg by mouth daily, Disp: , Rfl:     atorvastatin (LIPITOR) 20 mg tablet, Take 1 tablet (20 mg total) by mouth daily, Disp: 90 tablet, Rfl: 3   B Complex Vitamins (B COMPLEX PO), Take 1 tablet by mouth daily, Disp: , Rfl:     Black Pepper-Turmeric (TURMERIC COMPLEX/BLACK PEPPER PO), Take by mouth daily 600 mg turmeric, 5 mg black pepper, Disp: , Rfl:     calcium carbonate (OS-FLORIAN) 600 MG tablet, Take one tablet daily, Disp: , Rfl:     chlorthalidone 25 mg tablet, TAKE 1 TABLET BY MOUTH 4 DAYS A WEEK MON, WED, FRI, SUNDAY, Disp: 52 tablet, Rfl: 3    Cholecalciferol (VITAMIN D3) 5000 units CAPS, Take 1 capsule by mouth Daily, Disp: , Rfl:     Coenzyme Q10 (Co Q 10) 100 MG CAPS, Take by mouth every morning, Disp: , Rfl:     ELDERBERRY PO, Take by mouth every morning , Disp: , Rfl:     Ferrous Sulfate (IRON PO), Take 325 mg by mouth daily, Disp: , Rfl:     magnesium oxide (MAG-OX) 400 mg tablet, Take 1 tablet by mouth daily With zinc , Disp: , Rfl:     Misc Natural Products (GLUCOSAMINE CHOND COMPLEX/MSM PO), Take 1 tablet by mouth Daily 1000 mg , Disp: , Rfl:     Multiple Vitamins-Minerals (PRESERVISION AREDS) CAPS, Take 1 capsule by mouth daily, Disp: , Rfl:     Omega 3 1200 MG CAPS, Take by mouth every morning, Disp: , Rfl:     Probiotic Product (PROBIOTIC-10 PO), Take 1 capsule by mouth daily, Disp: , Rfl:     spironolactone (ALDACTONE) 25 mg tablet, Take 1 tablet (25 mg total) by mouth every morning, Disp: 90 tablet, Rfl: 3    verapamil (CALAN-SR) 120 mg CR tablet, Take 1 tablet (120 mg total) by mouth 2 (two) times a day, Disp: 60 tablet, Rfl: 4    Zinc Sulfate (ZINC 15 PO), Take by mouth daily With copper 1mg, Disp: , Rfl:     No Known Allergies     Blood pressure 144/78, pulse 62, temperature (!) 97 1 °F (36 2 °C), temperature source Temporal, height 5' 10 5" (1 791 m), weight 79 5 kg (175 lb 3 2 oz), SpO2 97 %, not currently breastfeeding     Objective:    Blood pressure 144/78, pulse 62, temperature (!) 97 1 °F (36 2 °C), temperature source Temporal, height 5' 10 5" (1 791 m), weight 79 5 kg (175 lb 3 2 oz), SpO2 97 %, not currently breastfeeding  Physical Exam  Vitals reviewed  Constitutional:       Appearance: Normal appearance  She is well-developed  HENT:      Head: Normocephalic  Nose: Nose normal       Mouth/Throat:      Mouth: Mucous membranes are moist    Eyes:      General: Lids are normal       Extraocular Movements: Extraocular movements intact  Pupils: Pupils are equal, round, and reactive to light  Cardiovascular:      Rate and Rhythm: Normal rate  Pulmonary:      Effort: Pulmonary effort is normal    Abdominal:      Palpations: Abdomen is soft  Musculoskeletal:      Cervical back: Normal range of motion  Skin:     General: Skin is warm and dry  Neurological:      Mental Status: She is alert  Coordination: Romberg sign negative  Deep Tendon Reflexes: Strength normal and reflexes are normal and symmetric  Psychiatric:         Attention and Perception: Attention and perception normal          Mood and Affect: Mood and affect normal          Speech: Speech normal          Behavior: Behavior normal  Behavior is cooperative  Thought Content: Thought content normal          Cognition and Memory: Cognition and memory normal          Judgment: Judgment normal          Neurological Exam  Mental Status  Alert  Oriented to person, place, time and situation  Recent and remote memory are intact  Speech is normal  Language is fluent with no aphasia  Attention and concentration are normal  Fund of knowledge is appropriate for level of education  Cranial Nerves  CN II: Visual acuity is normal  Visual fields full to confrontation  CN III, IV, VI: Extraocular movements intact bilaterally  Normal lids and orbits bilaterally  Pupils equal round and reactive to light bilaterally  CN V: Facial sensation is normal   CN VII: Full and symmetric facial movement  CN VIII: Hearing is normal   CN IX, X: Palate elevates symmetrically  Normal gag reflex    CN XI: Shoulder shrug strength is normal   CN XII: Tongue midline without atrophy or fasciculations  Motor  Normal muscle bulk throughout  Normal muscle tone  No abnormal involuntary movements  Strength is 5/5 throughout all four extremities  Sensory  Light touch is normal in upper and lower extremities  Temperature is normal in upper and lower extremities  Vibration is normal in upper and lower extremities  Proprioception is normal in upper and lower extremities  Reflexes  Deep tendon reflexes are 2+ and symmetric in all four extremities with downgoing toes bilaterally  Right pathological reflexes: Donna's absent  Left pathological reflexes: Donna's absent  Coordination  Right: Finger-to-nose normal  Rapid alternating movement normal  Heel-to-shin normal   Left: Finger-to-nose normal  Rapid alternating movement normal  Heel-to-shin normal     Gait  Casual gait is normal including stance, stride, and arm swing  Normal toe walking  Normal heel walking  Normal tandem gait  Romberg is absent  Able to rise from chair without using arms  ROS:    Review of Systems   Constitutional: Negative  Negative for appetite change and fever  HENT: Negative  Negative for hearing loss, tinnitus, trouble swallowing and voice change  Eyes: Negative  Negative for photophobia and pain  Respiratory: Negative  Negative for shortness of breath  Cardiovascular: Negative  Negative for palpitations  Gastrointestinal: Negative  Negative for nausea and vomiting  Endocrine: Negative  Negative for cold intolerance  Genitourinary: Negative  Negative for dysuria, frequency and urgency  Musculoskeletal: Negative  Negative for myalgias and neck pain  Skin: Negative  Negative for rash  Neurological: Positive for numbness  Negative for dizziness, tremors, seizures, syncope, facial asymmetry, speech difficulty, weakness and light-headedness  Left hand tingles   Hematological: Negative  Does not bruise/bleed easily  Psychiatric/Behavioral: Negative  Negative for confusion, hallucinations and sleep disturbance  ROS reviewed and discussed with patient

## 2022-04-14 DIAGNOSIS — E78.5 HYPERLIPIDEMIA, UNSPECIFIED HYPERLIPIDEMIA TYPE: ICD-10-CM

## 2022-04-14 RX ORDER — ATORVASTATIN CALCIUM 20 MG/1
TABLET, FILM COATED ORAL
Qty: 90 TABLET | Refills: 3 | Status: SHIPPED | OUTPATIENT
Start: 2022-04-14

## 2022-04-28 ENCOUNTER — OFFICE VISIT (OUTPATIENT)
Dept: FAMILY MEDICINE CLINIC | Facility: CLINIC | Age: 72
End: 2022-04-28
Payer: MEDICARE

## 2022-04-28 VITALS
SYSTOLIC BLOOD PRESSURE: 122 MMHG | BODY MASS INDEX: 24.64 KG/M2 | HEIGHT: 71 IN | HEART RATE: 60 BPM | WEIGHT: 176 LBS | TEMPERATURE: 97.2 F | RESPIRATION RATE: 16 BRPM | DIASTOLIC BLOOD PRESSURE: 60 MMHG

## 2022-04-28 DIAGNOSIS — G43.709 CHRONIC MIGRAINE WITHOUT AURA WITHOUT STATUS MIGRAINOSUS, NOT INTRACTABLE: ICD-10-CM

## 2022-04-28 DIAGNOSIS — E78.5 HYPERLIPIDEMIA, UNSPECIFIED HYPERLIPIDEMIA TYPE: ICD-10-CM

## 2022-04-28 DIAGNOSIS — H02.846 EYELID GLAND SWELLING, LEFT: Primary | ICD-10-CM

## 2022-04-28 DIAGNOSIS — I10 ESSENTIAL HYPERTENSION: ICD-10-CM

## 2022-04-28 PROCEDURE — 99214 OFFICE O/P EST MOD 30 MIN: CPT | Performed by: NURSE PRACTITIONER

## 2022-04-28 RX ORDER — ERYTHROMYCIN 5 MG/G
0.5 OINTMENT OPHTHALMIC EVERY 12 HOURS SCHEDULED
Qty: 3.5 G | Refills: 0 | Status: SHIPPED | OUTPATIENT
Start: 2022-04-28 | End: 2022-07-06 | Stop reason: ALTCHOICE

## 2022-04-28 NOTE — PROGRESS NOTES
Assessment/Plan:    1  Eyelid gland swelling, left  Comments:  will continue with warm compresses  Orders:  -     erythromycin (ILOTYCIN) ophthalmic ointment; Administer 0 5 inches into the left eye every 12 (twelve) hours    2  Essential hypertension  Comments:  stable with current regimen    3  Hyperlipidemia, unspecified hyperlipidemia type  Comments:  complaint with statin and tolerating it well    4  Chronic migraine without aura without status migrainosus, not intractable  Comments:  managed by neurologist          Patient Instructions:  Supportive care discussed and advised  Advised to RTO for any worsening and no improvement  Follow up for no improvement and worsening of conditions  Patient advised and educated when to see immediate medical care  Return if symptoms worsen or fail to improve  Future Appointments   Date Time Provider [facility]   7/29/2022  8:00 AM WA [place] [id number] [facility]   8/17/2022  9:30 AM DO YUN Cunningham [facility]   9/21/2022  9:00 AM Deepti Figueroa [facility]   9/26/2022  1:00 PM JANETT Ellis [facility]           Subjective:      Patient ID: Riley Rios is a 66 y o  female  Chief Complaint   Patient presents with    Facial Swelling     started Satur[place], left eye, tender to the touch, redness, itchiness  ac/lpn         Vitals:  /60   Pulse 60   Temp (!) 97 2 °F (36 2 °C)   Resp 16   Ht 5' 10 5" (1 791 m)   Wt 79 8 kg (176 lb)   BMI 24 90 kg/m²     HPI  Patient stated that started couple of days with left eye itching and discomfort and noticed burising on upper eye lids and which is resolved and progressed to left upper eyelid swelling with discharge and using warm compresses  Denies any headache, dizziness and vision changes  Complaint with BP medications and tolerating it well  On statin too       The following portions of the patient's history were reviewed and updated as appropriate: allergies, current medications, past family history, past medical history, past social history, past surgical history and problem list       Review of Systems   Constitutional: Negative  Eyes: Positive for discharge and itching  Negative for photophobia, pain, redness and visual disturbance  As noted in HPI     Respiratory: Negative  Cardiovascular: Negative  Neurological: Negative            Objective:    Social History     Tobacco Use   Smoking Status Never Smoker   Smokeless Tobacco Never Used       Allergies: No Known Allergies      Current Outpatient Medications   Medication Sig Dispense Refill    Ascorbic Acid (vitamin C) 1000 MG tablet Take 1,000 mg by mouth daily      atorvastatin (LIPITOR) 20 mg tablet TAKE 1 TABLET BY MOUTH EVERY DAY 90 tablet 3    B Complex Vitamins (B COMPLEX PO) Take 1 tablet by mouth daily      Black Pepper-Turmeric (TURMERIC COMPLEX/BLACK PEPPER PO) Take by mouth daily 600 mg turmeric, 5 mg black pepper      calcium carbonate (OS-FLORIAN) 600 MG tablet Take one tablet daily      chlorthalidone 25 mg tablet TAKE 1 TABLET BY MOUTH 4 DAYS A WEEK MON, WED, FRI, SUNDAY 52 tablet 3    Cholecalciferol (VITAMIN D3) 5000 units CAPS Take 1 capsule by mouth Daily      Coenzyme Q10 (Co Q 10) 100 MG CAPS Take by mouth every morning      ELDERBERRY PO Take by mouth every morning       magnesium oxide (MAG-OX) 400 mg tablet Take 1 tablet by mouth daily With zinc       Misc Natural Products (GLUCOSAMINE CHOND COMPLEX/MSM PO) Take 1 tablet by mouth Daily 1000 mg       Multiple Vitamins-Minerals (PRESERVISION AREDS) CAPS Take 1 capsule by mouth daily      Omega 3 1200 MG CAPS Take by mouth every morning      Probiotic Product (PROBIOTIC-10 PO) Take 1 capsule by mouth daily      spironolactone (ALDACTONE) 25 mg tablet Take 1 tablet (25 mg total) by mouth every morning 90 tablet 3    verapamil (CALAN-SR) 120 mg CR tablet Take 1 tablet (120 mg total) by mouth 2 (two) times a day 60 tablet 4    Zinc Sulfate (ZINC 15 PO) Take by mouth daily With copper 1mg      erythromycin (ILOTYCIN) ophthalmic ointment Administer 0 5 inches into the left eye every 12 (twelve) hours 3 5 g 0    Ferrous Sulfate (IRON PO) Take 325 mg by mouth daily (Patient not taking: Reported on 4/28/2022 )       No current facility-administered medications for this visit  Physical Exam  Constitutional:       Appearance: Normal appearance  HENT:      Head: Normocephalic and atraumatic  Nose: Nose normal    Eyes:      Conjunctiva/sclera: Conjunctivae normal       Right eye: Right conjunctiva is not injected  Comments: Left upper eye lid is diffusely swollen, erythematous and warm to touch  No periorbital erythema and cellulitis noted   Cardiovascular:      Rate and Rhythm: Normal rate and regular rhythm  Pulses: Normal pulses  Heart sounds: Normal heart sounds  Pulmonary:      Effort: Pulmonary effort is normal       Breath sounds: Normal breath sounds  Skin:     General: Skin is warm and dry  Findings: No rash  Neurological:      Mental Status: She is alert and oriented to person, place, and time  Psychiatric:         Mood and Affect: Mood normal          Behavior: Behavior normal          Thought Content:  Thought content normal          Judgment: Judgment normal                      JANETT Haro

## 2022-06-22 DIAGNOSIS — G43.709 CHRONIC MIGRAINE WITHOUT AURA WITHOUT STATUS MIGRAINOSUS, NOT INTRACTABLE: ICD-10-CM

## 2022-07-06 ENCOUNTER — OFFICE VISIT (OUTPATIENT)
Dept: CARDIOLOGY CLINIC | Facility: CLINIC | Age: 72
End: 2022-07-06
Payer: MEDICARE

## 2022-07-06 VITALS
SYSTOLIC BLOOD PRESSURE: 158 MMHG | OXYGEN SATURATION: 98 % | HEIGHT: 71 IN | BODY MASS INDEX: 24.92 KG/M2 | DIASTOLIC BLOOD PRESSURE: 70 MMHG | HEART RATE: 53 BPM | WEIGHT: 178 LBS | TEMPERATURE: 98.6 F

## 2022-07-06 DIAGNOSIS — K21.9 LARYNGOPHARYNGEAL REFLUX (LPR): ICD-10-CM

## 2022-07-06 DIAGNOSIS — R53.82 CHRONIC FATIGUE: ICD-10-CM

## 2022-07-06 DIAGNOSIS — E55.9 VITAMIN D DEFICIENCY: ICD-10-CM

## 2022-07-06 DIAGNOSIS — I10 WHITE COAT SYNDROME WITH DIAGNOSIS OF HYPERTENSION: Primary | ICD-10-CM

## 2022-07-06 DIAGNOSIS — E78.5 DYSLIPIDEMIA: ICD-10-CM

## 2022-07-06 DIAGNOSIS — I10 ESSENTIAL HYPERTENSION: ICD-10-CM

## 2022-07-06 PROCEDURE — 93000 ELECTROCARDIOGRAM COMPLETE: CPT | Performed by: INTERNAL MEDICINE

## 2022-07-06 PROCEDURE — 99214 OFFICE O/P EST MOD 30 MIN: CPT | Performed by: INTERNAL MEDICINE

## 2022-07-06 NOTE — PROGRESS NOTES
Office Cardiology Progress Note  Olvin Mccabe 74 y o  female MRN: [id number]  07/06/22  9:00 AM      ASSESSMENT:    1  Improved chronic exertional dyspnea with stair climbing but not with exercise at [facility], probably related to progressive overweight with 11 pound weight gain over the past 9 5 years  Has lost 2 lbs in the past 1 5+ years  2   White coat syndrome with history of controlled  essential hypertension  and latest home blood pressure averaging 131/72 one year ago  3  Controlled dyslipidemia with atorvastatin  4  History of normal exercise stress test 10/13/15 and benign echocardiogram on 5/1/13   5  History of  now suppressed chronic nonproductive cough, possibly caused by GERD, with patient currently off Pepcid for 21+ months   Cough has recently been less frequent  6  Resolved hypercalcemia with parathyroidectomy 11/30/11   7  Status post nasal polypectomy October, 2012 and [facility]  October, 2012 with right cataract extraction 9/13  8  Chronic stable multinodular goiter with latest thyroid ultrasound 5/30/17, followed by Dr Villafana  9   Resolved chronic kidney disease, stage IIIa    Current estimated GFR is 65  10  Chronic fatigue and tiredness with consideration of adverse drug reaction to verapamil, but more likely related to inadequate sleep duration  11  Osteoarthritis of both knees, right more than left  12  Osteopenia, stable, based on DEXA scan 07/21/2020  Plan       Patient Instructions     1  Starting soon, do blood pressure measurements at home for four consecutive days, mid to late morning and each evening, following at least 15 minutes of relaxation, three readings at a time 1-3  minutes apart, charted on blood pressure data sheet given to you by Dr Prajapati Courser  2   Take blood pressures after resting for at least 15 minutes while seated in a chair or at a table with arm supported on arm rest or table    Do 3 readings, one after the other, both morning and evening for 4 consecutive days  3   Write down each and every reading with date and time and   E-mail list of readings as an attachment to the office of Dr Taisha Mann for his review  4  E-mail address is:   [email or web address], which is case sensitive  5  Our office will contact you with further instructions and the results of this review  6  Continue current medication  7  Cardiology follow-up approximately six months with EKG, lipids, CMP  HPI    This 74 y o  female  denies new cardiopulmonary and medical symptoms  The patient continues to complain of chronic tiredness, with inadequate sleep duration, going to bed between 11:00 p m  and midnight and getting up at 4:15 a m  in order to get to [facility] for 5:00 a m  workouts four or five days a week  She and her  have been fully boosted once against COVID-19 with her  actually irina mild COVID-19 about six weeks ago  The patient is being seen in follow-up of the below listed diagnoses  Encounter Diagnoses   Name Primary?     White coat syndrome with diagnosis of hypertension Yes    Essential hypertension     Dyslipidemia     Laryngopharyngeal reflux (LPR)     Vitamin D deficiency     Chronic fatigue         Review of Systems    All other systems negative, except as noted in history of present illness    Historical Information   Past Medical History:   Diagnosis Date    Arthritis     Chronic kidney disease     stage 3    Hyperlipidemia     borderline    Hypertension     borderline    Migraine     Migraine     controlled with verapamil    Osteopenia     Snores      Past Surgical History:   Procedure Laterality Date    BREAST BIOPSY Right     benign ~ 1990s    BUNIONECTOMY Left     3 hammertoes, bunion    CATARACT EXTRACTION Bilateral 2012    COLONOSCOPY      PARATHYROID GLAND SURGERY Right     front gland removed     Social History     Substance and Sexual Activity   Alcohol Use No     Social History Substance and Sexual Activity   Drug Use No     Social History     Tobacco Use   Smoking Status Never Smoker   Smokeless Tobacco Never Used       Family History:  Family History   Problem Relation Age of Onset    Angina Mother     Heart failure Mother     Prostate cancer Father     Cancer Father         prostate    Diabetes Sister    Jullie Liming Sudden death Sister     Osteoporosis Sister     Heart disease Sister         MI    Hypertension Brother     Cancer Brother         lung    No Known Problems Son     Hypertension Daughter     Diabetes Brother     Cancer Brother         prostate    COPD Sister     Kidney disease Sister     Heart disease Sister         CHF    Lung cancer Maternal Uncle     Breast cancer Cousin 48         Meds/Allergies     Prior to Admission medications    Medication Sig Start Date End Date Taking?  Authorizing Provider   Ascorbic Acid (vitamin C) 1000 MG tablet Take 1,000 mg by mouth daily   Yes Historical Provider, MD   atorvastatin (LIPITOR) 20 mg tablet TAKE 1 TABLET BY MOUTH EVERY DAY 4/14/22  Yes Sheba Krishnan MD   B Complex Vitamins (B COMPLEX PO) Take 1 tablet by mouth daily   Yes Historical Provider, MD   Black Pepper-Turmeric (TURMERIC COMPLEX/BLACK PEPPER PO) Take by mouth daily 600 mg turmeric, 5 mg black pepper   Yes Historical Provider, MD   calcium carbonate (OS-FLORIAN) 600 MG tablet Take one tablet daily 9/16/20  Yes Byron Pack MD   chlorthalidone 25 mg tablet TAKE 1 TABLET BY MOUTH 4 DAYS A WEEK MON, WED, FRI, SUNDAY 9/13/21  Yes Sheba Krishnan MD   Cholecalciferol (VITAMIN D3) 5000 units CAPS Take 1 capsule by mouth Daily   Yes Historical Provider, MD   Coenzyme Q10 (Co Q 10) 100 MG CAPS Take by mouth every morning   Yes Historical Provider, MD   ELDERBERRY PO Take by mouth every morning    Yes Historical Provider, MD   magnesium oxide (MAG-OX) 400 mg tablet Take 1 tablet by mouth daily With zinc  12/2/10  Yes Historical Provider, MD Cash N Sean Hernández (GLUCOSAMINE CHOND COMPLEX/MSM PO) Take 1 tablet by mouth Daily 1000 mg    Yes Historical Provider, MD   Multiple Vitamins-Minerals (PRESERVISION AREDS) CAPS Take 1 capsule by mouth daily 10/18/17  Yes Historical Provider, MD   Omega 3 1200 MG CAPS Take by mouth every morning   Yes Historical Provider, MD   Probiotic Product (PROBIOTIC-10 PO) Take 1 capsule by mouth daily   Yes Historical Provider, MD   spironolactone (ALDACTONE) 25 mg tablet Take 1 tablet (25 mg total) by mouth every morning 2/7/22  Yes Mario Hood MD   verapamil (CALAN-SR) 120 mg CR tablet TAKE 1 TABLET BY MOUTH 2 TIMES A DAY  6/22/22  Yes JANETT Lei   Zinc Sulfate (ZINC 15 PO) Take by mouth daily With copper 1mg   Yes Historical Provider, MD   erythromycin (ILOTYCIN) ophthalmic ointment Administer 0 5 inches into the left eye every 12 (twelve) hours  Patient not taking: Reported on 7/6/2022 4/28/22 7/6/22  JANETT Dominique   Ferrous Sulfate (IRON PO) Take 325 mg by mouth daily  Patient not taking: No sig reported  7/6/22  Historical Provider, MD       No Known Allergies      Vitals:    07/06/22 0800   BP: 158/70   BP Location: Left arm   Patient Position: Sitting   Cuff Size: Standard   Pulse: (!) 53   Temp: 98 6 °F (37 °C)   SpO2: 98%   Weight: 80 7 kg (178 lb)   Height: 5' 10 5" (1 791 m)       Body mass index is 25 18 kg/m²  3 pound weight gain in approximately six months and 2 pound weight loss in approximately 1 5+ years    Physical Exam:    General Appearance:  Alert, cooperative, no distress, appears stated age and is mildly overweight     Head:  Normocephalic, without obvious abnormality, atraumatic   Eyes:  PERRL, conjunctiva/corneas clear, EOM's intact,   both eyes   Ears:  Normal TM's and external ear canals, both ears   Nose: Nares normal, septum midline, mucosa normal, no drainage or sinus tenderness   Throat: Lips, mucosa, and tongue normal; teeth and gums normal   Neck: Supple, symmetrical, trachea midline, no adenopathy, thyroid: not enlarged, symmetric, no tenderness/mass/nodules, no carotid bruit or JVD  There is a small horizontal right suprasternal surgical scar from prior right parathyroidectomy  Back:   Symmetric, no curvature, ROM normal, no CVA tenderness   Lungs:   Clear to auscultation bilaterally, respirations unlabored   Chest Wall:  No tenderness or deformity   Heart:  Regular rate and rhythm, S1, S2 normal, no murmur, rub or gallop   Abdomen:   Soft, non-tender, bowel sounds active all four quadrants,  no masses, no organomegaly   Extremities: Extremities normal, atraumatic, no cyanosis or edema   Pulses: 2+ and symmetric   Skin: Skin showed normal color, texture, turgor and no rashes or lesions   Lymph nodes: Cervical, supraclavicular, and axillary nodes normal   Neurologic: Normal         Cardiographics    ECG 07/06/22:    Sinus bradycardia and sinus arrhythmia at 53 bpm  First-degree AV block   Leftward frontal plane QRS axis   Septal Q-waves   Similar overall to 01/04/2022    IMAGING:    No Chest XR results available for this patient            LAB REVIEW:      Lab Results   Component Value Date    SODIUM 140 01/24/2022    K 3 7 01/24/2022     01/24/2022    CO2 30 01/24/2022    ANIONGAP 11 7 10/09/2015    BUN 21 01/24/2022    CREATININE 0 88 01/24/2022    GLUCOSE 94 01/03/2017    GLUF 88 01/24/2022    CALCIUM 9 2 01/24/2022    AST 12 01/24/2022    ALT 24 01/24/2022    ALKPHOS 47 01/24/2022    PROT 6 5 01/03/2017    BILITOT 0 3 01/03/2017    EGFR 65 01/24/2022     Lab Results   Component Value Date    CHOLESTEROL 109 01/24/2022    CHOLESTEROL 133 07/21/2021    CHOLESTEROL 117 12/23/2020     Lab Results   Component Value Date    HDL 50 01/24/2022    HDL 60 07/21/2021    HDL 49 12/23/2020       Lab Results   Component Value Date    LDLCALC 44 01/24/2022    LDLCALC 61 07/21/2021    LDLCALC 44 12/23/2020     No components found for: [facility]  Lab Results   Component Value Date TRIG 77 01/24/2022    TRIG 60 07/21/2021    TRIG 118 12/23/2020     CBC 01/24/2022:  Normal          Roderick Carranza MD

## 2022-07-06 NOTE — PATIENT INSTRUCTIONS
Starting soon, do blood pressure measurements at home for four consecutive days, mid to late morning and each evening, following at least 15 minutes of relaxation, three readings at a time 1-3  minutes apart, charted on blood pressure data sheet given to you by Dr Julianna Owen  Take blood pressures after resting for at least 15 minutes while seated in a chair or at a table with arm supported on arm rest or table  Do 3 readings, one after the other, both morning and evening for 4 consecutive days  Write down each and every reading with date and time and   E-mail list of readings as an attachment to the office of Dr Julianna Owen for his review  E-mail address is:   [email or web address], which is case sensitive  Our office will contact you with further instructions and the results of this review  Continue current medication  Cardiology follow-up approximately six months with EKG, lipids, CMP

## 2022-07-21 ENCOUNTER — TELEPHONE (OUTPATIENT)
Dept: FAMILY MEDICINE CLINIC | Facility: CLINIC | Age: 72
End: 2022-07-21

## 2022-07-21 NOTE — TELEPHONE ENCOUNTER
Over the counter ashwagandha 1325mg  Patient wants to know if this sleep medication is okay to take with her other medications   Please call patient back     Leave detailed message if she is not home

## 2022-07-25 ENCOUNTER — TELEPHONE (OUTPATIENT)
Dept: ENDOCRINOLOGY | Facility: CLINIC | Age: 72
End: 2022-07-25

## 2022-07-26 ENCOUNTER — APPOINTMENT (OUTPATIENT)
Dept: LAB | Facility: CLINIC | Age: 72
End: 2022-07-26
Payer: MEDICARE

## 2022-07-26 DIAGNOSIS — E78.5 HYPERLIPIDEMIA, UNSPECIFIED HYPERLIPIDEMIA TYPE: ICD-10-CM

## 2022-07-26 DIAGNOSIS — E55.9 VITAMIN D DEFICIENCY: ICD-10-CM

## 2022-07-26 DIAGNOSIS — E21.3 HYPERPARATHYROIDISM (HCC): ICD-10-CM

## 2022-07-26 DIAGNOSIS — Z13.0 SCREENING FOR DEFICIENCY ANEMIA: ICD-10-CM

## 2022-07-26 LAB
25(OH)D3 SERPL-MCNC: 44.7 NG/ML (ref 30–100)
ALBUMIN SERPL BCP-MCNC: 4.1 G/DL (ref 3.5–5)
ALP SERPL-CCNC: 41 U/L (ref 46–116)
ALT SERPL W P-5'-P-CCNC: 24 U/L (ref 12–78)
ANION GAP SERPL CALCULATED.3IONS-SCNC: 3 MMOL/L (ref 4–13)
AST SERPL W P-5'-P-CCNC: 18 U/L (ref 5–45)
BILIRUB SERPL-MCNC: 0.63 MG/DL (ref 0.2–1)
BUN SERPL-MCNC: 21 MG/DL (ref 5–25)
CALCIUM SERPL-MCNC: 9.4 MG/DL (ref 8.3–10.1)
CHLORIDE SERPL-SCNC: 105 MMOL/L (ref 96–108)
CHOLEST SERPL-MCNC: 135 MG/DL
CO2 SERPL-SCNC: 29 MMOL/L (ref 21–32)
CREAT SERPL-MCNC: 0.99 MG/DL (ref 0.6–1.3)
ERYTHROCYTE [DISTWIDTH] IN BLOOD BY AUTOMATED COUNT: 12.4 % (ref 11.6–15.1)
GFR SERPL CREATININE-BSD FRML MDRD: 56 ML/MIN/1.73SQ M
GLUCOSE P FAST SERPL-MCNC: 91 MG/DL (ref 65–99)
HCT VFR BLD AUTO: 42.1 % (ref 34.8–46.1)
HDLC SERPL-MCNC: 59 MG/DL
HGB BLD-MCNC: 13.6 G/DL (ref 11.5–15.4)
LDLC SERPL CALC-MCNC: 62 MG/DL (ref 0–100)
MCH RBC QN AUTO: 28.5 PG (ref 26.8–34.3)
MCHC RBC AUTO-ENTMCNC: 32.3 G/DL (ref 31.4–37.4)
MCV RBC AUTO: 88 FL (ref 82–98)
PLATELET # BLD AUTO: 230 THOUSANDS/UL (ref 149–390)
PMV BLD AUTO: 10.7 FL (ref 8.9–12.7)
POTASSIUM SERPL-SCNC: 3.7 MMOL/L (ref 3.5–5.3)
PROT SERPL-MCNC: 7.1 G/DL (ref 6.4–8.4)
PTH-INTACT SERPL-MCNC: 51 PG/ML (ref 18.4–80.1)
RBC # BLD AUTO: 4.77 MILLION/UL (ref 3.81–5.12)
SODIUM SERPL-SCNC: 137 MMOL/L (ref 135–147)
TRIGL SERPL-MCNC: 71 MG/DL
WBC # BLD AUTO: 5.17 THOUSAND/UL (ref 4.31–10.16)

## 2022-07-26 PROCEDURE — 80053 COMPREHEN METABOLIC PANEL: CPT

## 2022-07-26 PROCEDURE — 82306 VITAMIN D 25 HYDROXY: CPT

## 2022-07-26 PROCEDURE — 85027 COMPLETE CBC AUTOMATED: CPT

## 2022-07-26 PROCEDURE — 83970 ASSAY OF PARATHORMONE: CPT

## 2022-07-26 PROCEDURE — 80061 LIPID PANEL: CPT

## 2022-07-26 PROCEDURE — 36415 COLL VENOUS BLD VENIPUNCTURE: CPT

## 2022-07-29 ENCOUNTER — HOSPITAL ENCOUNTER (OUTPATIENT)
Dept: RADIOLOGY | Facility: HOSPITAL | Age: 72
Discharge: HOME | End: 2022-07-29
Payer: MEDICARE

## 2022-07-29 VITALS — BODY MASS INDEX: 24.92 KG/M2 | WEIGHT: 178 LBS | HEIGHT: 71 IN

## 2022-07-29 DIAGNOSIS — Z12.31 SCREENING MAMMOGRAM, ENCOUNTER FOR: ICD-10-CM

## 2022-07-29 PROCEDURE — 77067 SCR MAMMO BI INCL CAD: CPT

## 2022-07-29 PROCEDURE — 77063 BREAST TOMOSYNTHESIS BI: CPT

## 2022-07-30 ENCOUNTER — DOCUMENTATION (OUTPATIENT)
Dept: CARDIOLOGY CLINIC | Facility: CLINIC | Age: 72
End: 2022-07-30

## 2022-07-30 DIAGNOSIS — I10 ESSENTIAL HYPERTENSION: Primary | ICD-10-CM

## 2022-07-30 DIAGNOSIS — G43.709 CHRONIC MIGRAINE WITHOUT AURA WITHOUT STATUS MIGRAINOSUS, NOT INTRACTABLE: ICD-10-CM

## 2022-07-30 NOTE — PROGRESS NOTES
Patient's average blood pressure at home between 7/24 and 07/27/2022 was 138/73  This is an increase from previous average blood pressure 131/72, which could be from patient's weight gain  I am going to recommend to the patient that she increase her verapamil or Calan  to 180 milligrams twice a day  Will notify patient her staff and order higher dose verapamil  Will also recommend weight loss of at least 10 lbs as an alternative strategy for blood pressure reduction

## 2022-08-01 ENCOUNTER — TELEPHONE (OUTPATIENT)
Dept: CARDIOLOGY CLINIC | Facility: CLINIC | Age: 72
End: 2022-08-01

## 2022-08-01 NOTE — TELEPHONE ENCOUNTER
----- Message from Alton Diaz MD sent at 7/30/2022  1:56 PM EDT -----  Regarding: Recent home blood pressure readings    Notify patient that recent average blood pressure at home from 07/24 through 07/27/2022 was 138/73, which has increased from recent average home blood pressure 131/72  As a result, I took the liberty of adjusting her verapamil dosage to 180 milligrams twice a day and sent a prescription to the [facility] in [place] with a 30 day supply and five refills  If this medication works out, we can later on order a 90 day prescription  I recommend she recheck her home blood pressure after she also on the new dose of verapamil for at least one month, doing four consecutive days, three readings at a time mid to late morning and again in the evening and send it by e-mail to [email or web address]    In addition, it may be helpful in the longer term, if the patient could lose a minimum of 10 lbs, which also has a blood pressure lowering benefit  Let me know if she has any questions      Dr Jane Cherry

## 2022-08-03 ENCOUNTER — TELEPHONE (OUTPATIENT)
Dept: ENDOCRINOLOGY | Facility: CLINIC | Age: 72
End: 2022-08-03

## 2022-08-03 DIAGNOSIS — M81.0 OSTEOPOROSIS, UNSPECIFIED OSTEOPOROSIS TYPE, UNSPECIFIED PATHOLOGICAL FRACTURE PRESENCE: Primary | ICD-10-CM

## 2022-08-09 ENCOUNTER — RA CDI HCC (OUTPATIENT)
Dept: OTHER | Facility: HOSPITAL | Age: 72
End: 2022-08-09

## 2022-08-09 NOTE — PROGRESS NOTES
[address]  coding opportunities       Chart reviewed, no opportunity found: CHART REVIEWED, NO OPPORTUNITY FOUND        Patients Insurance     Medicare Insurance: Medicare

## 2022-09-17 DIAGNOSIS — I10 UNCONTROLLED HYPERTENSION: ICD-10-CM

## 2022-09-19 ENCOUNTER — OFFICE VISIT (OUTPATIENT)
Dept: FAMILY MEDICINE CLINIC | Facility: CLINIC | Age: 72
End: 2022-09-19
Payer: MEDICARE

## 2022-09-19 VITALS
BODY MASS INDEX: 25.06 KG/M2 | HEART RATE: 62 BPM | RESPIRATION RATE: 16 BRPM | SYSTOLIC BLOOD PRESSURE: 108 MMHG | DIASTOLIC BLOOD PRESSURE: 70 MMHG | HEIGHT: 71 IN | TEMPERATURE: 97.6 F | OXYGEN SATURATION: 98 % | WEIGHT: 179 LBS

## 2022-09-19 DIAGNOSIS — E78.5 DYSLIPIDEMIA: Primary | ICD-10-CM

## 2022-09-19 DIAGNOSIS — I10 ESSENTIAL HYPERTENSION: ICD-10-CM

## 2022-09-19 DIAGNOSIS — E21.3 HYPERPARATHYROIDISM (HCC): ICD-10-CM

## 2022-09-19 PROCEDURE — 99213 OFFICE O/P EST LOW 20 MIN: CPT | Performed by: FAMILY MEDICINE

## 2022-09-19 RX ORDER — CHLORTHALIDONE 25 MG/1
TABLET ORAL
Qty: 52 TABLET | Refills: 3 | Status: SHIPPED | OUTPATIENT
Start: 2022-09-19

## 2022-09-19 NOTE — ASSESSMENT & PLAN NOTE
Blood pressure is well controlled   Continue chlorthalidone 25 mg 4 days a week, spironolactone 25 mg daily and verapamil 180 mg twice daily

## 2022-09-19 NOTE — PROGRESS NOTES
Name: Ryan Guerin      : 1950      MRN: [id number]  Encounter Provider: Joyce Dawn DO  Encounter Date: 2022   Encounter department: [address] [place] [address]     1  Dyslipidemia  Assessment & Plan:  Stable   Continue atorvastatin 20 mg daily    Orders:  -     Lipid Panel with Direct LDL reflex; Future; Expected date: 2023    2  Essential hypertension  Assessment & Plan:  Blood pressure is well controlled   Continue chlorthalidone 25 mg 4 days a week, spironolactone 25 mg daily and verapamil 180 mg twice daily    Orders:  -     CBC; Future; Expected date: 2023  -     Comprehensive metabolic panel; Future; Expected date: 2023  -     Lipid Panel with Direct LDL reflex; Future; Expected date: 2023    3  Hyperparathyroidism ([address] )  Assessment & Plan:  PTH and calcium are normal         BMI Counseling: Body mass index is 25 32 kg/m²  The BMI is above normal  Exercise recommendations include exercising 3-5 times per week  Rationale for BMI follow-up plan is due to patient being overweight or obese  Declined flu shot, will get her at her pharmacy in October       Subjective      Patient reports that she is feeling well  She is staying very active      Review of Systems    Current Outpatient Medications on File Prior to Visit   Medication Sig    Ascorbic Acid (vitamin C) 1000 MG tablet Take 1,000 mg by mouth daily    atorvastatin (LIPITOR) 20 mg tablet TAKE 1 TABLET BY MOUTH EVERY DAY    B Complex Vitamins (B COMPLEX PO) Take 1 tablet by mouth daily    Black Pepper-Turmeric (TURMERIC COMPLEX/BLACK PEPPER PO) Take by mouth daily 600 mg turmeric, 5 mg black pepper    calcium carbonate (OS-FLORIAN) 600 MG tablet Take one tablet daily    chlorthalidone 25 mg tablet TAKE 1 TABLET BY MOUTH 4 DAYS A WEEK MON, WED, FRI,     Cholecalciferol (VITAMIN D3) 5000 units CAPS Take 1 capsule by mouth Daily    Coenzyme Q10 (Co Q 10) 100 MG CAPS Take by mouth every morning    ELDERBERRY PO Take by mouth every morning     magnesium oxide (MAG-OX) 400 mg tablet Take 1 tablet by mouth daily With zinc     Misc Natural Products (GLUCOSAMINE CHOND COMPLEX/MSM PO) Take 1 tablet by mouth Daily 1000 mg     Multiple Vitamins-Minerals (PRESERVISION AREDS) CAPS Take 1 capsule by mouth daily    Omega 3 1200 MG CAPS Take by mouth every morning    Probiotic Product (PROBIOTIC-10 PO) Take 1 capsule by mouth daily    spironolactone (ALDACTONE) 25 mg tablet Take 1 tablet (25 mg total) by mouth every morning    verapamil (CALAN-SR) 180 mg CR tablet Take 1 tablet (180 mg total) by mouth 2 (two) times a day    Zinc Sulfate (ZINC 15 PO) Take by mouth daily With copper 1mg    [DISCONTINUED] chlorthalidone 25 mg tablet TAKE 1 TABLET BY MOUTH 4 DAYS A WEEK MON, WED, FRI, SUNDAY       Objective     /70   Pulse 62   Temp 97 6 °F (36 4 °C)   Resp 16   Ht 5' 10 5" (1 791 m)   Wt 81 2 kg (179 lb)   SpO2 98%   BMI 25 32 kg/m²     Physical Exam  Vitals and nursing note reviewed  Constitutional:       Appearance: She is well-developed  HENT:      Head: Normocephalic and atraumatic  Right Ear: Tympanic membrane and external ear normal       Left Ear: Tympanic membrane and external ear normal    Cardiovascular:      Rate and Rhythm: Normal rate and regular rhythm  Heart sounds: Normal heart sounds  No murmur heard  No friction rub  Pulmonary:      Effort: Pulmonary effort is normal  No respiratory distress  Breath sounds: Normal breath sounds  No wheezing or rales  Musculoskeletal:      Right lower leg: No edema  Left lower leg: No edema         Georgiana Bill, DO

## 2022-09-27 ENCOUNTER — OFFICE VISIT (OUTPATIENT)
Dept: AUDIOLOGY | Facility: CLINIC | Age: 72
End: 2022-09-27
Payer: MEDICARE

## 2022-09-27 ENCOUNTER — TELEPHONE (OUTPATIENT)
Dept: FAMILY MEDICINE CLINIC | Facility: CLINIC | Age: 72
End: 2022-09-27

## 2022-09-27 ENCOUNTER — TELEPHONE (OUTPATIENT)
Dept: NEUROLOGY | Facility: CLINIC | Age: 72
End: 2022-09-27

## 2022-09-27 DIAGNOSIS — H90.3 SENSORINEURAL HEARING LOSS (SNHL), BILATERAL: Primary | ICD-10-CM

## 2022-09-27 DIAGNOSIS — H91.93 BILATERAL HEARING LOSS, UNSPECIFIED HEARING LOSS TYPE: Primary | ICD-10-CM

## 2022-09-27 PROCEDURE — 92567 TYMPANOMETRY: CPT | Performed by: AUDIOLOGIST

## 2022-09-27 PROCEDURE — 92557 COMPREHENSIVE HEARING TEST: CPT | Performed by: AUDIOLOGIST

## 2022-09-27 NOTE — PROGRESS NOTES
ADULT HEARING EVALUATION - [place] [address] AUDIOLOGY      Patient Name: Freddy Shah   MRN:  [id number]   :  1950   Age: 74 y o  Gender: female   DOS: 2022     HISTORY:     Freddy Shah, a 74 y o  female, was seen on 2022 at the referral of Dr Maria L Valero for an audiometric evaluation  Ms Gil Moore was unaccompanied to today's visit  Ms Gil Moore is a new patient to our practice  She reported today for an annual audiogram due to Hx of hearing loss and reports of difficulty hearing  Ms Gil Moore denied otalgia, otorrhea, aural fullness, tinnitus and dizziness  Other documented medical history states that Ms Gil Moore  has a past medical history of Arthritis, Chronic kidney disease, Hyperlipidemia, Hypertension, Migraine, Migraine, Osteopenia, and Snores  Interim medical history is otherwise reportedly unchanged  RESULTS:    Otoscopic Evaluation:   Right Ear: Unremarkable, canal clear   Left Ear: Unremarkable, canal clear    Tympanometry:   Right Ear: Type A; normal middle ear pressure and static compliance    Left Ear: Type A; normal middle ear pressure and static compliance     Audiometry:  Conventional pure tone audiometry from 250 - 8000 Hz  obtained with good reliability and revealed the following:     Right Ear: normal to moderately-severe sensorineural hearing loss (SNHL)   Left Ear: normal to moderately-severe sensorineural hearing loss (SNHL)     Speech Audiometry:    Speech Reception (SRT)   Right Ear: 20 dB HL   Left Ear: 20 dB HL    Word Recognition Scores (WRS):  Right Ear: excellent (100 % correct)     Left Ear: excellent (90 % correct)   Stimuli: [id number]    IMPRESSIONS:  Bilateral SNHL, normal to moderately-severe AU  Results are essentially stable     The results of today's findings were reviewed with Ms Gil Moore and her hearing thresholds were explained at length  Treatment options, including amplification and communication strategies, were discussed as appropriate   Ms Gil Moore voiced understanding of her test results and had no further questions  RECOMMENDATIONS:    1 ) Follow-up with referring provider  2 ) Based on today's findings and patient symptoms, Ms Royer Pillai is a candidate for a trial with amplification  A hearing aid evaluation to further discuss Ms Thurston's lifestyle, communication needs, and develop a treatment plan for their hearing loss is recommended  3 ) Annual audiograms, sooner if problems/concerns arise  *see attached audiogram*    It was a pleasure working with Ms Royer Pillai today  Thank you for referring this patient  Deepti Muniz    Clinical Audiologist    [address] [address] [id number]

## 2022-09-27 NOTE — TELEPHONE ENCOUNTER
9/27/2022 9:18 AM Called Bre Certain to let her know that I put in the order  Message left to let her know it was done    Message complete  Charmaine Muñoz, DO

## 2022-09-28 ENCOUNTER — OFFICE VISIT (OUTPATIENT)
Dept: NEUROLOGY | Facility: CLINIC | Age: 72
End: 2022-09-28
Payer: MEDICARE

## 2022-09-28 VITALS
TEMPERATURE: 97.7 F | OXYGEN SATURATION: 99 % | HEIGHT: 71 IN | DIASTOLIC BLOOD PRESSURE: 79 MMHG | SYSTOLIC BLOOD PRESSURE: 132 MMHG | BODY MASS INDEX: 25.2 KG/M2 | WEIGHT: 180 LBS | HEART RATE: 56 BPM

## 2022-09-28 DIAGNOSIS — G43.909 EPISODIC MIGRAINE: Primary | ICD-10-CM

## 2022-09-28 DIAGNOSIS — I10 PRIMARY HYPERTENSION: ICD-10-CM

## 2022-09-28 PROCEDURE — 99214 OFFICE O/P EST MOD 30 MIN: CPT | Performed by: PSYCHIATRY & NEUROLOGY

## 2022-09-28 NOTE — PROGRESS NOTES
Return NeuroOutpatient Note        Sofia Marshall  [id number]  51 y o   1950       Migraine        History obtained from:  Patient     HPI/Subjective:    Sofia Marshall is a 73 yo F with PMH of migraines presents as f/u  She is former patient of Dr Carina Quijano and then was seeing Shaw Moss  Patient has h/o migraines for 25 years  She's currently on verapamil 180mg bid  This was recently increased by her cardiologist for her HTN  Her typical migraine is described starting in temporal region, radiating to occipital region, associated with nausea, photosensitivity  She hasn't had one in a long time  She doesn't get headaches anymore  If she skips meal, may get episodic headache but otherwise is well controlled  Her migraines are triggered by chocolate, cheese and fuentes       Past Medical History:   Diagnosis Date    Arthritis     Chronic kidney disease     stage 3    Hyperlipidemia     borderline    Hypertension     borderline    Migraine     Migraine     controlled with verapamil    Osteopenia     Snores      Social History     Socioeconomic History    Marital status: /Civil Union     Spouse name: Not on file    Number of children: Not on file    Years of education: Not on file    Highest education level: Not on file   Occupational History    Not on file   Tobacco Use    Smoking status: Never Smoker    Smokeless tobacco: Never Used   Vaping Use    Vaping Use: Never used   Substance and Sexual Activity    Alcohol use: No    Drug use: No    Sexual activity: Not on file   Other Topics Concern    Not on file   Social History Narrative    Not on file     Social Determinants of Health     Financial Resource Strain: Not on file   Food Insecurity: Not on file   Transportation Needs: Not on file   Physical Activity: Not on file   Stress: Not on file   Social Connections: Not on file   Intimate Partner Violence: Not on file   Housing Stability: Not on file     Family History   Problem Relation Age of Onset    Angina Mother     Heart failure Mother     Prostate cancer Father     Cancer Father         prostate    Diabetes Sister     Sudden death Sister     Osteoporosis Sister     Heart disease Sister         MI    COPD Sister     Kidney disease Sister     Heart disease Sister         CHF    Hypertension Daughter     Hypertension Brother     Cancer Brother         lung    Diabetes Brother     Cancer Brother         prostate    No Known Problems Son     Lung cancer Maternal Uncle     Breast cancer Cousin 48    Prostate cancer Other 46     No Known Allergies  Current Outpatient Medications on File Prior to Visit   Medication Sig Dispense Refill    Ascorbic Acid (vitamin C) 1000 MG tablet Take 1,000 mg by mouth daily      atorvastatin (LIPITOR) 20 mg tablet TAKE 1 TABLET BY MOUTH EVERY DAY 90 tablet 3    B Complex Vitamins (B COMPLEX PO) Take 1 tablet by mouth daily      Black Pepper-Turmeric (TURMERIC COMPLEX/BLACK PEPPER PO) Take by mouth daily 600 mg turmeric, 5 mg black pepper      calcium carbonate (OS-FLORIAN) 600 MG tablet Take one tablet daily      chlorthalidone 25 mg tablet TAKE 1 TABLET BY MOUTH 4 DAYS A WEEK MON, WED, FRI, SUNDAY 52 tablet 3    Cholecalciferol (VITAMIN D3) 5000 units CAPS Take 1 capsule by mouth Daily      Coenzyme Q10 (Co Q 10) 100 MG CAPS Take by mouth every morning      ELDERBERRY PO Take by mouth every morning       magnesium oxide (MAG-OX) 400 mg tablet Take 1 tablet by mouth daily With zinc       Misc Natural Products (GLUCOSAMINE CHOND COMPLEX/MSM PO) Take 1 tablet by mouth Daily 1000 mg       Multiple Vitamins-Minerals (PRESERVISION AREDS) CAPS Take 1 capsule by mouth daily      Omega 3 1200 MG CAPS Take by mouth every morning      Probiotic Product (PROBIOTIC-10 PO) Take 1 capsule by mouth daily      spironolactone (ALDACTONE) 25 mg tablet Take 1 tablet (25 mg total) by mouth every morning 90 tablet 3    verapamil (CALAN-SR) 180 mg CR tablet Take 1 tablet (180 mg total) by mouth 2 (two) times a day 60 tablet 5    Zinc Sulfate (ZINC 15 PO) Take by mouth daily With copper 1mg       No current facility-administered medications on file prior to visit  Review of Systems   Refer to positive review of systems in HPI  Review of Systems    Constitutional- No fever  Eyes- No visual change  ENT- Hearing normal  CV- No chest pain  Resp- No Shortness of breath  GI- No diarrhea  - Bladder normal  MS- No Arthritis   Skin- No rash  Psych- No depression  Endo- No DM  Heme- No nodes    Vitals:    09/28/22 1601   BP: 132/79   BP Location: Left arm   Patient Position: Sitting   Cuff Size: Standard   Pulse: 56   Temp: 97 7 °F (36 5 °C)   TempSrc: Tympanic   SpO2: 99%   Weight: 81 6 kg (180 lb)   Height: 5' 10 5" (1 791 m)       PHYSICAL EXAM:  Appearance: No Acute Distress  Ophthalmoscopic: Disc Flat, Normal fundus  Mental status:  Orientation: Awake, Alert, and Orientedx3  Memory: Registation 3/3 Recall 3/3  Attention: normal  Knowledge: good  Language: No aphasia  Speech: No dysarthria  Cranial Nerves:  2 No Visual Defect on Confrontation, Pupils round, equal, reactive to light  3,4,6 Extraocular Movements Intact, no nystagmus  5 Facial Sensation Intact  7 No facial asymmetry  8 Intact hearing  9,10 Palate symmetric, normal gag  11 Good shoulder shrug  12 Tongue Midline  Gait: independent but unsteady with tandem gait     Coordination: No ataxia with finger to nose testing, and heel to shin  Sensory: Intact, Symmetric to pinprick, light touch, vibration, and joint position  Muscle Tone: Normal              Muscle exam:  Arm Right Left Leg Right Left   Deltoid 5/5 5/5 Iliopsoas 5/5 5/5   Biceps 5/5 5/5 Quads 5/5 5/5   Triceps 5/5 5/5 Hamstrings 5/5 5/5   Wrist Extension 5/5 5/5 Ankle Dorsi Flexion 5/5 5/5   Wrist Flexion 5/5 5/5 Ankle Plantar Flexion 5/5 5/5   Interossei 5/5 5/5 Ankle Eversion 5/5 5/5   APB 5/5 5/5 Ankle Inversion 5/5 5/5       Reflexes   KRISTI Figueroa Favorite Plantars Green's   Right 2+ 2+ 2+ 2+ 2+ Downgoing Not present   Left 2+ 2+ 2+ 2+ 2+ Downgoing Not present     Personal review of  Labs:                    Diagnoses and all orders for this visit:      1  Episodic migraine     2  Primary hypertension         Patient is very stable in terms of her migraines  Will resume current dose of verapamil 180mg bid  This is also being used for her HTN  She stays hydrated and tries to avoid food triggers as much as possible  She goes to gym daily and keeps her self active                 Total time of encounter:  30 min  More than 50% of the time was used in counseling and/or coordination of care  Extent of counseling and/or coordination of care        MD [facility]  [address]  [address]  [phone]

## 2022-10-17 ENCOUNTER — HOSPITAL ENCOUNTER (OUTPATIENT)
Dept: RADIOLOGY | Facility: HOSPITAL | Age: 72
Discharge: HOME | End: 2022-10-17
Payer: MEDICARE

## 2022-10-17 DIAGNOSIS — M81.0 OSTEOPOROSIS, UNSPECIFIED OSTEOPOROSIS TYPE, UNSPECIFIED PATHOLOGICAL FRACTURE PRESENCE: ICD-10-CM

## 2022-10-17 PROCEDURE — 77080 DXA BONE DENSITY AXIAL: CPT

## 2022-11-21 ENCOUNTER — OFFICE VISIT (OUTPATIENT)
Dept: ENDOCRINOLOGY | Facility: CLINIC | Age: 72
End: 2022-11-21

## 2022-11-21 VITALS
WEIGHT: 178 LBS | BODY MASS INDEX: 25.48 KG/M2 | TEMPERATURE: 97.9 F | DIASTOLIC BLOOD PRESSURE: 76 MMHG | HEIGHT: 70 IN | HEART RATE: 64 BPM | SYSTOLIC BLOOD PRESSURE: 130 MMHG

## 2022-11-21 DIAGNOSIS — E55.9 VITAMIN D DEFICIENCY: ICD-10-CM

## 2022-11-21 DIAGNOSIS — E21.3 HYPERPARATHYROIDISM (HCC): ICD-10-CM

## 2022-11-21 DIAGNOSIS — M81.0 OSTEOPOROSIS, UNSPECIFIED OSTEOPOROSIS TYPE, UNSPECIFIED PATHOLOGICAL FRACTURE PRESENCE: Primary | ICD-10-CM

## 2022-11-21 DIAGNOSIS — E04.2 MULTINODULAR GOITER: ICD-10-CM

## 2022-11-21 NOTE — PROGRESS NOTES
Patient Progress Note      CC: osteoporosis, vitamin D def, thyroid nodules      Referring Provider  No referring provider defined for this encounter  History of Present Illness:   Conor Culver is a 74 y o  female here for follow-up of osteoporosis, vitamin-D deficiency, thyroid nodules  She also has a history of primary hyperparathyroidism and underwent a parathyroid adenoma resection in 2011  Most recent PTH level in July 2022 was within normal range at 51 and calcium was normal at 9 4  For osteoporosis she was treated with bisphosphonate therapy for 7 years, so she was given a drug holiday in November 2018  She is taking vitamin D3 5000 International Units daily and a multivitamin daily  Last vitamin-D level normal at 44 7  Last DEXA scan done in Oct 2022 showed osteopenia of multiple sites  No recent falls or fractures  She is doing weight-bearing exercises routinely  She also has a history of thyroid nodules  Last thyroid ultrasound was done in May 2019  A left upper pole nodule and left lower pole nodule were measured but did not meet criteria for biopsy  No history of external radiation to head/neck/chest  No family history of thyroid cancer  No recent Iodine loading in form of medication, erbs or kelp supplements or radiological diagnostic studies  Thyroid ultrasound results: May 2019  FINDINGS:  Thyroid parenchyma is diffusely heterogeneous in echotexture with focal nodule(s) as described below  Right lobe:  4 7 x 1 3 x 1 5 cm  Left lobe:  4 6 x 1 6 x 1 4 cm  Isthmus:  0 4 cm  Nodule #1  Image 32  LEFT upper pole nodule measuring 0 9 x 0 6 x 0 8 cm  Unchanged from prior  COMPOSITION:  1 point, mixed cystic and solid  ECHOGENICITY:  1 point, hyperechoic or isoechoic  SHAPE:  0 points, wider-than-tall  MARGIN: 0 points, smooth  ECHOGENIC FOCI:  0 points, none or large comet-tail artifacts  TI-RADS Classification: TR 2 (2 points), Not suspious  No FNA  Nodule #2    Image 39   LEFT lower pole nodule measuring 0 5 x 0 7 x 0 4 cm  Unchanged from prior  COMPOSITION:  2 points, solid or almost completely solid   ECHOGENICITY:  1 point, hyperechoic or isoechoic  SHAPE:  0 points, wider-than-tall  MARGIN: 0 points, smooth  ECHOGENIC FOCI:  0 points, none or large comet-tail artifacts  TI-RADS Classification: TR 3 (3 points), Mildly suspicious  FNA if >2 5 cm  Follow if >1 5 cm  There are additional nodules of lesser size and/or TI-RADS score  These do not necessitate additional evaluation based on ACR criteria  IMPRESSION:     No nodule meets current ACR criteria for requiring biopsy or followup ultrasounds      Patient Active Problem List   Diagnosis   • Chronic migraine without aura without status migrainosus, not intractable   • Essential hypertension   • Lump of skin   • Valgus deformity of both great toes   • Pain in both feet   • Onychomycosis   • Radiculopathy of lumbar region   • Metatarsalgia of both feet   • Hyperlipidemia   • Laryngopharyngeal reflux (LPR)   • Vitamin D deficiency   • Hyperparathyroidism ([address] )   • Osteoporosis   • Multinodular goiter   • Dyspnea on exertion   • Dyslipidemia   • Intermittent palpitations   • Primary osteoarthritis of both knees   • White coat syndrome with diagnosis of hypertension   • Chronic fatigue      Past Medical History:   Diagnosis Date   • Arthritis    • Chronic kidney disease     stage 3   • Hyperlipidemia     borderline   • Hypertension     borderline   • Migraine    • Migraine     controlled with verapamil   • Osteopenia    • Snores       Past Surgical History:   Procedure Laterality Date   • BREAST BIOPSY Right     benign ~ 1990s   • BUNIONECTOMY Left     3 hammertoes, aquilesion   • CATARACT EXTRACTION Bilateral 2012   • COLONOSCOPY     • PARATHYROID GLAND SURGERY Right     front gland removed      Family History   Problem Relation Age of Onset   • Angina Mother    • Heart failure Mother    • Prostate cancer Father • Cancer Father         prostate   • Diabetes Sister    • Sudden death Sister    • Osteoporosis Sister    • Heart disease Sister         MI   • COPD Sister    • Kidney disease Sister    • Heart disease Sister         CHF   • Hypertension Daughter    • Hypertension Brother    • Cancer Brother         lung   • Diabetes Brother    • Cancer Brother         prostate   • No Known Problems Son    • Lung cancer Maternal Uncle    • Breast cancer Cousin 48   • Prostate cancer Other 46     Social History     Tobacco Use   • Smoking status: Never   • Smokeless tobacco: Never   Substance Use Topics   • Alcohol use: No     No Known Allergies      Current Outpatient Medications:   •  Ascorbic Acid (vitamin C) 1000 MG tablet, Take 1,000 mg by mouth daily, Disp: , Rfl:   •  atorvastatin (LIPITOR) 20 mg tablet, TAKE 1 TABLET BY MOUTH EVERY DAY, Disp: 90 tablet, Rfl: 3  •  B Complex Vitamins (B COMPLEX PO), Take 1 tablet by mouth daily, Disp: , Rfl:   •  Black Pepper-Turmeric (TURMERIC COMPLEX/BLACK PEPPER PO), Take by mouth daily 600 mg turmeric, 5 mg black pepper, Disp: , Rfl:   •  calcium carbonate (OS-FLORIAN) 600 MG tablet, Take one tablet daily, Disp: , Rfl:   •  chlorthalidone 25 mg tablet, TAKE 1 TABLET BY MOUTH 4 DAYS A WEEK MON, WED, FRI, SUNDAY, Disp: 52 tablet, Rfl: 3  •  Cholecalciferol (VITAMIN D3) 5000 units CAPS, Take 1 capsule by mouth Daily, Disp: , Rfl:   •  Coenzyme Q10 (Co Q 10) 100 MG CAPS, Take by mouth every morning, Disp: , Rfl:   •  ELDERBERRY PO, Take by mouth every morning , Disp: , Rfl:   •  magnesium oxide (MAG-OX) 400 mg tablet, Take 1 tablet by mouth daily With zinc , Disp: , Rfl:   •  Misc Natural Products (GLUCOSAMINE CHOND COMPLEX/MSM PO), Take 1 tablet by mouth Daily 1000 mg , Disp: , Rfl:   •  Multiple Vitamins-Minerals (PRESERVISION AREDS) CAPS, Take 1 capsule by mouth daily, Disp: , Rfl:   •  Omega 3 1200 MG CAPS, Take by mouth every morning, Disp: , Rfl:   •  Probiotic Product (PROBIOTIC-10 PO), Take 1 capsule by mouth daily, Disp: , Rfl:   •  spironolactone (ALDACTONE) 25 mg tablet, Take 1 tablet (25 mg total) by mouth every morning, Disp: 90 tablet, Rfl: 3  •  verapamil (CALAN-SR) 180 mg CR tablet, Take 1 tablet (180 mg total) by mouth 2 (two) times a day, Disp: 60 tablet, Rfl: 5  •  Zinc Sulfate (ZINC 15 PO), Take by mouth daily With copper 1mg, Disp: , Rfl:   Review of Systems   Constitutional: Negative for activity change, appetite change, fatigue and unexpected weight change  HENT: Negative for trouble swallowing  Eyes: Negative for visual disturbance  Respiratory: Negative for shortness of breath  Cardiovascular: Negative for chest pain and palpitations  Gastrointestinal: Negative for constipation and diarrhea  Endocrine: Negative for cold intolerance, heat intolerance, polydipsia and polyuria  Musculoskeletal: Positive for arthralgias  Skin: Negative  Neurological: Negative for numbness  Occasional tingling in hands / feet   Psychiatric/Behavioral: Negative  Physical Exam:  Body mass index is 25 54 kg/m²  /76   Pulse 64   Temp 97 9 °F (36 6 °C) (Skin)   Ht 5' 10" (1 778 m)   Wt 80 7 kg (178 lb)   BMI 25 54 kg/m²    Wt Readings from Last 3 Encounters:   11/21/22 80 7 kg (178 lb)   09/28/22 81 6 kg (180 lb)   09/19/22 81 2 kg (179 lb)       Physical Exam  Vitals and nursing note reviewed  Constitutional:       Appearance: She is well-developed and well-nourished  HENT:      Head: Normocephalic  Eyes:      General: No scleral icterus  Extraocular Movements: EOM normal       Pupils: Pupils are equal, round, and reactive to light  Neck:      Thyroid: No thyromegaly  Cardiovascular:      Rate and Rhythm: Normal rate and regular rhythm  Pulses:           Radial pulses are 2+ on the right side and 2+ on the left side  Heart sounds: No murmur heard  Pulmonary:      Effort: Pulmonary effort is normal  No respiratory distress        Breath sounds: Normal breath sounds  No wheezing  Musculoskeletal:      Cervical back: Neck supple  Skin:     General: Skin is warm and dry  Neurological:      Mental Status: She is alert  Psychiatric:         Mood and Affect: Mood and affect normal        Patient's shoes and socks were not removed  Labs:   No results found for: HGBA1C  Lab Results   Component Value Date    GLUCOSE 94 01/03/2017    CALCIUM 9 4 07/26/2022     01/03/2017    K 3 7 07/26/2022    CO2 29 07/26/2022     07/26/2022    BUN 21 07/26/2022    CREATININE 0 99 07/26/2022     No results found for: Beatriz Frausto  eGFR   Date Value Ref Range Status   07/26/2022 56 ml/min/1 73sq m Final     No components found for: [facility]  Lab Results   Component Value Date    CHOL 178 10/09/2015    HDL 59 07/26/2022    TRIG 71 07/26/2022    CHOLHDL 2 4 10/23/2019     Lab Results   Component Value Date    ALT 24 07/26/2022    AST 18 07/26/2022    ALKPHOS 41 (L) 07/26/2022    BILITOT 0 3 01/03/2017     Lab Results   Component Value Date    SSO2BUIANGKG 3 714 06/30/2019    TSH 2 590 05/31/2019       Impression:  1  Osteoporosis, unspecified osteoporosis type, unspecified pathological fracture presence    2  Vitamin D deficiency    3  Multinodular goiter    4  Hyperparathyroidism ([address] )           Plan:    Diagnoses and all orders for this visit:    Osteoporosis  Recent DEXA scan showed osteopenia of multiple sites  There is an increase in bone density of the hip  Patient is currently on drug holiday   Continue weight bearing exercises as tolerated  Take precautions to avoid falls  Discussed use of treatment due to elevated FRAX score but patient prefers to monitor at this time  -     Basic metabolic panel; Future  -     Vitamin D 25 hydroxy; Future    Vitamin D deficiency  Vitamin D was normal at 44 7  Continue current supplementation  -     Vitamin D 25 hydroxy;  Future    Multinodular goiter  Last thyroid ultrasound was done in May 2019  A left upper pole nodule and left lower pole nodule were measured but did not meet criteria for biopsy or f/u US   -     TSH, 3rd generation with Free T4 reflex; Future    Hyperparathyroidism [facility])  S/p parathyroid adenoma surgery  PTH was normal at 51 and calcium normal at 9 4  Continue to monitor labs  -     Basic metabolic panel; Future  -     Albumin; Future  -     PTH, intact; Future         Discussed with the patient diagnosis and treatment and all questions fully answered  She will call me if any problems arise  Counseled patient on diagnostic results, prognosis, risk and benefit of treatment options, instruction for management, importance of treatment compliance, risk factor reduction and impressions        Sumaya Wheeler PA-C

## 2022-11-25 NOTE — PROGRESS NOTES
Message created on 11/25/2022 for patient:      Notify patient that her average home blood pressure readings between 11/7 and 11/10/2022 were 131/68, which is much improved from the summer time  For now, I would recommend she stay on the same medication  We will plan to see her as scheduled  sometime after the holidays  Let me know if she has any questions      Dr Kayla Diehl

## 2022-11-28 ENCOUNTER — TELEPHONE (OUTPATIENT)
Dept: CARDIOLOGY CLINIC | Facility: CLINIC | Age: 72
End: 2022-11-28

## 2022-11-28 NOTE — TELEPHONE ENCOUNTER
----- Message from Acosta Goff MD sent at 11/25/2022  6:09 PM EST -----  Regarding: Home blood pressure readings    Notify patient that her average home blood pressure readings between 11/7 and 11/10/2022 were 131/68, which is much improved from the summer time  For now, I would recommend she stay on the same medication  We will plan to see her as scheduled  sometime after the holidays  Let me know if she has any questions      Dr Roni Prajapati

## 2022-11-29 ENCOUNTER — TELEMEDICINE (OUTPATIENT)
Dept: FAMILY MEDICINE CLINIC | Facility: CLINIC | Age: 72
End: 2022-11-29

## 2022-11-29 ENCOUNTER — TELEPHONE (OUTPATIENT)
Dept: FAMILY MEDICINE CLINIC | Facility: CLINIC | Age: 72
End: 2022-11-29

## 2022-11-29 DIAGNOSIS — R05.1 ACUTE COUGH: Primary | ICD-10-CM

## 2022-11-29 DIAGNOSIS — K21.9 LARYNGOPHARYNGEAL REFLUX (LPR): ICD-10-CM

## 2022-11-29 DIAGNOSIS — I10 ESSENTIAL HYPERTENSION: ICD-10-CM

## 2022-11-29 RX ORDER — AMOXICILLIN 875 MG/1
875 TABLET, COATED ORAL 2 TIMES DAILY
Qty: 20 TABLET | Refills: 0 | Status: SHIPPED | OUTPATIENT
Start: 2022-11-29 | End: 2022-12-09

## 2022-11-29 NOTE — TELEPHONE ENCOUNTER
Virtual appt made for patient  She stated that the mucus has been clear and the coughing has not gone away and wanted to get something sent in the pharmacy    Ebonie Aranda  Patient has an iphone

## 2022-11-29 NOTE — PROGRESS NOTES
Virtual Regular Visit    Verification of patient location:    Patient is located in the following state in which I hold an active license [place]      Assessment/Plan:    Problem List Items Addressed This Visit        Active Problems    Laryngopharyngeal reflux (LPR)    Essential hypertension    Acute cough - Primary    Relevant Medications    amoxicillin (AMOXIL) 875 mg tablet     mucinex DM  pepcid 20mg bid  If any yellow/green or fever, then start abx for bronchitis  F/u if no better      htn stable    appt if no better    Cough may be infection or LPR    Reason for visit is   Chief Complaint   Patient presents with   • Virtual Regular Visit        Encounter provider Ronnie Zavala DO    Provider located at  [address]  [place] [address] [place] [address] 1  [place] [id number]      Recent Visits  Date Type Provider Dept   11/29/22 [address] [place] [address]   11/29/22 Telephone Marie Schulte, [address] recent visits within past 7 days and meeting all other requirements  Future Appointments  No visits were found meeting these conditions  Showing future appointments within next 150 days and meeting all other requirements       The patient was identified by name and date of birth  Ciaran Melton was informed that this is a telemedicine visit and that the visit is being conducted through the [address] Now platform  She agrees to proceed     My office door was closed  No one else was in the room  She acknowledged consent and understanding of privacy and security of the video platform  The patient has agreed to participate and understands they can discontinue the visit at any time  Patient is aware this is a billable service  Subjective  Ciaran Melton is a 74 y o  female uri sx         HPI     7-10d   Cough  Getting worse  Keeps her awake  Clear mucus so far  No fever  Home covid test neg today  Pepcid/prilosec in past  More gerd lately  No diet changes  No antacids tried lately  No body aches  Some back pain  No leg swelling  No sob  Weight unchanged  No chf   No chest pain but sore from cough  Throat irritated      Past Medical History:   Diagnosis Date   • Arthritis    • Chronic kidney disease     stage 3   • Hyperlipidemia     borderline   • Hypertension     borderline   • Migraine    • Migraine     controlled with verapamil   • Osteopenia    • Snores        Past Surgical History:   Procedure Laterality Date   • BREAST BIOPSY Right     benign ~ 1990s   • BUNIONECTOMY Left     3 hammertoes, bunion   • CATARACT EXTRACTION Bilateral 2012   • COLONOSCOPY     • PARATHYROID GLAND SURGERY Right     front gland removed       Current Outpatient Medications   Medication Sig Dispense Refill   • amoxicillin (AMOXIL) 875 mg tablet Take 1 tablet (875 mg total) by mouth 2 (two) times a day for 10 days 20 tablet 0   • Ascorbic Acid (vitamin C) 1000 MG tablet Take 1,000 mg by mouth daily     • atorvastatin (LIPITOR) 20 mg tablet TAKE 1 TABLET BY MOUTH EVERY DAY 90 tablet 3   • B Complex Vitamins (B COMPLEX PO) Take 1 tablet by mouth daily     • Black Pepper-Turmeric (TURMERIC COMPLEX/BLACK PEPPER PO) Take by mouth daily 600 mg turmeric, 5 mg black pepper     • calcium carbonate (OS-FLORIAN) 600 MG tablet Take one tablet daily     • chlorthalidone 25 mg tablet TAKE 1 TABLET BY MOUTH 4 DAYS A WEEK MON, WED, FRI, SUNDAY 52 tablet 3   • Cholecalciferol (VITAMIN D3) 5000 units CAPS Take 1 capsule by mouth Daily     • Coenzyme Q10 (Co Q 10) 100 MG CAPS Take by mouth every morning     • ELDERBERRY PO Take by mouth every morning      • magnesium oxide (MAG-OX) 400 mg tablet Take 1 tablet by mouth daily With zinc      • Misc Natural Products (GLUCOSAMINE CHOND COMPLEX/MSM PO) Take 1 tablet by mouth Daily 1000 mg      • Multiple Vitamins-Minerals (PRESERVISION AREDS) CAPS Take 1 capsule by mouth daily     • Omega 3 1200 MG CAPS Take by mouth every morning     • Probiotic Product (PROBIOTIC-10 PO) Take 1 capsule by mouth daily     • spironolactone (ALDACTONE) 25 mg tablet Take 1 tablet (25 mg total) by mouth every morning 90 tablet 3   • verapamil (CALAN-SR) 180 mg CR tablet Take 1 tablet (180 mg total) by mouth 2 (two) times a day 60 tablet 5   • Zinc Sulfate (ZINC 15 PO) Take by mouth daily With copper 1mg       No current facility-administered medications for this visit  No Known Allergies    Review of Systems   Constitutional: Negative for fever  Respiratory: Positive for cough  Video Exam    There were no vitals filed for this visit  Physical Exam  Constitutional:       General: She is not in acute distress  Appearance: She is not ill-appearing  HENT:      Head: Normocephalic  Eyes:      General: No scleral icterus  Neck:      Comments: No stiffness  Pulmonary:      Effort: No respiratory distress  Breath sounds: No stridor  Comments: Speaks full sentences  Skin:     Coloration: Skin is not pale  Neurological:      Mental Status: She is alert  Psychiatric:         Behavior: Behavior normal          Thought Content:  Thought content normal           I spent 22 minutes directly with the patient during this visit

## 2022-11-29 NOTE — TELEPHONE ENCOUNTER
Patient is calling stating she has a constant cough that was discuss previous with Dr Darline Burr  The cough is keeping her up at night and now she has mucus  We do not have any apts today  Can something be prescribed for patient?     Please call back

## 2023-01-28 PROBLEM — R05.1 ACUTE COUGH: Status: RESOLVED | Noted: 2022-11-29 | Resolved: 2023-01-28

## 2023-02-14 DIAGNOSIS — I10 ESSENTIAL HYPERTENSION: ICD-10-CM

## 2023-02-14 RX ORDER — SPIRONOLACTONE 25 MG/1
TABLET ORAL
Qty: 90 TABLET | Refills: 3 | Status: SHIPPED | OUTPATIENT
Start: 2023-02-14

## 2023-02-27 ENCOUNTER — OFFICE VISIT (OUTPATIENT)
Dept: CARDIOLOGY CLINIC | Facility: CLINIC | Age: 73
End: 2023-02-27

## 2023-02-27 VITALS
HEIGHT: 70 IN | HEART RATE: 61 BPM | BODY MASS INDEX: 25.77 KG/M2 | SYSTOLIC BLOOD PRESSURE: 140 MMHG | WEIGHT: 180 LBS | DIASTOLIC BLOOD PRESSURE: 70 MMHG | OXYGEN SATURATION: 98 %

## 2023-02-27 DIAGNOSIS — E04.2 NON-TOXIC MULTINODULAR GOITER: ICD-10-CM

## 2023-02-27 DIAGNOSIS — Z78.0 OSTEOPENIA AFTER MENOPAUSE: ICD-10-CM

## 2023-02-27 DIAGNOSIS — K21.9 CHEST PAIN DUE TO GERD: ICD-10-CM

## 2023-02-27 DIAGNOSIS — M85.80 OSTEOPENIA AFTER MENOPAUSE: ICD-10-CM

## 2023-02-27 DIAGNOSIS — E78.5 DYSLIPIDEMIA: ICD-10-CM

## 2023-02-27 DIAGNOSIS — I10 ESSENTIAL HYPERTENSION: Primary | ICD-10-CM

## 2023-02-27 DIAGNOSIS — N18.31 STAGE 3A CHRONIC KIDNEY DISEASE (HCC): ICD-10-CM

## 2023-02-27 DIAGNOSIS — M17.0 PRIMARY OSTEOARTHRITIS OF BOTH KNEES: ICD-10-CM

## 2023-02-27 DIAGNOSIS — R07.9 CHEST PAIN DUE TO GERD: ICD-10-CM

## 2023-02-27 NOTE — PATIENT INSTRUCTIONS
Resume Pepcid until you see Dr Rosemarie Byrnes for your next visit  Continue present medication  Consider discontinuation of [facility]  Cardiology follow-up approximately 6 months with EKG  Do blood pressures for 4 straight days during the week prior to your next appointment and bring the paper with you for Dr Ching Xiao to review

## 2023-02-28 PROBLEM — K21.9 CHEST PAIN DUE TO GERD: Status: ACTIVE | Noted: 2023-02-28

## 2023-02-28 PROBLEM — M85.80 OSTEOPENIA AFTER MENOPAUSE: Status: ACTIVE | Noted: 2023-02-28

## 2023-02-28 PROBLEM — Z78.0 OSTEOPENIA AFTER MENOPAUSE: Status: ACTIVE | Noted: 2023-02-28

## 2023-02-28 PROBLEM — R07.9 CHEST PAIN DUE TO GERD: Status: ACTIVE | Noted: 2023-02-28

## 2023-02-28 PROBLEM — N18.31 STAGE 3A CHRONIC KIDNEY DISEASE (HCC): Status: ACTIVE | Noted: 2020-01-29

## 2023-02-28 PROBLEM — E04.2 NON-TOXIC MULTINODULAR GOITER: Status: ACTIVE | Noted: 2023-02-28

## 2023-02-28 NOTE — PROGRESS NOTES
Office Cardiology Progress Note  Wanda Turcios 74 y o  female MRN: [id number]  02/27/23  3:50 PM      ASSESSMENT:    1  Improved chronic exertional dyspnea with stair climbing but not with exercise at [facility], probably related to progressive overweight with 13 pound weight gain over the past 10 2 years   Has gained 5 lbs in the past 1 2+ years  2   White coat syndrome with history of controlled  essential hypertension  and latest home blood pressure averaging 131/68 11/7 through 11/10/2022   3  Controlled dyslipidemia with atorvastatin  4  History of normal exercise stress test 10/13/15 and benign echocardiogram on 5/1/13   5  History of  now suppressed chronic nonproductive cough but recent exacerbation of heartburn with 5 episodes in the past 2 weeks, possibly caused by worsened GERD, with patient currently off Pepcid for 29+ months  Rafaela Honour has recently been less frequent  6  Resolved hypercalcemia with parathyroidectomy 11/30/11   7  Status post nasal polypectomy October, 2012 and [facility]  October, 2012 with right cataract extraction 9/13  8  Chronic stable multinodular goiter with latest thyroid ultrasound 5/30/17, followed by Dr Villafana  9   Mild chronic kidney disease, stage IIIa    Current estimated GFR is 56    10  Chronic fatigue and tiredness with consideration of adverse drug reaction to verapamil, but more likely related to inadequate sleep duration  11  Osteoarthritis of both knees, right more than left  12  Osteopenia, improved, based on DEXA scan 10/17/2022  Plan       Patient Instructions     1  Resume Pepcid until you see Dr Emre Herrera for your next visit  2  Continue present medication  Consider discontinuation of PreserVision Areds  3  Cardiology follow-up approximately 6 months with EKG  Do blood pressures for 4 straight days during the week prior to your next appointment and bring the paper with you for Dr Morenita Matt to review        HPI    This 74 y o  female  denies new cardiopulmonary symptoms  The patient has had episodes of substernal heartburn 5 times in the past 2 weeks with 4 of the 5 episodes occurring while she lay in bed trying to sleep and one of them occurring in the afternoon  There is radiation upward with associated water brash and relief with Tums  She attends the wellness center at [facility] as part of [address]  The patient also complains of a lack of ambition and energy  The patient's most recent home blood pressure average between 11/7 and 11/10/2022 was 131/68  This was substantially better than the level of 138/73 in late July, 2022  The patient is also being seen in follow-up of the below listed diagnoses  Encounter Diagnoses   Name Primary?    • Essential hypertension Yes   • Dyslipidemia    • Chest pain due to GERD    • Stage 3a chronic kidney disease (HCC)    • Non-toxic multinodular goiter    • Primary osteoarthritis of both knees    • Osteopenia after menopause         Review of Systems    All other systems negative, except as noted in history of present illness    Historical Information   Past Medical History:   Diagnosis Date   • Arthritis    • Chronic kidney disease     stage 3   • Hyperlipidemia     borderline   • Hypertension     borderline   • Migraine    • Migraine     controlled with verapamil   • Osteopenia    • Snores      Past Surgical History:   Procedure Laterality Date   • BREAST BIOPSY Right     benign ~ 1990s   • BUNIONECTOMY Left     3 hammertoes, bunion   • CATARACT EXTRACTION Bilateral 2012   • COLONOSCOPY     • PARATHYROID GLAND SURGERY Right     front gland removed     Social History     Substance and Sexual Activity   Alcohol Use No     Social History     Substance and Sexual Activity   Drug Use No     Social History     Tobacco Use   Smoking Status Never   Smokeless Tobacco Never       Family History:  Family History   Problem Relation Age of Onset   • Angina Mother    • Heart failure Mother    • Prostate cancer Father    • Cancer Father         prostate   • Diabetes Sister    • Sudden death Sister    • Osteoporosis Sister    • Heart disease Sister         MI   • COPD Sister    • Kidney disease Sister    • Heart disease Sister         CHF   • Hypertension Daughter    • Hypertension Brother    • Cancer Brother         lung   • Diabetes Brother    • Cancer Brother         prostate   • No Known Problems Son    • Lung cancer Maternal Uncle    • Breast cancer Cousin 48   • Prostate cancer Other 46         Meds/Allergies     Prior to Admission medications    Medication Sig Start Date End Date Taking?  Authorizing Provider   Ascorbic Acid (vitamin C) 1000 MG tablet Take 1,000 mg by mouth daily   Yes Historical Provider, MD   atorvastatin (LIPITOR) 20 mg tablet TAKE 1 TABLET BY MOUTH EVERY DAY 4/14/22  Yes Chandan Krueger MD   B Complex Vitamins (B COMPLEX PO) Take 1 tablet by mouth daily   Yes Historical Provider, MD   Black Pepper-Turmeric (TURMERIC COMPLEX/BLACK PEPPER PO) Take by mouth daily 600 mg turmeric, 5 mg black pepper   Yes Historical Provider, MD   calcium carbonate (OS-FLORIAN) 600 MG tablet Take one tablet daily 9/16/20  Yes Brisa Newton MD   chlorthalidone 25 mg tablet TAKE 1 TABLET BY MOUTH 4 DAYS A WEEK MON, WED, FRI, SUNDAY 9/19/22  Yes Chandan Krueger MD   Cholecalciferol (VITAMIN D3) 5000 units CAPS Take 1 capsule by mouth Daily   Yes Historical Provider, MD   Coenzyme Q10 (Co Q 10) 100 MG CAPS Take by mouth every morning   Yes Historical Provider, MD   ELDERBERRY PO Take by mouth every morning    Yes Historical Provider, MD   magnesium oxide (MAG-OX) 400 mg tablet Take 1 tablet by mouth daily With zinc  12/2/10  Yes Historical Provider, MD   Misc Natural Products (GLUCOSAMINE CHOND COMPLEX/MSM PO) Take 1 tablet by mouth Daily 1000 mg    Yes Historical Provider, MD   Multiple Vitamins-Minerals (PRESERVISION AREDS) CAPS Take 1 capsule by mouth daily 10/18/17  Yes Historical Provider, MD   Omega 3 1200 MG CAPS Take by mouth every morning   Yes Historical Provider, MD   Probiotic Product (PROBIOTIC-10 PO) Take 1 capsule by mouth daily   Yes Historical Provider, MD   spironolactone (ALDACTONE) 25 mg tablet TAKE 1 TABLET BY MOUTH EVERY DAY IN THE MORNING 2/14/23  Yes Francisco Javier Nunez MD   verapamil (CALAN-SR) 180 mg CR tablet TAKE 1 TABLET (180 MG TOTAL) BY MOUTH 2 (TWO) TIMES A DAY 1/23/23  Yes Francisco Javier Nunez MD   Zinc Sulfate (ZINC 15 PO) Take by mouth daily With copper 1mg   Yes Historical Provider, MD       No Known Allergies      Vitals:    02/27/23 1453   BP: 140/70   BP Location: Left arm   Patient Position: Sitting   Cuff Size: Standard   Pulse: 61   SpO2: 98%   Weight: 81 6 kg (180 lb)   Height: 5' 10" (1 778 m)       Body mass index is 25 83 kg/m²  2 pound weight gain in approximately 8 months and 5 pound weight gain in approximately 1 2 years    Physical Exam:    General Appearance:  Alert, cooperative, no distress, appears stated age and is mildly overweight  Head:  Normocephalic, without obvious abnormality, atraumatic   Eyes:  PERRL, conjunctiva/corneas clear, EOM's intact,   both eyes   Ears:  Normal TM's and external ear canals, both ears   Nose: Nares normal, septum midline, mucosa normal, no drainage or sinus tenderness   Throat: Lips, mucosa, and tongue normal; teeth and gums normal   Neck: Supple, symmetrical, trachea midline, no adenopathy, thyroid: not enlarged, symmetric, no tenderness/mass/nodules, no carotid bruit or JVD  There is a small horizontal right suprasternal surgical scar from prior right parathyroidectomy   Back:   Symmetric, no curvature, ROM normal, no CVA tenderness   Lungs:   Clear to auscultation bilaterally, respirations unlabored   Chest Wall:  No tenderness or deformity   Heart:  Regular rate and rhythm, S1, S2 normal, no murmur, rub or gallop   Abdomen:   Soft, non-tender, bowel sounds active all four quadrants,  no masses, no organomegaly with mild abdominal obesity noted     Extremities: Extremities normal, atraumatic, no cyanosis or edema   Pulses: 2+ and symmetric   Skin: Skin showed normal color, texture, turgor and no rashes or lesions   Lymph nodes: Cervical, supraclavicular, and axillary nodes normal   Neurologic: Normal         Cardiographics    ECG 02/27/23:    Normal sinus rhythm at 61 bpm  First-degree AV block  Anteroseptal Q waves and cannot exclude prior anteroseptal infarct  Leftward frontal plane QRS axis  Abnormal ECG, quite similar to 7/6/2022     IMAGING:    No Chest XR results available for this patient  DXA bone density scan 10/17/2022:    Consistent with osteopenia but significant increase in bone mineral density in hips noted compared to 7/21/2020      LAB REVIEW:      Lab Results   Component Value Date    SODIUM 137 07/26/2022    K 3 7 07/26/2022     07/26/2022    CO2 29 07/26/2022    ANIONGAP 11 7 10/09/2015    BUN 21 07/26/2022    CREATININE 0 99 07/26/2022    GLUCOSE 94 01/03/2017    GLUF 91 07/26/2022    CALCIUM 9 4 07/26/2022    AST 18 07/26/2022    ALT 24 07/26/2022    ALKPHOS 41 (L) 07/26/2022    PROT 6 5 01/03/2017    BILITOT 0 3 01/03/2017    EGFR 56 07/26/2022     Lab Results   Component Value Date    CHOLESTEROL 135 07/26/2022    CHOLESTEROL 109 01/24/2022    CHOLESTEROL 133 07/21/2021     Lab Results   Component Value Date    HDL 59 07/26/2022    HDL 50 01/24/2022    HDL 60 07/21/2021       Lab Results   Component Value Date    LDLCALC 62 07/26/2022    LDLCALC 44 01/24/2022    LDLCALC 61 07/21/2021     No components found for: [facility]  Lab Results   Component Value Date    TRIG 71 07/26/2022    TRIG 77 01/24/2022    TRIG 60 07/21/2021     CBC, PTH, vitamin D, 25-OH 7/26/2022:  All normal          Emanuel Olivas MD

## 2023-03-02 ENCOUNTER — APPOINTMENT (OUTPATIENT)
Dept: LAB | Facility: CLINIC | Age: 73
End: 2023-03-02

## 2023-03-02 DIAGNOSIS — E78.5 DYSLIPIDEMIA: ICD-10-CM

## 2023-03-02 DIAGNOSIS — I10 ESSENTIAL HYPERTENSION: ICD-10-CM

## 2023-03-02 LAB
ALBUMIN SERPL BCP-MCNC: 4 G/DL (ref 3.5–5)
ALP SERPL-CCNC: 40 U/L (ref 46–116)
ALT SERPL W P-5'-P-CCNC: 27 U/L (ref 12–78)
ANION GAP SERPL CALCULATED.3IONS-SCNC: 2 MMOL/L (ref 4–13)
AST SERPL W P-5'-P-CCNC: 17 U/L (ref 5–45)
BILIRUB SERPL-MCNC: 0.64 MG/DL (ref 0.2–1)
BUN SERPL-MCNC: 19 MG/DL (ref 5–25)
CALCIUM SERPL-MCNC: 10.1 MG/DL (ref 8.3–10.1)
CHLORIDE SERPL-SCNC: 105 MMOL/L (ref 96–108)
CHOLEST SERPL-MCNC: 111 MG/DL
CO2 SERPL-SCNC: 30 MMOL/L (ref 21–32)
CREAT SERPL-MCNC: 0.94 MG/DL (ref 0.6–1.3)
ERYTHROCYTE [DISTWIDTH] IN BLOOD BY AUTOMATED COUNT: 12.2 % (ref 11.6–15.1)
GFR SERPL CREATININE-BSD FRML MDRD: 59 ML/MIN/1.73SQ M
GLUCOSE P FAST SERPL-MCNC: 100 MG/DL (ref 65–99)
HCT VFR BLD AUTO: 41.9 % (ref 34.8–46.1)
HDLC SERPL-MCNC: 60 MG/DL
HGB BLD-MCNC: 13.4 G/DL (ref 11.5–15.4)
LDLC SERPL CALC-MCNC: 35 MG/DL (ref 0–100)
MCH RBC QN AUTO: 27.7 PG (ref 26.8–34.3)
MCHC RBC AUTO-ENTMCNC: 32 G/DL (ref 31.4–37.4)
MCV RBC AUTO: 87 FL (ref 82–98)
PLATELET # BLD AUTO: 230 THOUSANDS/UL (ref 149–390)
PMV BLD AUTO: 10.8 FL (ref 8.9–12.7)
POTASSIUM SERPL-SCNC: 4.3 MMOL/L (ref 3.5–5.3)
PROT SERPL-MCNC: 6.8 G/DL (ref 6.4–8.4)
RBC # BLD AUTO: 4.84 MILLION/UL (ref 3.81–5.12)
SODIUM SERPL-SCNC: 137 MMOL/L (ref 135–147)
TRIGL SERPL-MCNC: 78 MG/DL
WBC # BLD AUTO: 5.48 THOUSAND/UL (ref 4.31–10.16)

## 2023-03-17 ENCOUNTER — TELEPHONE (OUTPATIENT)
Dept: NEUROLOGY | Facility: CLINIC | Age: 73
End: 2023-03-17

## 2023-03-17 NOTE — TELEPHONE ENCOUNTER
Spoke to patient - patient confirmed appt with [facility]

## 2023-03-21 ENCOUNTER — OFFICE VISIT (OUTPATIENT)
Dept: FAMILY MEDICINE CLINIC | Facility: CLINIC | Age: 73
End: 2023-03-21

## 2023-03-21 VITALS
DIASTOLIC BLOOD PRESSURE: 60 MMHG | WEIGHT: 180 LBS | BODY MASS INDEX: 25.77 KG/M2 | RESPIRATION RATE: 16 BRPM | SYSTOLIC BLOOD PRESSURE: 110 MMHG | HEART RATE: 58 BPM | TEMPERATURE: 97.8 F | OXYGEN SATURATION: 98 % | HEIGHT: 70 IN

## 2023-03-21 DIAGNOSIS — N18.31 STAGE 3A CHRONIC KIDNEY DISEASE (HCC): ICD-10-CM

## 2023-03-21 DIAGNOSIS — E78.5 HYPERLIPIDEMIA, UNSPECIFIED HYPERLIPIDEMIA TYPE: ICD-10-CM

## 2023-03-21 DIAGNOSIS — N39.43 POST-VOID DRIBBLING: ICD-10-CM

## 2023-03-21 DIAGNOSIS — Z00.00 MEDICARE ANNUAL WELLNESS VISIT, SUBSEQUENT: Primary | ICD-10-CM

## 2023-03-21 DIAGNOSIS — E21.3 HYPERPARATHYROIDISM (HCC): ICD-10-CM

## 2023-03-21 RX ORDER — FLUOCINOLONE ACETONIDE 0.1 MG/ML
SOLUTION TOPICAL
COMMUNITY
Start: 2023-03-21

## 2023-03-21 NOTE — PROGRESS NOTES
Assessment and Plan:     Problem List Items Addressed This Visit     Hyperlipidemia    Relevant Orders    Comprehensive metabolic panel    Lipid Panel with Direct LDL reflex    Hyperparathyroidism ([address] )    Relevant Orders    PTH, intact    Stage 3a chronic kidney disease (HCC)     Lab Results   Component Value Date    EGFR 59 03/02/2023    EGFR 56 07/26/2022    EGFR 65 01/24/2022    CREATININE 0 94 03/02/2023    CREATININE 0 99 07/26/2022    CREATININE 0 88 01/24/2022     Stable          Relevant Orders    CBC   Other Visit Diagnoses     Medicare annual wellness visit, subsequent    -  Primary    Post-void dribbling        Relevant Orders    Ambulatory referral to Physical Therapy ([facility])          Mini cog test score was normal today  We discussed that her limited medicine may be affecting her memory  I encouraged her to get 7 to 8 hours a night  Since she is getting up so early go to the gym every day she needs to going to bed earlier      Return in about 6 months (around 9/21/2023) for Next scheduled follow up  Preventive health issues were discussed with patient, and age appropriate screening tests were ordered as noted in patient's After Visit Summary  Personalized health advice and appropriate referrals for health education or preventive services given if needed, as noted in patient's After Visit Summary  History of Present Illness:     Patient presents for a Medicare Wellness Visit    Patient has been a little bit concerned about her memory sometimes  She sometimes forgets things  She is only getting 5 to 7 hours of sleep at night  She gets up at 4 AM to go to the gym       Patient Care Team:  Tatiana Mckenzie DO as PCP - Bucky Pierre MD as PCP - Endocrinology (Endocrinology)  MD Chaya Ng MD  Artist MD Alba Joseph MD (Cardiology)  Hellen Schaefer MD     Review of Systems:     Review of Systems     Problem List:     Patient Active Problem List Diagnosis   • Chronic migraine without aura without status migrainosus, not intractable   • Essential hypertension   • Lump of skin   • Valgus deformity of both great toes   • Pain in both feet   • Onychomycosis   • Radiculopathy of lumbar region   • Metatarsalgia of both feet   • Hyperlipidemia   • Laryngopharyngeal reflux (LPR)   • Stage 3a chronic kidney disease (HCC)   • Vitamin D deficiency   • Hyperparathyroidism ([address] )   • Osteoporosis   • Multinodular goiter   • Dyspnea on exertion   • Dyslipidemia   • Intermittent palpitations   • Primary osteoarthritis of both knees   • White coat syndrome with diagnosis of hypertension   • Chronic fatigue   • Chest pain due to GERD   • Non-toxic multinodular goiter   • Osteopenia after menopause      Past Medical and Surgical History:     Past Medical History:   Diagnosis Date   • Arthritis    • Chronic kidney disease     stage 3   • Hyperlipidemia     borderline   • Hypertension     borderline   • Migraine    • Migraine     controlled with verapamil   • Osteopenia    • Snores      Past Surgical History:   Procedure Laterality Date   • BREAST BIOPSY Right     benign ~ 1990s   • BUNIONECTOMY Left     3 hammertoes, bunion   • CATARACT EXTRACTION Bilateral 2012   • COLONOSCOPY     • PARATHYROID GLAND SURGERY Right     front gland removed      Family History:     Family History   Problem Relation Age of Onset   • Angina Mother    • Heart failure Mother    • Prostate cancer Father    • Cancer Father         prostate   • Diabetes Sister    • Sudden death Sister    • Osteoporosis Sister    • Heart disease Sister         MI   • COPD Sister    • Kidney disease Sister    • Heart disease Sister         CHF   • Hypertension Daughter    • Hypertension Brother    • Cancer Brother         lung   • Diabetes Brother    • Cancer Brother         prostate   • No Known Problems Son    • Lung cancer Maternal Uncle    • Breast cancer Cousin 48   • Prostate cancer Other 46      Social History: Social History     Socioeconomic History   • Marital status: /Civil Union     Spouse name: None   • Number of children: None   • Years of education: None   • Highest education level: None   Occupational History   • None   Tobacco Use   • Smoking status: Never   • Smokeless tobacco: Never   Vaping Use   • Vaping Use: Never used   Substance and Sexual Activity   • Alcohol use: No   • Drug use: No   • Sexual activity: None   Other Topics Concern   • None   Social History Narrative   • None     Social Determinants of Health     Financial Resource Strain: Low Risk    • Difficulty of Paying Living Expenses: Not hard at all   Food Insecurity: Not on file   Transportation Needs: No Transportation Needs   • Lack of Transportation (Medical): No   • Lack of Transportation (Non-Medical):  No   Physical Activity: Not on file   Stress: Not on file   Social Connections: Not on file   Intimate Partner Violence: Not on file   Housing Stability: Not on file      Medications and Allergies:     Current Outpatient Medications   Medication Sig Dispense Refill   • Ascorbic Acid (vitamin C) 1000 MG tablet Take 1,000 mg by mouth daily     • atorvastatin (LIPITOR) 20 mg tablet TAKE 1 TABLET BY MOUTH EVERY DAY 90 tablet 3   • B Complex Vitamins (B COMPLEX PO) Take 1 tablet by mouth daily     • Black Pepper-Turmeric (TURMERIC COMPLEX/BLACK PEPPER PO) Take by mouth daily 600 mg turmeric, 5 mg black pepper     • calcium carbonate (OS-FLORIAN) 600 MG tablet Take one tablet daily     • chlorthalidone 25 mg tablet TAKE 1 TABLET BY MOUTH 4 DAYS A WEEK MON, WED, FRI, SUNDAY 52 tablet 3   • Cholecalciferol (VITAMIN D3) 5000 units CAPS Take 1 capsule by mouth Daily     • Coenzyme Q10 (Co Q 10) 100 MG CAPS Take by mouth every morning     • ELDERBERRY PO Take by mouth every morning      • fluocinolone (SYNALAR) 0 01 % external solution      • magnesium oxide (MAG-OX) 400 mg tablet Take 1 tablet by mouth daily With zinc      • Misc Natural Products (GLUCOSAMINE CHOND COMPLEX/MSM PO) Take 1 tablet by mouth Daily 1000 mg      • Omega 3 1200 MG CAPS Take by mouth every morning     • Probiotic Product (PROBIOTIC-10 PO) Take 1 capsule by mouth daily     • spironolactone (ALDACTONE) 25 mg tablet TAKE 1 TABLET BY MOUTH EVERY DAY IN THE MORNING 90 tablet 3   • verapamil (CALAN-SR) 180 mg CR tablet TAKE 1 TABLET (180 MG TOTAL) BY MOUTH 2 (TWO) TIMES A  tablet 3   • Zinc Sulfate (ZINC 15 PO) Take by mouth daily With copper 1mg     • Multiple Vitamins-Minerals (PRESERVISION AREDS) CAPS Take 1 capsule by mouth daily (Patient not taking: Reported on 3/21/2023)       No current facility-administered medications for this visit  No Known Allergies   Immunizations:     Immunization History   Administered Date(s) Administered   • COVID-19 MODERNA VACC 0 5 ML IM 02/10/2021, 03/10/2021   • INFLUENZA 11/01/2018, 10/30/2021   • Influenza Split High Dose Preservative Free IM 12/22/2014, 11/13/2015, 11/05/2016, 10/18/2017   • Influenza, high dose seasonal 0 7 mL 10/17/2020   • Pneumococcal Conjugate 13-Valent 11/13/2015   • Pneumococcal Polysaccharide PPV23 11/01/2013, 10/24/2018   • Zoster 01/24/2015   • Zoster Vaccine Recombinant 09/12/2019, 01/16/2020   • influenza, trivalent, adjuvanted 10/31/2019      Health Maintenance:         Topic Date Due   • Breast Cancer Screening: Mammogram  07/29/2023   • DXA SCAN  10/17/2024   • Colorectal Cancer Screening  05/13/2028   • Hepatitis C Screening  Completed         Topic Date Due   • COVID-19 Vaccine (3 - Booster for Felipe Cuthbert series) 05/05/2021   • Influenza Vaccine (1) 09/01/2022      Medicare Screening Tests and Risk Assessments:     Thai Chua is here for her Subsequent Wellness visit  Health Risk Assessment:   Patient rates overall health as very good  Patient feels that their physical health rating is same  Patient is very satisfied with their life  Eyesight was rated as same  Hearing was rated as slightly worse   Patient feels that their emotional and mental health rating is same  Patients states they are sometimes angry  Patient states they are often unusually tired/fatigued  Pain experienced in the last 7 days has been some  Patient's pain rating has been 5/10  Patient states that she has experienced weight loss or gain in last 6 months  Depression Screening:   PHQ-2 Score: 0      Fall Risk Screening: In the past year, patient has experienced: history of falling in past year    Number of falls: 1  Injured during fall?: No    Feels unsteady when standing or walking?: Yes    Worried about falling?: No      Urinary Incontinence Screening:   Patient has leaked urine accidently in the last six months  It seems to be more often; I can feel I need to go and it takes forever for the stream to start  I think I'm finished, wipe and the stream starts again more than before! Other times I don't feel the urge until the last minute and have leakage   :-(    Home Safety:  Patient does not have trouble with stairs inside or outside of their home  Patient has working smoke alarms and has working carbon monoxide detector  Home safety hazards include: household clutter  I'm very cluttered; hopeful to get better this year    Nutrition:   Current diet is Regular, Unhealthy and Frequent junk food  I try not to eat or late (after 6:30 or 7:00 at the latest) and if I am thirsty after that it's just water  Medications:   Patient is currently taking over-the-counter supplements  OTC medications include: All that are listed under medications    Patient is able to manage medications  I stopped the Areds2 as I was becoming constipated; since I stopped them, it is better  Not sure if they caused it but it's better  Activities of Daily Living (ADLs)/Instrumental Activities of Daily Living (IADLs):   Walk and transfer into and out of bed and chair?: Yes  Dress and groom yourself?: Yes    Bathe or shower yourself?: Yes    Feed yourself?  Yes  Do your laundry/housekeeping?: Yes  Manage your money, pay your bills and track your expenses?: Yes  Make your own meals?: Yes    Do your own shopping?: Yes    Durable Medical Equipment Suppliers  N/A    Previous Hospitalizations:   Any hospitalizations or ED visits within the last 12 months?: No      Advance Care Planning:   Living will: No    Durable POA for healthcare: No    Advanced directive: No    Advanced directive counseling given: Yes    End of Life Decisions reviewed with patient: Yes    Provider agrees with end of life decisions: Yes      Cognitive Screening:   Provider or family/friend/caregiver concerned regarding cognition?: No    PREVENTIVE SCREENINGS      Cardiovascular Screening:    General: Screening Not Indicated and History Lipid Disorder      Diabetes Screening:     General: Screening Current      Colorectal Cancer Screening:     General: Screening Current      Breast Cancer Screening:     General: Screening Current      Cervical Cancer Screening:    General: Screening Not Indicated      Osteoporosis Screening:    General: Screening Not Indicated and History Osteoporosis      Abdominal Aortic Aneurysm (AAA) Screening:        General: Screening Not Indicated      Lung Cancer Screening:     General: Screening Not Indicated      Hepatitis C Screening:    General: Screening Current    Screening, Brief Intervention, and Referral to Treatment (SBIRT)    Screening  Typical number of drinks in a day: 0  Typical number of drinks in a week: 0  Interpretation: Low risk drinking behavior      AUDIT-C Screenin) How often did you have a drink containing alcohol in the past year? never  2) How many drinks did you have on a typical day when you were drinking in the past year? 0  3) How often did you have 6 or more drinks on one occasion in the past year? never    AUDIT-C Score: 0  Interpretation: Score 0-2 (female): Negative screen for alcohol misuse    Single Item Drug Screening:  How often have you used an illegal drug (including marijuana) or a prescription medication for non-medical reasons in the past year? never    Single Item Drug Screen Score: 0  Interpretation: Negative screen for possible drug use disorder    Brief Intervention  Alcohol & drug use screenings were reviewed  No concerns regarding substance use disorder identified  No results found  Physical Exam:     /60   Pulse 58   Temp 97 8 °F (36 6 °C)   Resp 16   Ht 5' 10" (1 778 m)   Wt 81 6 kg (180 lb)   SpO2 98%   BMI 25 83 kg/m²     Physical Exam  Vitals and nursing note reviewed  Constitutional:       Appearance: She is well-developed  HENT:      Head: Normocephalic and atraumatic  Right Ear: External ear normal       Left Ear: External ear normal       Nose: Nose normal    Cardiovascular:      Rate and Rhythm: Normal rate and regular rhythm  Heart sounds: Normal heart sounds  No murmur heard  No friction rub  Pulmonary:      Effort: No respiratory distress  Breath sounds: Normal breath sounds  No wheezing or rales  Musculoskeletal:      Right lower leg: No edema  Left lower leg: No edema  Neurological:      Mental Status: She is oriented to person, place, and time  Cranial Nerves: No cranial nerve deficit            Edgardo Barreto DO

## 2023-03-21 NOTE — ASSESSMENT & PLAN NOTE
Lab Results   Component Value Date    EGFR 59 03/02/2023    EGFR 56 07/26/2022    EGFR 65 01/24/2022    CREATININE 0 94 03/02/2023    CREATININE 0 99 07/26/2022    CREATININE 0 88 01/24/2022     Stable

## 2023-03-30 ENCOUNTER — OFFICE VISIT (OUTPATIENT)
Dept: NEUROLOGY | Facility: CLINIC | Age: 73
End: 2023-03-30

## 2023-03-30 VITALS
WEIGHT: 179 LBS | BODY MASS INDEX: 25.06 KG/M2 | HEART RATE: 66 BPM | OXYGEN SATURATION: 98 % | SYSTOLIC BLOOD PRESSURE: 113 MMHG | HEIGHT: 71 IN | DIASTOLIC BLOOD PRESSURE: 70 MMHG

## 2023-03-30 DIAGNOSIS — I10 PRIMARY HYPERTENSION: ICD-10-CM

## 2023-03-30 DIAGNOSIS — G43.909 EPISODIC MIGRAINE: Primary | ICD-10-CM

## 2023-03-30 RX ORDER — DIPHENOXYLATE HYDROCHLORIDE AND ATROPINE SULFATE 2.5; .025 MG/1; MG/1
1 TABLET ORAL DAILY
COMMUNITY

## 2023-03-30 NOTE — PATIENT INSTRUCTIONS
Continue with Verapamil 180mg twice a day  Pt has been migraine free, can follow up with Neurology office in 1yr or sooner if needed

## 2023-03-30 NOTE — PROGRESS NOTES
Patient ID: Beryle Dory is a 74 y o  female  Assessment/Plan:     Diagnoses and all orders for this visit:    Episodic migraine    Primary hypertension     Continue with Verapamil 180mg twice a day  Pt has been migraine free, can follow up with Neurology office in 1yr or sooner if needed    Subjective/HPI:  Beryle Dory is a 77-year-old female with PMH of migraines  She has history of migraines for approximately 25 years  She has been on verapamil 180 mg twice a day  This was recently increased by her cardiologist due to her hypertension  Patient's typical migraine is temporal region radiating to the occipital region associated with nausea and photosensitivity  Patient has not had migraine activity for a long time on this medication regimen  Patient will get headaches however she skips meals and has been previously triggered with chocolates cheese and fuentes  Patient reported she has been trying to avoid her migraine triggers and feels as though her blood pressure and migraine activity have been better on the higher dose  Patient returns to neurology office today, she continues to report being migraine free since her last office appointment  Patient's blood pressure is stable and normotensive  Patient states when she does get a random headache, it is usually later in the evening and she just goes to bed or waits it out  Patient tries to avoid taking any kind of OTC or pain medication for these  Patient does report however having some pain in her left lower extremity recently  States she is usually sleeping, side-lying on her left when she will get pain that forms around the left knee  Patient reports when she changes position or puts a pillow between her knees the pain will alleviate  Suspect this is more related to her lumbar spine, patient may need to avoid certain positions in order to help reduce radicular type symptoms    Patient has no chronic issue with back pain or radicular pain, is only positional at night when she is in bed  Patient will do some stretching and alter positions during her sleep to avoid any further back pain  Patient will update her PCP if this should continue or worsen over time  Patient has been relatively stable from a migraine perspective  She has been migraine free for 2 appointments now  Patient to follow-up in the outpatient neurology office in 1 year or sooner if needed  Patient's verapamil is being managed by her cardiologist at this point in time        The following portions of the patient's history were reviewed and updated as appropriate: allergies, current medications, past family history, past medical history, past social history, past surgical history and problem list               Past Medical History:   Diagnosis Date   • Arthritis    • Chronic kidney disease     stage 3   • Hyperlipidemia     borderline   • Hypertension     borderline   • Migraine    • Migraine     controlled with verapamil   • Osteopenia    • Snores        Past Surgical History:   Procedure Laterality Date   • BREAST BIOPSY Right     benign ~ 1990s   • BUNIONECTOMY Left     3 hammertoes, bunion   • CATARACT EXTRACTION Bilateral 2012   • COLONOSCOPY     • PARATHYROID GLAND SURGERY Right     front gland removed       Social History     Socioeconomic History   • Marital status: /Civil Union     Spouse name: None   • Number of children: None   • Years of education: None   • Highest education level: None   Occupational History   • None   Tobacco Use   • Smoking status: Never   • Smokeless tobacco: Never   Vaping Use   • Vaping Use: Never used   Substance and Sexual Activity   • Alcohol use: No   • Drug use: No   • Sexual activity: None   Other Topics Concern   • None   Social History Narrative   • None     Social Determinants of Health     Financial Resource Strain: Low Risk    • Difficulty of Paying Living Expenses: Not hard at all   Food Insecurity: Not on file   Transportation Needs: No Transportation Needs   • Lack of Transportation (Medical): No   • Lack of Transportation (Non-Medical):  No   Physical Activity: Not on file   Stress: Not on file   Social Connections: Not on file   Intimate Partner Violence: Not on file   Housing Stability: Not on file       Family History   Problem Relation Age of Onset   • Angina Mother    • Heart failure Mother    • Prostate cancer Father    • Cancer Father         prostate   • Diabetes Sister    • Sudden death Sister    • Osteoporosis Sister    • Heart disease Sister         MI   • COPD Sister    • Kidney disease Sister    • Heart disease Sister         CHF   • Hypertension Daughter    • Hypertension Brother    • Cancer Brother         lung   • Diabetes Brother    • Cancer Brother         prostate   • No Known Problems Son    • Lung cancer Maternal Uncle    • Breast cancer Cousin 48   • Prostate cancer Other 51         Current Outpatient Medications:   •  Ascorbic Acid (vitamin C) 1000 MG tablet, Take 1,000 mg by mouth daily, Disp: , Rfl:   •  atorvastatin (LIPITOR) 20 mg tablet, TAKE 1 TABLET BY MOUTH EVERY DAY, Disp: 90 tablet, Rfl: 3  •  B Complex Vitamins (B COMPLEX PO), Take 1 tablet by mouth daily, Disp: , Rfl:   •  Black Pepper-Turmeric (TURMERIC COMPLEX/BLACK PEPPER PO), Take by mouth daily 600 mg turmeric, 5 mg black pepper, Disp: , Rfl:   •  calcium carbonate (OS-FLORIAN) 600 MG tablet, Take one tablet daily, Disp: , Rfl:   •  chlorthalidone 25 mg tablet, TAKE 1 TABLET BY MOUTH 4 DAYS A WEEK MON, WED, FRI, SUNDAY, Disp: 52 tablet, Rfl: 3  •  Cholecalciferol (VITAMIN D3) 5000 units CAPS, Take 1 capsule by mouth Daily, Disp: , Rfl:   •  Coenzyme Q10 (Co Q 10) 100 MG CAPS, Take by mouth every morning, Disp: , Rfl:   •  ELDERBERRY PO, Take by mouth every morning , Disp: , Rfl:   •  fluocinolone (SYNALAR) 0 01 % external solution, , Disp: , Rfl:   •  magnesium oxide (MAG-OX) 400 mg tablet, Take 1 tablet by mouth daily With zinc , Disp: , Rfl:   • "Misc Natural Products (GLUCOSAMINE CHOND COMPLEX/MSM PO), Take 1 tablet by mouth Daily 1000 mg , Disp: , Rfl:   •  multivitamin (THERAGRAN) TABS, Take 1 tablet by mouth daily, Disp: , Rfl:   •  Omega 3 1200 MG CAPS, Take by mouth every morning, Disp: , Rfl:   •  Probiotic Product (PROBIOTIC-10 PO), Take 1 capsule by mouth daily, Disp: , Rfl:   •  verapamil (CALAN-SR) 180 mg CR tablet, TAKE 1 TABLET (180 MG TOTAL) BY MOUTH 2 (TWO) TIMES A DAY, Disp: 180 tablet, Rfl: 3  •  Zinc Sulfate (ZINC 15 PO), Take by mouth daily With copper 1mg, Disp: , Rfl:   •  Multiple Vitamins-Minerals (PRESERVISION AREDS) CAPS, Take 1 capsule by mouth daily, Disp: , Rfl:   •  spironolactone (ALDACTONE) 25 mg tablet, TAKE 1 TABLET BY MOUTH EVERY DAY IN THE MORNING, Disp: 90 tablet, Rfl: 3    No Known Allergies     Blood pressure 113/70, pulse 66, height 5' 10 5\" (1 791 m), weight 81 2 kg (179 lb), SpO2 98 %, not currently breastfeeding  Objective:    Blood pressure 113/70, pulse 66, height 5' 10 5\" (1 791 m), weight 81 2 kg (179 lb), SpO2 98 %, not currently breastfeeding  Physical Exam  Vitals reviewed  Constitutional:       Appearance: Normal appearance  She is well-developed  HENT:      Head: Normocephalic  Nose: Nose normal    Eyes:      General: Lids are normal       Extraocular Movements: Extraocular movements intact  Pupils: Pupils are equal, round, and reactive to light  Cardiovascular:      Rate and Rhythm: Normal rate  Pulmonary:      Effort: Pulmonary effort is normal    Abdominal:      General: Bowel sounds are normal       Palpations: Abdomen is soft  Musculoskeletal:      Cervical back: Normal range of motion  Skin:     General: Skin is warm and dry  Neurological:      Mental Status: She is alert  Motor: Motor strength is normal       Coordination: Romberg sign negative  Deep Tendon Reflexes: Reflexes are normal and symmetric     Psychiatric:         Attention and Perception: Attention " and perception normal          Mood and Affect: Mood and affect normal          Speech: Speech normal          Behavior: Behavior normal  Behavior is cooperative  Thought Content: Thought content normal          Cognition and Memory: Cognition and memory normal          Judgment: Judgment normal          Neurological Exam  Mental Status  Alert  Oriented to person, place, time and situation  Memory is normal  Recent and remote memory are intact  Speech is normal  Language is fluent with no aphasia  Attention and concentration are normal  Fund of knowledge is appropriate for level of education  Cranial Nerves  CN II: Visual acuity is normal  Visual fields full to confrontation  CN III, IV, VI: Extraocular movements intact bilaterally  Normal lids and orbits bilaterally  Pupils equal round and reactive to light bilaterally  CN V: Facial sensation is normal   CN VII: Full and symmetric facial movement  CN VIII: Hearing is normal   CN IX, X: Palate elevates symmetrically  Normal gag reflex  CN XI: Shoulder shrug strength is normal   CN XII: Tongue midline without atrophy or fasciculations  Motor  Normal muscle bulk throughout  Normal muscle tone  No abnormal involuntary movements  Strength is 5/5 throughout all four extremities  Sensory  Light touch is normal in upper and lower extremities  Temperature is normal in upper and lower extremities  Vibration is normal in upper and lower extremities  Proprioception is normal in upper and lower extremities  Reflexes  Deep tendon reflexes are 2+ and symmetric in all four extremities  Right pathological reflexes: Donna's absent  Left pathological reflexes: Donna's absent  Coordination  Right: Finger-to-nose normal  Rapid alternating movement normal  Heel-to-shin normal Left: Finger-to-nose normal  Rapid alternating movement normal  Heel-to-shin normal     Gait  Casual gait is normal including stance, stride, and arm swing  Normal toe walking  Normal heel walking  Normal tandem gait  Romberg is absent  Able to rise from chair without using arms  ROS:    Review of Systems   Constitutional: Negative  Negative for appetite change and fever  HENT: Positive for trouble swallowing  Negative for hearing loss, tinnitus and voice change  Eyes: Negative  Negative for photophobia, pain and visual disturbance  Respiratory: Negative  Negative for shortness of breath  Cardiovascular: Negative  Negative for palpitations  Gastrointestinal: Negative  Negative for nausea and vomiting  Endocrine: Negative  Negative for cold intolerance  Genitourinary: Negative  Negative for dysuria, frequency and urgency  Musculoskeletal: Negative  Negative for gait problem, myalgias and neck pain  Skin: Negative  Negative for rash  Allergic/Immunologic: Negative  Neurological: Negative  Negative for dizziness, tremors, seizures, syncope, facial asymmetry, speech difficulty, weakness, light-headedness, numbness and headaches  Hematological: Bruises/bleeds easily  Psychiatric/Behavioral: Positive for sleep disturbance  Negative for confusion and hallucinations  ROS reviewed and discussed with patient

## 2023-04-07 DIAGNOSIS — E78.5 HYPERLIPIDEMIA, UNSPECIFIED HYPERLIPIDEMIA TYPE: ICD-10-CM

## 2023-04-07 RX ORDER — ATORVASTATIN CALCIUM 20 MG/1
TABLET, FILM COATED ORAL
Qty: 90 TABLET | Refills: 3 | Status: SHIPPED | OUTPATIENT
Start: 2023-04-07

## 2023-07-14 ENCOUNTER — TELEPHONE (OUTPATIENT)
Dept: FAMILY MEDICINE CLINIC | Facility: CLINIC | Age: 73
End: 2023-07-14

## 2023-07-14 DIAGNOSIS — Z12.31 SCREENING MAMMOGRAM, ENCOUNTER FOR: Primary | ICD-10-CM

## 2023-08-08 ENCOUNTER — APPOINTMENT (OUTPATIENT)
Dept: LAB | Facility: CLINIC | Age: 73
End: 2023-08-08
Payer: MEDICARE

## 2023-08-08 DIAGNOSIS — N18.31 STAGE 3A CHRONIC KIDNEY DISEASE (HCC): ICD-10-CM

## 2023-08-08 DIAGNOSIS — E78.5 HYPERLIPIDEMIA, UNSPECIFIED HYPERLIPIDEMIA TYPE: ICD-10-CM

## 2023-08-08 DIAGNOSIS — E21.3 HYPERPARATHYROIDISM (HCC): ICD-10-CM

## 2023-08-08 LAB
ALBUMIN SERPL BCP-MCNC: 4.1 G/DL (ref 3.5–5)
ALP SERPL-CCNC: 44 U/L (ref 46–116)
ALT SERPL W P-5'-P-CCNC: 27 U/L (ref 12–78)
ANION GAP SERPL CALCULATED.3IONS-SCNC: 5 MMOL/L
AST SERPL W P-5'-P-CCNC: 18 U/L (ref 5–45)
BILIRUB SERPL-MCNC: 0.54 MG/DL (ref 0.2–1)
BUN SERPL-MCNC: 19 MG/DL (ref 5–25)
CALCIUM SERPL-MCNC: 9.4 MG/DL (ref 8.3–10.1)
CHLORIDE SERPL-SCNC: 109 MMOL/L (ref 96–108)
CHOLEST SERPL-MCNC: 116 MG/DL
CO2 SERPL-SCNC: 26 MMOL/L (ref 21–32)
CREAT SERPL-MCNC: 0.95 MG/DL (ref 0.6–1.3)
ERYTHROCYTE [DISTWIDTH] IN BLOOD BY AUTOMATED COUNT: 13.7 % (ref 11.6–15.1)
GFR SERPL CREATININE-BSD FRML MDRD: 58 ML/MIN/1.73SQ M
GLUCOSE P FAST SERPL-MCNC: 96 MG/DL (ref 65–99)
HCT VFR BLD AUTO: 41.5 % (ref 34.8–46.1)
HDLC SERPL-MCNC: 56 MG/DL
HGB BLD-MCNC: 13.5 G/DL (ref 11.5–15.4)
LDLC SERPL CALC-MCNC: 41 MG/DL (ref 0–100)
MCH RBC QN AUTO: 26.1 PG (ref 26.8–34.3)
MCHC RBC AUTO-ENTMCNC: 32.5 G/DL (ref 31.4–37.4)
MCV RBC AUTO: 80 FL (ref 82–98)
PLATELET # BLD AUTO: 265 THOUSANDS/UL (ref 149–390)
PMV BLD AUTO: 10.9 FL (ref 8.9–12.7)
POTASSIUM SERPL-SCNC: 3.7 MMOL/L (ref 3.5–5.3)
PROT SERPL-MCNC: 7.2 G/DL (ref 6.4–8.4)
PTH-INTACT SERPL-MCNC: 60.8 PG/ML (ref 12–88)
RBC # BLD AUTO: 5.17 MILLION/UL (ref 3.81–5.12)
SODIUM SERPL-SCNC: 140 MMOL/L (ref 135–147)
TRIGL SERPL-MCNC: 93 MG/DL
WBC # BLD AUTO: 5.75 THOUSAND/UL (ref 4.31–10.16)

## 2023-08-08 PROCEDURE — 80061 LIPID PANEL: CPT

## 2023-08-08 PROCEDURE — 85027 COMPLETE CBC AUTOMATED: CPT

## 2023-08-08 PROCEDURE — 83970 ASSAY OF PARATHORMONE: CPT

## 2023-08-08 PROCEDURE — 36415 COLL VENOUS BLD VENIPUNCTURE: CPT

## 2023-08-08 PROCEDURE — 80053 COMPREHEN METABOLIC PANEL: CPT

## 2023-08-22 ENCOUNTER — OFFICE VISIT (OUTPATIENT)
Dept: CARDIOLOGY CLINIC | Facility: CLINIC | Age: 73
End: 2023-08-22
Payer: MEDICARE

## 2023-08-22 VITALS
SYSTOLIC BLOOD PRESSURE: 148 MMHG | HEIGHT: 71 IN | OXYGEN SATURATION: 98 % | DIASTOLIC BLOOD PRESSURE: 78 MMHG | HEART RATE: 54 BPM | BODY MASS INDEX: 25.2 KG/M2 | WEIGHT: 180 LBS

## 2023-08-22 DIAGNOSIS — M17.0 PRIMARY OSTEOARTHRITIS OF BOTH KNEES: ICD-10-CM

## 2023-08-22 DIAGNOSIS — E78.5 DYSLIPIDEMIA: ICD-10-CM

## 2023-08-22 DIAGNOSIS — I10 ESSENTIAL HYPERTENSION: Primary | ICD-10-CM

## 2023-08-22 DIAGNOSIS — I10 WHITE COAT SYNDROME WITH DIAGNOSIS OF HYPERTENSION: ICD-10-CM

## 2023-08-22 DIAGNOSIS — R00.2 INTERMITTENT PALPITATIONS: ICD-10-CM

## 2023-08-22 DIAGNOSIS — E04.2 MULTINODULAR GOITER: ICD-10-CM

## 2023-08-22 DIAGNOSIS — N18.31 STAGE 3A CHRONIC KIDNEY DISEASE (HCC): ICD-10-CM

## 2023-08-22 DIAGNOSIS — Z87.19 HISTORY OF GASTROESOPHAGEAL REFLUX (GERD): ICD-10-CM

## 2023-08-22 PROCEDURE — 99214 OFFICE O/P EST MOD 30 MIN: CPT | Performed by: INTERNAL MEDICINE

## 2023-08-22 PROCEDURE — 93000 ELECTROCARDIOGRAM COMPLETE: CPT | Performed by: INTERNAL MEDICINE

## 2023-08-22 NOTE — PROGRESS NOTES
Office Cardiology Progress Note  Candida Peña 74 y.o. female MRN: [id number]  08/22/23  2:13 PM      ASSESSMENT:    1.  Stable and improved chronic exertional dyspnea with stair climbing but not with exercise at [facility], probably related to progressive overweight with 13 pound weight gain over the past 10.7 years.  Has gained 5 lbs in the past 1.7+ years. 2.  White coat syndrome with history of controlled  essential hypertension  and latest home blood pressure averaging 129/75 between 8/18 and 8/22/2023 and previously was 131/68 from 11/7 through 11/10/2022.  3. Controlled dyslipidemia with atorvastatin. 4. History of normal exercise stress test 10/13/15 and benign echocardiogram on 5/1/13.  5. History of  now suppressed chronic nonproductive cough but history of exacerbation of heartburn with 5 episodes in the past 2 weeks, occurring approximately 7 months ago, possibly caused by worsened GERD, with patient at that time off Pepcid for 29+ months. Rodríguez Lord has recently been less frequent with no recent heartburn. 6. Resolved hypercalcemia with parathyroidectomy 11/30/11.  7. Status post nasal polypectomy October, 2012 and [facility]  October, 2012 with right cataract extraction 9/13. 8. Chronic stable multinodular goiter with latest thyroid ultrasound 5/30/17, followed by Dr. Villafana. 9.  Mild chronic kidney disease, stage IIIa.   Current estimated GFR is 58. 10. Chronic fatigue and tiredness with consideration of adverse drug reaction to verapamil, but more likely related to inadequate sleep duration. 11. Clinically stable osteoarthritis of both knees, right more than left. Somewhat worsened primary osteoarthritis of both hands. 12. Osteopenia, improved, based on DEXA scan 10/17/2022. Plan       Patient Instructions     1. Continue present medication.   2. Cardiology follow-up approximately 6 months with EKG, home blood pressures for 4 consecutive days twice daily mid-to-late morning and in the evening, recorded on blood pressure data sheet and brought to the office with you at the time of appointment. HPI    This 74 y.o. female  denies new cardiopulmonary and medical symptoms. The patient has had somewhat increased arthritic pain and immobility of her hands recently but no change in her knee arthritis. She occasionally experiences dyspnea on exertion when climbing stairs while carrying clothing or bags but otherwise does not experience any symptoms. 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 She has no cardiopulmonary symptoms when exercising at the [facility]. The patient's recent blood pressure at home between 8/18 and 8/22/2023 averaged 129/75. She and her  have been doing some traveling this year with weddings in [place] in [place], [address] to [facility] and plan a future bus toward in [place], a cruise to [place] and visitation of the [address] [employer] exhibit in [place] this fall. The patient is also being seen in follow-up of the below listed diagnoses. Encounter Diagnoses   Name Primary?    • Essential hypertension Yes   • White coat syndrome with diagnosis of hypertension    • Intermittent palpitations    • Dyslipidemia    • History of gastroesophageal reflux (GERD)    • Stage 3a chronic kidney disease (HCC)    • Multinodular goiter    • Primary osteoarthritis of both knees         Review of Systems    All other systems negative, except as noted in history of present illness    Historical Information   Past Medical History:   Diagnosis Date   • Arthritis    • Chronic kidney disease     stage 3   • Hyperlipidemia     borderline   • Hypertension     borderline   • Migraine    • Migraine     controlled with verapamil   • Osteopenia    • Snores      Past Surgical History:   Procedure Laterality Date   • BREAST BIOPSY Right     benign ~ 1990s   • BUNIONECTOMY Left     3 jacky bunion   • CATARACT EXTRACTION Bilateral 2012   • COLONOSCOPY     • PARATHYROID GLAND SURGERY Right     front gland removed     Social History     Substance and Sexual Activity   Alcohol Use No     Social History     Substance and Sexual Activity   Drug Use No     Social History     Tobacco Use   Smoking Status Never   Smokeless Tobacco Never       Family History:  Family History   Problem Relation Age of Onset   • Angina Mother    • Heart failure Mother    • Prostate cancer Father    • Cancer Father         prostate   • Diabetes Sister    • Sudden death Sister    • Osteoporosis Sister    • Heart disease Sister         MI   • COPD Sister    • Kidney disease Sister    • Heart disease Sister         CHF   • Hypertension Daughter    • Hypertension Brother    • Cancer Brother         lung   • Diabetes Brother    • Cancer Brother         prostate   • No Known Problems Son    • Lung cancer Maternal Uncle    • Breast cancer Cousin 48   • Prostate cancer Other 46         Meds/Allergies     Prior to Admission medications    Medication Sig Start Date End Date Taking?  Authorizing Provider   Ascorbic Acid (vitamin C) 1000 MG tablet Take 1,000 mg by mouth daily   Yes Historical Provider, MD   atorvastatin (LIPITOR) 20 mg tablet TAKE 1 TABLET BY MOUTH EVERY DAY 4/7/23  Yes Deisy Townsend MD   B Complex Vitamins (B COMPLEX PO) Take 1 tablet by mouth daily   Yes Historical Provider, MD   Black Pepper-Turmeric (TURMERIC COMPLEX/BLACK PEPPER PO) Take by mouth daily 600 mg turmeric, 5 mg black pepper   Yes Historical Provider, MD   calcium carbonate (OS-FLORIAN) 600 MG tablet Take one tablet daily 9/16/20  Yes Tiara Young MD   chlorthalidone 25 mg tablet TAKE 1 TABLET BY MOUTH 4 DAYS A WEEK MON, WED, FRI, SUNDAY 9/19/22  Yes Deisy Townsend MD   Cholecalciferol (VITAMIN D3) 5000 units CAPS Take 1 capsule by mouth Daily   Yes Historical Provider, MD   Coenzyme Q10 (Co Q 10) 100 MG CAPS Take by mouth every morning   Yes Historical Provider, MD   ELDERBERRY PO Take by mouth every morning    Yes Historical Provider, MD   fluocinolone (SYNALAR) 0.01 % external solution  3/21/23  Yes Historical Provider, MD   magnesium oxide (MAG-OX) 400 mg tablet Take 1 tablet by mouth daily With zinc  12/2/10  Yes Historical Provider, MD   Misc Natural Products (GLUCOSAMINE CHOND COMPLEX/MSM PO) Take 1 tablet by mouth Daily 1000 mg    Yes Historical Provider, MD   Multiple Vitamins-Minerals (PRESERVISION AREDS) CAPS Take 1 capsule by mouth daily 10/18/17  Yes Historical Provider, MD   multivitamin (THERAGRAN) TABS Take 1 tablet by mouth daily   Yes Historical Provider, MD   Omega 3 1200 MG CAPS Take by mouth every morning   Yes Historical Provider, MD   Probiotic Product (PROBIOTIC-10 PO) Take 1 capsule by mouth daily   Yes Historical Provider, MD   spironolactone (ALDACTONE) 25 mg tablet TAKE 1 TABLET BY MOUTH EVERY DAY IN THE MORNING 2/14/23  Yes Minoo Yi MD   verapamil (CALAN-SR) 180 mg CR tablet TAKE 1 TABLET (180 MG TOTAL) BY MOUTH 2 (TWO) TIMES A DAY 1/23/23  Yes Minoo Yi MD   Zinc Sulfate (ZINC 15 PO) Take by mouth daily With copper 1mg   Yes Historical Provider, MD       No Known Allergies      Vitals:    08/22/23 1003   BP: 148/78   BP Location: Right arm   Patient Position: Sitting   Cuff Size: Large   Pulse: (!) 54   SpO2: 98%   Weight: 81.6 kg (180 lb)   Height: 5' 10.5" (1.791 m)       Body mass index is 25.46 kg/m². No change in weight in approximately 6 months with 5 pound weight gain in approximately 1.7 years    Physical Exam:    General Appearance:  Alert, cooperative, no distress, appears stated age and is minimally overweight.    Head:  Normocephalic, without obvious abnormality, atraumatic   Eyes:  PERRL, conjunctiva/corneas clear, EOM's intact,   both eyes   Ears:  Normal TM's and external ear canals, both ears   Nose: Nares normal, septum midline, mucosa normal, no drainage or sinus tenderness   Throat: Lips, mucosa, and tongue normal; teeth and gums normal   Neck: Supple, symmetrical, trachea midline, no adenopathy, thyroid: not enlarged, symmetric, no tenderness/mass/nodules, no carotid bruit or JVD   Back:   Symmetric, no curvature, ROM normal, no CVA tenderness   Lungs:   Clear to auscultation bilaterally, respirations unlabored   Chest Wall:  No tenderness or deformity   Heart:  Regular rate and rhythm, S1, S2 normal, no murmur, rub or gallop   Abdomen:   Soft, non-tender, bowel sounds active all four quadrants,  no masses, no organomegaly   Extremities: Extremities normal, atraumatic, no cyanosis or edema   Pulses: 2+ and symmetric   Skin: Skin showed normal color, texture, turgor and no rashes or lesions   Lymph nodes: Cervical, supraclavicular, and axillary nodes normal   Neurologic: Normal         Cardiographics    ECG 08/22/23:    Sinus bradycardia 54 bpm with sinus arrhythmia  First-degree AV block  Minimal voltage criteria for LVH  Consider prior septal infarct  Leftward frontal plane QRS axis  Abnormal ECG  Slower heart rate by 7 bpm with no other changes since 2/27/2023    IMAGING:    No Chest XR results available for this patient.           LAB REVIEW:      Lab Results   Component Value Date    SODIUM 140 08/08/2023    K 3.7 08/08/2023     (H) 08/08/2023    CO2 26 08/08/2023    ANIONGAP 11.7 10/09/2015    BUN 19 08/08/2023    CREATININE 0.95 08/08/2023    GLUCOSE 94 01/03/2017    GLUF 96 08/08/2023    CALCIUM 9.4 08/08/2023    AST 18 08/08/2023    ALT 27 08/08/2023    ALKPHOS 44 (L) 08/08/2023    PROT 6.5 01/03/2017    BILITOT 0.3 01/03/2017    EGFR 58 08/08/2023     Lab Results   Component Value Date    CHOLESTEROL 116 08/08/2023    CHOLESTEROL 111 03/02/2023    CHOLESTEROL 135 07/26/2022     Lab Results   Component Value Date    HDL 56 08/08/2023    HDL 60 03/02/2023    HDL 59 07/26/2022       Lab Results   Component Value Date    LDLCALC 41 08/08/2023    LDLCALC 35 03/02/2023    LDLCALC 62 07/26/2022     No components found for: "DIRECTLDLREFLEX"  Lab Results   Component Value Date    TRIG 93 08/08/2023    TRIG 78 03/02/2023    TRIG 71 07/26/2022       CBC, PTH 8/8/2023:  Both normal, except for slightly decreased MCV and Ju MD Jessie

## 2023-08-22 NOTE — PATIENT INSTRUCTIONS
Continue present medication. Cardiology follow-up approximately 6 months with EKG, home blood pressures for 4 consecutive days twice daily mid-to-late morning and in the evening, recorded on blood pressure data sheet and brought to the office with you at the time of appointment.

## 2023-09-17 DIAGNOSIS — I10 UNCONTROLLED HYPERTENSION: ICD-10-CM

## 2023-09-18 RX ORDER — CHLORTHALIDONE 25 MG/1
TABLET ORAL
Qty: 52 TABLET | Refills: 3 | Status: SHIPPED | OUTPATIENT
Start: 2023-09-18

## 2023-09-19 ENCOUNTER — OFFICE VISIT (OUTPATIENT)
Dept: FAMILY MEDICINE CLINIC | Facility: CLINIC | Age: 73
End: 2023-09-19
Payer: MEDICARE

## 2023-09-19 VITALS
WEIGHT: 181 LBS | TEMPERATURE: 98.6 F | RESPIRATION RATE: 16 BRPM | HEIGHT: 71 IN | DIASTOLIC BLOOD PRESSURE: 80 MMHG | HEART RATE: 60 BPM | SYSTOLIC BLOOD PRESSURE: 136 MMHG | BODY MASS INDEX: 25.34 KG/M2

## 2023-09-19 DIAGNOSIS — N18.31 STAGE 3A CHRONIC KIDNEY DISEASE (HCC): ICD-10-CM

## 2023-09-19 DIAGNOSIS — I10 PRIMARY HYPERTENSION: ICD-10-CM

## 2023-09-19 DIAGNOSIS — E78.5 HYPERLIPIDEMIA, UNSPECIFIED HYPERLIPIDEMIA TYPE: Primary | ICD-10-CM

## 2023-09-19 DIAGNOSIS — R10.84 GENERALIZED ABDOMINAL PAIN: ICD-10-CM

## 2023-09-19 DIAGNOSIS — N95.0 POSTMENOPAUSAL BLEEDING: ICD-10-CM

## 2023-09-19 DIAGNOSIS — E21.3 HYPERPARATHYROIDISM (HCC): ICD-10-CM

## 2023-09-19 PROCEDURE — 99214 OFFICE O/P EST MOD 30 MIN: CPT | Performed by: FAMILY MEDICINE

## 2023-09-19 NOTE — PROGRESS NOTES
Assessment/Plan:       1. Hyperlipidemia, unspecified hyperlipidemia type  Assessment & Plan:  Stable  Continue atorvastatin 20 mg daily    Orders:  -     Comprehensive metabolic panel; Future; Expected date: 03/02/2024  -     Lipid Panel with Direct LDL reflex; Future; Expected date: 03/02/2024    2. Primary hypertension  Assessment & Plan:  Stable  Continue chlorthalidone 25 mg4 days a week    Orders:  -     CBC; Future; Expected date: 03/02/2024  -     Comprehensive metabolic panel; Future; Expected date: 03/02/2024  -     Lipid Panel with Direct LDL reflex; Future; Expected date: 03/02/2024    3. Stage 3a chronic kidney disease (HCC)  -     CBC; Future; Expected date: 03/02/2024  -     Comprehensive metabolic panel; Future; Expected date: 03/02/2024    4. Postmenopausal bleeding  Comments:  intermittent  Orders:  -     US pelvis complete w transvaginal; Future; Expected date: 09/19/2023    5. Generalized abdominal pain  -     US abdomen complete; Future; Expected date: 09/19/2023    6. Hyperparathyroidism ([address])  Assessment & Plan:  Stable  Calcium and parathyroid level are normal    Orders:  -     PTH, intact; Future; Expected date: 03/02/2024        Postmenopausal bleeding. Advised for transvaginal ultrasound and educated the process. Abdominal pain. Will order an ultrasound. Wellness. Advised to follow a well-balanced diet and do squats daily and exercise. Advised to discontinue Elderberry and glucosamine chondroitin. Will provide a slip for blood work in 6 months. We will monitor parathyroid, calcium cholesterol, and blood count levels. Return in about 6 months (around 3/19/2024) for Annual Wellness Visit. Subjective:      Patient ID: Rogelio Miller is a 74 y.o. female. Rogelio Miller is a 17-year-old female who presents today for follow-up of her hyperlipidemia. She consents to the use of [facility] services today.     She is not currently interested in a flu injection but plans to have it in 10/2023. Her blood pressure was elevated today, 09/19/2023. She reports bright red bleeding once a year for 3 years and is unsure if it was from her bowel movement or vaginal discharge. She inquires if there is a test for pancreatic cancer since she has been having random stomach pain and denies bloating. She reports underarm itchiness. She wishes to discontinue some of her vitamins since she has been taking multivitamins, calcium, magnesium, and zinc. She currently denies the need for medication refill. She notes benefit with glucosamine chondroitin. She has a good appetite and has been on a well-balanced diet. She reports difficulty in long period of writing and has been using Frankincense with minimal benefit. The following portions of the patient's history were reviewed and updated as appropriate: allergies, current medications, past family history, past medical history, past social history, past surgical history and problem list.    Review of Systems  Gastrointestinal: Intermittent abdominal pain. Genitourinary: Random bright-red bleeding. Objective:  /80   Pulse 60   Temp 98.6 °F (37 °C) (Tympanic)   Resp 16   Ht 5' 10.5" (1.791 m)   Wt 82.1 kg (181 lb)   BMI 25.60 kg/m²          Physical Exam  Constitutional:    Vitals: Blood pressure was 136/80 mmHg. General: She is not in acute distress. Appearance: She is well-developed. HENT:      Head: Normocephalic and atraumatic. Eyes:      Conjunctiva/sclera: Conjunctivae normal.   Cardiovascular:      Rate and Rhythm: Normal rate and regular rhythm. Heart sounds: No murmur heard. Pulmonary:      Effort: Pulmonary effort is normal. No respiratory distress. Breath sounds: Normal breath sounds. Musculoskeletal:         General: No swelling. Cervical back: Neck supple. Skin:     General: Skin is warm and dry. Capillary Refill: Capillary refill takes less than 2 seconds.    Neurological:      Mental Status: She is alert. Psychiatric:         Mood and Affect: Mood normal.    I have personally reviewed results with the patient. Parathyroid level was normal.  Hemoglobin was normal.  MCV was 80. Calcium level was 9.4. Liver enzymes were normal.  Alkaline phosphatase was normal.  White blood cell count was normal.  Platelets were normal.        Transcribed for Patti Washington DO, by [facility] ISABEL Lugo on 09/20/23 at 8:40 AM. Powered by [employer].

## 2023-10-03 ENCOUNTER — TELEPHONE (OUTPATIENT)
Dept: FAMILY MEDICINE CLINIC | Facility: CLINIC | Age: 73
End: 2023-10-03

## 2023-10-03 ENCOUNTER — HOSPITAL ENCOUNTER (OUTPATIENT)
Dept: RADIOLOGY | Facility: HOSPITAL | Age: 73
Discharge: HOME | End: 2023-10-03
Payer: MEDICARE

## 2023-10-03 ENCOUNTER — TELEPHONE (OUTPATIENT)
Age: 73
End: 2023-10-03

## 2023-10-03 DIAGNOSIS — K76.9 LIVER LESION, RIGHT LOBE: Primary | ICD-10-CM

## 2023-10-03 DIAGNOSIS — N95.0 POSTMENOPAUSAL BLEEDING: ICD-10-CM

## 2023-10-03 DIAGNOSIS — R10.84 GENERALIZED ABDOMINAL PAIN: ICD-10-CM

## 2023-10-03 PROCEDURE — 76830 TRANSVAGINAL US NON-OB: CPT

## 2023-10-03 PROCEDURE — 76856 US EXAM PELVIC COMPLETE: CPT

## 2023-10-03 PROCEDURE — 76700 US EXAM ABDOM COMPLETE: CPT

## 2023-10-03 NOTE — TELEPHONE ENCOUNTER
10/3/2023 4:38 PM Called Jami Alberts to discuss her abdominal ultrasound. She has an indeterminate lesion in the right lobe of her liver that is 2.7 cm a follow-up MRI is recommended. MRI ordered    Left message on her voicemail to call the office.    Jessi Snyder DO

## 2023-10-03 NOTE — TELEPHONE ENCOUNTER
Reviewed US of the abdomen results with patient. Advised patient that a MRI was ordered to call and schedule phone number given to patient.

## 2023-10-04 ENCOUNTER — OFFICE VISIT (OUTPATIENT)
Dept: AUDIOLOGY | Facility: CLINIC | Age: 73
End: 2023-10-04
Payer: MEDICARE

## 2023-10-04 ENCOUNTER — TELEPHONE (OUTPATIENT)
Age: 73
End: 2023-10-04

## 2023-10-04 DIAGNOSIS — H90.3 SENSORINEURAL HEARING LOSS (SNHL), BILATERAL: Primary | ICD-10-CM

## 2023-10-04 DIAGNOSIS — H91.93 BILATERAL HEARING LOSS, UNSPECIFIED HEARING LOSS TYPE: Primary | ICD-10-CM

## 2023-10-04 PROCEDURE — 92557 COMPREHENSIVE HEARING TEST: CPT | Performed by: AUDIOLOGIST

## 2023-10-04 PROCEDURE — 92567 TYMPANOMETRY: CPT | Performed by: AUDIOLOGIST

## 2023-10-04 NOTE — PROGRESS NOTES
ADULT HEARING EVALUATION - [address] AUDIOLOGY      Patient Name: Siva Reeves   MRN:  [id number]   :  1950   Age: 74 y.o. Gender: female   DOS: 10/4/2023     HISTORY:     Siva Reeves, a 74 y.o. female, was seen on 10/4/2023 at the referral of Dr. Jeannie Matthew for an audiometric evaluation. Ms. aHnna Jones was unaccompanied to today's visit. Ms. Hanna Jones is a new patient to our practice. She reported today for an annual audiogram due to Hx of hearing loss and reports of difficulty hearing. Ms. Hanna Jones denied otalgia, otorrhea, aural fullness, tinnitus and dizziness. Other documented medical history states that Ms. Hanna Jones  has a past medical history of Arthritis, Chronic kidney disease, Hyperlipidemia, Hypertension, Migraine, Migraine, Osteopenia, and Snores. Interim medical history is otherwise reportedly unchanged. RESULTS:    Otoscopic Evaluation:   Right Ear: Unremarkable, canal clear   Left Ear: Unremarkable, canal clear    Tympanometry:   Right Ear: Type A; normal middle ear pressure and static compliance    Left Ear: Type A; normal middle ear pressure and static compliance     Audiometry:  Conventional pure tone audiometry from 250 - 8000 Hz  obtained with good reliability and revealed the following:     Right Ear: normal to moderately-severe sensorineural hearing loss (SNHL)   Left Ear: normal to moderately-severe sensorineural hearing loss (SNHL)     Speech Audiometry:    Speech Reception (SRT)   Right Ear: 25 dB HL   Left Ear: 25 dB HL    Word Recognition Scores (WRS):  Right Ear: excellent (100 % correct)     Left Ear: excellent (100 % correct)   Stimuli: [id number]    IMPRESSIONS:  Bilateral SNHL, normal to moderately-severe AU. Results are stable re: previous    The results of today's findings were reviewed with Ms. Hanna Jones and her hearing thresholds were explained at length. Treatment options, including amplification and communication strategies, were discussed as appropriate.  Ms. Hanna Jones voiced understanding of her test results and had no further questions. RECOMMENDATIONS:    1.) Follow-up with referring provider. 2.) Based on today's findings and patient symptoms, Ms. Clemente Bang is a candidate for a trial with amplification. A hearing aid evaluation to further discuss Ms. Thurston's lifestyle, communication needs, and develop a treatment plan for their hearing loss is recommended. 3.) Annual audiograms, sooner if problems/concerns arise. *see attached audiogram*    It was a pleasure working with Ms. Clemente Bang today. Thank you for referring this patient.      Ana Reardon  Clinical Audiologist    [address] [id number]

## 2023-10-04 NOTE — TELEPHONE ENCOUNTER
10/4/2023 9:27 AM Called Candida Spicer     I can see that she has scheduled the MRI    Left message on her voicemail to call the office.    Maciej Layne, DO

## 2023-10-04 NOTE — TELEPHONE ENCOUNTER
Mya Lopez from Audiology called and the patient needs a new [facility] [id number] [facility]    Ty

## 2023-10-11 ENCOUNTER — TELEPHONE (OUTPATIENT)
Age: 73
End: 2023-10-11

## 2023-10-11 ENCOUNTER — TELEPHONE (OUTPATIENT)
Dept: FAMILY MEDICINE CLINIC | Facility: CLINIC | Age: 73
End: 2023-10-11

## 2023-10-11 ENCOUNTER — TELEPHONE (OUTPATIENT)
Dept: OBGYN CLINIC | Facility: CLINIC | Age: 73
End: 2023-10-11

## 2023-10-11 DIAGNOSIS — R93.89 ENDOMETRIAL THICKENING ON ULTRASOUND: Primary | ICD-10-CM

## 2023-10-11 DIAGNOSIS — N95.0 POSTMENOPAUSAL BLEEDING: ICD-10-CM

## 2023-10-11 NOTE — TELEPHONE ENCOUNTER
Chris Ospina from [facility] is calling with significant findings on [id number] [facility] from 10/3. Please advise, thank you!

## 2023-10-11 NOTE — TELEPHONE ENCOUNTER
10/11/2023 8:34 AM called Jose Balbuena regarding her pelvic ultrasound results. It was a limited evaluation of her endometrium but there was a questionable area of mild thickening she also has numerous uterine fibroids measuring up to 4 cm. Endometrial sampling should be considered    Needs follow up with gyn    Left message on her voicemail to call the office.    Donte Ag DO

## 2023-10-11 NOTE — TELEPHONE ENCOUNTER
T/c from patient with abnormal uterine bleeding and abnormal US reviewed with Dr. Emily Palacios scheduled for December , if we have a cancellation sooner we can move her up , but would like her to have abdominal MRI first before appointment.  ISA CMA

## 2023-10-11 NOTE — TELEPHONE ENCOUNTER
Patient returned call -[facility] . Please place referral for Gyn. She has not seen one for many years , asked if Dr Flory Coates was still in practice. Aware she will hear from scheduling from Gynecology to set up an appt. Asked for her home number byron then her cell.  Will be stepping out today from 1-3 pm

## 2023-10-11 NOTE — TELEPHONE ENCOUNTER
10/11/2023 10:54 AM returned call to Valdez Gomez    We discussed her ultrasound results. Referred her to GYN for further evaluation. We also discussed that she has her MRI of the liver scheduled.     Message complete  Jose Rubio, DO

## 2023-10-20 ENCOUNTER — TELEPHONE (OUTPATIENT)
Age: 73
End: 2023-10-20

## 2023-10-20 NOTE — TELEPHONE ENCOUNTER
Patient called and had questions regarding donating blood. Patient stated that her 52 days are up and it is a good idea to start donating again. Please advise.

## 2023-10-20 NOTE — TELEPHONE ENCOUNTER
No, I don't think she should donate blood until she has the uterine biopsy done  Thank you,  Nohemy Pringle, DO

## 2023-10-30 ENCOUNTER — HOSPITAL ENCOUNTER (OUTPATIENT)
Dept: MRI IMAGING | Facility: HOSPITAL | Age: 73
Discharge: HOME | End: 2023-10-30
Payer: MEDICARE

## 2023-10-30 DIAGNOSIS — K76.9 LIVER LESION, RIGHT LOBE: ICD-10-CM

## 2023-10-30 PROCEDURE — A9585 GADOBUTROL INJECTION: HCPCS | Performed by: FAMILY MEDICINE

## 2023-10-30 PROCEDURE — G1004 CDSM NDSC: HCPCS

## 2023-10-30 PROCEDURE — 74183 MRI ABD W/O CNTR FLWD CNTR: CPT

## 2023-10-30 RX ORDER — GADOBUTROL 604.72 MG/ML
8 INJECTION INTRAVENOUS
Status: COMPLETED | OUTPATIENT
Start: 2023-10-30 | End: 2023-10-30

## 2023-10-30 RX ADMIN — GADOBUTROL 8 ML: 604.72 INJECTION INTRAVENOUS at 13:19

## 2023-11-08 ENCOUNTER — TELEPHONE (OUTPATIENT)
Dept: FAMILY MEDICINE CLINIC | Facility: CLINIC | Age: 73
End: 2023-11-08

## 2023-11-08 NOTE — TELEPHONE ENCOUNTER
11/8/2023 9:00 AM Called Sterling Burnett regarding her MRI    I explained that the lesion they saw her in her liver is a hemangioma    High fiber diet recommended for diverticulum    Franck Coker DO

## 2023-11-14 ENCOUNTER — HOSPITAL ENCOUNTER (OUTPATIENT)
Dept: RADIOLOGY | Facility: HOSPITAL | Age: 73
Discharge: HOME | End: 2023-11-14
Payer: MEDICARE

## 2023-11-14 VITALS — BODY MASS INDEX: 25.77 KG/M2 | WEIGHT: 180 LBS | HEIGHT: 70 IN

## 2023-11-14 DIAGNOSIS — Z12.31 SCREENING MAMMOGRAM, ENCOUNTER FOR: ICD-10-CM

## 2023-11-14 PROCEDURE — 77067 SCR MAMMO BI INCL CAD: CPT

## 2023-11-14 PROCEDURE — 77063 BREAST TOMOSYNTHESIS BI: CPT

## 2023-11-16 ENCOUNTER — APPOINTMENT (OUTPATIENT)
Dept: LAB | Facility: CLINIC | Age: 73
End: 2023-11-16
Payer: MEDICARE

## 2023-11-16 DIAGNOSIS — E55.9 VITAMIN D DEFICIENCY: ICD-10-CM

## 2023-11-16 DIAGNOSIS — E21.3 HYPERPARATHYROIDISM (HCC): ICD-10-CM

## 2023-11-16 DIAGNOSIS — E04.2 MULTINODULAR GOITER: ICD-10-CM

## 2023-11-16 DIAGNOSIS — M81.0 OSTEOPOROSIS, UNSPECIFIED OSTEOPOROSIS TYPE, UNSPECIFIED PATHOLOGICAL FRACTURE PRESENCE: ICD-10-CM

## 2023-11-16 LAB
25(OH)D3 SERPL-MCNC: 40.9 NG/ML (ref 30–100)
ALBUMIN SERPL BCP-MCNC: 4.2 G/DL (ref 3.5–5)
ANION GAP SERPL CALCULATED.3IONS-SCNC: 7 MMOL/L
BUN SERPL-MCNC: 22 MG/DL (ref 5–25)
CALCIUM SERPL-MCNC: 9 MG/DL (ref 8.4–10.2)
CHLORIDE SERPL-SCNC: 104 MMOL/L (ref 96–108)
CO2 SERPL-SCNC: 30 MMOL/L (ref 21–32)
CREAT SERPL-MCNC: 0.84 MG/DL (ref 0.6–1.3)
GFR SERPL CREATININE-BSD FRML MDRD: 68 ML/MIN/1.73SQ M
GLUCOSE P FAST SERPL-MCNC: 90 MG/DL (ref 65–99)
POTASSIUM SERPL-SCNC: 3.8 MMOL/L (ref 3.5–5.3)
SODIUM SERPL-SCNC: 141 MMOL/L (ref 135–147)
TSH SERPL DL<=0.05 MIU/L-ACNC: 3.53 UIU/ML (ref 0.45–4.5)

## 2023-11-16 PROCEDURE — 36415 COLL VENOUS BLD VENIPUNCTURE: CPT

## 2023-11-16 PROCEDURE — 82040 ASSAY OF SERUM ALBUMIN: CPT

## 2023-11-16 PROCEDURE — 84443 ASSAY THYROID STIM HORMONE: CPT

## 2023-11-16 PROCEDURE — 80048 BASIC METABOLIC PNL TOTAL CA: CPT

## 2023-11-16 PROCEDURE — 82306 VITAMIN D 25 HYDROXY: CPT

## 2023-11-27 ENCOUNTER — OFFICE VISIT (OUTPATIENT)
Dept: AUDIOLOGY | Facility: CLINIC | Age: 73
End: 2023-11-27

## 2023-11-27 DIAGNOSIS — H90.3 SENSORINEURAL HEARING LOSS, BILATERAL: Primary | ICD-10-CM

## 2023-11-27 NOTE — PROGRESS NOTES
HEARING AID EVALUTION [facility] AUDIOLOGY    Patient Name: Lisa Young   MRN:  [id number]   :  1950   Age: 74 y.o. Gender: female   DOS: 2023     Lisa Young was seen today for a hearing aid evaluation following her audiometric testing performed on 10/4/2023. Ms. Higinio Ferrari was accompanied by her  Denys Duane to today's visit. Ms. Higinio Ferrari was referred by Dr. Raghavendra Gray. The audiometric evaluation on 10/4/2023 revealed normal to moderately-severe sensorineural hearing loss (SNHL) in the right ear and normal to moderately-severe sensorineural hearing loss (SNHL) in the left ear. Word recognition scores were excellent in the right ear and excellent in the left ear. This findings were reviewed with Ms. Higinio Ferrari. All of her questions regarding her hearing status were addressed, and the importance of realistic expectations of hearing loss and amplification were emphasized. Hearing aids are assistive devices and are not designed to restore normal hearing. The difference between peripheral hearing loss and speech understanding (central hearing loss) was explained. While improvement with amplification is possible, there will always be word understanding problems due to the inherent nature of hearing loss. These problems will be greater in background noise than in quiet situations. Ms. Higinio Ferrari was counseled on the importance of self-advocacy, motivation, as well as effective communication strategies that can be used to optimize hearing aid success. These effective communication strategies include:   1.) Maintaining face-to-face communication, allowing for speechreading of facial expressions, lips, and gestures. 2.) Reducing background noise and distance between communication partners. 3.) Having communication partners reduce their rate of speech when appropriate.    4.) Beginning conversation by getting communication partner's attention and stating the topic, allowing for context clues to help fill in gaps in comprehension. 5.) Asking for rephrasing of aspects of conversation that are missed when needed rather than asking for repetitions. Ms. Td Hopson prioritized communication difficulties include:     1.) Hearing her  better. 2.) Hearing in background noise. 3.) Hearing woman's voices. Strengths and limitations of amplification, including various hearing aid styles, options, and circuitry were discussed at length with Ms. Aryan Willams. Ms. Aryan Willams would like more time to think about her options and the manufacturers before coming back for a final decision. Ms. Aryan Willams wished to defer purchasing hearing aids at this time until the holiday season passes. Ms. Aryan Willams was added to the schedule to discuss further details and answer any questions she may have in February 2024. It was a pleasure working with Ms. Aryan Willams. They were encouraged to contact the clinic with any further questions or concerns in the meantime. JOSE Guillaume  3rd year Audiology student    [address] [phone]    Deepti Francois.   Clinical Audiologist    [address] [phone]

## 2023-11-29 ENCOUNTER — OFFICE VISIT (OUTPATIENT)
Dept: ENDOCRINOLOGY | Facility: CLINIC | Age: 73
End: 2023-11-29
Payer: MEDICARE

## 2023-11-29 VITALS
DIASTOLIC BLOOD PRESSURE: 82 MMHG | HEART RATE: 58 BPM | BODY MASS INDEX: 25.68 KG/M2 | OXYGEN SATURATION: 99 % | WEIGHT: 179 LBS | SYSTOLIC BLOOD PRESSURE: 148 MMHG

## 2023-11-29 DIAGNOSIS — E55.9 VITAMIN D DEFICIENCY: ICD-10-CM

## 2023-11-29 DIAGNOSIS — E21.3 HYPERPARATHYROIDISM (HCC): ICD-10-CM

## 2023-11-29 DIAGNOSIS — M81.0 OSTEOPOROSIS, UNSPECIFIED OSTEOPOROSIS TYPE, UNSPECIFIED PATHOLOGICAL FRACTURE PRESENCE: Primary | ICD-10-CM

## 2023-11-29 DIAGNOSIS — E04.2 MULTINODULAR GOITER: ICD-10-CM

## 2023-11-29 PROCEDURE — 99214 OFFICE O/P EST MOD 30 MIN: CPT | Performed by: INTERNAL MEDICINE

## 2023-12-10 NOTE — PROGRESS NOTES
Juan Hollis 74 y.o. female MRN: [id number]    Encounter: [id number]      Assessment/Plan     Assessment: This is a 74y.o.-year-old female with osteoporosis    Plan:  Assessment/Plan      Diagnoses and all orders for this visit:    Osteoporosis, unspecified osteoporosis type, unspecified pathological fracture presence  Patient is currently on drug holiday, was treated with bisphosphonates for 7 years. DEXA scan is stable. Will order DEXA scan before next appointment, October 2024  Steven the importance of fall precautions, continue calcium supplementation 600 mg daily and vitamin D3 2000 IU daily  -     DXA bone density spine hip and pelvis; Future    Hyperparathyroidism ([address])  PTH normalized, status post parathyroid adenoma surgery  -     PTH, intact- Lab Collect; Future    Vitamin D deficiency  Continue vitamin D3 supplementation, 2000 IU daily  -     Basic metabolic panel; Future  -     Vitamin D 25 hydroxy; Future    Multinodular goiter  Ultrasound in 2019 showed subcentimeter thyroid nodules, no nodule meet criteria for biopsy or follow-up ultrasound       CC:   Osteoporosis , hyperparathyroidism, vitamin d deficiency     History of Present Illness      Juan Hollis is 75-year-old female with medical history of osteopenia, vitamin D deficiency, multiple thyroid nodules, primary hyperparathyroidism is here for follow-up. She underwent parathyroid adenoma resection in 2011, since then PTH and calcium normalized. For osteoporosis she was treated with bisphosphonate therapy for 7 years, currently on drug holiday since 2018. Last DEXA scan was done in October 2022 which showed stable bone mineral density. She is due for DEXA scan again in October 2024. For vitamin D deficiency, she takes vitamin D3 supplementation 5000 IU daily    She has history of thyroid nodules, most recent ultrasound was done in 2019, thyroid nodules are less than 1 cm and low suspicion. No thyroid ultrasound is needed as per radiology. No family history of thyroid cancer, no history of exposure to radiation to the neck,/face or chest area    Oct 2022   RESULTS:      LUMBAR SPINE  Level: L1-L4 :   BMD:  1.010  gm/cm2   T-score: -1.5         LEFT TOTAL HIP:   BMD: 0.767  gm/cm2   T-score: -1.9      LEFT FEMORAL NECK:   BMD: 0.747  gm/cm2   T score: -2.1      RIGHT TOTAL HIP:   BMD:  0.769  gm/cm2   T-score: -1.9     RIGHT FEMORAL NECK:   BMD:  0.779  gm/cm2    T score: -1.9         IMPRESSION:     1. Low bone mass (osteopenia). 2.  Since a DXA study from 7/21/2020, there has been:  A  STATISTICALLY SIGNIFICANT INCREASE in bone mineral density of  0.027 g/cm2 (3.6)% in the hips. 3.  The 10 year risk of hip fracture is 13.3% with the 10 year risk of major osteoporotic fracture being 24.1% as calculated by the [facility]/WHO fracture risk assessment tool (FRAX). 4.  The current NOF guidelines recommend treating patients with a T-score of -2.5 or less in the lumbar spine or hips, or in post-menopausal women and men over the age of 48 with low bone mass (osteopenia) and a FRAX 10 year risk score of >3% for hip   fracture and/or >20% for major osteoporotic fracture. 5.  The NOF recommends follow-up DXA in 1-2 years after initiating therapy for osteoporosis and every 2 years thereafter. More frequent evaluation is appropriate for patients with conditions associated with rapid bone loss, such as glucocorticoid   therapy. The interval between DXA screenings may be longer for individuals without major risk factors and initial T-score in the normal or upper low bone mass range.     Component      Latest Ref Rng 11/16/2023   Sodium      135 - 147 mmol/L 141    Potassium      3.5 - 5.3 mmol/L 3.8    Chloride      96 - 108 mmol/L 104    CO2      21 - 32 mmol/L 30    Anion Gap      mmol/L 7    BUN      5 - 25 mg/dL 22    Creatinine      0.60 - 1.30 mg/dL 0.84    GLUCOSE FASTING      65 - 99 mg/dL 90    Calcium      8.4 - 10.2 mg/dL 9.0    eGFR      ml/min/1.73sq m 68    Vit D, 25-Hydroxy      30.0 - 100.0 ng/mL 40.9    Albumin      3.5 - 5.0 g/dL 4.2    TSH 3RD GENERATON      0.450 - 4.500 uIU/mL 3.532          Review of Systems    Historical Information   Past Medical History:   Diagnosis Date    Arthritis     Chronic kidney disease     stage 3    Hyperlipidemia     borderline    Hypertension     borderline    Migraine     Migraine     controlled with verapamil    Osteopenia     Snores      Past Surgical History:   Procedure Laterality Date    BREAST BIOPSY Right     benign ~ 1990s    BUNIONECTOMY Left     3 hammertoes, bunion    CATARACT EXTRACTION Bilateral 2012    COLONOSCOPY      PARATHYROID GLAND SURGERY Right     front gland removed     Social History   Social History     Substance and Sexual Activity   Alcohol Use No     Social History     Substance and Sexual Activity   Drug Use No     Social History     Tobacco Use   Smoking Status Never   Smokeless Tobacco Never     Family History:   Family History   Problem Relation Age of Onset    Angina Mother     Heart failure Mother     Prostate cancer Father     Cancer Father         prostate    Diabetes Sister     Sudden death Sister     Osteoporosis Sister     Heart disease Sister         MI    COPD Sister     Kidney disease Sister     Heart disease Sister         CHF    Hypertension Daughter     Hypertension Brother     Cancer Brother         lung    Diabetes Brother     Cancer Brother         prostate    No Known Problems Son     Lung cancer Maternal Uncle     Breast cancer Cousin 48    Prostate cancer Other 46       Meds/Allergies   Current Outpatient Medications   Medication Sig Dispense Refill    Ascorbic Acid (vitamin C) 1000 MG tablet Take 1,000 mg by mouth daily      atorvastatin (LIPITOR) 20 mg tablet TAKE 1 TABLET BY MOUTH EVERY DAY 90 tablet 3    B Complex Vitamins (B COMPLEX PO) Take 1 tablet by mouth daily      Black Pepper-Turmeric (TURMERIC COMPLEX/BLACK PEPPER PO) Take by mouth daily 600 mg turmeric, 5 mg black pepper      calcium carbonate (OS-FLORIAN) 600 MG tablet Take one tablet daily      chlorthalidone 25 mg tablet TAKE 1 TABLET BY MOUTH 4 DAYS A WEEK MON, WED, FRI, SUNDAY 52 tablet 3    Cholecalciferol (VITAMIN D3) 5000 units CAPS Take 1 capsule by mouth Daily      Coenzyme Q10 (Co Q 10) 100 MG CAPS Take by mouth every morning      fluocinolone (SYNALAR) 0.01 % external solution       magnesium oxide (MAG-OX) 400 mg tablet Take 1 tablet by mouth daily With zinc       multivitamin (THERAGRAN) TABS Take 1 tablet by mouth daily      Omega 3 1200 MG CAPS Take by mouth every morning      Probiotic Product (PROBIOTIC-10 PO) Take 1 capsule by mouth daily      spironolactone (ALDACTONE) 25 mg tablet TAKE 1 TABLET BY MOUTH EVERY DAY IN THE MORNING 90 tablet 3    verapamil (CALAN-SR) 180 mg CR tablet TAKE 1 TABLET (180 MG TOTAL) BY MOUTH 2 (TWO) TIMES A  tablet 3    Zinc Sulfate (ZINC 15 PO) Take by mouth daily With copper 1mg       No current facility-administered medications for this visit. No Known Allergies    Objective   Vitals: Blood pressure 148/82, pulse 58, weight 81.2 kg (179 lb), SpO2 99 %, not currently breastfeeding. Physical Exam  Constitutional:       General: She is not in acute distress. Appearance: Normal appearance. She is not ill-appearing. HENT:      Head: Normocephalic and atraumatic. Nose: Nose normal.   Eyes:      Extraocular Movements: Extraocular movements intact. Conjunctiva/sclera: Conjunctivae normal.   Pulmonary:      Effort: No respiratory distress. Musculoskeletal:      Cervical back: Normal range of motion. Neurological:      General: No focal deficit present. Mental Status: She is alert and oriented to person, place, and time. Psychiatric:         Mood and Affect: Mood normal.         Behavior: Behavior normal.         The history was obtained from the review of the chart, patient.     Lab Results:   Lab Results Component Value Date/Time    [facility]532 11/16/2023 08:38 AM     Imaging Studies:         I have personally reviewed pertinent reports. Portions of the record may have been created with voice recognition software. Occasional wrong word or "sound a like" substitutions may have occurred due to the inherent limitations of voice recognition software. Read the chart carefully and recognize, using context, where substitutions have occurred. 190

## 2023-12-29 ENCOUNTER — OFFICE VISIT (OUTPATIENT)
Dept: OBGYN CLINIC | Facility: CLINIC | Age: 73
End: 2023-12-29
Payer: MEDICARE

## 2023-12-29 VITALS
HEIGHT: 70 IN | WEIGHT: 179 LBS | BODY MASS INDEX: 25.62 KG/M2 | SYSTOLIC BLOOD PRESSURE: 132 MMHG | DIASTOLIC BLOOD PRESSURE: 80 MMHG

## 2023-12-29 DIAGNOSIS — Z12.4 SCREENING FOR CERVICAL CANCER: ICD-10-CM

## 2023-12-29 DIAGNOSIS — R93.89 ENDOMETRIAL THICKENING ON ULTRASOUND: ICD-10-CM

## 2023-12-29 DIAGNOSIS — N81.11 CYSTOCELE, MIDLINE: ICD-10-CM

## 2023-12-29 DIAGNOSIS — N95.0 POSTMENOPAUSAL BLEEDING: Primary | ICD-10-CM

## 2023-12-29 PROCEDURE — 99203 OFFICE O/P NEW LOW 30 MIN: CPT | Performed by: STUDENT IN AN ORGANIZED HEALTH CARE EDUCATION/TRAINING PROGRAM

## 2023-12-29 PROCEDURE — G0145 SCR C/V CYTO,THINLAYER,RESCR: HCPCS | Performed by: STUDENT IN AN ORGANIZED HEALTH CARE EDUCATION/TRAINING PROGRAM

## 2023-12-29 PROCEDURE — G0476 HPV COMBO ASSAY CA SCREEN: HCPCS | Performed by: STUDENT IN AN ORGANIZED HEALTH CARE EDUCATION/TRAINING PROGRAM

## 2023-12-29 PROCEDURE — 58100 BIOPSY OF UTERUS LINING: CPT | Performed by: STUDENT IN AN ORGANIZED HEALTH CARE EDUCATION/TRAINING PROGRAM

## 2023-12-29 PROCEDURE — 88305 TISSUE EXAM BY PATHOLOGIST: CPT | Performed by: PATHOLOGY

## 2023-12-29 NOTE — PROGRESS NOTES
"Assessment/Plan:  Evelin Thurston is a 73 y.o.  with PMB and cystocele who presents for evaluation    No problem-specific Assessment & Plan notes found for this encounter.       Diagnoses and all orders for this visit:    Screening for cervical cancer  -     Liquid-based pap, screening    Endometrial thickening on ultrasound  -     Ambulatory referral to Obstetrics / Gynecology    Postmenopausal bleeding  -     Ambulatory referral to Obstetrics / Gynecology  -     Tissue Exam          Pap and endobx collected today  Able to enter uterus easily, very thin endometrium, gritty texture, minimal specimen, discussed consideration of D&C if insufficient  Cervix open, not stenotic, if insufficient, misoprostol would not help/not needed    Grade 2 cystocele on exam, symptomatic, discussed pessary vs surgery, interested in surgery, referred to urogyn    Subjective:      Patient ID: Evelin Thurston is a 73 y.o. female.    HPI  Postmenopausal bleeding--couple episodes/year  Not heavy, just spotting    Sexually active, no dyspareunia  Something protruding in vagina    Pelvic US with fibroids, limited eval of endometrial stripe, possible 5mm  Last pap 2016--NILM    Previously went ot Dr. Gomez and had D&c    Sometimes difficulty starting to void  No EJ    The following portions of the patient's history were reviewed and updated as appropriate: allergies, current medications, past social history, past surgical history, and problem list.    Review of Systems  -per HPI    Objective:     /80 (BP Location: Left arm, Patient Position: Sitting, Cuff Size: Standard)   Ht 5' 10\" (1.778 m)   Wt 81.2 kg (179 lb)   BMI 25.68 kg/m²      Physical Exam  Constitutional:       Appearance: Normal appearance.   HENT:      Head: Normocephalic and atraumatic.   Eyes:      Extraocular Movements: Extraocular movements intact.      Conjunctiva/sclera: Conjunctivae normal.   Pulmonary:      Effort: Pulmonary effort is normal. "   Genitourinary:     Comments: Vulva: normal, no lesions  Vagina: grade 2 cystocele, otherwise no lesions or ttp  Urethra: normal, no lesions, masses or ttp  Bladder: normal, no masses or ttp  Cervix: normal, no lesions, masses or CMT  Uterus: normal-size, normal mobility, good descensus  Adnexa: no masses or ttp    Musculoskeletal:         General: Normal range of motion.      Cervical back: Normal range of motion.   Skin:     General: Skin is warm and dry.   Neurological:      General: No focal deficit present.      Mental Status: She is alert.   Psychiatric:         Mood and Affect: Mood normal.         Behavior: Behavior normal.         Thought Content: Thought content normal.           Study Result    Narrative & Impression   PELVIC ULTRASOUND, COMPLETE     INDICATION:  The patient is 73 years old.  N95.0: Postmenopausal bleeding.     COMPARISON: Pelvic ultrasound 9/19/2012.     TECHNIQUE:   Transabdominal pelvic ultrasound was performed in sagittal and transverse planes with a curvilinear transducer.  Additional transvaginal imaging was performed to better evaluate the endometrium and ovaries.  Imaging included volumetric   sweeps as well as traditional still imaging technique.     FINDINGS:     UTERUS:  The uterus is retroverted in position, measuring 7.0 x 8.0 x 6.3 cm.  Numerous uterine fibroids are noted. The largest is a subserosal/intramural fibroid along the right fundus measuring 4.1 x 4.1 x 3.8 cm  Nabothian cyst(s) present, cervix otherwise within normal limits.     ENDOMETRIUM:  The endometrium is evaluation of the endometrium is markedly limited as it is obscured/distorted by fibroid changes. The endometrium is felt to be partially visualized measure up to 5 mm in AP caliber, this would be mildly thickened for a postmenopausal   female with bleeding.     OVARIES/ADNEXA:  Right ovary:  2.5 x 1.6 x 1.2 cm. 2.5 mL.  Left ovary: Not visualized.  The right ovary left ovary is not visualized. The right  "ovary demonstrates an unremarkable sonographic appearance.     OTHER:  No free fluid or loculated fluid collections.     IMPRESSION:  1. Markedly limited evaluation of the endometrium. A portion of the endometrial stripe is questionably visualized measuring 5 mm in AP caliber. This would be mildly thickened for a postmenopausal female with bleeding. Endometrial sampling should be   considered.     2. Numerous uterine fibroids measuring up to 4.1 cm.     3. Unremarkable sonographic appearance of the right ovary.     4. Nonvisualization of the left ovary.     The study was marked in EPIC for significant notification.     Workstation performed: [id number]       Endometrial biopsy    Date/Time: 12/29/2023 11:00 AM    Performed by: Shoshana Sanchez MD  Authorized by: Shoshana Sanchez MD  Universal Protocol:  Procedure performed by:  Consent: Verbal consent obtained.  Risks and benefits: risks, benefits and alternatives were discussed  Consent given by: patient  Time out: Immediately prior to procedure a \"time out\" was called to verify the correct patient, procedure, equipment, support staff and site/side marked as required.  Patient understanding: patient states understanding of the procedure being performed  Patient identity confirmed: verbally with patient and provided demographic data    Indication:     Indications: Post-menopausal bleeding    Procedure:     Procedure: endometrial biopsy with Pipelle      A bivalve speculum was placed in the vagina: yes      Cervix cleaned and prepped: yes      The cervix was dilated: no      Uterus sounded: yes      Uterus sound depth (cm):  7    Curettes used:  2    Specimen collected: specimen collected and sent to pathology      Patient tolerated procedure well with no complications: yes    Findings:     Uterus size:  Non-gravid    Cervix: normal      Adnexa: normal        "

## 2024-01-03 LAB
HPV HR 12 DNA CVX QL NAA+PROBE: NEGATIVE
HPV16 DNA CVX QL NAA+PROBE: NEGATIVE
HPV18 DNA CVX QL NAA+PROBE: NEGATIVE

## 2024-01-03 PROCEDURE — 88305 TISSUE EXAM BY PATHOLOGIST: CPT | Performed by: PATHOLOGY

## 2024-01-08 LAB
LAB AP GYN PRIMARY INTERPRETATION: NORMAL
Lab: NORMAL

## 2024-01-23 DIAGNOSIS — I10 ESSENTIAL HYPERTENSION: ICD-10-CM

## 2024-02-06 ENCOUNTER — OFFICE VISIT (OUTPATIENT)
Dept: AUDIOLOGY | Facility: CLINIC | Age: 74
End: 2024-02-06

## 2024-02-06 DIAGNOSIS — H90.3 SENSORINEURAL HEARING LOSS (SNHL), BILATERAL: Primary | ICD-10-CM

## 2024-02-06 NOTE — PROGRESS NOTES
Ms. Thurston was seen with her  today to review options. She noted at the beginning of today's visit that she was still unsure as to how to proceed. Her options include reconditioning the hearing aids [facility] gifted [facility] and purchasing new ones. Clinic costs outlined again today. Customer service contacted- donated devices carry a warranty until 06/24. After discussion, she wished to defer the decision until March when she can see Domitila Michael with her .     She is welcome to follow-up at that date or sooner if she wants to proceed either way.     Deepti Jha.  Clinical Audiologist    [facility]  [address] [id number]

## 2024-02-08 DIAGNOSIS — I10 ESSENTIAL HYPERTENSION: ICD-10-CM

## 2024-02-08 RX ORDER — SPIRONOLACTONE 25 MG/1
TABLET ORAL
Qty: 90 TABLET | Refills: 3 | Status: SHIPPED | OUTPATIENT
Start: 2024-02-08

## 2024-03-11 ENCOUNTER — APPOINTMENT (OUTPATIENT)
Dept: LAB | Facility: CLINIC | Age: 74
End: 2024-03-11
Payer: MEDICARE

## 2024-03-11 ENCOUNTER — TELEPHONE (OUTPATIENT)
Dept: NEUROLOGY | Facility: CLINIC | Age: 74
End: 2024-03-11

## 2024-03-11 DIAGNOSIS — I10 PRIMARY HYPERTENSION: ICD-10-CM

## 2024-03-11 DIAGNOSIS — E78.5 HYPERLIPIDEMIA, UNSPECIFIED HYPERLIPIDEMIA TYPE: ICD-10-CM

## 2024-03-11 DIAGNOSIS — E21.3 HYPERPARATHYROIDISM (HCC): ICD-10-CM

## 2024-03-11 DIAGNOSIS — N18.31 STAGE 3A CHRONIC KIDNEY DISEASE (HCC): ICD-10-CM

## 2024-03-11 LAB
ALBUMIN SERPL BCP-MCNC: 4.4 G/DL (ref 3.5–5)
ALP SERPL-CCNC: 43 U/L (ref 34–104)
ALT SERPL W P-5'-P-CCNC: 19 U/L (ref 7–52)
ANION GAP SERPL CALCULATED.3IONS-SCNC: 7 MMOL/L
AST SERPL W P-5'-P-CCNC: 18 U/L (ref 13–39)
BILIRUB SERPL-MCNC: 0.47 MG/DL (ref 0.2–1)
BUN SERPL-MCNC: 19 MG/DL (ref 5–25)
CALCIUM SERPL-MCNC: 9.8 MG/DL (ref 8.4–10.2)
CHLORIDE SERPL-SCNC: 102 MMOL/L (ref 96–108)
CHOLEST SERPL-MCNC: 118 MG/DL
CO2 SERPL-SCNC: 29 MMOL/L (ref 21–32)
CREAT SERPL-MCNC: 0.88 MG/DL (ref 0.6–1.3)
ERYTHROCYTE [DISTWIDTH] IN BLOOD BY AUTOMATED COUNT: 13.4 % (ref 11.6–15.1)
GFR SERPL CREATININE-BSD FRML MDRD: 64 ML/MIN/1.73SQ M
GLUCOSE P FAST SERPL-MCNC: 90 MG/DL (ref 65–99)
HCT VFR BLD AUTO: 42.7 % (ref 34.8–46.1)
HDLC SERPL-MCNC: 56 MG/DL
HGB BLD-MCNC: 14 G/DL (ref 11.5–15.4)
LDLC SERPL CALC-MCNC: 49 MG/DL (ref 0–100)
MCH RBC QN AUTO: 27.2 PG (ref 26.8–34.3)
MCHC RBC AUTO-ENTMCNC: 32.8 G/DL (ref 31.4–37.4)
MCV RBC AUTO: 83 FL (ref 82–98)
PLATELET # BLD AUTO: 235 THOUSANDS/UL (ref 149–390)
PMV BLD AUTO: 10.8 FL (ref 8.9–12.7)
POTASSIUM SERPL-SCNC: 4.1 MMOL/L (ref 3.5–5.3)
PROT SERPL-MCNC: 6.9 G/DL (ref 6.4–8.4)
PTH-INTACT SERPL-MCNC: 49.9 PG/ML (ref 12–88)
RBC # BLD AUTO: 5.15 MILLION/UL (ref 3.81–5.12)
SODIUM SERPL-SCNC: 138 MMOL/L (ref 135–147)
TRIGL SERPL-MCNC: 66 MG/DL
WBC # BLD AUTO: 6.67 THOUSAND/UL (ref 4.31–10.16)

## 2024-03-11 PROCEDURE — 80053 COMPREHEN METABOLIC PANEL: CPT

## 2024-03-11 PROCEDURE — 80061 LIPID PANEL: CPT

## 2024-03-11 PROCEDURE — 36415 COLL VENOUS BLD VENIPUNCTURE: CPT

## 2024-03-11 PROCEDURE — 83970 ASSAY OF PARATHORMONE: CPT

## 2024-03-11 PROCEDURE — 85027 COMPLETE CBC AUTOMATED: CPT

## 2024-03-13 ENCOUNTER — OFFICE VISIT (OUTPATIENT)
Dept: NEUROLOGY | Facility: CLINIC | Age: 74
End: 2024-03-13
Payer: MEDICARE

## 2024-03-13 VITALS
WEIGHT: 181 LBS | HEIGHT: 70 IN | HEART RATE: 86 BPM | BODY MASS INDEX: 25.91 KG/M2 | DIASTOLIC BLOOD PRESSURE: 70 MMHG | SYSTOLIC BLOOD PRESSURE: 120 MMHG

## 2024-03-13 DIAGNOSIS — G43.709 CHRONIC MIGRAINE WITHOUT AURA WITHOUT STATUS MIGRAINOSUS, NOT INTRACTABLE: Primary | ICD-10-CM

## 2024-03-13 PROCEDURE — 99214 OFFICE O/P EST MOD 30 MIN: CPT | Performed by: NURSE PRACTITIONER

## 2024-03-13 NOTE — PATIENT INSTRUCTIONS
Continue with Verapamil 180mg twice a day for now  D/W Cardiology tapering down the dosing for BP management  Follow up with Neurology office in 1yr or sooner if needed.

## 2024-03-13 NOTE — PROGRESS NOTES
Patient ID: Evelin Thurston is a 73 y.o. female.    Assessment/Plan:       Diagnoses and all orders for this visit:    Chronic migraine without aura without status migrainosus, not intractable       Continue with Verapamil 180mg twice a day for now  D/W Cardiology tapering down the dosing for BP management  Follow up with Neurology office in 1yr or sooner if needed.    Subjective/HPI:  Evelin Thurston is a 74yo female who follows with the outpatient neurology office for medical management of her migraines.  Patient has had migraines for approximately 26 years, she has been on verapamil 180 mg twice a day for this, this was an adjustment made by her cardiologist for her blood pressure issues.  Patient's typical migraine is temporal radiating to the occipital region of her head causing nausea and photosensitivity.  She has been relatively migraine free for an extended period of time.  She reports very few migraines and notes a decrease in intensity with her medication regimen.  Patient to get a migraine if she skips meals, she has been triggered by fuentes, cheese and chocolate.  She avoids these triggers and feels as though her blood pressure and migraine have been better on the higher dose of verapamil.  Last office appointment patient reported significant reduction in the number of migraines that she has had.  She will try to wait it out and avoid taking any pain medication for these.  Last office appointment patient did report some left lower extremity pain as well, feels as though this was related to side-lying sleeping that improves when she changes her position.  Patient was encouraged to do some stretching exercise and alter positions during her sleep to avoid further back pain.  Patient was to discuss this over time with her PCP.      Patient reports back to neurology office today, she reports having minimal migraine activity over the past year.  Patient states that she has had approximately 3 migraines over the past  year and they were mild in nature.  She does not take anything over-the-counter, she will generally like that worked its way out on its own.  Patient states since being on the increased dosing of verapamil, she has had good relief with her migraine activity.    She is also thinking about trying to taper this down, this was increased per her cardiologist therefore recommend follow-up with cardiology regarding its use for her blood pressure.  Patient states she has an appointment with them tomorrow, she has also been having some increased shortness of breath which she plans on discussing with him at that time.  Patient currently denies headache, dizziness, lightheadedness, visual changes, speech or swallowing changes, paresthesias, weaknesses, imbalances or falls.  She is currently happy with her medication regimen regarding migraine, she will discuss with cardiology possibly tapering the dosing down.    If approved by cardiology, patient is aware neurology would be willing to assist in tapering.  Patient will reach out to the neurology office if needed, otherwise will have conversation with cardiologist tomorrow.  Patient will follow-up in the outpatient neurology office in 1 year or sooner, she can contact the neurology office if needed.      Pt reports back today and noted that she has not had many migraine recently and noted about 3 migraine over the past year. She noted that she does not need medication when they come on and that they are relatively mild.    Pt has denies dizziness, lightheadedness, vision changes,  speech or swallowing changes, paresthesia, weakness, imbalances or falls.   She denies having any issues for Neurology office today. She does get SOB at times and has an appointment with the cardiologist tomorrow and will be discussing this with them at that time.      The following portions of the patient's history were reviewed and updated as appropriate: allergies, current medications, past family  history, past medical history, past social history, past surgical history, and problem list.      Past Medical History:   Diagnosis Date    Arthritis     Chronic kidney disease     stage 3    Hyperlipidemia     borderline    Hypertension     borderline    Migraine     Migraine     controlled with verapamil    Osteopenia     Snores        Past Surgical History:   Procedure Laterality Date    BREAST BIOPSY Right     benign ~ 1990s    BUNIONECTOMY Left     3 hammertoes, bunion    CATARACT EXTRACTION Bilateral 2012    COLONOSCOPY      PARATHYROID GLAND SURGERY Right     front gland removed       Social History     Socioeconomic History    Marital status: /Civil Union     Spouse name: None    Number of children: None    Years of education: None    Highest education level: None   Occupational History    None   Tobacco Use    Smoking status: Never    Smokeless tobacco: Never   Vaping Use    Vaping status: Never Used   Substance and Sexual Activity    Alcohol use: No    Drug use: No    Sexual activity: None   Other Topics Concern    None   Social History Narrative    None     Social Determinants of Health     Financial Resource Strain: Low Risk  (3/20/2023)    Overall Financial Resource Strain (CARDIA)     Difficulty of Paying Living Expenses: Not hard at all   Food Insecurity: Not on file   Transportation Needs: No Transportation Needs (3/20/2023)    PRAPARE - Transportation     Lack of Transportation (Medical): No     Lack of Transportation (Non-Medical): No   Physical Activity: Not on file   Stress: Not on file   Social Connections: Not on file   Intimate Partner Violence: Not on file   Housing Stability: Not on file       Family History   Problem Relation Age of Onset    Angina Mother     Heart failure Mother     Prostate cancer Father     Cancer Father         prostate    Diabetes Sister     Sudden death Sister     Osteoporosis Sister     Heart disease Sister         MI    COPD Sister     Kidney disease Sister      Heart disease Sister         CHF    Hypertension Daughter     Hypertension Brother     Cancer Brother         lung    Diabetes Brother     Cancer Brother         prostate    No Known Problems Son     Lung cancer Maternal Uncle     Breast cancer Cousin 50    Prostate cancer Other 51         Current Outpatient Medications:     atorvastatin (LIPITOR) 20 mg tablet, TAKE 1 TABLET BY MOUTH EVERY DAY, Disp: 90 tablet, Rfl: 3    chlorthalidone 25 mg tablet, TAKE 1 TABLET BY MOUTH 4 DAYS A WEEK MON, WED, FRI, SUNDAY, Disp: 52 tablet, Rfl: 3    Cholecalciferol (VITAMIN D3) 5000 units CAPS, Take 1 capsule by mouth Daily, Disp: , Rfl:     Coenzyme Q10 (Co Q 10) 100 MG CAPS, Take by mouth every morning, Disp: , Rfl:     magnesium oxide (MAG-OX) 400 mg tablet, Take 1 tablet by mouth daily With zinc , Disp: , Rfl:     multivitamin (THERAGRAN) TABS, Take 1 tablet by mouth daily, Disp: , Rfl:     spironolactone (ALDACTONE) 25 mg tablet, TAKE 1 TABLET BY MOUTH EVERY DAY IN THE MORNING, Disp: 90 tablet, Rfl: 3    verapamil (CALAN-SR) 180 mg CR tablet, TAKE 1 TABLET (180 MG TOTAL) BY MOUTH 2 (TWO) TIMES A DAY, Disp: 180 tablet, Rfl: 1    Zinc Sulfate (ZINC 15 PO), Take by mouth daily With copper 1mg, Disp: , Rfl:     Ascorbic Acid (vitamin C) 1000 MG tablet, Take 1,000 mg by mouth daily (Patient not taking: Reported on 3/13/2024), Disp: , Rfl:     B Complex Vitamins (B COMPLEX PO), Take 1 tablet by mouth daily (Patient not taking: Reported on 12/29/2023), Disp: , Rfl:     Black Pepper-Turmeric (TURMERIC COMPLEX/BLACK PEPPER PO), Take by mouth daily 600 mg turmeric, 5 mg black pepper (Patient not taking: Reported on 3/13/2024), Disp: , Rfl:     calcium carbonate (OS-FLORIAN) 600 MG tablet, Take one tablet daily (Patient not taking: Reported on 3/13/2024), Disp: , Rfl:     fluocinolone (SYNALAR) 0.01 % external solution, , Disp: , Rfl:     Omega 3 1200 MG CAPS, Take by mouth every morning (Patient not taking: Reported on 12/29/2023),  "Disp: , Rfl:     Probiotic Product (PROBIOTIC-10 PO), Take 1 capsule by mouth daily (Patient not taking: Reported on 3/13/2024), Disp: , Rfl:     No Known Allergies     Blood pressure 120/70, pulse 86, height 5' 10\" (1.778 m), weight 82.1 kg (181 lb), not currently breastfeeding.               Objective:    Blood pressure 120/70, pulse 86, height 5' 10\" (1.778 m), weight 82.1 kg (181 lb), not currently breastfeeding.    Physical Exam  Vitals reviewed.   Constitutional:       Appearance: Normal appearance. She is well-developed.   HENT:      Head: Normocephalic.      Nose: Nose normal.      Mouth/Throat:      Mouth: Mucous membranes are moist.   Eyes:      General: Lids are normal.      Extraocular Movements: Extraocular movements intact.      Pupils: Pupils are equal, round, and reactive to light.   Cardiovascular:      Rate and Rhythm: Normal rate.   Pulmonary:      Effort: Pulmonary effort is normal.   Abdominal:      Palpations: Abdomen is soft.   Musculoskeletal:      Cervical back: Normal range of motion.   Skin:     General: Skin is warm and dry.   Neurological:      Mental Status: She is alert.      Motor: Motor strength is normal.     Coordination: Romberg sign negative.      Deep Tendon Reflexes: Reflexes are normal and symmetric.   Psychiatric:         Attention and Perception: Attention and perception normal.         Mood and Affect: Mood and affect normal.         Speech: Speech normal.         Behavior: Behavior normal. Behavior is cooperative.         Thought Content: Thought content normal.         Cognition and Memory: Cognition and memory normal.         Judgment: Judgment normal.         Neurological Exam  Mental Status  Alert. Oriented to person, place, time and situation. Memory is normal. Recent and remote memory are intact. Speech is normal. Language is fluent with no aphasia. Attention and concentration are normal. Fund of knowledge is appropriate for level of education.    Cranial Nerves  CN " II: Visual acuity is normal. Visual fields full to confrontation.  CN III, IV, VI: Extraocular movements intact bilaterally. Normal lids and orbits bilaterally. Pupils equal round and reactive to light bilaterally.  CN V: Facial sensation is normal.  CN VII: Full and symmetric facial movement.  CN VIII: Hearing is normal.  CN IX, X: Palate elevates symmetrically. Normal gag reflex.  CN XI: Shoulder shrug strength is normal.  CN XII: Tongue midline without atrophy or fasciculations.    Motor  Normal muscle bulk throughout. Normal muscle tone. No abnormal involuntary movements. Strength is 5/5 throughout all four extremities.    Sensory  Light touch is normal in upper and lower extremities. Temperature is normal in upper and lower extremities. Vibration is normal in upper and lower extremities. Proprioception is normal in upper and lower extremities.     Reflexes  Deep tendon reflexes are 2+ and symmetric in all four extremities.    Right pathological reflexes: Donna's absent.  Left pathological reflexes: Donna's absent.    Coordination  Right: Finger-to-nose normal. Rapid alternating movement normal. Heel-to-shin normal.Left: Finger-to-nose normal. Rapid alternating movement normal. Heel-to-shin normal.    Gait  Casual gait is normal including stance, stride, and arm swing.Normal toe walking. Normal heel walking. Normal tandem gait. Romberg is absent. Able to rise from chair without using arms.        ROS:    Review of Systems   Constitutional:  Negative for appetite change, fatigue and fever.   HENT: Negative.  Negative for hearing loss, tinnitus, trouble swallowing and voice change.    Eyes: Negative.  Negative for photophobia, pain and visual disturbance.   Respiratory: Negative.  Negative for shortness of breath.    Cardiovascular: Negative.  Negative for palpitations.   Gastrointestinal: Negative.  Negative for nausea and vomiting.   Endocrine: Negative.  Negative for cold intolerance.   Genitourinary:  Negative.  Negative for dysuria, frequency and urgency.   Musculoskeletal:  Negative for back pain, gait problem, myalgias, neck pain and neck stiffness.   Skin: Negative.  Negative for rash.   Allergic/Immunologic: Negative.    Neurological: Negative.  Negative for dizziness, tremors, seizures, syncope, facial asymmetry, speech difficulty, weakness, light-headedness, numbness and headaches.   Hematological: Negative.  Does not bruise/bleed easily.   Psychiatric/Behavioral: Negative.  Negative for confusion, hallucinations and sleep disturbance.        ROS reviewed and discussed with the patient.

## 2024-03-14 ENCOUNTER — OFFICE VISIT (OUTPATIENT)
Dept: CARDIOLOGY CLINIC | Facility: CLINIC | Age: 74
End: 2024-03-14
Payer: MEDICARE

## 2024-03-14 VITALS
OXYGEN SATURATION: 96 % | SYSTOLIC BLOOD PRESSURE: 122 MMHG | WEIGHT: 181 LBS | BODY MASS INDEX: 25.91 KG/M2 | DIASTOLIC BLOOD PRESSURE: 70 MMHG | HEIGHT: 70 IN | HEART RATE: 62 BPM

## 2024-03-14 DIAGNOSIS — I10 UNCONTROLLED HYPERTENSION: ICD-10-CM

## 2024-03-14 DIAGNOSIS — R06.02 EXERTIONAL SHORTNESS OF BREATH: ICD-10-CM

## 2024-03-14 DIAGNOSIS — N18.31 STAGE 3A CHRONIC KIDNEY DISEASE (HCC): ICD-10-CM

## 2024-03-14 DIAGNOSIS — R00.2 INTERMITTENT PALPITATIONS: Primary | ICD-10-CM

## 2024-03-14 PROCEDURE — 99203 OFFICE O/P NEW LOW 30 MIN: CPT | Performed by: INTERNAL MEDICINE

## 2024-03-14 PROCEDURE — 93000 ELECTROCARDIOGRAM COMPLETE: CPT | Performed by: INTERNAL MEDICINE

## 2024-03-14 NOTE — PROGRESS NOTES
[facility]  [address] [place] [address] [id number]   [place] [address]  Cardiology Consultation    Evelin Thurston  [id number]  1950      Consult for: Exertional dyspnea  Appreciate consult by: Katiana Rodriguez DO    1. Intermittent palpitations  POCT ECG    Stress test only, exercise    Echo complete w/ contrast if indicated      2. Uncontrolled hypertension  POCT ECG    Stress test only, exercise    Echo complete w/ contrast if indicated      3. Exertional shortness of breath  Stress test only, exercise    Echo complete w/ contrast if indicated      4. Stage 3a chronic kidney disease (HCC)  Echo complete w/ contrast if indicated         Discussion/Summary:   Progressive exertional dyspnea-she has a longstanding history of hypertension.  We will rule her out for diastolic heart dysfunction.  Plan for exercise treadmill stress test to evaluate blood pressure with exertion.  We will also obtain an echo 2D to evaluate her filling pressures.  We may consider switching her to an SGLT2 inhibitor.  She is currently with well-controlled blood pressures on spironolactone and chlorthalidone.  We will also rule out ischemia by exercise stress test    Hyperlipidemia well-controlled with the last LDL of 49    History of hypercalcemia status post parathyroidectomy    Osteopenia on DEXA scan    HPI:   73-year-old with a history of hypertension presents for initial consultation.  She reports having some exertional dyspnea at times.  She has noted it has been progressive.  She is now short of breath walking less than a mile.  She reports trying to cut back on sodium.  She is compliant with her antihypertensive medications.  She denies having any prior history of any irregular heart rhythms.  She denies having chest heaviness.  She denies having any falls.    Past Medical History:   Diagnosis Date    Arthritis     Chronic kidney disease     stage 3    Hyperlipidemia     borderline    Hypertension      borderline    Migraine     Migraine     controlled with verapamil    Osteopenia     Snores      Social History     Socioeconomic History    Marital status: /Civil Union     Spouse name: Not on file    Number of children: Not on file    Years of education: Not on file    Highest education level: Not on file   Occupational History    Not on file   Tobacco Use    Smoking status: Never    Smokeless tobacco: Never   Vaping Use    Vaping status: Never Used   Substance and Sexual Activity    Alcohol use: No    Drug use: No    Sexual activity: Not on file   Other Topics Concern    Not on file   Social History Narrative    Not on file     Social Determinants of Health     Financial Resource Strain: Low Risk  (3/20/2023)    Overall Financial Resource Strain (CARDIA)     Difficulty of Paying Living Expenses: Not hard at all   Food Insecurity: Not on file   Transportation Needs: No Transportation Needs (3/20/2023)    PRAPARE - Transportation     Lack of Transportation (Medical): No     Lack of Transportation (Non-Medical): No   Physical Activity: Not on file   Stress: Not on file   Social Connections: Not on file   Intimate Partner Violence: Not on file   Housing Stability: Not on file      Family History   Problem Relation Age of Onset    Angina Mother     Heart failure Mother     Prostate cancer Father     Cancer Father         prostate    Diabetes Sister     Sudden death Sister     Osteoporosis Sister     Heart disease Sister         MI    COPD Sister     Kidney disease Sister     Heart disease Sister         CHF    Hypertension Daughter     Hypertension Brother     Cancer Brother         lung    Diabetes Brother     Cancer Brother         prostate    No Known Problems Son     Lung cancer Maternal Uncle     Breast cancer Cousin 50    Prostate cancer Other 51     Past Surgical History:   Procedure Laterality Date    BREAST BIOPSY Right     benign ~ 1990s    BUNIONECTOMY Left     3 hammertoes, bunion    CATARACT  EXTRACTION Bilateral 2012    COLONOSCOPY      PARATHYROID GLAND SURGERY Right     front gland removed       Current Outpatient Medications:     atorvastatin (LIPITOR) 20 mg tablet, TAKE 1 TABLET BY MOUTH EVERY DAY, Disp: 90 tablet, Rfl: 3    chlorthalidone 25 mg tablet, TAKE 1 TABLET BY MOUTH 4 DAYS A WEEK MON, WED, FRI, SUNDAY, Disp: 52 tablet, Rfl: 3    Cholecalciferol (VITAMIN D3) 5000 units CAPS, Take 1 capsule by mouth Daily, Disp: , Rfl:     Coenzyme Q10 (Co Q 10) 100 MG CAPS, Take by mouth every morning, Disp: , Rfl:     magnesium oxide (MAG-OX) 400 mg tablet, Take 1 tablet by mouth daily With zinc , Disp: , Rfl:     multivitamin (THERAGRAN) TABS, Take 1 tablet by mouth daily, Disp: , Rfl:     spironolactone (ALDACTONE) 25 mg tablet, TAKE 1 TABLET BY MOUTH EVERY DAY IN THE MORNING, Disp: 90 tablet, Rfl: 3    verapamil (CALAN-SR) 180 mg CR tablet, TAKE 1 TABLET (180 MG TOTAL) BY MOUTH 2 (TWO) TIMES A DAY, Disp: 180 tablet, Rfl: 1    Zinc Sulfate (ZINC 15 PO), Take by mouth daily With copper 1mg, Disp: , Rfl:     Ascorbic Acid (vitamin C) 1000 MG tablet, Take 1,000 mg by mouth daily (Patient not taking: Reported on 3/13/2024), Disp: , Rfl:     B Complex Vitamins (B COMPLEX PO), Take 1 tablet by mouth daily (Patient not taking: Reported on 12/29/2023), Disp: , Rfl:     Black Pepper-Turmeric (TURMERIC COMPLEX/BLACK PEPPER PO), Take by mouth daily 600 mg turmeric, 5 mg black pepper (Patient not taking: Reported on 3/13/2024), Disp: , Rfl:     calcium carbonate (OS-FLORIAN) 600 MG tablet, Take one tablet daily (Patient not taking: Reported on 3/13/2024), Disp: , Rfl:     fluocinolone (SYNALAR) 0.01 % external solution, , Disp: , Rfl:     Omega 3 1200 MG CAPS, Take by mouth every morning (Patient not taking: Reported on 12/29/2023), Disp: , Rfl:     Probiotic Product (PROBIOTIC-10 PO), Take 1 capsule by mouth daily (Patient not taking: Reported on 3/13/2024), Disp: , Rfl:   No Known Allergies  Vitals:    03/14/24 1410  "  BP: 122/70   BP Location: Left arm   Patient Position: Sitting   Cuff Size: Standard   Pulse: 62   SpO2: 96%   Weight: 82.1 kg (181 lb)   Height: 5' 10\" (1.778 m)       Review of Systems:   Review of Systems   Constitutional:  Positive for fatigue. Negative for activity change, appetite change, chills, diaphoresis, fever and unexpected weight change.   HENT: Negative.  Negative for congestion, dental problem, drooling, ear discharge, ear pain, facial swelling, hearing loss, mouth sores, nosebleeds, postnasal drip, rhinorrhea, sinus pressure, sinus pain, sneezing, sore throat, tinnitus, trouble swallowing and voice change.    Eyes: Negative.  Negative for photophobia, pain, redness, itching and visual disturbance.   Respiratory:  Positive for shortness of breath. Negative for apnea, cough, choking, chest tightness, wheezing and stridor.    Cardiovascular: Negative.  Negative for chest pain, palpitations and leg swelling.   Gastrointestinal: Negative.  Negative for abdominal distention, abdominal pain, anal bleeding, blood in stool, constipation, diarrhea, nausea, rectal pain and vomiting.   Endocrine: Negative.  Negative for cold intolerance, heat intolerance, polydipsia, polyphagia and polyuria.   Genitourinary: Negative.  Negative for decreased urine volume, difficulty urinating, dyspareunia, dysuria, enuresis, flank pain, frequency, genital sores, hematuria, menstrual problem, pelvic pain, urgency, vaginal bleeding, vaginal discharge and vaginal pain.   Musculoskeletal: Negative.  Negative for arthralgias, back pain, gait problem, joint swelling, myalgias, neck pain and neck stiffness.   Skin: Negative.  Negative for color change, pallor, rash and wound.   Allergic/Immunologic: Negative.  Negative for environmental allergies, food allergies and immunocompromised state.   Neurological: Negative.  Negative for dizziness, tremors, seizures, syncope, facial asymmetry, speech difficulty, weakness, light-headedness, " "numbness and headaches.   Hematological: Negative.  Negative for adenopathy. Does not bruise/bleed easily.   Psychiatric/Behavioral: Negative.  Negative for agitation, behavioral problems, confusion, decreased concentration, dysphoric mood, hallucinations, self-injury, sleep disturbance and suicidal ideas. The patient is not nervous/anxious and is not hyperactive.    All other systems reviewed and are negative.      Vitals:    03/14/24 1410   BP: 122/70   BP Location: Left arm   Patient Position: Sitting   Cuff Size: Standard   Pulse: 62   SpO2: 96%   Weight: 82.1 kg (181 lb)   Height: 5' 10\" (1.778 m)     Physical Examination:   Physical Exam  Constitutional:       General: She is not in acute distress.     Appearance: She is well-developed. She is not diaphoretic.   HENT:      Head: Normocephalic and atraumatic.      Right Ear: External ear normal.      Left Ear: External ear normal.   Eyes:      General: No scleral icterus.        Right eye: No discharge.         Left eye: No discharge.      Conjunctiva/sclera: Conjunctivae normal.      Pupils: Pupils are equal, round, and reactive to light.   Neck:      Thyroid: No thyromegaly.      Vascular: No JVD.      Trachea: No tracheal deviation.   Cardiovascular:      Rate and Rhythm: Normal rate and regular rhythm.      Heart sounds: No murmur heard.     No friction rub. Gallop present.   Pulmonary:      Effort: Pulmonary effort is normal. No respiratory distress.      Breath sounds: Normal breath sounds. No stridor. No wheezing or rales.   Chest:      Chest wall: No tenderness.   Abdominal:      General: Bowel sounds are normal. There is no distension.      Palpations: Abdomen is soft. There is no mass.      Tenderness: There is no abdominal tenderness. There is no guarding or rebound.   Musculoskeletal:         General: No tenderness or deformity. Normal range of motion.      Cervical back: Normal range of motion and neck supple.   Skin:     General: Skin is warm and " "dry.      Coloration: Skin is not pale.      Findings: No erythema or rash.   Neurological:      Mental Status: She is alert and oriented to person, place, and time.      Cranial Nerves: No cranial nerve deficit.      Motor: No abnormal muscle tone.      Coordination: Coordination normal.      Deep Tendon Reflexes: Reflexes are normal and symmetric. Reflexes normal.   Psychiatric:         Behavior: Behavior normal.         Thought Content: Thought content normal.         Judgment: Judgment normal.         Labs:     Lab Results   Component Value Date    WBC 6.67 03/11/2024    HGB 14.0 03/11/2024    HCT 42.7 03/11/2024    MCV 83 03/11/2024    RDW 13.4 03/11/2024     03/11/2024     BMP:  Lab Results   Component Value Date    SODIUM 138 03/11/2024    K 4.1 03/11/2024     03/11/2024    CO2 29 03/11/2024    ANIONGAP 11.7 10/09/2015    BUN 19 03/11/2024    CREATININE 0.88 03/11/2024    GLUC 91 10/23/2019    GLUF 90 03/11/2024    CALCIUM 9.8 03/11/2024    EGFR 64 03/11/2024    MG 2.1 06/30/2019     LFT:  Lab Results   Component Value Date    AST 18 03/11/2024    ALT 19 03/11/2024    ALKPHOS 43 03/11/2024    TP 6.9 03/11/2024    ALB 4.4 03/11/2024      Lab Results   Component Value Date    BSB1DGVNNHLF 3.532 11/16/2023     No results found for: \"HGBA1C\"  Lipid Profile:   Lab Results   Component Value Date    CHOLESTEROL 118 03/11/2024    HDL 56 03/11/2024    LDLCALC 49 03/11/2024    TRIG 66 03/11/2024     Lab Results   Component Value Date    CHOLESTEROL 118 03/11/2024    CHOLESTEROL 116 08/08/2023     Lab Results   Component Value Date    TROPONINI <0.02 06/30/2019     No results found for: \"NTBNP\"   Recent Results (from the past 672 hour(s))   CBC    Collection Time: 03/11/24  8:38 AM   Result Value Ref Range    WBC 6.67 4.31 - 10.16 Thousand/uL    RBC 5.15 (H) 3.81 - 5.12 Million/uL    Hemoglobin 14.0 11.5 - 15.4 g/dL    Hematocrit 42.7 34.8 - 46.1 %    MCV 83 82 - 98 fL    MCH 27.2 26.8 - 34.3 pg    MCHC " "32.8 31.4 - 37.4 g/dL    RDW 13.4 11.6 - 15.1 %    Platelets 235 149 - 390 Thousands/uL    MPV 10.8 8.9 - 12.7 fL   Comprehensive metabolic panel    Collection Time: 03/11/24  8:38 AM   Result Value Ref Range    Sodium 138 135 - 147 mmol/L    Potassium 4.1 3.5 - 5.3 mmol/L    Chloride 102 96 - 108 mmol/L    CO2 29 21 - 32 mmol/L    ANION GAP 7 mmol/L    BUN 19 5 - 25 mg/dL    Creatinine 0.88 0.60 - 1.30 mg/dL    Glucose, Fasting 90 65 - 99 mg/dL    Calcium 9.8 8.4 - 10.2 mg/dL    AST 18 13 - 39 U/L    ALT 19 7 - 52 U/L    Alkaline Phosphatase 43 34 - 104 U/L    Total Protein 6.9 6.4 - 8.4 g/dL    Albumin 4.4 3.5 - 5.0 g/dL    Total Bilirubin 0.47 0.20 - 1.00 mg/dL    eGFR 64 ml/min/1.73sq m   Lipid Panel with Direct LDL reflex    Collection Time: 03/11/24  8:38 AM   Result Value Ref Range    Cholesterol 118 See Comment mg/dL    Triglycerides 66 See Comment mg/dL    HDL, Direct 56 >=50 mg/dL    LDL Calculated 49 0 - 100 mg/dL   PTH, intact    Collection Time: 03/11/24  8:38 AM   Result Value Ref Range    PTH 49.9 12.0 - 88.0 pg/mL       Imaging & Testing   I have personally reviewed pertinent reports.      Cardiac Testing       EKG: Personally reviewed.          Frankie Gary MD [facility]  [phone]  Please call with any questions or suggestions    Counseling :  A description of the counseling:   Goals and Barriers:  Patient's ability to self care:  Medication side effect reviewed with patient in detail and all their questions answered.    \"Portions of the record may have been created with voice recognition software. Occasional wrong word or \"sound a like\" substitutions may have occurred due to the inherent limitations of voice recognition software. Read the chart carefully and recognize, using context, where substitutions have occurred. Please call if you have any questions. \"    "

## 2024-03-21 ENCOUNTER — RA CDI HCC (OUTPATIENT)
Dept: OTHER | Facility: HOSPITAL | Age: 74
End: 2024-03-21

## 2024-03-25 ENCOUNTER — OFFICE VISIT (OUTPATIENT)
Dept: AUDIOLOGY | Facility: CLINIC | Age: 74
End: 2024-03-25
Payer: MEDICARE

## 2024-03-25 ENCOUNTER — OFFICE VISIT (OUTPATIENT)
Dept: FAMILY MEDICINE CLINIC | Facility: CLINIC | Age: 74
End: 2024-03-25
Payer: MEDICARE

## 2024-03-25 VITALS
BODY MASS INDEX: 26.2 KG/M2 | HEART RATE: 62 BPM | WEIGHT: 183 LBS | TEMPERATURE: 97.3 F | RESPIRATION RATE: 16 BRPM | SYSTOLIC BLOOD PRESSURE: 136 MMHG | HEIGHT: 70 IN | DIASTOLIC BLOOD PRESSURE: 68 MMHG

## 2024-03-25 DIAGNOSIS — Z00.00 MEDICARE ANNUAL WELLNESS VISIT, SUBSEQUENT: Primary | ICD-10-CM

## 2024-03-25 DIAGNOSIS — H90.3 SENSORINEURAL HEARING LOSS (SNHL), BILATERAL: Primary | ICD-10-CM

## 2024-03-25 DIAGNOSIS — R06.6 HICCUPS: ICD-10-CM

## 2024-03-25 DIAGNOSIS — E78.5 HYPERLIPIDEMIA, UNSPECIFIED HYPERLIPIDEMIA TYPE: ICD-10-CM

## 2024-03-25 DIAGNOSIS — R32 URINARY INCONTINENCE, UNSPECIFIED TYPE: ICD-10-CM

## 2024-03-25 DIAGNOSIS — E78.5 DYSLIPIDEMIA: ICD-10-CM

## 2024-03-25 DIAGNOSIS — N18.31 STAGE 3A CHRONIC KIDNEY DISEASE (HCC): ICD-10-CM

## 2024-03-25 DIAGNOSIS — E21.3 HYPERPARATHYROIDISM (HCC): ICD-10-CM

## 2024-03-25 DIAGNOSIS — R06.83 SNORING: ICD-10-CM

## 2024-03-25 DIAGNOSIS — I10 ESSENTIAL HYPERTENSION: ICD-10-CM

## 2024-03-25 PROCEDURE — G0439 PPPS, SUBSEQ VISIT: HCPCS | Performed by: FAMILY MEDICINE

## 2024-03-25 PROCEDURE — 99214 OFFICE O/P EST MOD 30 MIN: CPT | Performed by: FAMILY MEDICINE

## 2024-03-25 NOTE — ASSESSMENT & PLAN NOTE
Lab Results   Component Value Date    EGFR 64 03/11/2024    EGFR 68 11/16/2023    EGFR 58 08/08/2023    CREATININE 0.88 03/11/2024    CREATININE 0.84 11/16/2023    CREATININE 0.95 08/08/2023     Improved

## 2024-03-25 NOTE — PATIENT INSTRUCTIONS
Medicare Preventive Visit Patient Instructions  Thank you for completing your Welcome to Medicare Visit or Medicare Annual Wellness Visit today. Your next wellness visit will be due in one year (3/26/2025).  The screening/preventive services that you may require over the next 5-10 years are detailed below. Some tests may not apply to you based off risk factors and/or age. Screening tests ordered at today's visit but not completed yet may show as past due. Also, please note that scanned in results may not display below.  Preventive Screenings:  Service Recommendations Previous Testing/Comments   Colorectal Cancer Screening  * Colonoscopy    * Fecal Occult Blood Test (FOBT)/Fecal Immunochemical Test (FIT)  * Fecal DNA/Cologuard Test  * Flexible Sigmoidoscopy Age: 45-75 years old   Colonoscopy: every 10 years (may be performed more frequently if at higher risk)  OR  FOBT/FIT: every 1 year  OR  Cologuard: every 3 years  OR  Sigmoidoscopy: every 5 years  Screening may be recommended earlier than age 45 if at higher risk for colorectal cancer. Also, an individualized decision between you and your healthcare provider will decide whether screening between the ages of 76-85 would be appropriate. Colonoscopy: 05/13/2021  FOBT/FIT: Not on file  Cologuard: Not on file  Sigmoidoscopy: Not on file    Screening Current     Breast Cancer Screening Age: 40+ years old  Frequency: every 1-2 years  Not required if history of left and right mastectomy Mammogram: 11/14/2023    Screening Current   Cervical Cancer Screening Between the ages of 21-29, pap smear recommended once every 3 years.   Between the ages of 30-65, can perform pap smear with HPV co-testing every 5 years.   Recommendations may differ for women with a history of total hysterectomy, cervical cancer, or abnormal pap smears in past. Pap Smear: 12/29/2023    Screening Not Indicated   Hepatitis C Screening Once for adults born between 1945 and 1965  More frequently in  patients at high risk for Hepatitis C Hep C Antibody: 01/17/2019    Screening Current   Diabetes Screening 1-2 times per year if you're at risk for diabetes or have pre-diabetes Fasting glucose: 90 mg/dL (3/11/2024)  A1C: No results in last 5 years (No results in last 5 years)  Screening Current   Cholesterol Screening Once every 5 years if you don't have a lipid disorder. May order more often based on risk factors. Lipid panel: 03/11/2024    Screening Not Indicated  History Lipid Disorder     Other Preventive Screenings Covered by Medicare:  Abdominal Aortic Aneurysm (AAA) Screening: covered once if your at risk. You're considered to be at risk if you have a family history of AAA.  Lung Cancer Screening: covers low dose CT scan once per year if you meet all of the following conditions: (1) Age 55-77; (2) No signs or symptoms of lung cancer; (3) Current smoker or have quit smoking within the last 15 years; (4) You have a tobacco smoking history of at least 20 pack years (packs per day multiplied by number of years you smoked); (5) You get a written order from a healthcare provider.  Glaucoma Screening: covered annually if you're considered high risk: (1) You have diabetes OR (2) Family history of glaucoma OR (3)  aged 50 and older OR (4)  American aged 65 and older  Osteoporosis Screening: covered every 2 years if you meet one of the following conditions: (1) You're estrogen deficient and at risk for osteoporosis based off medical history and other findings; (2) Have a vertebral abnormality; (3) On glucocorticoid therapy for more than 3 months; (4) Have primary hyperparathyroidism; (5) On osteoporosis medications and need to assess response to drug therapy.   Last bone density test (DXA Scan): 10/17/2022.  HIV Screening: covered annually if you're between the age of 15-65. Also covered annually if you are younger than 15 and older than 65 with risk factors for HIV infection. For pregnant  patients, it is covered up to 3 times per pregnancy.    Immunizations:  Immunization Recommendations   Influenza Vaccine Annual influenza vaccination during flu season is recommended for all persons aged >= 6 months who do not have contraindications   Pneumococcal Vaccine   * Pneumococcal conjugate vaccine = PCV13 (Prevnar 13), PCV15 (Vaxneuvance), PCV20 (Prevnar 20)  * Pneumococcal polysaccharide vaccine = PPSV23 (Pneumovax) Adults 19-65 yo with certain risk factors or if 65+ yo  If never received any pneumonia vaccine: recommend Prevnar 20 (PCV20)  Give PCV20 if previously received 1 dose of PCV13 or PPSV23   Hepatitis B Vaccine 3 dose series if at intermediate or high risk (ex: diabetes, end stage renal disease, liver disease)   Respiratory syncytial virus (RSV) Vaccine - COVERED BY MEDICARE PART D  * RSVPreF3 (Arexvy) CDC recommends that adults 60 years of age and older may receive a single dose of RSV vaccine using shared clinical decision-making (SCDM)   Tetanus (Td) Vaccine - COST NOT COVERED BY MEDICARE PART B Following completion of primary series, a booster dose should be given every 10 years to maintain immunity against tetanus. Td may also be given as tetanus wound prophylaxis.   Tdap Vaccine - COST NOT COVERED BY MEDICARE PART B Recommended at least once for all adults. For pregnant patients, recommended with each pregnancy.   Shingles Vaccine (Shingrix) - COST NOT COVERED BY MEDICARE PART B  2 shot series recommended in those 19 years and older who have or will have weakened immune systems or those 50 years and older     Health Maintenance Due:      Topic Date Due   • DXA SCAN  10/17/2024   • Breast Cancer Screening: Mammogram  11/14/2024   • Colorectal Cancer Screening  05/13/2028   • Hepatitis C Screening  Completed     Immunizations Due:      Topic Date Due   • COVID-19 Vaccine (3 - 2023-24 season) 09/01/2023     Advance Directives   What are advance directives?  Advance directives are legal  documents that state your wishes and plans for medical care. These plans are made ahead of time in case you lose your ability to make decisions for yourself. Advance directives can apply to any medical decision, such as the treatments you want, and if you want to donate organs.   What are the types of advance directives?  There are many types of advance directives, and each state has rules about how to use them. You may choose a combination of any of the following:  Living will:  This is a written record of the treatment you want. You can also choose which treatments you do not want, which to limit, and which to stop at a certain time. This includes surgery, medicine, IV fluid, and tube feedings.   Durable power of  for healthcare (DPAHC):  This is a written record that states who you want to make healthcare choices for you when you are unable to make them for yourself. This person, called a proxy, is usually a family member or a friend. You may choose more than 1 proxy.  Do not resuscitate (DNR) order:  A DNR order is used in case your heart stops beating or you stop breathing. It is a request not to have certain forms of treatment, such as CPR. A DNR order may be included in other types of advance directives.  Medical directive:  This covers the care that you want if you are in a coma, near death, or unable to make decisions for yourself. You can list the treatments you want for each condition. Treatment may include pain medicine, surgery, blood transfusions, dialysis, IV or tube feedings, and a ventilator (breathing machine).  Values history:  This document has questions about your views, beliefs, and how you feel and think about life. This information can help others choose the care that you would choose.  Why are advance directives important?  An advance directive helps you control your care. Although spoken wishes may be used, it is better to have your wishes written down. Spoken wishes can be  misunderstood, or not followed. Treatments may be given even if you do not want them. An advance directive may make it easier for your family to make difficult choices about your care.   Urinary Incontinence   Urinary incontinence (UI)  is when you lose control of your bladder. UI develops because your bladder cannot store or empty urine properly. The 3 most common types of UI are stress incontinence, urge incontinence, or both.  Medicines:   May be given to help strengthen your bladder control. Report any side effects of medication to your healthcare provider.  Do pelvic muscle exercises often:  Your pelvic muscles help you stop urinating. Squeeze these muscles tight for 5 seconds, then relax for 5 seconds. Gradually work up to squeezing for 10 seconds. Do 3 sets of 15 repetitions a day, or as directed. This will help strengthen your pelvic muscles and improve bladder control.  Train your bladder:  Go to the bathroom at set times, such as every 2 hours, even if you do not feel the urge to go. You can also try to hold your urine when you feel the urge to go. For example, hold your urine for 5 minutes when you feel the urge to go. As that becomes easier, hold your urine for 10 minutes.   Self-care:   Keep a UI record.  Write down how often you leak urine and how much you leak. Make a note of what you were doing when you leaked urine.  Drink liquids as directed. You may need to limit the amount of liquid you drink to help control your urine leakage. Do not drink any liquid right before you go to bed. Limit or do not have drinks that contain caffeine or alcohol.   Prevent constipation.  Eat a variety of high-fiber foods. Good examples are high-fiber cereals, beans, vegetables, and whole-grain breads. Walking is the best way to trigger your intestines to have a bowel movement.  Exercise regularly and maintain a healthy weight.  Weight loss and exercise will decrease pressure on your bladder and help you control your  leakage.   Use a catheter as directed  to help empty your bladder. A catheter is a tiny, plastic tube that is put into your bladder to drain your urine.   Go to behavior therapy as directed.  Behavior therapy may be used to help you learn to control your urge to urinate.    Weight Management   Why it is important to manage your weight:  Being overweight increases your risk of health conditions such as heart disease, high blood pressure, type 2 diabetes, and certain types of cancer. It can also increase your risk for osteoarthritis, sleep apnea, and other respiratory problems. Aim for a slow, steady weight loss. Even a small amount of weight loss can lower your risk of health problems.  How to lose weight safely:  A safe and healthy way to lose weight is to eat fewer calories and get regular exercise. You can lose up about 1 pound a week by decreasing the number of calories you eat by 500 calories each day.   Healthy meal plan for weight management:  A healthy meal plan includes a variety of foods, contains fewer calories, and helps you stay healthy. A healthy meal plan includes the following:  Eat whole-grain foods more often.  A healthy meal plan should contain fiber. Fiber is the part of grains, fruits, and vegetables that is not broken down by your body. Whole-grain foods are healthy and provide extra fiber in your diet. Some examples of whole-grain foods are whole-wheat breads and pastas, oatmeal, brown rice, and bulgur.  Eat a variety of vegetables every day.  Include dark, leafy greens such as spinach, kale, liyah greens, and mustard greens. Eat yellow and orange vegetables such as carrots, sweet potatoes, and winter squash.   Eat a variety of fruits every day.  Choose fresh or canned fruit (canned in its own juice or light syrup) instead of juice. Fruit juice has very little or no fiber.  Eat low-fat dairy foods.  Drink fat-free (skim) milk or 1% milk. Eat fat-free yogurt and low-fat cottage cheese. Try  low-fat cheeses such as mozzarella and other reduced-fat cheeses.  Choose meat and other protein foods that are low in fat.  Choose beans or other legumes such as split peas or lentils. Choose fish, skinless poultry (chicken or turkey), or lean cuts of red meat (beef or pork). Before you cook meat or poultry, cut off any visible fat.   Use less fat and oil.  Try baking foods instead of frying them. Add less fat, such as margarine, sour cream, regular salad dressing and mayonnaise to foods. Eat fewer high-fat foods. Some examples of high-fat foods include french fries, doughnuts, ice cream, and cakes.  Eat fewer sweets.  Limit foods and drinks that are high in sugar. This includes candy, cookies, regular soda, and sweetened drinks.  Exercise:  Exercise at least 30 minutes per day on most days of the week. Some examples of exercise include walking, biking, dancing, and swimming. You can also fit in more physical activity by taking the stairs instead of the elevator or parking farther away from stores. Ask your healthcare provider about the best exercise plan for you.      © Copyright [employer] 2018 Information is for End User's use only and may not be sold, redistributed or otherwise used for commercial purposes. All illustrations and images included in CareNotes® are the copyrighted property of [employer].D.A[employer]M., Inc. or [employer]

## 2024-03-25 NOTE — PROGRESS NOTES
Hearing Aid Fitting    Name:  Evelin Thurston  :  1950  Age:  73 y.o.  MRN:  [id number]  Date of Evaluation: 24     HISTORY:    Evelin Thurston was seen today for a binaural hearing aid adoption of her Sandra Livo AI  in the canal (MAXIMO) hearing aid(s). Evelin is adopting these hearing aids from her , Yassine, who does not currently use the devices. Evelin was unaccompanied to today's visit. Hearing aid purchase is being paid by private Pay.     DEVICE INFORMATION:     Left Device Right Device   Hearing Aid Make: Sandra  Sandra    Hearing Aid Model: Joseph AI Joseph AI   Serial Number: [id number]   Repair Warranty Date: 24   Loss/Damage Warranty Status: Active  Active        Length/Output 3/60 3/60   Wax System: Satispay   Dome Size/Style: 7mm occluded 7mm occluded   Battery: Lithium-ion Rechargeable Lithium-ion Rechargeable      Earmold Serial Number: N/A N/A   Earmold Warranty Date:  N/A N/A    Serial Number:   Warranty Date:     Accessories: N/A       DEVICE SETTINGS:    Hearing aid(s) were programmed using ESTAT fitting formula and were adjusted based on patient perceived comfort level. Hearing aid(s) were set to experience level 2  per patient subjective listening preference. The patient noted good sound quality, and was happy with the overall sound quality and fit of the hearing aid(s).    DEVICE ORIENTATION:    The patient  was counseled on device components and component function. Proper insertion and removal of the aid(s) were demonstrated. The patient practiced insertion and removal of the devices in the office, they demonstrated good ability to manipulate the hearing aids. Patient was given the devices users manual that reviews aid usage and operation, hearing aid cleaning tools, and hearing aid carrying case.     The hearing aid warranty, including unlimited repair and a one time loss and damage per hearing aid, through the , as  well as [facility] hearing aid service plan, including unlimited office visits, and supplies were outlined thoroughly. The patient agreed to the terms of sale listed on the purchase agreement containing device specifications, warranties, and pricing information.        DEVICE EXPECTATIONS & USAGE:     Hearing aids are assistive devices and are not designed to restore normal hearing sensitivity. The importance of realistic expectations, especially in the presence of background noise, was emphasized. The need for daily, consistent usage (8-12 hours per day) for proper device adjustment, as well as the importance of self-advocacy and practicing effective communication strategies was outlined.     Effective communication strategies include:  1.) Maintaining face-to-face communication, allowing for speechreading of facial expressions, lips, and gestures.  2.) Reducing background noise and distance between communication partners.  3.) Having communication partners reduce their rate of speech when appropriate.  4.) Beginning conversation by getting communication partner's attention.  5.) Asking for rephrasing of missed aspects of conversation rather than asking for repetition.    RECOMMENDATIONS:  The patient demonstrated understanding of all the topics discussed. The patientis to follow-up in 2-3 weeks for a hearing aid check within the trial period as scheduled.     Deepti Jha.,  Clinical Audiologist     Janae Goel.  Clinical Audiologist    [facility]  [facility]  [address] [id number]

## 2024-03-25 NOTE — PROGRESS NOTES
Assessment and Plan:     Problem List Items Addressed This Visit       Dyslipidemia     Stable  Continue atorvastatin 20 mg a day          Essential hypertension     Stable  Continue verapamil 80 mg twice daily, spironolactone 90 mg daily and chlorthalidone 25 mg daily         Hyperparathyroidism (HCC)     PTH is now normal         Relevant Orders    Comprehensive metabolic panel    Stage 3a chronic kidney disease (HCC)     Lab Results   Component Value Date    EGFR 64 03/11/2024    EGFR 68 11/16/2023    EGFR 58 08/08/2023    CREATININE 0.88 03/11/2024    CREATININE 0.84 11/16/2023    CREATININE 0.95 08/08/2023     Improved          Relevant Orders    CBC    Comprehensive metabolic panel     Other Visit Diagnoses       Medicare annual wellness visit, subsequent    -  Primary    Snoring        Relevant Orders    Ambulatory Referral to Sleep Medicine    Hiccups        Relevant Orders    XR chest pa & lateral    Urinary incontinence, unspecified type        she will start pelvic flood PT when she is ready    Hyperlipidemia, unspecified hyperlipidemia type        Relevant Orders    Lipid Panel with Direct LDL reflex        Return in about 6 months (around 9/25/2024) for Next scheduled follow up.    Depression Screening and Follow-up Plan: Patient was screened for depression during today's encounter. They screened negative with a PHQ-2 score of 0.    Urinary Incontinence Plan of Care: counseling topics discussed: use restroom every 2 hours. Referral was placed for Physical therapy. Will go to PT after heart testing completed .       Preventive health issues were discussed with patient, and age appropriate screening tests were ordered as noted in patient's After Visit Summary.  Personalized health advice and appropriate referrals for health education or preventive services given if needed, as noted in patient's After Visit Summary.     History of Present Illness:     Patient presents for a Medicare Wellness  Visit    Patient reports that she has been feeling very tired.  Even she gets plenty of sleep she still does not feel rested.  She did see the cardiologist.  She has a stress test and echocardiogram scheduled.    She also has issues with snoring.    The increased fatigue levels have been going on for the past the past year.    She also has been having issues with frequent hiccups       Patient Care Team:  Katiana Rodriguez DO as PCP - General  Ludwin Villafana MD as PCP - Endocrinology (Endocrinology)  MD Tremayne Cerna MD Shanker Mukherjee, MD Arthur Popkave, MD (Cardiology)  Mariella Guerra MD     Review of Systems:     Review of Systems   Constitutional:  Positive for fatigue.   Respiratory: Negative.          Problem List:     Patient Active Problem List   Diagnosis    Chronic migraine without aura without status migrainosus, not intractable    Essential hypertension    Lump of skin    Valgus deformity of both great toes    Pain in both feet    Onychomycosis    Radiculopathy of lumbar region    Metatarsalgia of both feet    Laryngopharyngeal reflux (LPR)    Stage 3a chronic kidney disease (HCC)    Vitamin D deficiency    Hyperparathyroidism (HCC)    Osteoporosis    Multinodular goiter    Dyspnea on exertion    Dyslipidemia    Intermittent palpitations    Primary osteoarthritis of both knees    White coat syndrome with diagnosis of hypertension    Chronic fatigue    Chest pain due to GERD    Non-toxic multinodular goiter    Osteopenia after menopause    Episodic migraine    History of gastroesophageal reflux (GERD)      Past Medical and Surgical History:     Past Medical History:   Diagnosis Date    Arthritis     Chronic kidney disease     stage 3    Hyperlipidemia     borderline    Hypertension     borderline    Migraine     Migraine     controlled with verapamil    Osteopenia     Snores      Past Surgical History:   Procedure Laterality Date    BREAST BIOPSY Right     benign ~ 1990s    BUNIONECTOMY  Left     3 hammertoes, bunion    CATARACT EXTRACTION Bilateral 2012    COLONOSCOPY      PARATHYROID GLAND SURGERY Right     front gland removed      Family History:     Family History   Problem Relation Age of Onset    Angina Mother     Heart failure Mother     Prostate cancer Father     Cancer Father         prostate    Diabetes Sister     Sudden death Sister     Osteoporosis Sister     Heart disease Sister         MI    COPD Sister     Kidney disease Sister     Heart disease Sister         CHF    Hypertension Daughter     Hypertension Brother     Cancer Brother         lung    Diabetes Brother     Cancer Brother         prostate    No Known Problems Son     Lung cancer Maternal Uncle     Breast cancer Cousin 50    Prostate cancer Other 51      Social History:     Social History     Socioeconomic History    Marital status: /Civil Union     Spouse name: None    Number of children: None    Years of education: None    Highest education level: None   Occupational History    None   Tobacco Use    Smoking status: Never     Passive exposure: Never    Smokeless tobacco: Never   Vaping Use    Vaping status: Never Used   Substance and Sexual Activity    Alcohol use: No    Drug use: No    Sexual activity: None   Other Topics Concern    None   Social History Narrative    None     Social Determinants of Health     Financial Resource Strain: Low Risk  (3/20/2023)    Overall Financial Resource Strain (CARDIA)     Difficulty of Paying Living Expenses: Not hard at all   Food Insecurity: No Food Insecurity (3/25/2024)    Hunger Vital Sign     Worried About Running Out of Food in the Last Year: Never true     Ran Out of Food in the Last Year: Never true   Transportation Needs: No Transportation Needs (3/25/2024)    PRAPARE - Transportation     Lack of Transportation (Medical): No     Lack of Transportation (Non-Medical): No   Physical Activity: Not on file   Stress: Not on file   Social Connections: Not on file   Intimate  Partner Violence: Not on file   Housing Stability: Low Risk  (3/25/2024)    Housing Stability Vital Sign     Unable to Pay for Housing in the Last Year: No     Number of Places Lived in the Last Year: 1     Unstable Housing in the Last Year: No      Medications and Allergies:     Current Outpatient Medications   Medication Sig Dispense Refill    atorvastatin (LIPITOR) 20 mg tablet TAKE 1 TABLET BY MOUTH EVERY DAY 90 tablet 3    calcium carbonate (OS-FLORIAN) 600 MG tablet Take one tablet daily      chlorthalidone 25 mg tablet TAKE 1 TABLET BY MOUTH 4 DAYS A WEEK MON, WED, FRI, SUNDAY 52 tablet 3    Cholecalciferol (VITAMIN D3) 5000 units CAPS Take 1 capsule by mouth Daily      Coenzyme Q10 (Co Q 10) 100 MG CAPS Take by mouth every morning      magnesium oxide (MAG-OX) 400 mg tablet Take 1 tablet by mouth daily With zinc       multivitamin (THERAGRAN) TABS Take 1 tablet by mouth daily      spironolactone (ALDACTONE) 25 mg tablet TAKE 1 TABLET BY MOUTH EVERY DAY IN THE MORNING 90 tablet 3    verapamil (CALAN-SR) 180 mg CR tablet TAKE 1 TABLET (180 MG TOTAL) BY MOUTH 2 (TWO) TIMES A  tablet 1    Zinc Sulfate (ZINC 15 PO) Take by mouth daily With copper 1mg       No current facility-administered medications for this visit.     No Known Allergies   Immunizations:     Immunization History   Administered Date(s) Administered    COVID-19 MODERNA VACC 0.5 ML IM 02/10/2021, 03/10/2021    INFLUENZA 11/01/2018, 10/30/2021    Influenza Split High Dose Preservative Free IM 12/22/2014, 11/13/2015, 11/05/2016, 10/18/2017    Influenza, high dose seasonal 0.7 mL 10/17/2020    Pneumococcal Conjugate 13-Valent 11/13/2015    Pneumococcal Polysaccharide PPV23 11/01/2013, 10/24/2018    Zoster 01/24/2015    Zoster Vaccine Recombinant 09/12/2019, 01/16/2020    influenza, trivalent, adjuvanted 10/31/2019      Health Maintenance:         Topic Date Due    DXA SCAN  10/17/2024    Breast Cancer Screening: Mammogram  11/14/2024    Colorectal  Cancer Screening  05/13/2028    Hepatitis C Screening  Completed         Topic Date Due    COVID-19 Vaccine (3 - 2023-24 season) 09/01/2023      Medicare Screening Tests and Risk Assessments:     Evelin is here for her Subsequent Wellness visit.     Health Risk Assessment:   Patient rates overall health as very good. Patient feels that their physical health rating is same. Patient is satisfied with their life. Eyesight was rated as same. Hearing was rated as same. Patient feels that their emotional and mental health rating is same. Patients states they are sometimes angry. Patient states they are always unusually tired/fatigued. Pain experienced in the last 7 days has been none. Patient states that she has experienced no weight loss or gain in last 6 months.     Depression Screening:   PHQ-2 Score: 0      Fall Risk Screening:   In the past year, patient has experienced: no history of falling in past year      Urinary Incontinence Screening:   Patient has leaked urine accidently in the last six months.     Home Safety:  Patient does not have trouble with stairs inside or outside of their home. Patient has working smoke alarms and has working carbon monoxide detector. Home safety hazards include: none.     Nutrition:   Current diet is Regular and Frequent junk food.     Medications:   Patient is currently taking over-the-counter supplements. OTC medications include: see medication list. Patient is able to manage medications.     Activities of Daily Living (ADLs)/Instrumental Activities of Daily Living (IADLs):   Walk and transfer into and out of bed and chair?: Yes  Dress and groom yourself?: Yes    Bathe or shower yourself?: Yes    Feed yourself? Yes  Do your laundry/housekeeping?: Yes  Manage your money, pay your bills and track your expenses?: Yes  Make your own meals?: Yes    Do your own shopping?: Yes    Previous Hospitalizations:   Any hospitalizations or ED visits within the last 12 months?: No      Advance Care  Planning:   Living will: No    Durable POA for healthcare: No    Advanced directive: No    Advanced directive counseling given: Yes    Patient declined ACP directive: Yes    End of Life Decisions reviewed with patient: Yes    Provider agrees with end of life decisions: Yes      Cognitive Screening:   Provider or family/friend/caregiver concerned regarding cognition?: No    PREVENTIVE SCREENINGS      Cardiovascular Screening:    General: Screening Not Indicated and History Lipid Disorder      Diabetes Screening:     General: Screening Current      Colorectal Cancer Screening:     General: Screening Current      Breast Cancer Screening:     General: Screening Current      Cervical Cancer Screening:    General: Screening Not Indicated      Osteoporosis Screening:    General: Screening Not Indicated and History Osteoporosis      Abdominal Aortic Aneurysm (AAA) Screening:        General: Screening Not Indicated      Lung Cancer Screening:     General: Screening Not Indicated      Hepatitis C Screening:    General: Screening Current    Screening, Brief Intervention, and Referral to Treatment (SBIRT)    Screening  Typical number of drinks in a day: 0  Typical number of drinks in a week: 0  Interpretation: Low risk drinking behavior.    AUDIT-C Screenin) How often did you have a drink containing alcohol in the past year? never  2) How many drinks did you have on a typical day when you were drinking in the past year? 0  3) How often did you have 6 or more drinks on one occasion in the past year? never    AUDIT-C Score: 0  Interpretation: Score 0-2 (female): Negative screen for alcohol misuse    Single Item Drug Screening:  How often have you used an illegal drug (including marijuana) or a prescription medication for non-medical reasons in the past year? never    Single Item Drug Screen Score: 0  Interpretation: Negative screen for possible drug use disorder    Brief Intervention  Alcohol & drug use screenings were  "reviewed. No concerns regarding substance use disorder identified.     No results found.     Physical Exam:     /68   Pulse 62   Temp (!) 97.3 °F (36.3 °C) (Tympanic)   Resp 16   Ht 5' 10\" (1.778 m)   Wt 83 kg (183 lb)   BMI 26.26 kg/m²     Physical Exam  Vitals and nursing note reviewed.   Constitutional:       Appearance: She is well-developed.   HENT:      Head: Normocephalic and atraumatic.      Right Ear: External ear normal.      Left Ear: External ear normal.      Nose: Nose normal.   Cardiovascular:      Rate and Rhythm: Normal rate and regular rhythm.      Heart sounds: Normal heart sounds. No murmur heard.     No friction rub.   Pulmonary:      Effort: No respiratory distress.      Breath sounds: Normal breath sounds. No wheezing or rales.   Musculoskeletal:      Right lower leg: No edema.      Left lower leg: No edema.          Katiana Rodriguez, DO  "

## 2024-03-25 NOTE — ASSESSMENT & PLAN NOTE
Stable  Continue verapamil 80 mg twice daily, spironolactone 90 mg daily and chlorthalidone 25 mg daily

## 2024-04-08 ENCOUNTER — OFFICE VISIT (OUTPATIENT)
Dept: AUDIOLOGY | Facility: CLINIC | Age: 74
End: 2024-04-08

## 2024-04-08 DIAGNOSIS — H90.3 SENSORINEURAL HEARING LOSS (SNHL), BILATERAL: Primary | ICD-10-CM

## 2024-04-08 NOTE — PROGRESS NOTES
Hearing Aid Visit:    Name:  Evelin Thurston  :  1950  Age:  73 y.o.  MRN:  [id number]  Date of Evaluation: 24     HISTORY:    Evelin Thurston was seen today (2024) for a(n) in-warranty hearing aid check of her bilateral hearing aids. Today, Evelin reported that she was doing well overall. She noted that she has some difficulty with insertion AD.     *HA Adoption visit 1 out of 3.*     DEVICE INFORMATION:       Left Device Right Device   Hearing Aid Make: Sandra Flores    Hearing Aid Model: Joseph AI Joseph AI   Serial Number: [id number]   Repair Warranty Date: 24   Loss/Damage Warranty Status: Active  Active        Length/Output 3/60 3/60   Wax System: Novi   Dome Size/Style: 7mm occluded 6mm occluded   Battery: Lithium-ion Rechargeable Lithium-ion Rechargeable      Earmold Serial Number: N/A N/A   Earmold Warranty Date:  N/A N/A    Serial Number:   Warranty Date:     Accessories: N/A       ACTION/ADJUSTMENTS:    7mm occluded changed to 6mm AD. This improved insertion and subjective comfort.     Real Ear Measurements (REM) were performed bilaterally using NAL-NL2 prescriptive targets. Targets were approximated for soft, medium, and loud speech at 55, 65, 75 decibels sound pressure level (SPL), respectively. Maximum Power Output (MPO) was within the predicted range of comfort. Subjective listening comfort validated by Ms. Thurston.     Datalogging revealed 9 hrs/day of usage.     Thrive joya installed- joya function outlined.     RECOMMENDATIONS:     RTC in 1-2 mo., patient may be discharged to maintenance schedule thereafter if doing well.     Deepti Jha.,   Clinical Audiologist  [facility] PROFESSIONAL [facility] AUDIOLOGY  [address] [id number]

## 2024-04-22 ENCOUNTER — HOSPITAL ENCOUNTER (OUTPATIENT)
Dept: NON INVASIVE DIAGNOSTICS | Facility: HOSPITAL | Age: 74
Discharge: HOME | End: 2024-04-22
Attending: INTERNAL MEDICINE
Payer: MEDICARE

## 2024-04-22 ENCOUNTER — HOSPITAL ENCOUNTER (OUTPATIENT)
Dept: RADIOLOGY | Facility: HOSPITAL | Age: 74
Discharge: HOME | End: 2024-04-22
Payer: MEDICARE

## 2024-04-22 VITALS
DIASTOLIC BLOOD PRESSURE: 66 MMHG | OXYGEN SATURATION: 99 % | SYSTOLIC BLOOD PRESSURE: 124 MMHG | BODY MASS INDEX: 26.2 KG/M2 | HEART RATE: 55 BPM | HEIGHT: 70 IN | WEIGHT: 183 LBS

## 2024-04-22 VITALS
HEART RATE: 62 BPM | WEIGHT: 183 LBS | SYSTOLIC BLOOD PRESSURE: 136 MMHG | HEIGHT: 70 IN | BODY MASS INDEX: 26.2 KG/M2 | DIASTOLIC BLOOD PRESSURE: 68 MMHG

## 2024-04-22 DIAGNOSIS — R00.2 INTERMITTENT PALPITATIONS: ICD-10-CM

## 2024-04-22 DIAGNOSIS — I10 UNCONTROLLED HYPERTENSION: ICD-10-CM

## 2024-04-22 DIAGNOSIS — R06.02 EXERTIONAL SHORTNESS OF BREATH: ICD-10-CM

## 2024-04-22 DIAGNOSIS — R06.6 HICCUPS: ICD-10-CM

## 2024-04-22 DIAGNOSIS — N18.31 STAGE 3A CHRONIC KIDNEY DISEASE (HCC): ICD-10-CM

## 2024-04-22 LAB
AORTIC ROOT: 3.3 CM
APICAL FOUR CHAMBER EJECTION FRACTION: 65 %
AV LVOT PEAK GRADIENT: 8 MMHG
BSA FOR ECHO PROCEDURE: 2.01 M2
E WAVE DECELERATION TIME: 186 MS
E/A RATIO: 0.85
FRACTIONAL SHORTENING: 33 (ref 28–44)
INTERVENTRICULAR SEPTUM IN DIASTOLE (PARASTERNAL SHORT AXIS VIEW): 1.2 CM
INTERVENTRICULAR SEPTUM: 1.2 CM (ref 0.6–1.1)
LAAS-AP2: 24.5 CM2
LAAS-AP4: 23.3 CM2
LEFT ATRIUM SIZE: 3.2 CM
LEFT ATRIUM VOLUME (MOD BIPLANE): 77 ML
LEFT ATRIUM VOLUME INDEX (MOD BIPLANE): 38.3 ML/M2
LEFT INTERNAL DIMENSION IN SYSTOLE: 2.4 CM (ref 2.1–4)
LEFT VENTRICULAR INTERNAL DIMENSION IN DIASTOLE: 3.6 CM (ref 3.5–6)
LEFT VENTRICULAR POSTERIOR WALL IN END DIASTOLE: 1.1 CM
LEFT VENTRICULAR STROKE VOLUME: 34 ML
LVSV (TEICH): 34 ML
MAX HR PERCENT: 90 %
MAX HR: 131 BPM
MV E'TISSUE VEL-SEP: 8 CM/S
MV PEAK A VEL: 0.94 M/S
MV PEAK E VEL: 80 CM/S
MV STENOSIS PRESSURE HALF TIME: 54 MS
MV VALVE AREA P 1/2 METHOD: 4.07
RA PRESSURE ESTIMATED: 8 MMHG
RATE PRESSURE PRODUCT: NORMAL
RIGHT ATRIUM AREA SYSTOLE A4C: 15.1 CM2
RIGHT VENTRICLE ID DIMENSION: 3.5 CM
RV PSP: 36 MMHG
SL CV LEFT ATRIUM LENGTH A2C: 5.6 CM
SL CV LV EF: 55
SL CV PED ECHO LEFT VENTRICLE DIASTOLIC VOLUME (MOD BIPLANE) 2D: 54 ML
SL CV PED ECHO LEFT VENTRICLE SYSTOLIC VOLUME (MOD BIPLANE) 2D: 20 ML
SL CV STRESS RECOVERY BP: NORMAL MMHG
SL CV STRESS RECOVERY HR: 82 BPM
SL CV STRESS RECOVERY O2 SAT: 98 %
SL CV STRESS STAGE REACHED: 3
STRESS ANGINA INDEX: 0
STRESS BASELINE BP: NORMAL MMHG
STRESS BASELINE HR: 55 BPM
STRESS O2 SAT REST: 99 %
STRESS PEAK HR: 131 BPM
STRESS POST ESTIMATED WORKLOAD: 10.1 METS
STRESS POST EXERCISE DUR MIN: 8 MIN
STRESS POST O2 SAT PEAK: 100 %
STRESS POST PEAK BP: 216 MMHG
TR MAX PG: 28 MMHG
TR PEAK VELOCITY: 2.7 M/S
TRICUSPID ANNULAR PLANE SYSTOLIC EXCURSION: 1.9 CM
TRICUSPID VALVE PEAK REGURGITATION VELOCITY: 2.65 M/S

## 2024-04-22 PROCEDURE — 93306 TTE W/DOPPLER COMPLETE: CPT

## 2024-04-22 PROCEDURE — 93018 CV STRESS TEST I&R ONLY: CPT | Performed by: INTERNAL MEDICINE

## 2024-04-22 PROCEDURE — 93017 CV STRESS TEST TRACING ONLY: CPT

## 2024-04-22 PROCEDURE — 93016 CV STRESS TEST SUPVJ ONLY: CPT | Performed by: INTERNAL MEDICINE

## 2024-04-22 PROCEDURE — 71046 X-RAY EXAM CHEST 2 VIEWS: CPT

## 2024-04-22 PROCEDURE — 93306 TTE W/DOPPLER COMPLETE: CPT | Performed by: INTERNAL MEDICINE

## 2024-04-23 LAB
CHEST PAIN STATEMENT: NORMAL
MAX DIASTOLIC BP: 54 MMHG
MAX PREDICTED HEART RATE: 145 BPM
PROTOCOL NAME: NORMAL
REASON FOR TERMINATION: NORMAL
STRESS POST EXERCISE DUR MIN: 8 MIN
STRESS POST EXERCISE DUR SEC: 1 SEC
STRESS POST PEAK HR: 131 BPM
STRESS POST PEAK SYSTOLIC BP: 216 MMHG
TARGET HR FORMULA: NORMAL
TEST INDICATION: NORMAL

## 2024-04-26 ENCOUNTER — OFFICE VISIT (OUTPATIENT)
Dept: CARDIOLOGY CLINIC | Facility: CLINIC | Age: 74
End: 2024-04-26
Payer: MEDICARE

## 2024-04-26 VITALS
BODY MASS INDEX: 25.91 KG/M2 | HEART RATE: 70 BPM | DIASTOLIC BLOOD PRESSURE: 62 MMHG | HEIGHT: 70 IN | WEIGHT: 181 LBS | OXYGEN SATURATION: 98 % | SYSTOLIC BLOOD PRESSURE: 118 MMHG

## 2024-04-26 DIAGNOSIS — R06.02 EXERTIONAL SHORTNESS OF BREATH: Primary | ICD-10-CM

## 2024-04-26 DIAGNOSIS — E78.5 HYPERLIPIDEMIA, UNSPECIFIED HYPERLIPIDEMIA TYPE: ICD-10-CM

## 2024-04-26 DIAGNOSIS — I50.31 ACUTE HEART FAILURE WITH PRESERVED EJECTION FRACTION (HCC): ICD-10-CM

## 2024-04-26 DIAGNOSIS — N18.31 STAGE 3A CHRONIC KIDNEY DISEASE (HCC): ICD-10-CM

## 2024-04-26 PROCEDURE — 99214 OFFICE O/P EST MOD 30 MIN: CPT | Performed by: INTERNAL MEDICINE

## 2024-04-26 RX ORDER — SPIRONOLACTONE AND HYDROCHLOROTHIAZIDE 25; 25 MG/1; MG/1
1 TABLET ORAL EVERY MORNING
Qty: 30 TABLET | Refills: 3 | Status: SHIPPED | OUTPATIENT
Start: 2024-04-26

## 2024-04-26 RX ORDER — SPIRONOLACTONE AND HYDROCHLOROTHIAZIDE 25; 25 MG/1; MG/1
1 TABLET ORAL EVERY MORNING
Qty: 90 TABLET | Refills: 1 | Status: SHIPPED | OUTPATIENT
Start: 2024-04-26 | End: 2024-04-26 | Stop reason: SDUPTHER

## 2024-04-26 RX ORDER — DAPAGLIFLOZIN 10 MG/1
10 TABLET, FILM COATED ORAL EVERY MORNING
Qty: 30 TABLET | Refills: 3 | Status: SHIPPED | OUTPATIENT
Start: 2024-04-26

## 2024-04-26 RX ORDER — ATORVASTATIN CALCIUM 20 MG/1
20 TABLET, FILM COATED ORAL DAILY
Qty: 90 TABLET | Refills: 3 | Status: SHIPPED | OUTPATIENT
Start: 2024-04-26

## 2024-04-26 NOTE — PROGRESS NOTES
[facility]  [address] [place] [address] [id number]   [place] [address]  Cardiology Consultation    Evelin Thurston  [id number]  1950      Consult for: Exertional dyspnea  Appreciate consult by: Katiana Rodriguez DO    1. Exertional shortness of breath  dapagliflozin (Farxiga) 10 MG tablet    spironolactone-hydrochlorothiazide (ALDACTAZIDE) 25-25 mg per tablet    DISCONTINUED: spironolactone-hydrochlorothiazide (ALDACTAZIDE) 25-25 mg per tablet      2. Stage 3a chronic kidney disease (HCC)  dapagliflozin (Farxiga) 10 MG tablet      3. Acute heart failure with preserved ejection fraction (HCC)  dapagliflozin (Farxiga) 10 MG tablet      4. Hyperlipidemia, unspecified hyperlipidemia type  atorvastatin (LIPITOR) 20 mg tablet         Discussion/Summary:   Progressive exertional dyspnea-he has a hypertensive blood pressure response on stress test.  Her symptoms are consistent with heart failure with preserved ejection fraction.  We will try her on Farxiga 10 mg in the morning.  We will switch her chlorthalidone/spironolactone to Aldactazide combination.  She will periodically check her blood pressures-especially after exercise.  We will continue with her verapamil 180 mg twice a day.  She will wear compression when sitting and standing.    Hyperlipidemia well-controlled with the last LDL of 49    History of hypercalcemia status post parathyroidectomy    Osteopenia on DEXA scan    HPI:   73-year-old with a history of hypertension presents for initial consultation.  She reports having some exertional dyspnea at times.  She has noted it has been progressive.  She is now short of breath walking less than a mile.  She reports trying to cut back on sodium.  She is compliant with her antihypertensive medications.  She denies having any prior history of any irregular heart rhythms.  She denies having chest heaviness.  She denies having any falls.    4/26/2024: We reviewed through her recent exercise stress  test.  She continues to have exertional dyspnea.  She is compliant with her medications.    Past Medical History:   Diagnosis Date    Arthritis     Chronic kidney disease     stage 3    Hyperlipidemia     borderline    Hypertension     borderline    Migraine     Migraine     controlled with verapamil    Osteopenia     Snores      Social History     Socioeconomic History    Marital status: /Civil Union     Spouse name: Not on file    Number of children: Not on file    Years of education: Not on file    Highest education level: Not on file   Occupational History    Not on file   Tobacco Use    Smoking status: Never     Passive exposure: Never    Smokeless tobacco: Never   Vaping Use    Vaping status: Never Used   Substance and Sexual Activity    Alcohol use: No    Drug use: No    Sexual activity: Not on file   Other Topics Concern    Not on file   Social History Narrative    Not on file     Social Determinants of Health     Financial Resource Strain: Low Risk  (3/20/2023)    Overall Financial Resource Strain (CARDIA)     Difficulty of Paying Living Expenses: Not hard at all   Food Insecurity: No Food Insecurity (3/25/2024)    Hunger Vital Sign     Worried About Running Out of Food in the Last Year: Never true     Ran Out of Food in the Last Year: Never true   Transportation Needs: No Transportation Needs (3/25/2024)    PRAPARE - Transportation     Lack of Transportation (Medical): No     Lack of Transportation (Non-Medical): No   Physical Activity: Not on file   Stress: Not on file   Social Connections: Not on file   Intimate Partner Violence: Not on file   Housing Stability: Low Risk  (3/25/2024)    Housing Stability Vital Sign     Unable to Pay for Housing in the Last Year: No     Number of Places Lived in the Last Year: 1     Unstable Housing in the Last Year: No      Family History   Problem Relation Age of Onset    Angina Mother     Heart failure Mother     Prostate cancer Father     Cancer Father          "prostate    Diabetes Sister     Sudden death Sister     Osteoporosis Sister     Heart disease Sister         MI    COPD Sister     Kidney disease Sister     Heart disease Sister         CHF    Hypertension Daughter     Hypertension Brother     Cancer Brother         lung    Diabetes Brother     Cancer Brother         prostate    No Known Problems Son     Lung cancer Maternal Uncle     Breast cancer Cousin 50    Prostate cancer Other 51     Past Surgical History:   Procedure Laterality Date    BREAST BIOPSY Right     benign ~ 1990s    BUNIONECTOMY Left     3 hammertoes, bunion    CATARACT EXTRACTION Bilateral 2012    COLONOSCOPY      PARATHYROID GLAND SURGERY Right     front gland removed       Current Outpatient Medications:     atorvastatin (LIPITOR) 20 mg tablet, Take 1 tablet (20 mg total) by mouth daily, Disp: 90 tablet, Rfl: 3    calcium carbonate (OS-FLORIAN) 600 MG tablet, Take one tablet daily, Disp: , Rfl:     Cholecalciferol (VITAMIN D3) 5000 units CAPS, Take 1 capsule by mouth Daily, Disp: , Rfl:     Coenzyme Q10 (Co Q 10) 100 MG CAPS, Take by mouth every morning, Disp: , Rfl:     dapagliflozin (Farxiga) 10 MG tablet, Take 1 tablet (10 mg total) by mouth every morning, Disp: 30 tablet, Rfl: 3    magnesium oxide (MAG-OX) 400 mg tablet, Take 1 tablet by mouth daily With zinc , Disp: , Rfl:     multivitamin (THERAGRAN) TABS, Take 1 tablet by mouth daily, Disp: , Rfl:     spironolactone-hydrochlorothiazide (ALDACTAZIDE) 25-25 mg per tablet, Take 1 tablet by mouth every morning, Disp: 30 tablet, Rfl: 3    verapamil (CALAN-SR) 180 mg CR tablet, TAKE 1 TABLET (180 MG TOTAL) BY MOUTH 2 (TWO) TIMES A DAY, Disp: 180 tablet, Rfl: 1    Zinc Sulfate (ZINC 15 PO), Take by mouth daily With copper 1mg, Disp: , Rfl:   No Known Allergies  Vitals:    04/26/24 1319   BP: 118/62   BP Location: Right arm   Patient Position: Sitting   Cuff Size: Standard   Pulse: 70   SpO2: 98%   Weight: 82.1 kg (181 lb)   Height: 5' 10\" (1.778 m) "       Review of Systems:   Review of Systems   Constitutional:  Positive for fatigue. Negative for activity change, appetite change, chills, diaphoresis, fever and unexpected weight change.   HENT: Negative.  Negative for congestion, dental problem, drooling, ear discharge, ear pain, facial swelling, hearing loss, mouth sores, nosebleeds, postnasal drip, rhinorrhea, sinus pressure, sinus pain, sneezing, sore throat, tinnitus, trouble swallowing and voice change.    Eyes: Negative.  Negative for photophobia, pain, redness, itching and visual disturbance.   Respiratory:  Positive for shortness of breath. Negative for apnea, cough, choking, chest tightness, wheezing and stridor.    Cardiovascular: Negative.  Negative for chest pain, palpitations and leg swelling.   Gastrointestinal: Negative.  Negative for abdominal distention, abdominal pain, anal bleeding, blood in stool, constipation, diarrhea, nausea, rectal pain and vomiting.   Endocrine: Negative.  Negative for cold intolerance, heat intolerance, polydipsia, polyphagia and polyuria.   Genitourinary: Negative.  Negative for decreased urine volume, difficulty urinating, dyspareunia, dysuria, enuresis, flank pain, frequency, genital sores, hematuria, menstrual problem, pelvic pain, urgency, vaginal bleeding, vaginal discharge and vaginal pain.   Musculoskeletal: Negative.  Negative for arthralgias, back pain, gait problem, joint swelling, myalgias, neck pain and neck stiffness.   Skin: Negative.  Negative for color change, pallor, rash and wound.   Allergic/Immunologic: Negative.  Negative for environmental allergies, food allergies and immunocompromised state.   Neurological: Negative.  Negative for dizziness, tremors, seizures, syncope, facial asymmetry, speech difficulty, weakness, light-headedness, numbness and headaches.   Hematological: Negative.  Negative for adenopathy. Does not bruise/bleed easily.   Psychiatric/Behavioral: Negative.  Negative for  "agitation, behavioral problems, confusion, decreased concentration, dysphoric mood, hallucinations, self-injury, sleep disturbance and suicidal ideas. The patient is not nervous/anxious and is not hyperactive.    All other systems reviewed and are negative.      Vitals:    04/26/24 1319   BP: 118/62   BP Location: Right arm   Patient Position: Sitting   Cuff Size: Standard   Pulse: 70   SpO2: 98%   Weight: 82.1 kg (181 lb)   Height: 5' 10\" (1.778 m)     Physical Examination:   Physical Exam  Constitutional:       General: She is not in acute distress.     Appearance: She is well-developed. She is not diaphoretic.   HENT:      Head: Normocephalic and atraumatic.      Right Ear: External ear normal.      Left Ear: External ear normal.   Eyes:      General: No scleral icterus.        Right eye: No discharge.         Left eye: No discharge.      Conjunctiva/sclera: Conjunctivae normal.      Pupils: Pupils are equal, round, and reactive to light.   Neck:      Thyroid: No thyromegaly.      Vascular: No JVD.      Trachea: No tracheal deviation.   Cardiovascular:      Rate and Rhythm: Normal rate and regular rhythm.      Heart sounds: No murmur heard.     No friction rub. Gallop present.   Pulmonary:      Effort: Pulmonary effort is normal. No respiratory distress.      Breath sounds: Normal breath sounds. No stridor. No wheezing or rales.   Chest:      Chest wall: No tenderness.   Abdominal:      General: Bowel sounds are normal. There is no distension.      Palpations: Abdomen is soft. There is no mass.      Tenderness: There is no abdominal tenderness. There is no guarding or rebound.   Musculoskeletal:         General: No tenderness or deformity. Normal range of motion.      Cervical back: Normal range of motion and neck supple.   Skin:     General: Skin is warm and dry.      Coloration: Skin is not pale.      Findings: No erythema or rash.   Neurological:      Mental Status: She is alert and oriented to person, place, " "and time.      Cranial Nerves: No cranial nerve deficit.      Motor: No abnormal muscle tone.      Coordination: Coordination normal.      Deep Tendon Reflexes: Reflexes are normal and symmetric. Reflexes normal.   Psychiatric:         Behavior: Behavior normal.         Thought Content: Thought content normal.         Judgment: Judgment normal.         Labs:     Lab Results   Component Value Date    WBC 6.67 03/11/2024    HGB 14.0 03/11/2024    HCT 42.7 03/11/2024    MCV 83 03/11/2024    RDW 13.4 03/11/2024     03/11/2024     BMP:  Lab Results   Component Value Date    SODIUM 138 03/11/2024    K 4.1 03/11/2024     03/11/2024    CO2 29 03/11/2024    ANIONGAP 11.7 10/09/2015    BUN 19 03/11/2024    CREATININE 0.88 03/11/2024    GLUC 91 10/23/2019    GLUF 90 03/11/2024    CALCIUM 9.8 03/11/2024    EGFR 64 03/11/2024    MG 2.1 06/30/2019     LFT:  Lab Results   Component Value Date    AST 18 03/11/2024    ALT 19 03/11/2024    ALKPHOS 43 03/11/2024    TP 6.9 03/11/2024    ALB 4.4 03/11/2024      Lab Results   Component Value Date    VGI6ZCSINPCN 3.532 11/16/2023     No results found for: \"HGBA1C\"  Lipid Profile:   Lab Results   Component Value Date    CHOLESTEROL 118 03/11/2024    HDL 56 03/11/2024    LDLCALC 49 03/11/2024    TRIG 66 03/11/2024     Lab Results   Component Value Date    CHOLESTEROL 118 03/11/2024    CHOLESTEROL 116 08/08/2023     Lab Results   Component Value Date    TROPONINI <0.02 06/30/2019     No results found for: \"NTBNP\"   Recent Results (from the past 672 hour(s))   Echo complete w/ contrast if indicated    Collection Time: 04/22/24  1:30 PM   Result Value Ref Range    BSA 2.01 m2    A4C EF 65 %    LVIDd 3.60 cm    LVIDS 2.40 cm    IVSd 1.20 cm    LVPWd 1.10 cm    FS 33 28 - 44    MV E' Tissue Velocity Septal 8 cm/s    LA Volume Index (BP) 38.3 mL/m2    E/A ratio 0.85     E wave deceleration time 186 ms    MV Peak E Vahid 80 cm/s    MV Peak A Vahid 0.94 m/s    AV LVOT peak gradient 8 mmHg "    RVID d 3.5 cm    Tricuspid annular plane systolic excursion 1.90 cm    LA size 3.2 cm    LA length (A2C) 5.60 cm    LA volume (BP) 77 mL    RAA A4C 15.1 cm2    MV stenosis pressure 1/2 time 54 ms    MV valve area p 1/2 method 4.07     TR Peak Vahid 2.7 m/s    Triscuspid Valve Regurgitation Peak Gradient 28.0 mmHg    Ao root 3.30 cm    Tricuspid valve peak regurgitation velocity 2.65 m/s    Left ventricular stroke volume (2D) 34.00 mL    IVS 1.2 cm    LEFT VENTRICLE SYSTOLIC VOLUME (MOD BIPLANE) 2D 20 mL    LV DIASTOLIC VOLUME (MOD BIPLANE) 2D 54 mL    Left Atrium Area-systolic Four Chamber 23.3 cm2    Left Atrium Area-systolic Apical Two Chamber 24.5 cm2    LVSV, 2D 34 mL    LV EF 55     Est. RA pres 8.0 mmHg    Right Ventricular Peak Systolic Pressure 36.00 mmHg   Stress strip    Collection Time: 04/22/24  1:34 PM   Result Value Ref Range    Protocol Name BILL     Exercise duration (min) 8 min    Exercise duration (sec) 1 sec    Post Peak Systolic  mmHg    Max Diastolic Bp 54 mmHg    Peak  BPM    Max Predicted Heart Rate 145 BPM    Reason for Termination Target Heart Rate Achieved     Test Indication SOB, HTN     Target Hr Formular (220 - Age)*100%     Arrhy During Ex      ECG Interp Before Ex      ECG Interp during Ex      Ex Summary Comment      Chest Pain Statement none     Overall Hr Response To Exercise      Overall BP Response To Exercise     Stress test only, exercise    Collection Time: 04/22/24  1:51 PM   Result Value Ref Range    Baseline HR 55 bpm    Baseline /66 mmHg    O2 sat rest 99 %    Stress peak  bpm    Post peak  mmHg    O2 sat peak 100 %    Recovery HR 82 bpm    Recovery /86 mmHg    O2 sat recovery 98 %    Max  bpm    Max HR Percent 90 %    Exercise duration (min) 8 min    Estimated workload 10.1 METS    Rate Pressure Product 28,296.0     Angina Index 0     Stress Stage Reached 3.0        Imaging & Testing   I have personally reviewed pertinent  "reports.      Cardiac Testing       EKG: Personally reviewed.          Frankie Gary MD [facility] [place]  [phone]  Please call with any questions or suggestions    Counseling :  A description of the counseling:   Goals and Barriers:  Patient's ability to self care:  Medication side effect reviewed with patient in detail and all their questions answered.    \"Portions of the record may have been created with voice recognition software. Occasional wrong word or \"sound a like\" substitutions may have occurred due to the inherent limitations of voice recognition software. Read the chart carefully and recognize, using context, where substitutions have occurred. Please call if you have any questions. \"    "

## 2024-04-29 ENCOUNTER — TELEPHONE (OUTPATIENT)
Dept: CARDIOLOGY CLINIC | Facility: CLINIC | Age: 74
End: 2024-04-29

## 2024-04-29 NOTE — TELEPHONE ENCOUNTER
Toritod fax from [facility] seeking clinical answers for authorization of Farxiga. I completed packet, attached ov note, stress test, and echo results and returned fax. I also scanned original into patient chart.

## 2024-05-04 ENCOUNTER — OFFICE VISIT (OUTPATIENT)
Dept: OBGYN CLINIC | Facility: CLINIC | Age: 74
End: 2024-05-04
Payer: MEDICARE

## 2024-05-04 ENCOUNTER — HOSPITAL ENCOUNTER (OUTPATIENT)
Dept: RADIOLOGY | Facility: HOSPITAL | Age: 74
Discharge: HOME | End: 2024-05-04
Payer: MEDICARE

## 2024-05-04 VITALS — OXYGEN SATURATION: 98 % | BODY MASS INDEX: 25.62 KG/M2 | WEIGHT: 179 LBS | HEIGHT: 70 IN | HEART RATE: 65 BPM

## 2024-05-04 DIAGNOSIS — M25.561 RIGHT KNEE PAIN, UNSPECIFIED CHRONICITY: ICD-10-CM

## 2024-05-04 DIAGNOSIS — M25.561 RIGHT KNEE PAIN, UNSPECIFIED CHRONICITY: Primary | ICD-10-CM

## 2024-05-04 DIAGNOSIS — M17.0 PRIMARY OSTEOARTHRITIS OF BOTH KNEES: ICD-10-CM

## 2024-05-04 PROCEDURE — 99213 OFFICE O/P EST LOW 20 MIN: CPT | Performed by: PHYSICIAN ASSISTANT

## 2024-05-04 PROCEDURE — 73562 X-RAY EXAM OF KNEE 3: CPT

## 2024-05-04 PROCEDURE — 20610 DRAIN/INJ JOINT/BURSA W/O US: CPT | Performed by: PHYSICIAN ASSISTANT

## 2024-05-04 RX ORDER — TRIAMCINOLONE ACETONIDE 40 MG/ML
80 INJECTION, SUSPENSION INTRA-ARTICULAR; INTRAMUSCULAR
Status: COMPLETED | OUTPATIENT
Start: 2024-05-04 | End: 2024-05-04

## 2024-05-04 RX ORDER — BUPIVACAINE HYDROCHLORIDE 5 MG/ML
2 INJECTION, SOLUTION EPIDURAL; INTRACAUDAL
Status: COMPLETED | OUTPATIENT
Start: 2024-05-04 | End: 2024-05-04

## 2024-05-04 RX ORDER — LIDOCAINE HYDROCHLORIDE 20 MG/ML
1 INJECTION, SOLUTION EPIDURAL; INFILTRATION; INTRACAUDAL; PERINEURAL
Status: COMPLETED | OUTPATIENT
Start: 2024-05-04 | End: 2024-05-04

## 2024-05-04 RX ADMIN — BUPIVACAINE HYDROCHLORIDE 2 ML: 5 INJECTION, SOLUTION EPIDURAL; INTRACAUDAL at 08:15

## 2024-05-04 RX ADMIN — LIDOCAINE HYDROCHLORIDE 1 ML: 20 INJECTION, SOLUTION EPIDURAL; INFILTRATION; INTRACAUDAL; PERINEURAL at 08:15

## 2024-05-04 RX ADMIN — TRIAMCINOLONE ACETONIDE 80 MG: 40 INJECTION, SUSPENSION INTRA-ARTICULAR; INTRAMUSCULAR at 08:15

## 2024-05-04 NOTE — PROGRESS NOTES
Assessment/Plan   Diagnoses and all orders for this visit:    Right knee pain, chronic, intermittent    Right knee patellofemoral osteoarthritis    - Cortisone injection today  - Ice as needed  - Follow up prn with Dr. Moreland          Subjective   Patient ID: Evelin Thurston is a 73 y.o. female.    Vitals:    05/04/24 0807   Pulse: 65   SpO2: 98%     72yo female comes in for an evaluation of her right knee.  She is a patient of Dr. Moreland for right knee OA.  She has been doing well and over the last week, her pain flared up again.  She c/o anterior knee pain that increases when trying to walk down the steps.  It has improved since Thursday, but she wants some sort of treatment because she is leaving for vacation soon, and expects to be doing a lot of walking. The pain is dull in character, mild in severity, does not radiate and is not associated with numbness.          The following portions of the patient's history were reviewed and updated as appropriate: allergies, current medications, past family history, past medical history, past social history, past surgical history, and problem list.    Review of Systems  Ortho Exam  Past Medical History:   Diagnosis Date    Arthritis     Chronic kidney disease     stage 3    Hyperlipidemia     borderline    Hypertension     borderline    Migraine     Migraine     controlled with verapamil    Osteopenia     Snores      Past Surgical History:   Procedure Laterality Date    BREAST BIOPSY Right     benign ~ 1990s    BUNIONECTOMY Left     3 hammertoes, bunion    CATARACT EXTRACTION Bilateral 2012    COLONOSCOPY      PARATHYROID GLAND SURGERY Right     front gland removed     Family History   Problem Relation Age of Onset    Angina Mother     Heart failure Mother     Prostate cancer Father     Cancer Father         prostate    Diabetes Sister     Sudden death Sister     Osteoporosis Sister     Heart disease Sister         MI    COPD Sister     Kidney disease Sister     Heart disease  Sister         CHF    Hypertension Daughter     Hypertension Brother     Cancer Brother         lung    Diabetes Brother     Cancer Brother         prostate    No Known Problems Son     Lung cancer Maternal Uncle     Breast cancer Cousin 50    Prostate cancer Other 51     Social History     Occupational History    Not on file   Tobacco Use    Smoking status: Never     Passive exposure: Never    Smokeless tobacco: Never   Vaping Use    Vaping status: Never Used   Substance and Sexual Activity    Alcohol use: No    Drug use: No    Sexual activity: Not on file       Review of Systems   Constitutional: Negative.    HENT: Negative.    Eyes: Negative.    Respiratory: Negative.    Cardiovascular: Negative.    Gastrointestinal: Negative.    Endocrine: Negative.    Genitourinary: Negative.    Musculoskeletal: As below..   Allergic/Immunologic: Negative.    Neurological: Negative.    Hematological: Negative.    Psychiatric/Behavioral: Negative.        Objective   Physical Exam    Constitutional: Awake, Alert, Oriented  Eyes: EOMI  Psych: Mood and affect appropriate  Heart: regular rate   Lungs: No audible wheezing  Abdomen: No guarding  Lymph: no lymphedema  right Knee:  Appearance  No swelling, discoloration, deformity, or ecchymosis  Effusion  none  Palpation  No tenderness about the medial / lateral joint line, patella, patellar tendon, MCL, LCL, hamstrings, or medial / lateral tibial plateau.  ROM  Extension: 0 and Flexion: 130  Special Tests  Noé's Test negative, Lachman's Test negative, Anterior Drawer Test negative, Posterior Drawer Test negative, Valgus Stress Test negative, Varus Stress Test negative, and Patellar apprehension negative  SLR negative  Motor  normal 5/5 in all planes  NVI distally    Xrays:  I have personally reviewed pertinent films in PACS and my interpretation is no acute displaced fracture.  Patellofemoral osteoarthritis.      Large joint arthrocentesis: R knee  Universal Protocol:  Consent:  "Verbal consent obtained.  Risks and benefits: risks, benefits and alternatives were discussed  Consent given by: patient  Time out: Immediately prior to procedure a \"time out\" was called to verify the correct patient, procedure, equipment, support staff and site/side marked as required.  Timeout called at: 5/4/2024 8:39 AM.  Patient understanding: patient states understanding of the procedure being performed  Site marked: the operative site was marked  Radiology Images displayed and confirmed. If images not available, report reviewed: imaging studies available  Patient identity confirmed: verbally with patient  Supporting Documentation  Indications: pain   Procedure Details  Location: knee - R knee  Needle size: 22 G  Ultrasound guidance: no  Approach: lateral  Medications administered: 2 mL bupivacaine (PF) 0.5 %; 80 mg triamcinolone acetonide 40 mg/mL; 1 mL lidocaine (PF) 2 %    Patient tolerance: patient tolerated the procedure well with no immediate complications  Dressing:  Sterile dressing applied          "

## 2024-06-05 ENCOUNTER — OFFICE VISIT (OUTPATIENT)
Dept: AUDIOLOGY | Facility: CLINIC | Age: 74
End: 2024-06-05

## 2024-06-05 DIAGNOSIS — H90.3 SENSORINEURAL HEARING LOSS (SNHL), BILATERAL: Primary | ICD-10-CM

## 2024-06-06 NOTE — PROGRESS NOTES
Hearing Aid Visit:    Name:  Evelin Thurston  :  1950  Age:  73 y.o.  MRN:  [id number]  Date of Evaluation: 2024    HISTORY:    Evelin Thurston was seen today (2024) for a(n) in-warranty hearing aid check of her bilateral hearing aids. Today, Evelin reported that she was doing well overall, however, recently the aids were not performing well when she was trying to [place] on the plane.   She felt better with the smaller dome for the right side.       *HA Adoption visit 3 out of 3.*  Price list was provided and discussed with the patient     DEVICE INFORMATION:       Left Device Right Device   Hearing Aid Make: Sandra Flores    Hearing Aid Model: Joseph AI Joseph AI   Serial Number: [id number]   Repair Warranty Date: 24   Loss/Damage Warranty Status: Active  Active        Length/Output 3/60 3/60   Wax System: HearClear HearClear   Dome Size/Style: 7mm occluded 6mm occluded   Battery: Lithium-ion Rechargeable Lithium-ion Rechargeable       Serial Number:   Warranty Date:     Accessories: N/A       ACTION/ADJUSTMENTS:  Cleaned and checked all / the wax guards were full / we went over changing them and I provided her 3 packs  No changes made to the programming.  The aids sounded better to the patient after cleaning   Canals were clear     RECOMMENDATIONS:   RTO 24 for regular maintenance ($40)      Vanessa Deshpande,   Clinical Audiologist  [facility]  [address] [facility] [id number]

## 2024-07-09 ENCOUNTER — OFFICE VISIT (OUTPATIENT)
Dept: PULMONOLOGY | Facility: CLINIC | Age: 74
End: 2024-07-09
Payer: MEDICARE

## 2024-07-09 VITALS
SYSTOLIC BLOOD PRESSURE: 140 MMHG | OXYGEN SATURATION: 97 % | BODY MASS INDEX: 25.34 KG/M2 | DIASTOLIC BLOOD PRESSURE: 82 MMHG | WEIGHT: 177 LBS | HEIGHT: 70 IN | TEMPERATURE: 98.3 F | HEART RATE: 76 BPM

## 2024-07-09 DIAGNOSIS — R06.09 DYSPNEA ON EXERTION: Primary | ICD-10-CM

## 2024-07-09 PROCEDURE — 94010 BREATHING CAPACITY TEST: CPT | Performed by: INTERNAL MEDICINE

## 2024-07-09 PROCEDURE — 99204 OFFICE O/P NEW MOD 45 MIN: CPT | Performed by: INTERNAL MEDICINE

## 2024-07-09 RX ORDER — DOXYCYCLINE HYCLATE 100 MG/1
CAPSULE ORAL
COMMUNITY
Start: 2024-05-14

## 2024-07-09 NOTE — PROGRESS NOTES
Assessment/Plan:    Dyspnea on exertion  Patient senses relatively minor dyspnea on exertion over the last year.  In spite of this she is able to take gym classes which sound moderately vigorous at 5:30 AM on a daily basis.  Spirometry today demonstrates some decrease in vital capacity which could be reflective of air trapping or reduction in lung volumes of unclear cause.  Formal PFTs seem indicated.  For now there does not seem to be an need to do a chest CT.  Patient has sleep testing scheduled in December and she does have some symptoms of sleep apnea.  She also has interrupted sleep due to leg pain.  Sleep deprivation could translate into dyspnea on exertion and dyspnea is a not uncommon symptom of sleep apnea.  Follow through with that testing once accomplished.     Diagnoses and all orders for this visit:    Dyspnea on exertion  -     Complete PFT with post bronchodilator; Future  -     POCT spirometry    Other orders  -     doxycycline hyclate (VIBRAMYCIN) 100 mg capsule;  (Patient not taking: Reported on 7/9/2024)          Subjective:      Patient ID: Evelin Thurston is a 74 y.o. female.    This patient is self-referred for dyspnea on exertion.  This is of 1 years duration.  No background of pulmonary disease or allergy.  In the distant past patient has had episodes of unexplained cough which seems to resolve with combination of PPI and H2 blocker.  Cough not an issue now.  No other respiratory symptoms.  Patient senses dyspnea when climbing a flight of stairs especially when carrying something with weight such as a laundry basket.  Does sense at times that she gets dyspneic when she rushes.  No suggestion by history of muscle weakness.  No voice changes.  No recent respiratory infections especially COVID-19.  No history of smoking or occupational history relative to lung disease.  Family history of lung cancer only.  Signs of nonrestful sleep and daytime sleepiness.  Sleep testing has already been scheduled.   "Patient states that her  states that she snores and may have seen episodes of obstructed airway.  Patient also frequently awakens from leg pain.  Knee arthritis could also cause dyspnea due to added effort of physical activities.  There may be some component of sleep deprivation here.    Shortness of Breath  Associated symptoms include coughing and rhinorrhea. Pertinent negatives include no chest pain, leg swelling, palpitations or wheezing.       The following portions of the patient's history were reviewed and updated as appropriate: allergies, current medications, past family history, past medical history, past social history, past surgical history and problem list.    Review of Systems   Constitutional:  Negative for activity change and unexpected weight change.   HENT:  Positive for congestion, rhinorrhea and trouble swallowing. Negative for sinus pressure, sneezing and voice change.    Respiratory:  Positive for apnea, cough and shortness of breath. Negative for wheezing.         Apnea-maybe.  Previous cough now resolved   Cardiovascular:  Negative for chest pain, palpitations and leg swelling.   Gastrointestinal:  Positive for abdominal pain.   Endocrine: Negative for cold intolerance and heat intolerance.   Genitourinary:  Negative for difficulty urinating.   Musculoskeletal:  Positive for arthralgias and back pain.        Arthralgias right knee and fingers   Allergic/Immunologic: Negative for environmental allergies.   Neurological: Negative.          Objective:      /82 (BP Location: Left arm, Patient Position: Sitting, Cuff Size: Standard)   Pulse 76   Temp 98.3 °F (36.8 °C) (Tympanic)   Ht 5' 10\" (1.778 m)   Wt 80.3 kg (177 lb)   SpO2 97%   BMI 25.40 kg/m²          Physical Exam  Vitals reviewed.   Constitutional:       General: She is not in acute distress.     Appearance: She is normal weight. She is not ill-appearing.   HENT:      Nose: Nose normal.      Mouth/Throat:      Mouth: " Mucous membranes are moist.      Pharynx: Oropharynx is clear.      Comments: Pharynx not seen  Eyes:      Conjunctiva/sclera: Conjunctivae normal.   Cardiovascular:      Rate and Rhythm: Normal rate and regular rhythm.      Pulses:           Radial pulses are 2+ on the right side and 2+ on the left side.   Pulmonary:      Effort: Pulmonary effort is normal.      Breath sounds: Normal breath sounds. No wheezing or rales.   Abdominal:      General: Abdomen is flat. There is no distension.      Palpations: Abdomen is soft. There is no hepatomegaly or splenomegaly.   Musculoskeletal:         General: No swelling.      Cervical back: No rigidity or tenderness.      Right lower leg: No edema.      Left lower leg: No edema.   Lymphadenopathy:      Cervical: No cervical adenopathy.   Skin:     General: Skin is warm and dry.   Neurological:      Mental Status: She is alert and oriented to person, place, and time.   Psychiatric:         Mood and Affect: Mood normal.         Behavior: Behavior normal.

## 2024-07-09 NOTE — ASSESSMENT & PLAN NOTE
Patient senses relatively minor dyspnea on exertion over the last year.  In spite of this she is able to take gym classes which sound moderately vigorous at 5:30 AM on a daily basis.  Spirometry today demonstrates some decrease in vital capacity which could be reflective of air trapping or reduction in lung volumes of unclear cause.  Formal PFTs seem indicated.  For now there does not seem to be an need to do a chest CT.  Patient has sleep testing scheduled in December and she does have some symptoms of sleep apnea.  She also has interrupted sleep due to leg pain.  Sleep deprivation could translate into dyspnea on exertion and dyspnea is a not uncommon symptom of sleep apnea.  Follow through with that testing once accomplished.

## 2024-07-11 ENCOUNTER — OFFICE VISIT (OUTPATIENT)
Dept: OBGYN CLINIC | Facility: CLINIC | Age: 74
End: 2024-07-11
Payer: MEDICARE

## 2024-07-11 VITALS
WEIGHT: 177.6 LBS | HEIGHT: 70 IN | SYSTOLIC BLOOD PRESSURE: 134 MMHG | BODY MASS INDEX: 25.43 KG/M2 | DIASTOLIC BLOOD PRESSURE: 76 MMHG | HEART RATE: 60 BPM

## 2024-07-11 DIAGNOSIS — M54.2 NECK PAIN: ICD-10-CM

## 2024-07-11 DIAGNOSIS — M17.11 PRIMARY OSTEOARTHRITIS OF RIGHT KNEE: Primary | ICD-10-CM

## 2024-07-11 DIAGNOSIS — M25.561 CHRONIC PAIN OF RIGHT KNEE: ICD-10-CM

## 2024-07-11 DIAGNOSIS — G89.29 CHRONIC PAIN OF RIGHT KNEE: ICD-10-CM

## 2024-07-11 PROCEDURE — 99214 OFFICE O/P EST MOD 30 MIN: CPT | Performed by: ORTHOPAEDIC SURGERY

## 2024-07-11 NOTE — PROGRESS NOTES
Assessment/Plan:  1. Primary osteoarthritis of right knee  Injection Procedure Prior Authorization      2. Chronic pain of right knee  Injection Procedure Prior Authorization      3. Neck pain  Ambulatory referral to Spine & Pain Management        Scribe Attestation      I,:  Olivier Blancas am acting as a scribe while in the presence of the attending physician.:       I,:  Melchor Moreland, DO personally performed the services described in this documentation    as scribed in my presence.:           Evelin is a pleasant 74-year-old female who returns today for follow-up evaluation for her right knee pain.  After reviewing her imaging and performing a thorough history and physical exam I explained that she does appear symptomatic of her mild underlying patellofemoral osteoarthritis.  I offered to graduate her care to viscosupplementation injection series.  She was amenable to this and an order was placed for prior authorization today.  We will reach out to her after approval to schedule the appropriate visits for administration of the injections.  She also complains of neck pain today and referral was provided to my spine and pain colleagues for evaluation and management of this issue.  All of her questions and concerns were addressed today.    Subjective: Follow-up evaluation for right knee pain    Patient ID: Evelin Thurston is a 74 y.o. female who returns today for follow-up evaluation for her right knee pain.  She was seen in 2021 and was recently evaluated by Kenton Robles in early May and given a steroid injection.  At today's visit, she complains of diffuse pain about the anterior aspect of her knee that radiates proximally into her thigh and distally into her calf and lower leg. Her pain has been 7-8/10 recently.  She also reports that her knee has been giving, once per week or so.  She finds her pain is pronounced at night and wakes her.  She did receive relief from the injection administered in May and reports this  wore off in June.  She denies any recent injury or trauma.    Review of Systems   Constitutional:  Positive for activity change. Negative for chills, fever and unexpected weight change.   HENT:  Negative for hearing loss, nosebleeds and sore throat.    Eyes:  Negative for pain, redness and visual disturbance.   Respiratory:  Negative for cough, shortness of breath and wheezing.    Cardiovascular:  Negative for chest pain, palpitations and leg swelling.   Gastrointestinal:  Negative for abdominal pain, nausea and vomiting.   Endocrine: Negative for polydipsia and polyuria.   Genitourinary:  Negative for dysuria and hematuria.   Musculoskeletal:  Positive for arthralgias and myalgias. Negative for joint swelling.        See HPI   Skin:  Negative for rash and wound.   Neurological:  Negative for dizziness, numbness and headaches.   Psychiatric/Behavioral:  Negative for decreased concentration and suicidal ideas. The patient is not nervous/anxious.          Past Medical History:   Diagnosis Date    Arthritis     Chronic kidney disease     stage 3    Hyperlipidemia     borderline    Hypertension     borderline    Migraine     Migraine     controlled with verapamil    Osteopenia     Snores        Past Surgical History:   Procedure Laterality Date    BREAST BIOPSY Right     benign ~ 1990s    BUNIONECTOMY Left     3 hammertoes, bunion    CATARACT EXTRACTION Bilateral 2012    COLONOSCOPY      PARATHYROID GLAND SURGERY Right     front gland removed       Family History   Problem Relation Age of Onset    Angina Mother     Heart failure Mother     Prostate cancer Father     Cancer Father         prostate    Diabetes Sister     Sudden death Sister     Osteoporosis Sister     Heart disease Sister         MI    COPD Sister     Kidney disease Sister     Heart disease Sister         CHF    Hypertension Daughter     Hypertension Brother     Cancer Brother         lung    Diabetes Brother     Cancer Brother         prostate    No  Known Problems Son     Lung cancer Maternal Uncle     Breast cancer Cousin 50    Prostate cancer Other 51       Social History     Occupational History    Not on file   Tobacco Use    Smoking status: Never     Passive exposure: Never    Smokeless tobacco: Never   Vaping Use    Vaping status: Never Used   Substance and Sexual Activity    Alcohol use: No    Drug use: No    Sexual activity: Not on file         Current Outpatient Medications:     atorvastatin (LIPITOR) 20 mg tablet, Take 1 tablet (20 mg total) by mouth daily, Disp: 90 tablet, Rfl: 3    calcium carbonate (OS-FLORIAN) 600 MG tablet, Take one tablet daily, Disp: , Rfl:     Cholecalciferol (VITAMIN D3) 5000 units CAPS, Take 1 capsule by mouth Daily, Disp: , Rfl:     Coenzyme Q10 (Co Q 10) 100 MG CAPS, Take by mouth every morning, Disp: , Rfl:     dapagliflozin (Farxiga) 10 MG tablet, Take 1 tablet (10 mg total) by mouth every morning, Disp: 30 tablet, Rfl: 3    magnesium oxide (MAG-OX) 400 mg tablet, Take 1 tablet by mouth daily With zinc , Disp: , Rfl:     multivitamin (THERAGRAN) TABS, Take 1 tablet by mouth daily, Disp: , Rfl:     spironolactone-hydrochlorothiazide (ALDACTAZIDE) 25-25 mg per tablet, Take 1 tablet by mouth every morning, Disp: 30 tablet, Rfl: 3    verapamil (CALAN-SR) 180 mg CR tablet, TAKE 1 TABLET (180 MG TOTAL) BY MOUTH 2 (TWO) TIMES A DAY, Disp: 180 tablet, Rfl: 1    Zinc Sulfate (ZINC 15 PO), Take by mouth daily With copper 1mg, Disp: , Rfl:     doxycycline hyclate (VIBRAMYCIN) 100 mg capsule, , Disp: , Rfl:     No Known Allergies    Objective:  Vitals:    07/11/24 1300   BP: 134/76   Pulse: 60       Body mass index is 25.48 kg/m².    Right Knee Exam     Tenderness   The patient is experiencing tenderness in the patella.    Range of Motion   Extension:  0   Flexion:  120     Tests   Varus: negative Valgus: negative  Drawer:  Anterior - negative    Posterior - negative    Other   Erythema: absent  Scars: absent  Sensation: normal  Pulse:  present  Swelling: none  Effusion: no effusion present    Comments:  Fullness in popliteal fossa  Stable at 0, 30 and 90 degrees  Neurovascularly in tact distally  No warmth or erythema  Parapatellar crepitance noted  Patellofemoral grind: positive          Observations     Right Knee   Negative for effusion.       Physical Exam  Vitals and nursing note reviewed.   Constitutional:       Appearance: Normal appearance. She is well-developed.   HENT:      Head: Normocephalic and atraumatic.      Right Ear: External ear normal.      Left Ear: External ear normal.   Eyes:      General: No scleral icterus.     Extraocular Movements: Extraocular movements intact.      Conjunctiva/sclera: Conjunctivae normal.   Cardiovascular:      Rate and Rhythm: Normal rate.   Pulmonary:      Effort: Pulmonary effort is normal. No respiratory distress.   Musculoskeletal:      Cervical back: Normal range of motion and neck supple.      Right knee: No effusion.      Comments: See Ortho exam   Skin:     General: Skin is warm and dry.   Neurological:      General: No focal deficit present.      Mental Status: She is alert and oriented to person, place, and time.   Psychiatric:         Behavior: Behavior normal.         I have personally reviewed pertinent films in PACS.    X-ray of the right knee obtained on 5/4/2024 reviewed demonstrating mild age-appropriate degenerative change.  There is no acute fracture, dislocation, lytic or blastic lesion.    This document was created using speech voice recognition software.   Grammatical errors, random word insertions, pronoun errors, and incomplete sentences are an occasional consequence of this system due to software limitations, ambient noise, and hardware issues.   Any formal questions or concerns about content, text, or information contained within the body of this dictation should be directly addressed to the provider for clarification.

## 2024-07-23 ENCOUNTER — CONSULT (OUTPATIENT)
Age: 74
End: 2024-07-23
Payer: MEDICARE

## 2024-07-23 ENCOUNTER — APPOINTMENT (OUTPATIENT)
Dept: RADIOLOGY | Facility: CLINIC | Age: 74
End: 2024-07-23
Payer: MEDICARE

## 2024-07-23 VITALS
HEART RATE: 80 BPM | DIASTOLIC BLOOD PRESSURE: 73 MMHG | WEIGHT: 177 LBS | SYSTOLIC BLOOD PRESSURE: 117 MMHG | HEIGHT: 70 IN | BODY MASS INDEX: 25.34 KG/M2

## 2024-07-23 DIAGNOSIS — M81.0 OSTEOPOROSIS, UNSPECIFIED OSTEOPOROSIS TYPE, UNSPECIFIED PATHOLOGICAL FRACTURE PRESENCE: ICD-10-CM

## 2024-07-23 DIAGNOSIS — M54.2 NECK PAIN: ICD-10-CM

## 2024-07-23 DIAGNOSIS — M79.18 CERVICAL MYOFASCIAL PAIN SYNDROME: Primary | ICD-10-CM

## 2024-07-23 PROCEDURE — 72040 X-RAY EXAM NECK SPINE 2-3 VW: CPT

## 2024-07-23 PROCEDURE — G2211 COMPLEX E/M VISIT ADD ON: HCPCS | Performed by: STUDENT IN AN ORGANIZED HEALTH CARE EDUCATION/TRAINING PROGRAM

## 2024-07-23 PROCEDURE — 99204 OFFICE O/P NEW MOD 45 MIN: CPT | Performed by: STUDENT IN AN ORGANIZED HEALTH CARE EDUCATION/TRAINING PROGRAM

## 2024-07-23 NOTE — PROGRESS NOTES
Assessment:  1. Cervical myofascial pain syndrome    2. Neck pain    3. Osteoporosis, unspecified osteoporosis type, unspecified pathological fracture presence      Patient is a pleasant 74-year-old woman presenting with 2 months of left neck pain along the left knee pain.  Over the past month the intensity pains been moderate to severe she rates pain currently as a 5 out of 10 on numeric rating scale.  The pain is interfere with her daily activities and occurs intermittently.  Symptoms are worse in the morning and describes pain as a shooting, sharp pain and cutting sensation that shoots up to the head.  She does report some weakness and dropping objects at times.  She does not use any assistive devices.  Activities increase her pain include lying down, bending.  Patient also reports coughing and sneezing.  Patient has no recent imaging of her cervical spine.  She is currently using heat and ice which provides moderate relief but only temporarily.  She is not utilizing any medications outside of Voltaren gel and Biofreeze which does help sometimes.    On exam patient with tenderness to palpation bilateral cervical paraspinal muscles with taut bands appreciated positive jump sign.  Patient also with midline tenderness in the upper cervical spine.  Patient symptoms likely secondary to cervical myofascial pain along with cervical spondylosis.  To further evaluate we will get an x-ray of cervical spine today.  For symptomatic relief patient counseled to continue with topical anti-inflammatories and to utilize Tylenol 9 7 5 mg 3 times daily as needed.  Additionally ambulatory referral to physical therapy for neck and knee pain ordered.  Lastly did offer patient trigger point injection of bilateral cervical paraspinal muscles patient like to hold off on any interventions at this time.  Plan:  Continue diclofenac ointment 1% Tid prn  Start apap 650 mg q6hr prn   Ambulatory referral to PT for knee and neck pain   TPI of  bilateral cervical paraspinal muscles in the future if patient desires  X-ray cervical spine  Orders Placed This Encounter   Procedures   • XR spine cervical 2 or 3 vw injury     Standing Status:   Future     Number of Occurrences:   1     Standing Expiration Date:   7/23/2028     Scheduling Instructions:      Bring along any outside films relating to this procedure.         • Ambulatory referral to Physical Therapy     Standing Status:   Future     Standing Expiration Date:   7/23/2025     Referral Priority:   Routine     Referral Type:   Physical Therapy     Referral Reason:   Specialty Services Required     Requested Specialty:   Physical Therapy     Number of Visits Requested:   1     Expiration Date:   7/23/2025       No orders of the defined types were placed in this encounter.      My impressions and treatment recommendations were discussed in detail with the patient, who verbalized understanding and had no further questions.      Follow-up is planned in four weeks time or sooner as warranted.  Discharge instructions were provided. I personally saw and examined the patient and I agree with the above discussed plan of care.    History of Present Illness:    Evelin Thurston is a 74 y.o. female who presents to [facility] for initial evaluation of the above stated pain complaints. The patient has a past medical and chronic pain history as outlined in the assessment section. She was referred by Melchor Moreland, DO  [address], [address]  [place] [address].    Patient is a pleasant 74-year-old woman presenting with 2 months of left neck pain along the left knee pain.  Over the past month the intensity pains been moderate to severe she rates pain currently as a 5 out of 10 on numeric rating scale.  The pain is interfere with her daily activities and occurs intermittently.  Symptoms are worse in the morning and describes pain as a shooting, sharp pain and cutting  sensation that shoots up to the head.  She does report some weakness and dropping objects at times.  She does not use any assistive devices.  Activities increase her pain include lying down, bending.  Patient also reports coughing and sneezing.  Patient has no recent imaging of her cervical spine.  She is currently using heat and ice which provides moderate relief but only temporarily.  She is not utilizing any medications outside of Voltaren gel and Biofreeze which does help sometimes.    Review of Systems:    Review of Systems   Constitutional:  Negative for fever and unexpected weight change.   HENT:  Positive for hearing loss. Negative for trouble swallowing.    Eyes:  Negative for visual disturbance.   Respiratory:  Negative for shortness of breath and wheezing.    Cardiovascular:  Negative for chest pain and palpitations.   Gastrointestinal:  Positive for abdominal pain. Negative for constipation, diarrhea, nausea and vomiting.   Endocrine: Negative for cold intolerance, heat intolerance and polydipsia.   Genitourinary:  Negative for difficulty urinating and frequency.   Musculoskeletal:  Positive for arthralgias and myalgias. Negative for gait problem and joint swelling.   Skin:  Negative for rash.   Neurological:  Positive for dizziness and numbness. Negative for seizures, syncope, weakness and headaches.   Hematological:  Does not bruise/bleed easily.   Psychiatric/Behavioral:  Negative for dysphoric mood.            Past Medical History:   Diagnosis Date   • Arthritis    • Chronic kidney disease     stage 3   • Hyperlipidemia     borderline   • Hypertension     borderline   • Migraine    • Migraine     controlled with verapamil   • Osteopenia    • Snores        Past Surgical History:   Procedure Laterality Date   • BREAST BIOPSY Right     benign ~ 1990s   • BUNIONECTOMY Left     3 cecilia rico   • CATARACT EXTRACTION Bilateral 2012   • COLONOSCOPY     • PARATHYROID GLAND SURGERY Right     front gland  removed       Family History   Problem Relation Age of Onset   • Angina Mother    • Heart failure Mother    • Prostate cancer Father    • Cancer Father         prostate   • Diabetes Sister    • Sudden death Sister    • Osteoporosis Sister    • Heart disease Sister         MI   • COPD Sister    • Kidney disease Sister    • Heart disease Sister         CHF   • Hypertension Daughter    • Hypertension Brother    • Cancer Brother         lung   • Diabetes Brother    • Cancer Brother         prostate   • No Known Problems Son    • Lung cancer Maternal Uncle    • Breast cancer Cousin 50   • Prostate cancer Other 51       Social History     Occupational History   • Not on file   Tobacco Use   • Smoking status: Never     Passive exposure: Never   • Smokeless tobacco: Never   Vaping Use   • Vaping status: Never Used   Substance and Sexual Activity   • Alcohol use: No   • Drug use: No   • Sexual activity: Not on file         Current Outpatient Medications:   •  atorvastatin (LIPITOR) 20 mg tablet, Take 1 tablet (20 mg total) by mouth daily, Disp: 90 tablet, Rfl: 3  •  calcium carbonate (OS-FLORIAN) 600 MG tablet, Take one tablet daily, Disp: , Rfl:   •  Cholecalciferol (VITAMIN D3) 5000 units CAPS, Take 1 capsule by mouth Daily, Disp: , Rfl:   •  Coenzyme Q10 (Co Q 10) 100 MG CAPS, Take by mouth every morning, Disp: , Rfl:   •  dapagliflozin (Farxiga) 10 MG tablet, Take 1 tablet (10 mg total) by mouth every morning, Disp: 30 tablet, Rfl: 3  •  magnesium oxide (MAG-OX) 400 mg tablet, Take 1 tablet by mouth daily With zinc , Disp: , Rfl:   •  multivitamin (THERAGRAN) TABS, Take 1 tablet by mouth daily, Disp: , Rfl:   •  spironolactone-hydrochlorothiazide (ALDACTAZIDE) 25-25 mg per tablet, Take 1 tablet by mouth every morning, Disp: 30 tablet, Rfl: 3  •  verapamil (CALAN-SR) 180 mg CR tablet, TAKE 1 TABLET (180 MG TOTAL) BY MOUTH 2 (TWO) TIMES A DAY, Disp: 180 tablet, Rfl: 1  •  Zinc Sulfate (ZINC 15 PO), Take by mouth daily With  "copper 1mg, Disp: , Rfl:   •  doxycycline hyclate (VIBRAMYCIN) 100 mg capsule, , Disp: , Rfl:     No Known Allergies    Physical Exam:    /73   Pulse 80   Ht 5' 10\" (1.778 m)   Wt 80.3 kg (177 lb)   BMI 25.40 kg/m²     Constitutional: normal, well developed, well nourished, alert, in no distress and non-toxic and no overt pain behavior.  Eyes: anicteric  HEENT: grossly intact  Neck: supple, symmetric, trachea midline and no masses   Pulmonary:even and unlabored  Cardiovascular:No edema or pitting edema present  Skin:Normal without rashes or lesions and well hydrated  Psychiatric:Mood and affect appropriate  Neurologic:Cranial Nerves II-XII grossly intact  Musculoskeletal:normal gait. TTP in bilateral cervical paraspinal muscles with taut bands appreciated and positive jump sign.     Imaging  No orders to display       Orders Placed This Encounter   Procedures   • XR spine cervical 2 or 3 vw injury   • Ambulatory referral to Physical Therapy      "

## 2024-07-26 ENCOUNTER — TELEPHONE (OUTPATIENT)
Age: 74
End: 2024-07-26

## 2024-07-26 NOTE — TELEPHONE ENCOUNTER
Caller: Patient     Doctor:     Reason for call: Patient would like a call back with her results from he xr once the report is in.    Also wanting to know if she should wait for report to start PT ?     Please advise     Call back#: [phone]

## 2024-07-26 NOTE — TELEPHONE ENCOUNTER
S/w pt and advised of same. Pt also advised we will be in touch when her xray results are final. Pt verbalized understanding.

## 2024-07-30 ENCOUNTER — EVALUATION (OUTPATIENT)
Dept: PHYSICAL THERAPY | Facility: CLINIC | Age: 74
End: 2024-07-30
Payer: MEDICARE

## 2024-07-30 ENCOUNTER — TELEPHONE (OUTPATIENT)
Age: 74
End: 2024-07-30

## 2024-07-30 DIAGNOSIS — M54.2 NECK PAIN: ICD-10-CM

## 2024-07-30 PROCEDURE — 97161 PT EVAL LOW COMPLEX 20 MIN: CPT

## 2024-07-30 PROCEDURE — 97110 THERAPEUTIC EXERCISES: CPT

## 2024-07-30 NOTE — TELEPHONE ENCOUNTER
Caller: pt    Doctor: linette    Reason for call: I read the pt the results.    Call back#: [phone]

## 2024-07-30 NOTE — PROGRESS NOTES
PT Evaluation     Today's date: 2024  Patient name: Evelin Thurston  : 1950  MRN: [id number]  Referring provider: Prasanna Fisher MD  Dx:   Encounter Diagnosis     ICD-10-CM    1. Neck pain  M54.2 Ambulatory referral to Physical Therapy                     Assessment  Impairments: abnormal or restricted ROM, activity intolerance, impaired physical strength, lacks appropriate home exercise program, pain with function, poor posture  and poor body mechanics    Assessment details: Evelin Thurston is a 74 y.o. female who presents with pain, decreased strength, decreased ROM, decreased joint mobility, and postural dysfunction. Noted forward head with rounding shoulders. Due to these impairments, patient has difficulty performing ADL's, recreational activities, engaging in social activities, ambulation, stair negotiation, lifting/carrying, reaching. Mechanical assessment demo unclear results at this time. Patient given cervical retractions as HEP to further assess. Patient demo understanding of edu on centralization and peripheralization and when to stop vs continue based on results. Patient's clinical presentation is consistent with their referring diagnosis of Neck pain. Patient has been educated in home exercise program and plan of care. Patient would benefit from skilled physical therapy services to address their aforementioned functional limitations and progress towards prior level of function and independence with home exercise program.       Goals  Short Term Goals to be accomplished in 4 weeks:  STG1: Pt will be I with HEP to maximize progress between therapy sessions  STG2: Pt will be I with posture management to limit impingement.   STG3: Pt will demo Cervical AROM >50% improvement needed for looking side to side when driving/crossing the street  STG4: Pt will demo 1/2 MMT grade inc in cervical stabilizers and shoulder strength to improve lifting/carrying         Long Term Goals to be accomplished in 12 weeks:    LTG1: Pt will demo cervical stab/shoulder strength to WNL as per PLOF to improve lifting/carrying  LTG2: Pt will deny sleep disturbance due to pain  LTG3: Pt will demo cervical AROM WNL to decrease impingement       Plan  Patient would benefit from: PT eval and skilled physical therapy  Planned modality interventions: cryotherapy, thermotherapy: hydrocollator packs, traction and unattended electrical stimulation    Planned therapy interventions: manual therapy, neuromuscular re-education, self care, therapeutic activities, therapeutic exercise, home exercise program, IASTM, joint mobilization, abdominal trunk stabilization, functional ROM exercises, flexibility, patient/caregiver education and postural training    Frequency: 2x week  Plan of Care beginning date: 2024  Plan of Care expiration date: 10/22/2024  Treatment plan discussed with: patient  Plan details: HEP development, stretching, strengthening, A/AA/PROM, joint mobilizations, posture education, STM/MI as needed to reduce muscle tension, muscle reeducation, PLOC discussed and agreed upon with patient.            Subjective Evaluation    History of Present Illness  Mechanism of injury: Patient reports to PT for acute neck pain that began around 4 weeks ago. Patient stated it wakes her up at night and bothers her during the day as well. She has tried ice, heat, osteo-biflex, and voltaren gel. The osteo-biflex seemed to help a bit. Patient stated last night it was very irritated but is less aggravated this morning. Patient states Left side is worse than right and the juan occasionally radiates in to occiput. Patient reports she gets some pain in her hands and weak , however this began about 1 year ago.   Patient Goals  Patient goals for therapy: decreased pain, improved balance, increased motion, increased strength, independence with ADLs/IADLs and return to sport/leisure activities    Pain  Current pain rating: 3  At best pain ratin  At worst  pain rating: 10  Location: cervical and radiates into occiput  Quality: dull ache and radiating  Exacerbated by: constant but quick head turns increase it.  Progression: no change    Social Support  Steps to enter house: yes  Stairs in house: yes   Lives in: multiple-level home  Lives with: spouse    Hand dominance: left      Diagnostic Tests  Abnormal x-ray: degenerative changes with decreased disc space.  Treatments  Previous treatment: physical therapy (low back and knee)        Objective      Posture: forward head with mild rounding of shoulders     Cervicial AROM limitation (* = pain)      Flexion: Min le* (tight)  Extension: Mod-severe le*  R rotation: Min le*  L rotation: Min le*(more pain than R/tighter)  R sidebend: Severe le*  L sidebend: Mod-severe le*  Protraction: WNL  Retraction: Mod-severe le    Thoracic AROM limitations (* = pain)  Thoracic ext: Min le*  Thoracic rot R min le(tight)  Thoracic rot L  min le*    Shoulder AROM: standing      R  L  Flexion :  WNL  WNL  Abduction:  WNL  WNL  Functional IR:  T4  T6  Functional ER: T4  T2    Strength: MMT R  L  Shoulder flexion: 5/5  5/5    Shoulder abduction: 5/5  5/5    Shoulder ER:  4/5  4/5  Shoulder IR:  4/5  4/5  Elbow flex:  5/5  5/5  Elbow ext:  5/5  5/5    :    R   L     70   50     55   45     50   50    Mechanical Assessment: 3/10 B/L cervical pain into occiput   Repeated retraction: 1x10 cervical retractions = NE       Flexibility: mod-severe le in UT flexibility     Tenderness/Palpation: mild TTP in UT and suboccipitals               Precautions:   Past Medical History:   Diagnosis Date    Arthritis     Chronic kidney disease     stage 3    Hyperlipidemia     borderline    Hypertension     borderline    Migraine     Migraine     controlled with verapamil    Osteopenia     Snores      SOC: 7/30/2024  FOTO: 7/30/2024  POC Expiration: 10/22/204  Daily Treatment Log:  Date 7/30/2024       Visit # 1       Auth         Auth exp         Manual        IASTM to UT and cervical                 There Exer 10'       UBE        Corner stretch         Levator scap stretch         UT stretch                                 HEP Created and discussed with edu on centralization vs peripheralization.        There Activ                                                        NMReed        Cervical retractions         Scapular retractions         Rows         B/L shoulder ext                 Modalities                                HEP:   Access Code: [email or web address]  URL: [email or web address]  Date: 07/30/2024  Prepared by: Purnima Giles    Exercises  - Seated Passive Cervical Retraction  - 5 x daily - 7 x weekly - 1 sets - 10 reps  - Seated Scapular Retraction  - 1-2 x daily - 7 x weekly - 1 sets - 10 reps

## 2024-07-31 ENCOUNTER — OFFICE VISIT (OUTPATIENT)
Dept: PHYSICAL THERAPY | Facility: CLINIC | Age: 74
End: 2024-07-31
Payer: MEDICARE

## 2024-07-31 ENCOUNTER — TELEPHONE (OUTPATIENT)
Age: 74
End: 2024-07-31

## 2024-07-31 DIAGNOSIS — N39.43 POST-VOID DRIBBLING: Primary | ICD-10-CM

## 2024-07-31 DIAGNOSIS — M54.2 NECK PAIN: Primary | ICD-10-CM

## 2024-07-31 PROCEDURE — 97110 THERAPEUTIC EXERCISES: CPT

## 2024-07-31 PROCEDURE — 97112 NEUROMUSCULAR REEDUCATION: CPT

## 2024-07-31 NOTE — PROGRESS NOTES
"Daily Note     Today's date: 2024  Patient name: Evelin Thurston  : 1950  MRN: [id number]  Referring provider: Prasanna Fisher MD  Dx:   Encounter Diagnosis     ICD-10-CM    1. Neck pain  M54.2                      Subjective: Patient reports she had some increased pain at night but not during HEP. The pain lingered a bit into the morning, but has lessened t/o the day.       Objective: See treatment diary below      Assessment: Tolerated treatment well. Patient edu that tenderness can be normal with manual work but if it resolves within an hour or two it is ok. HEP updated to include corner stretch and wall slides to address rounded shoulders and increased tension in CT junction. Patient to report back on any post session soreness next visit. Patient would benefit from continued PT      Plan: Continue per plan of care.      Precautions:   Past Medical History:   Diagnosis Date    Arthritis     Chronic kidney disease     stage 3    Hyperlipidemia     borderline    Hypertension     borderline    Migraine     Migraine     controlled with verapamil    Osteopenia     Snores      SOC: 2024  FOTO: 2024  POC Expiration: 10/22/204  Daily Treatment Log:  Date 2024      Visit # 1 2      Auth         Auth exp        Manual  8'       IASTM to UT and cervical   EG (mild irritation into cervical region)              There Exer 10' 15'      UBE  1.5 fwd/back for 3' total       Corner stretch   5\" 2x5       Levator scap stretch         UT stretch   3\" x5 for tolerance       Wall slides   5\" x5                      HEP Created and discussed with [facility] on centralization vs peripheralization.  Updated and discussed       There Activ                                                        NMReed  12'      Cervical retractions   1x10      Scapular retractions   1x10       Rows   Blue tubing 1x15       B/L shoulder ext   Blue tubing 1x10               Modalities                                HEP:   Access " Code: [email or web address]  URL: [email or web address]  Date: 07/31/2024  Prepared by: Purnima Giles    Exercises  - Seated Passive Cervical Retraction  - 5 x daily - 7 x weekly - 1 sets - 10 reps  - Seated Scapular Retraction  - 1 x daily - 7 x weekly - 1 sets - 10 reps  - Shoulder Flexion Wall Slide with Towel  - 1 x daily - 7 x weekly - 1 sets - 5 reps - 5 sec hold  - Corner Pec Major Stretch  - 1 x daily - 7 x weekly - 1 sets - 10 reps - 5 sec hold

## 2024-07-31 NOTE — TELEPHONE ENCOUNTER
Pt said that her referral for pelvic floor therapy has  and she would now like to do this. She was told to call back when she was ready. Thank you.

## 2024-08-01 ENCOUNTER — PROCEDURE VISIT (OUTPATIENT)
Dept: OBGYN CLINIC | Facility: CLINIC | Age: 74
End: 2024-08-01
Payer: MEDICARE

## 2024-08-01 ENCOUNTER — APPOINTMENT (OUTPATIENT)
Dept: PHYSICAL THERAPY | Facility: CLINIC | Age: 74
End: 2024-08-01
Payer: MEDICARE

## 2024-08-01 DIAGNOSIS — M17.11 PRIMARY OSTEOARTHRITIS OF RIGHT KNEE: Primary | ICD-10-CM

## 2024-08-01 DIAGNOSIS — G89.29 CHRONIC PAIN OF RIGHT KNEE: ICD-10-CM

## 2024-08-01 DIAGNOSIS — M25.561 CHRONIC PAIN OF RIGHT KNEE: ICD-10-CM

## 2024-08-01 PROCEDURE — 20610 DRAIN/INJ JOINT/BURSA W/O US: CPT | Performed by: PHYSICIAN ASSISTANT

## 2024-08-01 NOTE — PROGRESS NOTES
"Assessment/Plan:  1. Primary osteoarthritis of right knee  Large joint arthrocentesis      2. Chronic pain of right knee  Large joint arthrocentesis        Evelin is a pleasant 74-year-old presenting today for the first of 3 Orthovisc injections for her right knee osteoarthritis and activity related pain.  She consented to and underwent the injection as detailed below, which she tolerated well without difficulty or complication.  Postinjection instructions were provided.  We will plan to see her next week and the week after to complete the series.  All questions addressed    Large joint arthrocentesis: R knee  Universal Protocol:  Consent: Verbal consent obtained.  Risks and benefits: risks, benefits and alternatives were discussed  Consent given by: patient  Time out: Immediately prior to procedure a \"time out\" was called to verify the correct patient, procedure, equipment, support staff and site/side marked as required.  Timeout called at: 8/1/2024 8:10 AM.  Site marked: the operative site was marked  Patient identity confirmed: verbally with patient  Supporting Documentation  Indications: pain   Procedure Details  Location: knee - R knee  Preparation: Patient was prepped and draped in the usual sterile fashion  Needle size: 20 G  Ultrasound guidance: no  Approach: anterolateral  Medications administered: 30 mg sodium hyaluronate 30 mg/2 mL    Patient tolerance: patient tolerated the procedure well with no immediate complications  Dressing:  Sterile dressing applied            Subjective: Right knee Orthovisc #1    Patient ID: Evelin Thurston is a 74 y.o. female presenting for the first of 3 Orthovisc injections for her right knee osteoarthritis and activity related pain.  She denies any new injuries from her previous visit    Review of Systems      Past Medical History:   Diagnosis Date    Arthritis     Chronic kidney disease     stage 3    Hyperlipidemia     borderline    Hypertension     borderline    Migraine     " Migraine     controlled with verapamil    Osteopenia     Snores        Past Surgical History:   Procedure Laterality Date    BREAST BIOPSY Right     benign ~ 1990s    BUNIONECTOMY Left     3 hammertoes, bunion    CATARACT EXTRACTION Bilateral 2012    COLONOSCOPY      PARATHYROID GLAND SURGERY Right     front gland removed       Family History   Problem Relation Age of Onset    Angina Mother     Heart failure Mother     Prostate cancer Father     Cancer Father         prostate    Diabetes Sister     Sudden death Sister     Osteoporosis Sister     Heart disease Sister         MI    COPD Sister     Kidney disease Sister     Heart disease Sister         CHF    Hypertension Daughter     Hypertension Brother     Cancer Brother         lung    Diabetes Brother     Cancer Brother         prostate    No Known Problems Son     Lung cancer Maternal Uncle     Breast cancer Cousin 50    Prostate cancer Other 51       Social History     Occupational History    Not on file   Tobacco Use    Smoking status: Never     Passive exposure: Never    Smokeless tobacco: Never   Vaping Use    Vaping status: Never Used   Substance and Sexual Activity    Alcohol use: No    Drug use: No    Sexual activity: Not on file         Current Outpatient Medications:     atorvastatin (LIPITOR) 20 mg tablet, Take 1 tablet (20 mg total) by mouth daily, Disp: 90 tablet, Rfl: 3    calcium carbonate (OS-FLORIAN) 600 MG tablet, Take one tablet daily, Disp: , Rfl:     Cholecalciferol (VITAMIN D3) 5000 units CAPS, Take 1 capsule by mouth Daily, Disp: , Rfl:     Coenzyme Q10 (Co Q 10) 100 MG CAPS, Take by mouth every morning, Disp: , Rfl:     dapagliflozin (Farxiga) 10 MG tablet, Take 1 tablet (10 mg total) by mouth every morning, Disp: 30 tablet, Rfl: 3    magnesium oxide (MAG-OX) 400 mg tablet, Take 1 tablet by mouth daily With zinc , Disp: , Rfl:     multivitamin (THERAGRAN) TABS, Take 1 tablet by mouth daily, Disp: , Rfl:     spironolactone-hydrochlorothiazide  (ALDACTAZIDE) 25-25 mg per tablet, Take 1 tablet by mouth every morning, Disp: 30 tablet, Rfl: 3    verapamil (CALAN-SR) 180 mg CR tablet, TAKE 1 TABLET (180 MG TOTAL) BY MOUTH 2 (TWO) TIMES A DAY, Disp: 180 tablet, Rfl: 1    Zinc Sulfate (ZINC 15 PO), Take by mouth daily With copper 1mg, Disp: , Rfl:     doxycycline hyclate (VIBRAMYCIN) 100 mg capsule, , Disp: , Rfl:     No Known Allergies    Objective:  There were no vitals filed for this visit.    There is no height or weight on file to calculate BMI.    Ortho Exam    Physical Exam    This document was created using speech voice recognition software.   Grammatical errors, random word insertions, pronoun errors, and incomplete sentences are an occasional consequence of this system due to software limitations, ambient noise, and hardware issues.   Any formal questions or concerns about content, text, or information contained within the body of this dictation should be directly addressed to the provider for clarification.

## 2024-08-06 NOTE — PROGRESS NOTES
PT Evaluation     Today's date: 2024  Patient name: Evelin Thurston  : 1950  MRN: [id number]  Referring provider: Katiana Rodriguez DO  Dx:   Encounter Diagnosis     ICD-10-CM    1. Post-void dribbling  N39.43       2. Stress incontinence of urine  N39.3                      Assessment  Impairments: abnormal muscle firing, abnormal muscle tone, activity intolerance, impaired physical strength, lacks appropriate home exercise program, poor posture  and poor body mechanics    Assessment details:   CASE SUMMARY:   Evelin Thurston is a 74 y.o. year old female who reports onset of symptoms ~  urinary incontinence, midline cystocele and urinary frequency/ urge.   Patient describes symptoms as: annoying, fatiguing, worrisome.  Symptoms are : constant.  Evelin is limited in the following activities: being too far away from toileting facilities, sleeping through the night, going through the day without wearing a pad.      PMHx includes: See chart for full details with medications.     Patient's clinical presentation is consistent with their referring diagnosis of: urinary incontinence and urinary frequency/ urge  POC was discussed and agreed upon with patient.  Patient was educated on: pelvic floor anatomy, Importance of body mechanics and ergonomics in regards to protecting against activities which increase IAP and pressure,  and PT exam and course of treatment.  Patient vocalized a good understanding of  POC and HEP issued. Patient would benefit from skilled physical therapy services to address their aforementioned functional limitations and progress towards prior level of function and independence with home exercise program.      Pelvic floor verbal consent and written consent signed and in chart- LSR 24  Patient deferred second person in room: YES         Understanding of Dx/Px/POC: good     Prognosis: good    Goals  STG (3 weeks)  Patient will be independent with HEP  Patient will demonstrate ability to properly fill  out bowel/ bladder log  Patient will self report sxs decrease by 25%  Patient will demonstrate the ability to perform kegel and downtraining    LTG (8 weeks)  Patient will be independent in comprehensive HEP  Patient will improve pelvic health metric by MDIC  Patient will self report sxs decrease by 75%  Patient will demonstrate the ability to perform sustained kegels in functional positioning     Plan  Patient would benefit from: skilled physical therapy  Planned modality interventions: biofeedback, cryotherapy, TENS, ultrasound and hydrotherapy    Planned therapy interventions: joint mobilization, manual therapy, neuromuscular re-education, patient education, postural training, strengthening, stretching, therapeutic activities, therapeutic exercise, functional ROM exercises, flexibility, graded activity, home exercise program, abdominal trunk stabilization, Galarza taping, kinesiology taping, breathing training and dry needling    Frequency: 1x week  Duration in weeks: 8  Plan of Care beginning date: 8/8/2024  Plan of Care expiration date: 10/3/2024  Treatment plan discussed with: patient    PT Pelvic Floor Subjective:   History of Present Illness:   Sxs stared over a year ago. Did not feel bladder drop but she has started beeling it lately. Went to gyn - had fibroids. Given dx of POP and saw Dr. Smith in [facility] who mentioned that her bladder could be tacked up. In 2011 had parathyroid out and hasn't been constipated until this year.  Takes miralax 3x per week.         Recurrent probem    Quality of life: good    Social Support:     Lives in:  Multiple-level home    Lives with:  Spouse    Relationship status: /committed    Work status: retired    Life stress level: 2 and 3    History of Depression: noPronouns: she/her  Hand dominance:  Left  Diet and Exercise:      Exercise type: strengthening exercise program    Gym 5x days, teach osteoporosis class  OB/ gyn History    Gestational History:      "Prior Pregnancy: Yes      Number of prior pregnancies: 2    Number of term pregnancies: 2    Delivery Type: vaginal delivery    no delivery complications    Menstrual History:      Menopausal: menopause  Bladder Function:     Voiding Difficulties positive for: urgency, hesitancy, straining and incomplete emptying       Voiding Difficulties comments:     Voiding frequency: every 3-4 hours    Urinary leakage: urine leakage    Urinary leakage aggravated by: coughing    Nocturia (episodes per night): 3    Painful urination: No      Intake (ounces): Water: 40,     Intake (ounces) comment: When she feels urgency she is unable to urinate, when she wipes more will come out.     If she goes to bed at 10 she is not up at all.  If she goes to bed at 8:30 she is up 3x to pee    Drinks a gallon of milk a week  Incontinence Management:     Pads/Diaper Use:  Day  Bowel Function:     Voiding DIfficulties: constipation      Bowel frequency: every 3 days    [place] Stool Scale: type 1, type 2 and type 4    Stool softener use: no stool softeners    Enema use: no enema    Uses \"squatty potty\": no Squatty Potty  Sexual Function:     Sexually Active:  Sexually active  Patient Goals:     Patient goals for therapy:  Improved sleep, fully empty bladder or bowels, improved bladder or bowel function and improved comfort      Objective       Abdominal Assessment:          General Perineum Exam:   perineum intact.     Visual Inspection of Perineum:   Excursion of perineal body in cephalad direction with contraction of pelvic floor muscles (PFM): unable  Excursion of perineal body in caudal direction with relaxation of pelvic floor muscles (PFM): weak  Cotton swab test: non-tender  Sensation: intact  Tenderness: unprovoked    Pelvic Organ Prolapse   Position: hook-lying  At rest: moderate  With bearing down: moderate (to the level of hymenal remnants)  Perineal body inspection: within normal limits        Pelvic Floor Muscle Exam:     Muscle " Contraction: unable to perform   Breathing pattern with contraction: within normal limits         PERFECT Score   Power right: 0/5   Power left: 0/5      Perfect Score: Rectal exam: 2/5      pelvic floor exam consent given by patient    Pelvic exam completed: vaginally and rectally     SMEG Biofeedback   to be assessed next treatment           Insurance:  [employer]/CMS Eval/ Re-eval POC expires PFDI Auth #/ Referral # Total    Start date  Expiration date Extension  Visit limitation?  PT only or  PT+OT? Co-Insurance   CMS 8/8/24 10/3/24 [phone]                                                                       AUTH #:  Date               Authed: Used                Remaining                   Precautions: standard      Date: 1/18/24         Session: IE         Manuals                                                  Neuro Re-Ed          360 breathing          Diaphragmatic breathing          TA engagement          TA+ kegel          Kegel + SLR                                                                      Ther Ex          Geno pose          Cat-cow          Lord of the half fish          Happy baby                                                  Ther Activity Bowel and bladder education                             Gait Training                              Modalities

## 2024-08-07 ENCOUNTER — OFFICE VISIT (OUTPATIENT)
Dept: PHYSICAL THERAPY | Facility: CLINIC | Age: 74
End: 2024-08-07
Payer: MEDICARE

## 2024-08-07 DIAGNOSIS — M54.2 NECK PAIN: Primary | ICD-10-CM

## 2024-08-07 DIAGNOSIS — R06.02 EXERTIONAL SHORTNESS OF BREATH: ICD-10-CM

## 2024-08-07 PROCEDURE — 97140 MANUAL THERAPY 1/> REGIONS: CPT

## 2024-08-07 PROCEDURE — 97112 NEUROMUSCULAR REEDUCATION: CPT

## 2024-08-07 PROCEDURE — 97110 THERAPEUTIC EXERCISES: CPT

## 2024-08-07 NOTE — PROGRESS NOTES
"Daily Note     Today's date: 2024  Patient name: Evelin Thurston  : 1950  MRN: [id number]  Referring provider: Prasanna Fisher MD  Dx:   Encounter Diagnosis     ICD-10-CM    1. Neck pain  M54.2                      Subjective: Patient reports the symptoms are similar and pain remains worse at night. Turning her head quickly causes sharper pain.       Objective: See treatment diary below      Assessment: Tolerated treatment well with improved tolerance to STM vs IASTM. Patient denied any increase in pain with additional exercises today. Patient reported slight improvement in symptoms with cervical snags into rotation. Patient would benefit from continued PT      Plan: Continue per plan of care.      Precautions:   Past Medical History:   Diagnosis Date    Arthritis     Chronic kidney disease     stage 3    Hyperlipidemia     borderline    Hypertension     borderline    Migraine     Migraine     controlled with verapamil    Osteopenia     Snores      SOC: 2024  FOTO: 2024  POC Expiration: 10/22/204  Daily Treatment Log:  Date 2024     Visit # 1 2 3     Auth         Auth exp        Manual  8'  8'     IASTM to UT and cervical   EG (mild irritation into cervical region) STM to upper and lower cervical              There Exer 10' 15' 15'      UBE  1.5 fwd/back for 3' total       Corner stretch   5\" 2x5  5\" 2x5      Levator scap stretch         UT stretch   3\" x5 for tolerance  3\" x5 for tolerance      Wall slides   5\" x5 5\" 2x5      B/L shoulder ER    1x10              HEP Created and discussed with [facility] on centralization vs peripheralization.  Updated and discussed  Updated and discussed      There Activ                                                        NMReed  12' 15'     Cervical retractions   1x10 3\" x10      Scapular retractions   1x10  1x10      Rows   Blue tubing 1x15  Blue tubing 1x15     B/L shoulder ext   Blue tubing 1x10  Blue tubing 1x15     Cervical rot snags w/ " "towel    3\" x10              Modalities        Cervical MH    W/ HEP edu 5' skin intact pre/post                      HEP:   Access Code: [email or web address]  URL: [email or web address]  Date: 08/07/2024  Prepared by: Purnima Giles    Exercises  - Seated Passive Cervical Retraction  - 5 x daily - 7 x weekly - 1 sets - 10 reps  - Seated Scapular Retraction  - 1 x daily - 7 x weekly - 1 sets - 10 reps  - Shoulder Flexion Wall Slide with Towel  - 1 x daily - 7 x weekly - 1 sets - 5 reps - 5 sec hold  - Corner Pec Major Stretch  - 1 x daily - 7 x weekly - 1 sets - 10 reps - 5 sec hold  - Seated Assisted Cervical Rotation with Towel  - 1 x daily - 7 x weekly - 1 sets - 10 reps - 3sec hold       "

## 2024-08-08 ENCOUNTER — EVALUATION (OUTPATIENT)
Dept: PHYSICAL THERAPY | Facility: CLINIC | Age: 74
End: 2024-08-08
Payer: MEDICARE

## 2024-08-08 ENCOUNTER — PROCEDURE VISIT (OUTPATIENT)
Dept: OBGYN CLINIC | Facility: CLINIC | Age: 74
End: 2024-08-08
Payer: MEDICARE

## 2024-08-08 DIAGNOSIS — M17.11 PRIMARY OSTEOARTHRITIS OF RIGHT KNEE: Primary | ICD-10-CM

## 2024-08-08 DIAGNOSIS — N81.11 MIDLINE CYSTOCELE: ICD-10-CM

## 2024-08-08 DIAGNOSIS — N39.3 STRESS INCONTINENCE OF URINE: ICD-10-CM

## 2024-08-08 DIAGNOSIS — G89.29 CHRONIC PAIN OF RIGHT KNEE: ICD-10-CM

## 2024-08-08 DIAGNOSIS — M25.561 CHRONIC PAIN OF RIGHT KNEE: ICD-10-CM

## 2024-08-08 DIAGNOSIS — N39.43 POST-VOID DRIBBLING: Primary | ICD-10-CM

## 2024-08-08 PROCEDURE — 20610 DRAIN/INJ JOINT/BURSA W/O US: CPT | Performed by: ORTHOPAEDIC SURGERY

## 2024-08-08 PROCEDURE — 97162 PT EVAL MOD COMPLEX 30 MIN: CPT

## 2024-08-08 RX ORDER — SPIRONOLACTONE AND HYDROCHLOROTHIAZIDE 25; 25 MG/1; MG/1
1 TABLET ORAL EVERY MORNING
Qty: 100 TABLET | Refills: 1 | Status: SHIPPED | OUTPATIENT
Start: 2024-08-08

## 2024-08-08 NOTE — PROGRESS NOTES
Assessment/Plan:  1. Primary osteoarthritis of right knee  Large joint arthrocentesis: R knee      2. Chronic pain of right knee  Large joint arthrocentesis: R knee        Scribe Attestation      I,:  Olivier Blancas am acting as a scribe while in the presence of the attending physician.:       I,:  Melchor Moreland, DO personally performed the services described in this documentation    as scribed in my presence.:           Evelin is a pleasant 74-year-old female who returns today for the second injection in her Orthovisc series for her right knee.  She tolerated the first injection in the series well.  She consented to and underwent the second injection as documented below without issue or complication and this was well-tolerated by the patient.  Postinjection instructions were provided.  We will see her back in 1 week for the third and final injection.    Large joint arthrocentesis: R knee  Universal Protocol:  Consent: Verbal consent obtained.  Risks and benefits: risks, benefits and alternatives were discussed  Consent given by: patient  Patient understanding: patient states understanding of the procedure being performed  Site marked: the operative site was marked  Patient identity confirmed: verbally with patient  Supporting Documentation  Indications: pain   Procedure Details  Location: knee - R knee  Preparation: Patient was prepped and draped in the usual sterile fashion  Needle size: 20 G  Ultrasound guidance: no  Approach: anterolateral  Medications administered: 30 mg sodium hyaluronate 30 mg/2 mL    Patient tolerance: patient tolerated the procedure well with no immediate complications  Dressing:  Sterile dressing applied          Subjective: Right knee Orthovisc 2    Patient ID: Evelin Tuhrston is a 74 y.o. female who returns today for the second injection in her Orthovisc series for her right knee.  She tolerated the first injection of the series well.  She denies any new injury or trauma.    Review of  Systems      Past Medical History:   Diagnosis Date    Arthritis     Chronic kidney disease     stage 3    Hyperlipidemia     borderline    Hypertension     borderline    Migraine     Migraine     controlled with verapamil    Osteopenia     Snores        Past Surgical History:   Procedure Laterality Date    BREAST BIOPSY Right     benign ~ 1990s    BUNIONECTOMY Left     3 hammertoes, bunion    CATARACT EXTRACTION Bilateral 2012    COLONOSCOPY      PARATHYROID GLAND SURGERY Right     front gland removed       Family History   Problem Relation Age of Onset    Angina Mother     Heart failure Mother     Prostate cancer Father     Cancer Father         prostate    Diabetes Sister     Sudden death Sister     Osteoporosis Sister     Heart disease Sister         MI    COPD Sister     Kidney disease Sister     Heart disease Sister         CHF    Hypertension Daughter     Hypertension Brother     Cancer Brother         lung    Diabetes Brother     Cancer Brother         prostate    No Known Problems Son     Lung cancer Maternal Uncle     Breast cancer Cousin 50    Prostate cancer Other 51       Social History     Occupational History    Not on file   Tobacco Use    Smoking status: Never     Passive exposure: Never    Smokeless tobacco: Never   Vaping Use    Vaping status: Never Used   Substance and Sexual Activity    Alcohol use: No    Drug use: No    Sexual activity: Not on file         Current Outpatient Medications:     atorvastatin (LIPITOR) 20 mg tablet, Take 1 tablet (20 mg total) by mouth daily, Disp: 90 tablet, Rfl: 3    calcium carbonate (OS-FLORIAN) 600 MG tablet, Take one tablet daily, Disp: , Rfl:     Cholecalciferol (VITAMIN D3) 5000 units CAPS, Take 1 capsule by mouth Daily, Disp: , Rfl:     Coenzyme Q10 (Co Q 10) 100 MG CAPS, Take by mouth every morning, Disp: , Rfl:     dapagliflozin (Farxiga) 10 MG tablet, Take 1 tablet (10 mg total) by mouth every morning, Disp: 30 tablet, Rfl: 3    doxycycline hyclate  (VIBRAMYCIN) 100 mg capsule, , Disp: , Rfl:     magnesium oxide (MAG-OX) 400 mg tablet, Take 1 tablet by mouth daily With zinc , Disp: , Rfl:     multivitamin (THERAGRAN) TABS, Take 1 tablet by mouth daily, Disp: , Rfl:     spironolactone-hydrochlorothiazide (ALDACTAZIDE) 25-25 mg per tablet, Take 1 tablet by mouth every morning, Disp: 30 tablet, Rfl: 3    verapamil (CALAN-SR) 180 mg CR tablet, TAKE 1 TABLET (180 MG TOTAL) BY MOUTH 2 (TWO) TIMES A DAY, Disp: 180 tablet, Rfl: 1    Zinc Sulfate (ZINC 15 PO), Take by mouth daily With copper 1mg, Disp: , Rfl:     No Known Allergies    Objective:  There were no vitals filed for this visit.    There is no height or weight on file to calculate BMI.    Ortho Exam    Physical Exam    This document was created using speech voice recognition software.   Grammatical errors, random word insertions, pronoun errors, and incomplete sentences are an occasional consequence of this system due to software limitations, ambient noise, and hardware issues.   Any formal questions or concerns about content, text, or information contained within the body of this dictation should be directly addressed to the provider for clarification.

## 2024-08-09 ENCOUNTER — OFFICE VISIT (OUTPATIENT)
Dept: PHYSICAL THERAPY | Facility: CLINIC | Age: 74
End: 2024-08-09
Payer: MEDICARE

## 2024-08-09 DIAGNOSIS — M54.2 NECK PAIN: Primary | ICD-10-CM

## 2024-08-09 PROCEDURE — 97110 THERAPEUTIC EXERCISES: CPT

## 2024-08-09 PROCEDURE — 97112 NEUROMUSCULAR REEDUCATION: CPT

## 2024-08-09 NOTE — PROGRESS NOTES
"Daily Note     Today's date: 2024  Patient name: Evelin Thurston  : 1950  MRN: [id number]  Referring provider: Prasanna Fisher MD  Dx:   Encounter Diagnosis     ICD-10-CM    1. Neck pain  M54.2                      Subjective: Patient reports she was sore the last 2 days but is a bit better today. Was unsure if she slept wrong or if it was something else.       Objective: See treatment diary below      Assessment: Tolerated treatment well. Deferred STM today to assess if this is causing increased, may include in future sessions if indicated. Patient denied any increase in pain with exercises today. Patient would benefit from continued PT      Plan: Continue per plan of care.      Precautions:   Past Medical History:   Diagnosis Date    Arthritis     Chronic kidney disease     stage 3    Hyperlipidemia     borderline    Hypertension     borderline    Migraine     Migraine     controlled with verapamil    Osteopenia     Snores      SOC: 2024  FOTO: 2024  POC Expiration: 10/22/204  Daily Treatment Log:  Date 2024    Visit # 1 2 3 4    Auth         Auth exp        Manual  8'  8'     IASTM to UT and cervical   EG (mild irritation into cervical region) STM to upper and lower cervical  Deferred             There Exer 10' 15' 15'  20'    UBE  1.5 fwd/back for 3' total   1.5 fwd/back for 3' total    Corner stretch   5\" 2x5  5\" 2x5  5\" 2x5     Levator scap stretch         UT stretch   3\" x5 for tolerance  3\" x5 for tolerance  3\" x5 for tolerance     Wall slides   5\" x5 5\" 2x5  5\" 2x10     B/L shoulder ER    1x10  1x10             HEP Created and discussed with [facility] on centralization vs peripheralization.  Updated and discussed  Updated and discussed  Discussed with [facility] to monitor symptoms and report back next session     There Activ                                                        NMReed  12' 15' 15'    Cervical retractions   1x10 3\" x10  3\" x10     Scapular retractions   " "1x10  1x10  1x10 4    Rows   Blue tubing 1x15  Blue tubing 1x15 Blue tubing 1x15    B/L shoulder ext   Blue tubing 1x10  Blue tubing 1x15 Blue tubing 1x15    Cervical rot snags w/ towel    3\" x10  3\" x10             Modalities        Cervical MH    W/ HEP edu 5' skin intact pre/post  W/ edu 5' skin intact pre/post                     HEP:   Access Code: [email or web address]  URL: [email or web address]  Date: 08/07/2024  Prepared by: Purnima Giles    Exercises  - Seated Passive Cervical Retraction  - 5 x daily - 7 x weekly - 1 sets - 10 reps  - Seated Scapular Retraction  - 1 x daily - 7 x weekly - 1 sets - 10 reps  - Shoulder Flexion Wall Slide with Towel  - 1 x daily - 7 x weekly - 1 sets - 5 reps - 5 sec hold  - Corner Pec Major Stretch  - 1 x daily - 7 x weekly - 1 sets - 10 reps - 5 sec hold  - Seated Assisted Cervical Rotation with Towel  - 1 x daily - 7 x weekly - 1 sets - 10 reps - 3sec hold       "

## 2024-08-13 ENCOUNTER — HOSPITAL ENCOUNTER (OUTPATIENT)
Dept: PULMONOLOGY | Facility: HOSPITAL | Age: 74
Discharge: HOME | End: 2024-08-13
Payer: MEDICARE

## 2024-08-13 DIAGNOSIS — R06.09 DYSPNEA ON EXERTION: ICD-10-CM

## 2024-08-13 PROCEDURE — 94726 PLETHYSMOGRAPHY LUNG VOLUMES: CPT

## 2024-08-13 PROCEDURE — 94729 DIFFUSING CAPACITY: CPT | Performed by: STUDENT IN AN ORGANIZED HEALTH CARE EDUCATION/TRAINING PROGRAM

## 2024-08-13 PROCEDURE — 94726 PLETHYSMOGRAPHY LUNG VOLUMES: CPT | Performed by: STUDENT IN AN ORGANIZED HEALTH CARE EDUCATION/TRAINING PROGRAM

## 2024-08-13 PROCEDURE — 94760 N-INVAS EAR/PLS OXIMETRY 1: CPT

## 2024-08-13 PROCEDURE — 94060 EVALUATION OF WHEEZING: CPT | Performed by: STUDENT IN AN ORGANIZED HEALTH CARE EDUCATION/TRAINING PROGRAM

## 2024-08-13 PROCEDURE — 94060 EVALUATION OF WHEEZING: CPT

## 2024-08-13 PROCEDURE — 94729 DIFFUSING CAPACITY: CPT

## 2024-08-13 RX ORDER — ALBUTEROL SULFATE 0.83 MG/ML
2.5 SOLUTION RESPIRATORY (INHALATION) ONCE
Status: COMPLETED | OUTPATIENT
Start: 2024-08-13 | End: 2024-08-13

## 2024-08-13 RX ADMIN — ALBUTEROL SULFATE 2.5 MG: 2.5 SOLUTION RESPIRATORY (INHALATION) at 08:51

## 2024-08-14 ENCOUNTER — OFFICE VISIT (OUTPATIENT)
Dept: PHYSICAL THERAPY | Facility: CLINIC | Age: 74
End: 2024-08-14
Payer: MEDICARE

## 2024-08-14 DIAGNOSIS — M54.2 NECK PAIN: Primary | ICD-10-CM

## 2024-08-14 PROCEDURE — 97112 NEUROMUSCULAR REEDUCATION: CPT

## 2024-08-14 PROCEDURE — 97110 THERAPEUTIC EXERCISES: CPT

## 2024-08-14 NOTE — PROGRESS NOTES
"Daily Note     Today's date: 2024  Patient name: Evelin Thurston  : 1950  MRN: [id number]  Referring provider: Prasanna Fisher MD  Dx:   Encounter Diagnosis     ICD-10-CM    1. Neck pain  M54.2                      Subjective: pt reports that her neck is sore today and was sore yesterday, she was very busy and did not get to a lot of her HEP due to this.       Objective: See treatment diary below      Assessment: Tolerated treatment well. Pt tolerated additional reps and progression of postural strengthening well w/o complaints today. Dem dec cerv stiffness and discomfort post session. Patient would benefit from continued PT      Plan: Continue per plan of care.      Precautions:   Past Medical History:   Diagnosis Date    Arthritis     Chronic kidney disease     stage 3    Hyperlipidemia     borderline    Hypertension     borderline    Migraine     Migraine     controlled with verapamil    Osteopenia     Snores      SOC: 2024  FOTO: 2024  POC Expiration: 10/22/204  Daily Treatment Log:  Date 2024 8/[id number]   Visit # 1 2 3 4 5    Auth         Auth exp        Manual  8'  8'  5'    IASTM to UT and cervical   EG (mild irritation into cervical region) STM to upper and lower cervical  Deferred  RB L cerv/UT            There Exer 10' 15' 15'  20' 20'    UBE  1.5 fwd/back for 3' total   1.5 fwd/back for 3' total 1.5'  ea fwd/bwd 3'    Corner stretch   5\" 2x5  5\" 2x5  5\" 2x5  10\"x5    Levator scap stretch         UT stretch   3\" x5 for tolerance  3\" x5 for tolerance  3\" x5 for tolerance  More pain than stretch today    Wall slides   5\" x5 5\" 2x5  5\" 2x10  5\" 2x10    B/L shoulder ER    1x10  1x10  YTB 2x10    B/L shoulder horz abd      YTB 2x10    HEP Created and discussed with [facility] on centralization vs peripheralization.  Updated and discussed  Updated and discussed  Discussed with [facility] to monitor symptoms and report back next session     There Activ                            " "                            NMReed  12' 15' 15' 15'    Cervical retractions   1x10 3\" x10  3\" x10  3\"x15    Scapular retractions   1x10  1x10  1x10     Rows   Blue tubing 1x15  Blue tubing 1x15 Blue tubing 1x15 [place] 12# 2x10    B/L shoulder ext   Blue tubing 1x10  Blue tubing 1x15 Blue tubing 1x15 [place] 8.5# 2x10    Cervical rot snags w/ towel    3\" x10  3\" x10  5\"x10 R/L            Modalities        Cervical MH    W/ HEP edu 5' skin intact pre/post  W/ edu 5' skin intact pre/post                     HEP:   Access Code: [email or web address]  URL: [email or web address]  Date: 08/07/2024  Prepared by: Purnima Giles    Exercises  - Seated Passive Cervical Retraction  - 5 x daily - 7 x weekly - 1 sets - 10 reps  - Seated Scapular Retraction  - 1 x daily - 7 x weekly - 1 sets - 10 reps  - Shoulder Flexion Wall Slide with Towel  - 1 x daily - 7 x weekly - 1 sets - 5 reps - 5 sec hold  - Corner Pec Major Stretch  - 1 x daily - 7 x weekly - 1 sets - 10 reps - 5 sec hold  - Seated Assisted Cervical Rotation with Towel  - 1 x daily - 7 x weekly - 1 sets - 10 reps - 3sec hold         "

## 2024-08-15 ENCOUNTER — OFFICE VISIT (OUTPATIENT)
Dept: PHYSICAL THERAPY | Facility: CLINIC | Age: 74
End: 2024-08-15
Payer: MEDICARE

## 2024-08-15 ENCOUNTER — PROCEDURE VISIT (OUTPATIENT)
Dept: OBGYN CLINIC | Facility: CLINIC | Age: 74
End: 2024-08-15
Payer: MEDICARE

## 2024-08-15 DIAGNOSIS — M17.11 PRIMARY OSTEOARTHRITIS OF RIGHT KNEE: Primary | ICD-10-CM

## 2024-08-15 DIAGNOSIS — M25.561 CHRONIC PAIN OF RIGHT KNEE: ICD-10-CM

## 2024-08-15 DIAGNOSIS — G89.29 CHRONIC PAIN OF RIGHT KNEE: ICD-10-CM

## 2024-08-15 DIAGNOSIS — M54.2 NECK PAIN: Primary | ICD-10-CM

## 2024-08-15 PROCEDURE — 20610 DRAIN/INJ JOINT/BURSA W/O US: CPT | Performed by: ORTHOPAEDIC SURGERY

## 2024-08-15 PROCEDURE — 97112 NEUROMUSCULAR REEDUCATION: CPT

## 2024-08-15 PROCEDURE — 97110 THERAPEUTIC EXERCISES: CPT

## 2024-08-15 NOTE — PROGRESS NOTES
"Daily Note     Today's date: 8/15/2024  Patient name: Evelin Thurston  : 1950  MRN: [id number]  Referring provider: Prasanna Fisher MD  Dx:   Encounter Diagnosis     ICD-10-CM    1. Neck pain  M54.2                      Subjective: Pt states that she was a bit sore last night; thinks it is due to how she is sleeping. She inquires about pillows that will offer best support. She felt better this morning after going through her exercises.       Objective: See treatment diary below      Assessment: Pt notes \"twinge\" in R UT at end of corner stretch; this resolved momentarily. Performed IASTM to B/L upper trapezius. Educated pt on different options regarding pillow positioning for optimal support and comfort with sleep; trialed in session. Corrected foot positioning with corner stretch and wall slides; instructed pt to stand closer to wall. Tolerated treatment well. Patient would benefit from continued PT.       Plan: Continue per plan of care.  Progress treatment as tolerated.       Precautions:   Past Medical History:   Diagnosis Date    Arthritis     Chronic kidney disease     stage 3    Hyperlipidemia     borderline    Hypertension     borderline    Migraine     Migraine     controlled with verapamil    Osteopenia     Snores      SOC: 2024  FOTO: 2024  POC Expiration: 10/22/204  Daily Treatment Log:  Date 2024 Next session [id number]   Visit #   3 4 5    Auth         Auth exp        Manual 5'  8'  5'    IASTM to UT and cervical  KE cervical/ UT B/L   STM to upper and lower cervical  Deferred  RB L cerv/UT            There Exer 20'  15'  20' 20'    UBE 1.5'  ea fwd/bwd 3'    1.5 fwd/back for 3' total 1.5'  ea fwd/bwd 3'    Corner stretch  10\"x5   5\" 2x5  5\" 2x5  10\"x5    Levator scap stretch         UT stretch    3\" x5 for tolerance  3\" x5 for tolerance  More pain than stretch today    Wall slides  5\" 2x10   5\" 2x5  5\" 2x10  5\" 2x10    B/L shoulder ER  YTB 2x10  1x10  1x10  YTB 2x10  " "  B/L shoulder horz abd  YTB 2x10    YTB 2x10    HEP Reviewed pillow placement  for cervical support w/sleep; trialed various options  Updated and discussed  Discussed with edu to monitor symptoms and report back next session     There Activ                                                        NMReed 15'  15' 15' 15'    Cervical retractions  3\"x15   3\" x10  3\" x10  3\"x15    Scapular retractions  1x10   1x10  1x10     Rows  [place] 12# 2x10   Blue tubing 1x15 Blue tubing 1x15 [place] 12# 2x10    B/L shoulder ext  Blue tubing 2x10  Blue tubing 1x15 Blue tubing 1x15 [place] 8.5# 2x10    Cervical rot snags w/ towel  5\"x10 R/L   3\" x10  3\" x10  5\"x10 R/L            Modalities        Cervical MH    W/ HEP edu 5' skin intact pre/post  W/ edu 5' skin intact pre/post                     HEP:   Access Code: [email or web address]  URL: [email or web address]  Date: 08/07/2024  Prepared by: Purnima Giles    Exercises  - Seated Passive Cervical Retraction  - 5 x daily - 7 x weekly - 1 sets - 10 reps  - Seated Scapular Retraction  - 1 x daily - 7 x weekly - 1 sets - 10 reps  - Shoulder Flexion Wall Slide with Towel  - 1 x daily - 7 x weekly - 1 sets - 5 reps - 5 sec hold  - Corner Pec Major Stretch  - 1 x daily - 7 x weekly - 1 sets - 10 reps - 5 sec hold  - Seated Assisted Cervical Rotation with Towel  - 1 x daily - 7 x weekly - 1 sets - 10 reps - 3sec hold           "

## 2024-08-15 NOTE — PROGRESS NOTES
Assessment/Plan:  1. Primary osteoarthritis of right knee  Large joint arthrocentesis: R knee      2. Chronic pain of right knee          Scribe Attestation      I,:  Jaylin Em MA am acting as a scribe while in the presence of the attending physician.:       I,:  Melchor Moreland DO personally performed the services described in this documentation    as scribed in my presence.:           74-year-old female who presents to the office today for follow up evaluation right knee Orthovisc #3. The patient consented and underwent her 3rd and final right knee Orthovisc #3 in the office today without any complications. She may follow up with me as needed.    Large joint arthrocentesis: R knee  [place] Protocol:  procedure performed by consultantConsent: Verbal consent obtained.  Consent given by: patient  Patient identity confirmed: verbally with patient  Supporting Documentation  Indications: pain   Procedure Details  Location: knee - R knee  Preparation: Patient was prepped and draped in the usual sterile fashion  Needle size: 20 G  Approach: anterolateral  Medications administered: 30 mg sodium hyaluronate 30 mg/2 mL    Patient tolerance: patient tolerated the procedure well with no immediate complications  Dressing:  Sterile dressing applied            Subjective: right knee Orthovisc #3    Patient ID: Evelin Thurston is a 74 y.o. female who presents to the office today for follow up evaluation right knee Orthovisc #3. She has been tolerating the series well.     Review of Systems   Constitutional:  Negative for chills and fever.   HENT:  Negative for drooling and sneezing.    Eyes:  Negative for redness.   Respiratory:  Negative for cough and wheezing.    Gastrointestinal:  Negative for nausea and vomiting.   Musculoskeletal:  Positive for arthralgias. Negative for joint swelling and myalgias.   Neurological:  Negative for weakness and numbness.   Psychiatric/Behavioral:  Negative for behavioral problems. The patient  is not nervous/anxious.          Past Medical History:   Diagnosis Date    Arthritis     Chronic kidney disease     stage 3    Hyperlipidemia     borderline    Hypertension     borderline    Migraine     Migraine     controlled with verapamil    Osteopenia     Snores        Past Surgical History:   Procedure Laterality Date    BREAST BIOPSY Right     benign ~ 1990s    BUNIONECTOMY Left     3 hammertoes, bunion    CATARACT EXTRACTION Bilateral 2012    COLONOSCOPY      PARATHYROID GLAND SURGERY Right     front gland removed       Family History   Problem Relation Age of Onset    Angina Mother     Heart failure Mother     Prostate cancer Father     Cancer Father         prostate    Diabetes Sister     Sudden death Sister     Osteoporosis Sister     Heart disease Sister         MI    COPD Sister     Kidney disease Sister     Heart disease Sister         CHF    Hypertension Daughter     Hypertension Brother     Cancer Brother         lung    Diabetes Brother     Cancer Brother         prostate    No Known Problems Son     Lung cancer Maternal Uncle     Breast cancer Cousin 50    Prostate cancer Other 51       Social History     Occupational History    Not on file   Tobacco Use    Smoking status: Never     Passive exposure: Never    Smokeless tobacco: Never   Vaping Use    Vaping status: Never Used   Substance and Sexual Activity    Alcohol use: No    Drug use: No    Sexual activity: Not on file         Current Outpatient Medications:     atorvastatin (LIPITOR) 20 mg tablet, Take 1 tablet (20 mg total) by mouth daily, Disp: 90 tablet, Rfl: 3    calcium carbonate (OS-FLORIAN) 600 MG tablet, Take one tablet daily, Disp: , Rfl:     Cholecalciferol (VITAMIN D3) 5000 units CAPS, Take 1 capsule by mouth Daily, Disp: , Rfl:     Coenzyme Q10 (Co Q 10) 100 MG CAPS, Take by mouth every morning, Disp: , Rfl:     dapagliflozin (Farxiga) 10 MG tablet, Take 1 tablet (10 mg total) by mouth every morning, Disp: 30 tablet, Rfl: 3    magnesium  oxide (MAG-OX) 400 mg tablet, Take 1 tablet by mouth daily With zinc , Disp: , Rfl:     multivitamin (THERAGRAN) TABS, Take 1 tablet by mouth daily, Disp: , Rfl:     spironolactone-hydrochlorothiazide (ALDACTAZIDE) 25-25 mg per tablet, TAKE 1 TABLET BY MOUTH EVERY MORNING, Disp: 100 tablet, Rfl: 1    verapamil (CALAN-SR) 180 mg CR tablet, TAKE 1 TABLET (180 MG TOTAL) BY MOUTH 2 (TWO) TIMES A DAY, Disp: 180 tablet, Rfl: 1    Zinc Sulfate (ZINC 15 PO), Take by mouth daily With copper 1mg, Disp: , Rfl:     doxycycline hyclate (VIBRAMYCIN) 100 mg capsule, , Disp: , Rfl:     No Known Allergies    Objective:  There were no vitals filed for this visit.    There is no height or weight on file to calculate BMI.    Ortho Exam    Physical Exam  Vitals and nursing note reviewed.   Constitutional:       Appearance: Normal appearance. She is well-developed.   HENT:      Head: Normocephalic and atraumatic.      Nose: Nose normal.   Eyes:      General: No scleral icterus.        Right eye: No discharge.         Left eye: No discharge.      Extraocular Movements: Extraocular movements intact.      Conjunctiva/sclera: Conjunctivae normal.   Cardiovascular:      Rate and Rhythm: Normal rate.   Pulmonary:      Effort: Pulmonary effort is normal. No respiratory distress.   Musculoskeletal:      Cervical back: Normal range of motion and neck supple.      Comments: As noted in HPI   Skin:     General: Skin is warm and dry.   Neurological:      Mental Status: She is alert and oriented to person, place, and time.   Psychiatric:         Behavior: Behavior normal.         Thought Content: Thought content normal.         Judgment: Judgment normal.         I have personally reviewed pertinent films in PACS.  No new images reviewed.     This document was created using speech voice recognition software.   Grammatical errors, random word insertions, pronoun errors, and incomplete sentences are an occasional consequence of this system due to  software limitations, ambient noise, and hardware issues.   Any formal questions or concerns about content, text, or information contained within the body of this dictation should be directly addressed to the provider for clarification.

## 2024-08-19 ENCOUNTER — TELEPHONE (OUTPATIENT)
Dept: PULMONOLOGY | Facility: CLINIC | Age: 74
End: 2024-08-19

## 2024-08-19 NOTE — TELEPHONE ENCOUNTER
called the patient regarding her pulmonary function studies. As Dr ramirez said all components of this testing is normal.  This implies that there is no major lung dysfunction accounting for shortness of breath.  It is important to follow through with sleep testing as sleep apnea can cause shortness of breath as a symptom. Left our phone number if she has any questions.

## 2024-08-20 ENCOUNTER — OFFICE VISIT (OUTPATIENT)
Dept: PHYSICAL THERAPY | Facility: CLINIC | Age: 74
End: 2024-08-20
Payer: MEDICARE

## 2024-08-20 DIAGNOSIS — M54.2 NECK PAIN: Primary | ICD-10-CM

## 2024-08-20 PROCEDURE — 97112 NEUROMUSCULAR REEDUCATION: CPT

## 2024-08-20 PROCEDURE — 97110 THERAPEUTIC EXERCISES: CPT

## 2024-08-20 PROCEDURE — 97140 MANUAL THERAPY 1/> REGIONS: CPT

## 2024-08-20 NOTE — PROGRESS NOTES
"Daily Note     Today's date: 2024  Patient name: Evelin Thurston  : 1950  MRN: [id number]  Referring provider: Prasanna Fisher MD  Dx:   Encounter Diagnosis     ICD-10-CM    1. Neck pain  M54.2                      Subjective: pt reports pain 5/10 on arrival to session today. \"Yesterday was really good with only a dull ache and not real pain but today things are more sore.\" Pt reports that she was playing with how many pillows she was using at the instruction of her PT last visit.       Objective: See treatment diary below  Cerv retract w/ ext= dec B pain w/ rotations; improved ROM       Assessment: Tolerated treatment well. Pt dem dec pain and improving ROM w/ progression to cerv retract w/ ext. Pt to cont with this in HEP and cont to assess responses until her next visit, advised to cease exercise if her symptoms worsen during this time. Cont to progress postural strengthening and posture correction as able. Pt edu to cont to trial what pillow positioning keeps her cervical spine in a neutral position. Patient would benefit from continued PT      Plan: Continue per plan of care.      Precautions:   Past Medical History:   Diagnosis Date    Arthritis     Chronic kidney disease     stage 3    Hyperlipidemia     borderline    Hypertension     borderline    Migraine     Migraine     controlled with verapamil    Osteopenia     Snores      SOC: 2024  FOTO: 2024  POC Expiration: 10/22/204  Daily Treatment Log:  Date 2024 [id number]   Visit #  7  4 5    Auth         Auth exp        Manual 5' 10'    5'    IASTM to UT and cervical  KE cervical/ UT B/L  RB cervical/ UT B/L   Deferred  RB L cerv/UT            There Exer 20' 10'   20' 20'    UBE 1.5'  ea fwd/bwd 3'    1.5 fwd/back for 3' total 1.5'  ea fwd/bwd 3'    Corner stretch  10\"x5  10\"x5   5\" 2x5  10\"x5    Levator scap stretch         UT stretch     3\" x5 for tolerance  More pain than stretch today    Wall slides  5\" 2x10  5\"x10  " " 5\" 2x10  5\" 2x10    B/L shoulder ER  YTB 2x10   1x10  YTB 2x10    B/L shoulder horz abd  YTB 2x10    YTB 2x10    HEP Reviewed pillow placement  for cervical support w/sleep; trialed various options   Discussed with edu to monitor symptoms and report back next session     There Activ                                                        NMReed 15' 25'   15' 15'    Cervical retractions  3\"x15  3\"x15     Retract w/ ext 2x10   3\" x10  3\"x15    Scapular retractions  1x10    1x10     Rows  [place] 12# 2x10  Blk tubing 2x10  Blue tubing 1x15 [place] 12# 2x10    B/L shoulder ext  Blue tubing 2x10 Blue tubing 2x10   Blue tubing 1x15 Brittney 8.5# 2x10    Cervical rot snags w/ towel  5\"x10 R/L  10\"x5 R/L   3\" x10  5\"x10 R/L            Modalities        Cervical MH     W/ edu 5' skin intact pre/post                     Access Code: [email or web address]  URL: [email or web address]  Date: 08/20/2024  Prepared by: Keisha Novoa    Exercises  - Seated Passive Cervical Retraction  - 3 x daily - 7 x weekly - 1 sets - 10 reps  - Seated Cervical Retraction and Extension  - 5 x daily - 7 x weekly - 1 sets - 10 reps  - Seated Scapular Retraction  - 1 x daily - 7 x weekly - 1 sets - 10 reps  - Shoulder Flexion Wall Slide with Towel  - 1 x daily - 7 x weekly - 1 sets - 5 reps - 5 sec hold  - Corner Pec Major Stretch  - 1 x daily - 7 x weekly - 1 sets - 10 reps - 5 sec hold  - Seated Assisted Cervical Rotation with Towel  - 1 x daily - 7 x weekly - 1 sets - 10 reps - 3sec hold             "

## 2024-08-22 ENCOUNTER — EVALUATION (OUTPATIENT)
Dept: PHYSICAL THERAPY | Facility: CLINIC | Age: 74
End: 2024-08-22
Payer: MEDICARE

## 2024-08-22 DIAGNOSIS — M54.2 NECK PAIN: Primary | ICD-10-CM

## 2024-08-22 PROCEDURE — 97110 THERAPEUTIC EXERCISES: CPT

## 2024-08-22 PROCEDURE — 97112 NEUROMUSCULAR REEDUCATION: CPT

## 2024-08-22 PROCEDURE — 97140 MANUAL THERAPY 1/> REGIONS: CPT

## 2024-08-22 NOTE — PROGRESS NOTES
PT Re-Evaluation     Today's date: 2024  Patient name: Evelin Thurston  : 1950  MRN: [id number]  Referring provider: Prasanna Fisher MD  Dx:   Encounter Diagnosis     ICD-10-CM    1. Neck pain  M54.2                      Assessment  Impairments: abnormal or restricted ROM, pain with function, poor posture  and poor body mechanics    Assessment details: Evelin Thurston is a 74 y.o. female who presents with improvements in pain, UE strength, cervical  ROM, and postural awareness. Noted slight improvement in forward head with rounding shoulders. Some deficits are still present however patient feels she is managing well with HEP. Patient to trial HEP only for 2 weeks and should symptoms increase she may return. Should symptoms remain well managed she will D/C to HEP.  Due to patients deficits, she has difficulty performing ADL's, recreational activities, engaging in social activities, lifting/carrying, reaching. Mechanical assessment demo improved symptoms with cervical retraction with extension at this time. Patient demo understanding of edu on centralization and peripheralization and when to stop vs continue based on results. Patient has been educated in home exercise program and plan of care. Patient would benefit from skilled physical therapy services to address their aforementioned functional limitations and progress towards prior level of function and independence with home exercise program.       Goals  Short Term Goals to be accomplished in 4 weeks:  STG1: Pt will be I with HEP to maximize progress between therapy sessions---MET  STG2: Pt will be I with posture management to limit impingement. ---MET  STG3: Pt will demo Cervical AROM >50% improvement needed for looking side to side when driving/crossing the street---MET  STG4: Pt will demo 1/2 MMT grade inc in cervical stabilizers and shoulder strength to improve lifting/carrying---MET         Long Term Goals to be accomplished in 12 weeks:   LTG1: Pt will  demo cervical stab/shoulder strength to WNL as per PLOF to improve lifting/carrying---MET  LTG2: Pt will deny sleep disturbance due to pain---MET  LTG3: Pt will demo cervical AROM WNL to decrease impingement ---Partially MET (improved but not WNL)      Plan  Patient would benefit from: PT eval and skilled physical therapy  Planned modality interventions: cryotherapy, thermotherapy: hydrocollator packs, traction and unattended electrical stimulation    Planned therapy interventions: manual therapy, neuromuscular re-education, self care, therapeutic activities, therapeutic exercise, home exercise program, IASTM, joint mobilization, abdominal trunk stabilization, functional ROM exercises, flexibility, patient/caregiver education and postural training    Frequency: 2x week  Plan of Care beginning date: 2024  Plan of Care expiration date: 10/22/2024  Treatment plan discussed with: patient  Plan details: HEP development, stretching, strengthening, A/AA/PROM, joint mobilizations, posture education, STM/MI as needed to reduce muscle tension, muscle reeducation, PLOC discussed and agreed upon with patient.            Subjective Evaluation    History of Present Illness  Mechanism of injury: Patient reports to PT for re-evaluations of acute neck pain. Patient stated the pain is much improved and she would like to trial an HEP only for 1-2 weeks. If she has no increases in pain then she will D/C to HEP.   Patient Goals  Patient goals for therapy: decreased pain, improved balance, increased motion, increased strength, independence with ADLs/IADLs and return to sport/leisure activities    Pain  Current pain ratin  At best pain ratin  At worst pain ratin (once recently and did not last long)  Location: cervical and radiates into occiput  Quality: dull ache and sharp  Exacerbated by: quick head turns increase it.  Progression: improved    Social Support  Steps to enter house: yes  Stairs in house: yes   Lives in:  "multiple-level home  Lives with: spouse    Hand dominance: left      Diagnostic Tests  Abnormal x-ray: degenerative changes with decreased disc space.  Treatments  Previous treatment: physical therapy (low back and knee)        Objective      Posture: forward head with mild rounding of shoulders     Cervicial AROM limitation (* = pain)      Flexion: Min le*(dull ache)  Extension: Mod le*(dull ache)   R rotation: Min le  L rotation: Min le*(dull ache)  R sidebend: Mod le  L sidebend: Mod le  Protraction: WNL  Retraction: Mod le    Thoracic AROM limitations (* = pain)  Thoracic ext: Min le*  Thoracic rot R min le(tight)  Thoracic rot L  min le*    Shoulder AROM: standing      R  L  Flexion :  WNL  WNL  Abduction:  WNL  WNL  Functional IR:  T4  T6  Functional ER: T4  T2    Strength: MMT R  L  Shoulder flexion: 5/5  5/5    Shoulder abduction: 5/5  5/5    Shoulder ER:  4+/5  4+/5  Shoulder IR:  4+/5  4+/5  Elbow flex:  5/5  5/5  Elbow ext:  5/5  5/5    : tested on 7/30/24     R   L     70   50     55   45     50   50    Mechanical Assessment: has been responding well to cervical retractions with extension       Flexibility: mod le in UT flexibility     Tenderness/Palpation: mild TTP in UT and suboccipitals               Precautions:   Past Medical History:   Diagnosis Date    Arthritis     Chronic kidney disease     stage 3    Hyperlipidemia     borderline    Hypertension     borderline    Migraine     Migraine     controlled with verapamil    Osteopenia     Snores      SOC: 7/30/2024  FOTO: 7/30/2024  POC Expiration: 10/22/204  Daily Treatment Log:  Date 8/14/2024 8/20/2024 8/22/2024     Visit #  7 8     Auth         Auth exp        Manual 5' 10'  10'     IASTM to UT and cervical  KE cervical/ UT B/L  RB cervical/ UT B/L  EG cervical/ UT B/L              There Exer 20' 10'  20'     UBE 1.5'  ea fwd/bwd 3'        Corner stretch  10\"x5  10\"x5  10\" x5      Levator scap stretch         UT stretch         Wall " "slides  5\" 2x10  5\"x10  5\" x10      B/L shoulder ER  YTB 2x10       B/L shoulder horz abd  YTB 2x10       HEP Reviewed pillow placement  for cervical support w/sleep; trialed various options  updated and discussed      Objective measures    EG     There Activ                                                        NMReed 15' 25'  10'     Cervical retractions  3\"x15  3\"x15     Retract w/ ext 2x10  Retract w/ ext 2x10      Scapular retractions  1x10        Rows  [place] 12# 2x10  Blk tubing 2x10      B/L shoulder ext  Blue tubing 2x10 Blue tubing 2x10       Cervical rot snags w/ towel  5\"x10 R/L  10\"x5 R/L  10\"x5 R/L              Modalities        Cervical MH                         HEP  Access Code: [email or web address]  URL: [email or web address]  Date: 08/22/2024  Prepared by: Purnima Giles    Exercises  - Seated Passive Cervical Retraction  - 3 x daily - 7 x weekly - 1 sets - 10 reps  - Seated Cervical Retraction and Extension  - 5 x daily - 7 x weekly - 1 sets - 10 reps  - Seated Scapular Retraction  - 1 x daily - 7 x weekly - 1 sets - 10 reps  - Shoulder Flexion Wall Slide with Towel  - 1 x daily - 7 x weekly - 1 sets - 5 reps - 5 sec hold  - Corner Pec Major Stretch  - 1 x daily - 7 x weekly - 1 sets - 10 reps - 5 sec hold  - Seated Assisted Cervical Rotation with Towel  - 1 x daily - 7 x weekly - 1 sets - 10 reps - 3sec hold  - Shoulder extension with resistance - Neutral  - 1 x daily - 7 x weekly - 2 sets - 10 reps  - Standing Shoulder Row with Anchored Resistance  - 1 x daily - 7 x weekly - 2 sets - 10 reps       " Mother

## 2024-08-26 ENCOUNTER — APPOINTMENT (OUTPATIENT)
Dept: PHYSICAL THERAPY | Facility: CLINIC | Age: 74
End: 2024-08-26
Payer: MEDICARE

## 2024-08-27 ENCOUNTER — OFFICE VISIT (OUTPATIENT)
Dept: PHYSICAL THERAPY | Facility: CLINIC | Age: 74
End: 2024-08-27
Payer: MEDICARE

## 2024-08-27 DIAGNOSIS — N39.43 POST-VOID DRIBBLING: Primary | ICD-10-CM

## 2024-08-27 DIAGNOSIS — M54.2 NECK PAIN: ICD-10-CM

## 2024-08-27 DIAGNOSIS — N39.3 STRESS INCONTINENCE OF URINE: ICD-10-CM

## 2024-08-27 DIAGNOSIS — N81.11 MIDLINE CYSTOCELE: ICD-10-CM

## 2024-08-27 PROCEDURE — 97112 NEUROMUSCULAR REEDUCATION: CPT

## 2024-08-27 PROCEDURE — 97140 MANUAL THERAPY 1/> REGIONS: CPT

## 2024-08-27 NOTE — PROGRESS NOTES
Daily Note     Today's date: 2024  Patient name: Evelin Thurston  : 1950  MRN: [id number]  Referring provider: Katiana Rodriguez DO  Dx:   Encounter Diagnosis     ICD-10-CM    1. Neck pain  M54.2       2. Post-void dribbling  N39.43       3. Stress incontinence of urine  N39.3       4. Midline cystocele  N81.11           Start Time: 1500  Stop Time: 1555  Total time in clinic (min): 55 minutes    Subjective: Notes that she is still having constipation      Objective: See treatment diary below      Assessment: Tolerated treatment well. Patient would benefit from continued PT in order to increase bowel motility. Trialed PEMF in conjunction with colonic massage to increase colon motility. Introduced biofeedback in conjunction with TA engagment and pelvic floor contraction and Evelin was better able to understand connection between abs, breath and pelvic floor. Performed kegels in functional movements. Educated on quick flicks for bladder distraction.       Plan: Continue per plan of care.      Precautions:   Past Medical History:   Diagnosis Date    Arthritis     Chronic kidney disease     stage 3    Hyperlipidemia     borderline    Hypertension     borderline    Migraine     Migraine     controlled with verapamil    Osteopenia     Snores    PERFECT Score   Power right: 0/5   Power left: 0/5      Perfect Score: Rectal exam: 2/5      pelvic floor exam consent given by patient    Pelvic exam completed: vaginally and rectally      SMEG Biofeedback   to be assessed next treatment           Insurance:  AMA/CMS Eval/ Re-eval POC expires PFDI Auth #/ Referral # Total     Start date  Expiration date Extension  Visit limitation?  PT only or  PT+OT? Co-Insurance   CMS 8/8/24 10/3/24 [phone]                                                                                                                                 AUTH #:  Date                           Authed: Used                             Remaining                                Precautions: standard        Date: 8/8/24 8/27/24             Session: IE  2             Manuals                      colonic massage with focus on secum and sigmoid                  PEFM lvl 17 15 min while performing abvoe                                                 Neuro Re-Ed                 360 breathing                 Diaphragmatic breathing                 TA engagement    10x +BFB             TA+ kegel    10x + BFB             Kegel + SLR    20x b/l               kegel + ADD squeeze +bridge    20x              seated kegel + ADD squeeze    20x              sit to stand with PFC    15 x                                                                   Ther Ex                 Geno pose                 Cat-cow                 Lord of the half fish                 Happy baby                                                                                         Ther Activity Bowel and bladder education                                                   Gait Training                                                     Modalities

## 2024-08-28 ENCOUNTER — APPOINTMENT (OUTPATIENT)
Dept: PHYSICAL THERAPY | Facility: CLINIC | Age: 74
End: 2024-08-28
Payer: MEDICARE

## 2024-09-05 ENCOUNTER — OFFICE VISIT (OUTPATIENT)
Dept: PHYSICAL THERAPY | Facility: CLINIC | Age: 74
End: 2024-09-05
Payer: MEDICARE

## 2024-09-05 DIAGNOSIS — N39.3 STRESS INCONTINENCE OF URINE: ICD-10-CM

## 2024-09-05 DIAGNOSIS — N81.11 MIDLINE CYSTOCELE: ICD-10-CM

## 2024-09-05 DIAGNOSIS — N39.43 POST-VOID DRIBBLING: Primary | ICD-10-CM

## 2024-09-05 PROCEDURE — 97112 NEUROMUSCULAR REEDUCATION: CPT

## 2024-09-05 NOTE — PROGRESS NOTES
Daily Note     Today's date: 2024  Patient name: Evelin Thurston  : 1950  MRN: [id number]  Referring provider: Katiana Rodriguez DO  Dx:   Encounter Diagnosis     ICD-10-CM    1. Post-void dribbling  N39.43       2. Stress incontinence of urine  N39.3       3. Midline cystocele  N81.11           Start Time: 1400  Stop Time: 1455  Total time in clinic (min): 55 minutes    Subjective: Noted that she was cramping often when doing kegels + leg lifts at home, and she is not getting as much water as she should      Objective: See treatment diary below      Assessment: Tolerated treatment well. Patient would benefit from continued PT in order to build accessory musculature in conjunction with pelvic floor. Did well with standing exercises. No cramping during session today. Demonstrated ability to engage TA.       Plan: Continue per plan of care.      Precautions:   Past Medical History:   Diagnosis Date    Arthritis     Chronic kidney disease     stage 3    Hyperlipidemia     borderline    Hypertension     borderline    Migraine     Migraine     controlled with verapamil    Osteopenia     Snores    PERFECT Score   Power right: 0/5   Power left: 0/5      Perfect Score: Rectal exam: 2/5      pelvic floor exam consent given by patient    Pelvic exam completed: vaginally and rectally      SMEG Biofeedback   to be assessed next treatment           Insurance:  AMA/CMS Eval/ Re-eval POC expires PFDI Auth #/ Referral # Total     Start date  Expiration date Extension  Visit limitation?  PT only or  PT+OT? Co-Insurance   CMS 8/8/24 10/3/24 [phone]                                                                                                                                 AUTH #:  Date                           Authed: Used                             Remaining                               Precautions: standard        Date: 24           Session: IE  2  3           Manuals                      colonic massage  with focus on secum and sigmoid                  PEFM lvl 17 15 min while performing abvoe                                                 Neuro Re-Ed                 360 breathing                 Diaphragmatic breathing                 TA engagement    10x +BFB  seated add + TA + PFC 20x           TA+ kegel    10x + BFB             Kegel + SLR    20x b/l   sit to stand + ADD + PFC 20x            kegel + ADD squeeze +bridge    20x  TA + hip burner 5 sec hold 2*10            seated kegel + ADD squeeze    20x  step up+ PFC 20x            sit to stand with PFC    15 x  lateral, monster, reverse speed skater green loop 50'                  clamshell 20c green loop                  bridge + ADD squeeze 20x                  SLR + PFC 20x           Ther Ex      heel raised squat with PFC 20x           Geno pose      standing hip abd/ ext 20x blue loop            Cat-cow      standing arm ext + PFC BTB 20x           Lord of the half fish                 Happy baby                                                                                         Ther Activity Bowel and bladder education                                                   Gait Training                                                     Modalities

## 2024-09-10 ENCOUNTER — OFFICE VISIT (OUTPATIENT)
Dept: PHYSICAL THERAPY | Facility: CLINIC | Age: 74
End: 2024-09-10
Payer: MEDICARE

## 2024-09-10 DIAGNOSIS — N81.11 MIDLINE CYSTOCELE: ICD-10-CM

## 2024-09-10 DIAGNOSIS — N39.3 STRESS INCONTINENCE OF URINE: ICD-10-CM

## 2024-09-10 DIAGNOSIS — N39.43 POST-VOID DRIBBLING: Primary | ICD-10-CM

## 2024-09-10 PROCEDURE — 97140 MANUAL THERAPY 1/> REGIONS: CPT

## 2024-09-10 PROCEDURE — 97112 NEUROMUSCULAR REEDUCATION: CPT

## 2024-09-10 NOTE — PROGRESS NOTES
Daily Note     Today's date: 9/10/2024  Patient name: Evelin Thurston  : 1950  MRN: [id number]  Referring provider: Katiana Rodriguez DO  Dx:   Encounter Diagnosis     ICD-10-CM    1. Post-void dribbling  N39.43       2. Stress incontinence of urine  N39.3       3. Midline cystocele  N81.11           Start Time: 0900  Stop Time: 0955  Total time in clinic (min): 55 minutes    Subjective: Reports things have been  doing a little better, not as much leakage but it is still there. Constipation is about the same, had one normal bowel movement      Objective: See treatment diary below    Dry needling consent for reviewed and signed by patient. Pt educated on dry needling to include but not limited to: risks/benefits/aftercare instructions. Provided with educational handout on DN. All questions and concerns answered and addressed.     Assessment: Tolerated treatment well. Patient would benefit from continued PT in order to down train central nervous system. Trialled dry needling in conjunction with pelvic floor contractions. Discussed increased water and fiber intake. Given new bowel and bladder HEP.   Pt verbally consented to dry needling treatment session. Risks/benefits/aftercare instructions reviewed. All questions/concerns addressed.  Pt in semi mcdonald position. Hand hygiene performed pre and post DN treatment session. Placement of needles in pathological tissue.   4 ears, 2 foot 4 tibial nerve distribution  (needle location).  Total treatment duration to include set up/break down/in situ: 40 minutes. Patient was supervised by clinician throughout entirety of treatment session to include when DN in situ; clinician next to patient. All needles counted upon insertion and removal-- 6 needles. All accounted for and disposed in appropriate sharp container.     No adverse reaction to treatment. Pt advised may experience muscular fatigue and soreness. Pt verbalized understanding.        Plan: Continue per plan of care. Re-  eval next session     Precautions:   Past Medical History:   Diagnosis Date    Arthritis     Chronic kidney disease     stage 3    Hyperlipidemia     borderline    Hypertension     borderline    Migraine     Migraine     controlled with verapamil    Osteopenia     Snores    PERFECT Score   Power right: 0/5   Power left: 0/5      Perfect Score: Rectal exam: 2/5      pelvic floor exam consent given by patient    Pelvic exam completed: vaginally and rectally      SMEG Biofeedback   to be assessed next treatment           Insurance:  [place]/CMS Eval/ Re-eval POC expires PFDI Auth #/ Referral # Total     Start date  Expiration date Extension  Visit limitation?  PT only or  PT+OT? Co-Insurance   CMS 8/8/24 10/3/24 [phone]                                                                                                                                 AUTH #:  Date                           Authed: Used                             Remaining                               Precautions: standard        Date: 8/8/24  8/27/24  9/5/24  9/10/24         Session: IE  2  3  4         Manuals                      colonic massage with focus on secum and sigmoid    DN- see above              PEFM lvl 17 15 min while performing abvoe                                                 Neuro Re-Ed        pelvic floor contractions in conjunction with diaphragmatic breathing         360 breathing                 Diaphragmatic breathing                 TA engagement    10x +BFB  seated add + TA + PFC 20x           TA+ kegel    10x + BFB             Kegel + SLR    20x b/l   sit to stand + ADD + PFC 20x            kegel + ADD squeeze +bridge    20x  TA + hip burner 5 sec hold 2*10            seated kegel + ADD squeeze    20x  step up+ PFC 20x            sit to stand with PFC    15 x  lateral, monster, reverse speed skater green loop 50'  lateral, monster, reverse speed skater green loop 50'                clamshell 20c green loop                  bridge  + ADD squeeze 20x  sit to stand with PFC + add squeeze                SLR + PFC 20x           Ther Ex      heel raised squat with PFC 20x  hip burners 2*10         Geno pose      standing hip abd/ ext 20x blue loop    standing hip abd/ ext 20x blue loop         Cat-cow      standing arm ext + PFC BTB 20x           Lord of the half fish                 Happy baby                                                                                         Ther Activity Bowel and bladder education                                                   Gait Training                                                     Modalities

## 2024-09-13 ENCOUNTER — APPOINTMENT (OUTPATIENT)
Dept: LAB | Facility: CLINIC | Age: 74
End: 2024-09-13
Payer: MEDICARE

## 2024-09-13 DIAGNOSIS — E78.5 HYPERLIPIDEMIA, UNSPECIFIED HYPERLIPIDEMIA TYPE: ICD-10-CM

## 2024-09-13 DIAGNOSIS — N18.31 STAGE 3A CHRONIC KIDNEY DISEASE (HCC): ICD-10-CM

## 2024-09-13 DIAGNOSIS — E21.3 HYPERPARATHYROIDISM (HCC): ICD-10-CM

## 2024-09-13 LAB
ALBUMIN SERPL BCG-MCNC: 4.5 G/DL (ref 3.5–5)
ALP SERPL-CCNC: 39 U/L (ref 34–104)
ALT SERPL W P-5'-P-CCNC: 21 U/L (ref 7–52)
ANION GAP SERPL CALCULATED.3IONS-SCNC: 5 MMOL/L (ref 4–13)
AST SERPL W P-5'-P-CCNC: 20 U/L (ref 13–39)
BILIRUB SERPL-MCNC: 0.43 MG/DL (ref 0.2–1)
BUN SERPL-MCNC: 27 MG/DL (ref 5–25)
CALCIUM SERPL-MCNC: 10.1 MG/DL (ref 8.4–10.2)
CHLORIDE SERPL-SCNC: 101 MMOL/L (ref 96–108)
CHOLEST SERPL-MCNC: 111 MG/DL
CO2 SERPL-SCNC: 31 MMOL/L (ref 21–32)
CREAT SERPL-MCNC: 0.98 MG/DL (ref 0.6–1.3)
ERYTHROCYTE [DISTWIDTH] IN BLOOD BY AUTOMATED COUNT: 12.1 % (ref 11.6–15.1)
GFR SERPL CREATININE-BSD FRML MDRD: 56 ML/MIN/1.73SQ M
GLUCOSE P FAST SERPL-MCNC: 91 MG/DL (ref 65–99)
HCT VFR BLD AUTO: 45.8 % (ref 34.8–46.1)
HDLC SERPL-MCNC: 52 MG/DL
HGB BLD-MCNC: 14.5 G/DL (ref 11.5–15.4)
LDLC SERPL CALC-MCNC: 48 MG/DL (ref 0–100)
MCH RBC QN AUTO: 27.6 PG (ref 26.8–34.3)
MCHC RBC AUTO-ENTMCNC: 31.7 G/DL (ref 31.4–37.4)
MCV RBC AUTO: 87 FL (ref 82–98)
PLATELET # BLD AUTO: 233 THOUSANDS/UL (ref 149–390)
PMV BLD AUTO: 11.4 FL (ref 8.9–12.7)
POTASSIUM SERPL-SCNC: 4.6 MMOL/L (ref 3.5–5.3)
PROT SERPL-MCNC: 6.8 G/DL (ref 6.4–8.4)
RBC # BLD AUTO: 5.26 MILLION/UL (ref 3.81–5.12)
SODIUM SERPL-SCNC: 137 MMOL/L (ref 135–147)
TRIGL SERPL-MCNC: 56 MG/DL
WBC # BLD AUTO: 6.88 THOUSAND/UL (ref 4.31–10.16)

## 2024-09-13 PROCEDURE — 85027 COMPLETE CBC AUTOMATED: CPT

## 2024-09-13 PROCEDURE — 80053 COMPREHEN METABOLIC PANEL: CPT

## 2024-09-13 PROCEDURE — 36415 COLL VENOUS BLD VENIPUNCTURE: CPT

## 2024-09-13 PROCEDURE — 80061 LIPID PANEL: CPT

## 2024-09-19 ENCOUNTER — OFFICE VISIT (OUTPATIENT)
Dept: PHYSICAL THERAPY | Facility: CLINIC | Age: 74
End: 2024-09-19
Payer: MEDICARE

## 2024-09-19 DIAGNOSIS — N39.43 POST-VOID DRIBBLING: Primary | ICD-10-CM

## 2024-09-19 DIAGNOSIS — N81.11 MIDLINE CYSTOCELE: ICD-10-CM

## 2024-09-19 DIAGNOSIS — N39.3 STRESS INCONTINENCE OF URINE: ICD-10-CM

## 2024-09-19 PROCEDURE — 97140 MANUAL THERAPY 1/> REGIONS: CPT

## 2024-09-19 PROCEDURE — 97112 NEUROMUSCULAR REEDUCATION: CPT

## 2024-09-19 NOTE — PROGRESS NOTES
Daily Note     Today's date: 2024  Patient name: Evelin Thurston  : 1950  MRN: [id number]  Referring provider: Katiana Rodriguez DO  Dx:   Encounter Diagnosis     ICD-10-CM    1. Post-void dribbling  N39.43       2. Stress incontinence of urine  N39.3       3. Midline cystocele  N81.11                      Subjective: No bowel movement in 3 days, realizes that she is low on water intake. Incontinence has been a bit better.  She doesn't like to use public bathrooms      Objective: See treatment diary below      Assessment: Tolerated treatment well. Patient would benefit from continued PT in order to increase bowel motility. Decreased tissue extensibility over secum and descending colon.   Pt verbally consented to dry needling treatment session. Risks/benefits/aftercare instructions reviewed. All questions/concerns addressed.  Pt in prone position. Hand hygiene performed pre and post DN treatment session. Placement of needles in pathological tissue.   4 ears, 2 feet, 4 tibial nerve distribution (needle location).  Total treatment duration to include set up/break down/in situ: 30 minutes. Patient was supervised by clinician throughout entirety of treatment session to include when DN in situ; clinician next to patient. All needles counted upon insertion and removal-- 10 needles. All accounted for and disposed in appropriate sharp container.     No adverse reaction to treatment. Pt advised may experience muscular fatigue and soreness. Pt verbalized understanding.        Plan: Continue per plan of care.      Precautions:   Past Medical History:   Diagnosis Date    Arthritis     Chronic kidney disease     stage 3    Hyperlipidemia     borderline    Hypertension     borderline    Migraine     Migraine     controlled with verapamil    Osteopenia     Snores    PERFECT Score   Power right: 0/5   Power left: 0/5      Perfect Score: Rectal exam: 2/5      pelvic floor exam consent given by patient    Pelvic exam completed:  vaginally and rectally      SMEG Biofeedback   to be assessed next treatment           Insurance:  AMA/CMS Eval/ Re-eval POC expires PFDI Auth #/ Referral # Total     Start date  Expiration date Extension  Visit limitation?  PT only or  PT+OT? Co-Insurance   CMS 8/8/24 10/3/24 [phone]                                                                                                                                 AUTH #:  Date                           Authed: Used                             Remaining                               Precautions: standard        Date: 8/8/24  8/27/24  9/5/24  9/10/24  9/19/24       Session: IE  2  3  4  5       Manuals                      colonic massage with focus on secum and sigmoid    DN- see above  DN- see above            PEFM lvl 17 15 min while performing abvoe      PEFM lvl 17 15 min while performing abvoe                  colonic massage with focus on secum and sigmoid; illiocecal valve                         Neuro Re-Ed        pelvic floor contractions in conjunction with diaphragmatic breathing  pelvic floor contractions in conjunction with diaphragmatic breathing       360 breathing                 Diaphragmatic breathing                 TA engagement    10x +BFB  seated add + TA + PFC 20x           TA+ kegel    10x + BFB             Kegel + SLR    20x b/l   sit to stand + ADD + PFC 20x            kegel + ADD squeeze +bridge    20x  TA + hip burner 5 sec hold 2*10            seated kegel + ADD squeeze    20x  step up+ PFC 20x            sit to stand with PFC    15 x  lateral, monster, reverse speed skater green loop 50'  lateral, monster, reverse speed skater green loop 50'                clamshell 20c green loop                  bridge + ADD squeeze 20x  sit to stand with PFC + add squeeze                SLR + PFC 20x           Ther Ex      heel raised squat with PFC 20x  hip burners 2*10  hip burners 2*10       Geno pose      standing hip abd/ ext 20x blue loop     standing hip abd/ ext 20x blue loop  bridges + PFC+ add squeeze       Cat-cow      standing arm ext + PFC BTB 20x           Lord of the half fish                 Happy baby                                                                                         Ther Activity Bowel and bladder education                                                   Gait Training                                                     Modalities

## 2024-09-26 ENCOUNTER — EVALUATION (OUTPATIENT)
Dept: PHYSICAL THERAPY | Facility: CLINIC | Age: 74
End: 2024-09-26
Payer: MEDICARE

## 2024-09-26 DIAGNOSIS — N81.11 MIDLINE CYSTOCELE: ICD-10-CM

## 2024-09-26 DIAGNOSIS — N39.3 STRESS INCONTINENCE OF URINE: ICD-10-CM

## 2024-09-26 DIAGNOSIS — N39.43 POST-VOID DRIBBLING: Primary | ICD-10-CM

## 2024-09-26 DIAGNOSIS — M54.2 NECK PAIN: ICD-10-CM

## 2024-09-26 PROCEDURE — 97140 MANUAL THERAPY 1/> REGIONS: CPT

## 2024-09-26 PROCEDURE — 97112 NEUROMUSCULAR REEDUCATION: CPT

## 2024-09-26 NOTE — PROGRESS NOTES
Daily Note     Today's date: 2024  Patient name: Evelin Thurston  : 1950  MRN: [id number]  Referring provider: Katiana Rodriguez DO  Dx:   Encounter Diagnosis     ICD-10-CM    1. Post-void dribbling  N39.43       2. Stress incontinence of urine  N39.3       3. Midline cystocele  N81.11       4. Neck pain  M54.2                         Assessment  Impairments: abnormal muscle firing, abnormal muscle tone, activity intolerance, impaired physical strength, lacks appropriate home exercise program, poor posture  and poor body mechanics     Assessment details:   CASE SUMMARY:   Evelin Thurston is a 74 y.o. year old female who reports onset of symptoms ~  urinary incontinence, midline cystocele and urinary frequency/ urge.   Patient describes symptoms as: annoying, fatiguing, worrisome.  Symptoms are : constant.  Evelin is limited in the following activities: being too far away from toileting facilities, sleeping through the night, going through the day without wearing a pad.  Evelin has made progress in pelvic floor strength and toward decreasing constipation with more formed bowel movements.  She continues to work toward increasing water intake. Evelin is still a good candidate for skilled physical therapy.      PMHx includes: See chart for full details with medications.      Patient's clinical presentation is consistent with their referring diagnosis of: urinary incontinence and urinary frequency/ urge  POC was discussed and agreed upon with patient.  Patient was educated on: pelvic floor anatomy, Importance of body mechanics and ergonomics in regards to protecting against activities which increase IAP and pressure,  and PT exam and course of treatment.  Patient vocalized a good understanding of  POC and HEP issued. Patient would benefit from skilled physical therapy services to address their aforementioned functional limitations and progress towards prior level of function and independence with home exercise program.        Pelvic floor verbal consent and written consent signed and in chart- LSR 8/8/24  Patient deferred second person in room: YES           Understanding of Dx/Px/POC: good     Prognosis: good     Goals  STG (3 weeks)  Patient will be independent with HEP  Patient will demonstrate ability to properly fill out bowel/ bladder log  Patient will self report sxs decrease by 25%  Patient will demonstrate the ability to perform kegel and downtraining     LTG (8 weeks)  Patient will be independent in comprehensive HEP  Patient will improve pelvic health metric by MDIC  Patient will self report sxs decrease by 75%  Patient will demonstrate the ability to perform sustained kegels in functional positioning      Plan  Patient would benefit from: skilled physical therapy  Planned modality interventions: biofeedback, cryotherapy, TENS, ultrasound and hydrotherapy     Planned therapy interventions: joint mobilization, manual therapy, neuromuscular re-education, patient education, postural training, strengthening, stretching, therapeutic activities, therapeutic exercise, functional ROM exercises, flexibility, graded activity, home exercise program, abdominal trunk stabilization, Galarza taping, kinesiology taping, breathing training and dry needling     Frequency: 1x week  Duration in weeks: 8  Plan of Care beginning date: 8/8/2024  Plan of Care expiration date: 11/3/2024  Treatment plan discussed with: patient     PT Pelvic Floor Subjective:   History of Present Illness:   Sxs stared over a year ago. Did not feel bladder drop but she has started beeling it lately. Went to gyn - had fibroids. Given dx of POP and saw Dr. Smith in [facility] who mentioned that her bladder could be tacked up. In 2011 had parathyroid out and hasn't been constipated until this year.  Takes miralax 3x per week.       Update 9/26/24: Still has leakage. Bladder hasn't really done anything. Is mostly doing HEP but there are days when she has not.  "Constipation has been better and stool has been less pellet like since       Recurrent probem     Quality of life: good     Social Support:     Lives in:  Multiple-level home    Lives with:  Spouse    Relationship status: /committed    Work status: retired    Life stress level: 2 and 3    History of Depression: noPronouns: she/her  Hand dominance:  Left  Diet and Exercise:       Exercise type: strengthening exercise program    Gym 5x days, teach osteoporosis class  OB/ gyn History    Gestational History:     Prior Pregnancy: Yes      Number of prior pregnancies: 2    Number of term pregnancies: 2    Delivery Type: vaginal delivery    no delivery complications    Menstrual History:       Menopausal: menopause  Bladder Function:     Voiding Difficulties positive for: urgency, hesitancy, straining and incomplete emptying       Voiding Difficulties comments:     Voiding frequency: every 3-4 hours    Urinary leakage: urine leakage    Urinary leakage aggravated by: coughing    Nocturia (episodes per night): 3    Painful urination: No      Intake (ounces): Water: 40,     Intake (ounces) comment: When she feels urgency she is unable to urinate, when she wipes more will come out.      If she goes to bed at 10 she is not up at all.  If she goes to bed at 8:30 she is up 3x to pee     Drinks a gallon of milk a week  Incontinence Management:     Pads/Diaper Use:  Day  Bowel Function:     Voiding DIfficulties: constipation      Bowel frequency: every 3 days    Pullman Stool Scale: type 1, type 2 and type 4    Stool softener use: no stool softeners    Enema use: no enema    Uses \"squatty potty\": no Squatty Potty  Sexual Function:     Sexually Active:  Sexually active  Patient Goals:     Patient goals for therapy:  Improved sleep, fully empty bladder or bowels, improved bladder or bowel function and improved comfort        Objective         Abdominal Assessment:             General Perineum Exam:   perineum intact.    "   Visual Inspection of Perineum:   Excursion of perineal body in cephalad direction with contraction of pelvic floor muscles (PFM): unable  Excursion of perineal body in caudal direction with relaxation of pelvic floor muscles (PFM): weak  Cotton swab test: non-tender  Sensation: intact  Tenderness: unprovoked     Pelvic Organ Prolapse   Position: hook-lying  At rest: moderate  With bearing down: moderate (to the level of hymenal remnants)  Perineal body inspection: within normal limits          Pelvic Floor Muscle Exam:     Muscle Contraction: unable to perform   Breathing pattern with contraction: within normal limits           PERFECT Score   Power right: 0/5   Power left: 0/5      Perfect Score: Rectal exam: 2/5   Update 9/26/24:  1/5     pelvic floor exam consent given by patient    Pelvic exam completed: vaginally and rectally      SMEG Biofeedback   to be assessed next treatment     Precautions:   Past Medical History:   Diagnosis Date    Arthritis     Chronic kidney disease     stage 3    Hyperlipidemia     borderline    Hypertension     borderline    Migraine     Migraine     controlled with verapamil    Osteopenia     Snores    PERFECT Score   Power right: 0/5   Power left: 0/5      Perfect Score: Rectal exam: 2/5      pelvic floor exam consent given by patient    Pelvic exam completed: vaginally and rectally      SMEG Biofeedback   to be assessed next treatment           Insurance:  AMA/CMS Eval/ Re-eval POC expires PFDI Auth #/ Referral # Total     Start date  Expiration date Extension  Visit limitation?  PT only or  PT+OT? Co-Insurance   CMS 8/8/24 10/3/24 [phone]                      9/26/24  11/3/24  121.88                                                                                                       AUTH #:  Date                           Authed: Used                             Remaining                               Precautions: standard        Date: 8/8/24  8/27/24  9/5/24  9/10/24  9/19/24   9/26/24     Session: IE  2  3  4  5  6     Manuals                      colonic massage with focus on secum and sigmoid    DN- see above  DN- see above            PEFM lvl 17 15 min while performing abvoe      PEFM lvl 17 15 min while performing abvoe  PEFM lvl 17 15 min while performing above                colonic massage with focus on secum and sigmoid; illiocecal valve  colonic massage with focus on secum and sigmoid; illiocecal valve                       Neuro Re-Ed        pelvic floor contractions in conjunction with diaphragmatic breathing  pelvic floor contractions in conjunction with diaphragmatic breathing   pelvic floor contractions in conjunction with diaphragmatic breathing     360 breathing                 Diaphragmatic breathing                 TA engagement    10x +BFB  seated add + TA + PFC 20x           TA+ kegel    10x + BFB             Kegel + SLR    20x b/l   sit to stand + ADD + PFC 20x            kegel + ADD squeeze +bridge    20x  TA + hip burner 5 sec hold 2*10            seated kegel + ADD squeeze    20x  step up+ PFC 20x            sit to stand with PFC    15 x  lateral, monster, reverse speed skater green loop 50'  lateral, monster, reverse speed skater green loop 50'                clamshell 20c green loop      kegel + sit to stand + add 20c            bridge + ADD squeeze 20x  sit to stand with PFC + add squeeze                SLR + PFC 20x           Ther Ex      heel raised squat with PFC 20x  hip burners 2*10  hip burners 2*10       Geno pose      standing hip abd/ ext 20x blue loop    standing hip abd/ ext 20x blue loop  bridges + PFC+ add squeeze  bridges + PFC+ add squeeze     Cat-cow      standing arm ext + PFC BTB 20x           Lord of the half fish                 Happy baby                                                                                         Ther Activity Bowel and bladder education                                                   Gait Training                                                      Modalities

## 2024-10-03 ENCOUNTER — APPOINTMENT (OUTPATIENT)
Dept: PHYSICAL THERAPY | Facility: CLINIC | Age: 74
End: 2024-10-03
Payer: MEDICARE

## 2024-10-04 ENCOUNTER — TELEPHONE (OUTPATIENT)
Age: 74
End: 2024-10-04

## 2024-10-04 ENCOUNTER — OFFICE VISIT (OUTPATIENT)
Dept: FAMILY MEDICINE CLINIC | Facility: CLINIC | Age: 74
End: 2024-10-04
Payer: MEDICARE

## 2024-10-04 VITALS
HEIGHT: 71 IN | RESPIRATION RATE: 18 BRPM | OXYGEN SATURATION: 97 % | SYSTOLIC BLOOD PRESSURE: 130 MMHG | TEMPERATURE: 97.6 F | HEART RATE: 74 BPM | WEIGHT: 170 LBS | BODY MASS INDEX: 23.8 KG/M2 | DIASTOLIC BLOOD PRESSURE: 76 MMHG

## 2024-10-04 DIAGNOSIS — E21.3 HYPERPARATHYROIDISM (HCC): ICD-10-CM

## 2024-10-04 DIAGNOSIS — I10 ESSENTIAL HYPERTENSION: Primary | ICD-10-CM

## 2024-10-04 DIAGNOSIS — E78.5 DYSLIPIDEMIA: ICD-10-CM

## 2024-10-04 DIAGNOSIS — Z12.31 ENCOUNTER FOR SCREENING MAMMOGRAM FOR BREAST CANCER: ICD-10-CM

## 2024-10-04 DIAGNOSIS — N18.31 STAGE 3A CHRONIC KIDNEY DISEASE (HCC): ICD-10-CM

## 2024-10-04 PROCEDURE — 99214 OFFICE O/P EST MOD 30 MIN: CPT | Performed by: FAMILY MEDICINE

## 2024-10-04 PROCEDURE — G2211 COMPLEX E/M VISIT ADD ON: HCPCS | Performed by: FAMILY MEDICINE

## 2024-10-04 NOTE — ASSESSMENT & PLAN NOTE
Lab Results   Component Value Date    EGFR 56 09/13/2024    EGFR 64 03/11/2024    EGFR 68 11/16/2023    CREATININE 0.98 09/13/2024    CREATININE 0.88 03/11/2024    CREATININE 0.84 11/16/2023   Stable  Advised to avoid all NSAIDs

## 2024-10-04 NOTE — TELEPHONE ENCOUNTER
Pt called because she forgot to leave a form she has for Dr Rodriguez to complete and sign for her.  She will drop it off later and come back to pick it up when it is ready.  Please watch for form.   Graft Cartilage Fenestration Text: The cartilage was fenestrated with a 2mm punch biopsy to help facilitate graft survival and healing.

## 2024-10-04 NOTE — PROGRESS NOTES
"Ambulatory Visit  Name: Evelin Thurston      : 1950      MRN: [id number]  Encounter Provider: Katiana Rodriguez DO  Encounter Date: 10/4/2024   Encounter department: [facility]    Assessment & Plan  Essential hypertension  Stable  Continue verapamil 180 mg twice daily  Orders:    CBC; Future    Comprehensive metabolic panel; Future    Lipid Panel with Direct LDL reflex; Future    Encounter for screening mammogram for breast cancer    Orders:    Mammo screening bilateral w 3d and cad; Future    Stage 3a chronic kidney disease (HCC)  Lab Results   Component Value Date    EGFR 56 2024    EGFR 64 2024    EGFR 68 2023    CREATININE 0.98 2024    CREATININE 0.88 2024    CREATININE 0.84 2023   Stable  Advised to avoid all NSAIDs          Hyperparathyroidism (HCC)  Stable  Orders:    PTH, intact; Future    Dyslipidemia  Stable  Continue atorvastatin 20 mg daily  Orders:    Lipid Panel with Direct LDL reflex; Future       Return in about 6 months (around 2025) for Annual Wellness Visit.  She is going to get her flu shot at her pharmacy  RSV vaccine recommended   Declines COVID-vaccine      History of Present Illness     Patient reports that she has had 1 urinary tract infection since starting Farxiga.  She does chronically feel fatigued but otherwise has been doing well.  She has been watching her diet.          Review of Systems        Objective     /76   Pulse 74   Temp 97.6 °F (36.4 °C)   Resp 18   Ht 5' 10.5\" (1.791 m)   Wt 77.1 kg (170 lb)   SpO2 97%   BMI 24.05 kg/m²     Physical Exam  Vitals and nursing note reviewed.   Constitutional:       Appearance: She is well-developed.   HENT:      Head: Normocephalic and atraumatic.      Right Ear: External ear normal.      Left Ear: External ear normal.      Nose: Nose normal.   Cardiovascular:      Rate and Rhythm: Normal rate and regular rhythm.      Heart sounds: Normal heart sounds. No murmur heard.     No " friction rub.   Pulmonary:      Effort: No respiratory distress.      Breath sounds: Normal breath sounds. No wheezing or rales.

## 2024-10-04 NOTE — ASSESSMENT & PLAN NOTE
Stable  Continue atorvastatin 20 mg daily  Orders:    Lipid Panel with Direct LDL reflex; Future

## 2024-10-04 NOTE — PATIENT INSTRUCTIONS
Recent Results (from the past 672 hour(s))   CBC    Collection Time: 09/13/24  7:30 AM   Result Value Ref Range    WBC 6.88 4.31 - 10.16 Thousand/uL    RBC 5.26 (H) 3.81 - 5.12 Million/uL    Hemoglobin 14.5 11.5 - 15.4 g/dL    Hematocrit 45.8 34.8 - 46.1 %    MCV 87 82 - 98 fL    MCH 27.6 26.8 - 34.3 pg    MCHC 31.7 31.4 - 37.4 g/dL    RDW 12.1 11.6 - 15.1 %    Platelets 233 149 - 390 Thousands/uL    MPV 11.4 8.9 - 12.7 fL   Comprehensive metabolic panel    Collection Time: 09/13/24  7:30 AM   Result Value Ref Range    Sodium 137 135 - 147 mmol/L    Potassium 4.6 3.5 - 5.3 mmol/L    Chloride 101 96 - 108 mmol/L    CO2 31 21 - 32 mmol/L    ANION GAP 5 4 - 13 mmol/L    BUN 27 (H) 5 - 25 mg/dL    Creatinine 0.98 0.60 - 1.30 mg/dL    Glucose, Fasting 91 65 - 99 mg/dL    Calcium 10.1 8.4 - 10.2 mg/dL    AST 20 13 - 39 U/L    ALT 21 7 - 52 U/L    Alkaline Phosphatase 39 34 - 104 U/L    Total Protein 6.8 6.4 - 8.4 g/dL    Albumin 4.5 3.5 - 5.0 g/dL    Total Bilirubin 0.43 0.20 - 1.00 mg/dL    eGFR 56 ml/min/1.73sq m   Lipid Panel with Direct LDL reflex    Collection Time: 09/13/24  7:30 AM   Result Value Ref Range    Cholesterol 111 See Comment mg/dL    Triglycerides 56 See Comment mg/dL    HDL, Direct 52 >=50 mg/dL    LDL Calculated 48 0 - 100 mg/dL

## 2024-10-04 NOTE — ASSESSMENT & PLAN NOTE
Stable  Continue verapamil 180 mg twice daily  Orders:    CBC; Future    Comprehensive metabolic panel; Future    Lipid Panel with Direct LDL reflex; Future

## 2024-10-10 ENCOUNTER — OFFICE VISIT (OUTPATIENT)
Dept: PHYSICAL THERAPY | Facility: CLINIC | Age: 74
End: 2024-10-10
Payer: MEDICARE

## 2024-10-10 DIAGNOSIS — N39.3 STRESS INCONTINENCE OF URINE: ICD-10-CM

## 2024-10-10 DIAGNOSIS — N81.11 MIDLINE CYSTOCELE: ICD-10-CM

## 2024-10-10 DIAGNOSIS — M54.2 NECK PAIN: ICD-10-CM

## 2024-10-10 DIAGNOSIS — N39.43 POST-VOID DRIBBLING: Primary | ICD-10-CM

## 2024-10-10 PROCEDURE — 97140 MANUAL THERAPY 1/> REGIONS: CPT

## 2024-10-10 PROCEDURE — 97112 NEUROMUSCULAR REEDUCATION: CPT

## 2024-10-10 NOTE — PROGRESS NOTES
Daily Note     Today's date: 10/10/2024  Patient name: Evelin Thurston  : 1950  MRN: [id number]  Referring provider: Katiana Rodriguez DO  Dx:   Encounter Diagnosis     ICD-10-CM    1. Post-void dribbling  N39.43       2. Stress incontinence of urine  N39.3       3. Midline cystocele  N81.11       4. Neck pain  M54.2                      Subjective: Reports that constipation is a little better, it is easier to get stool out. Urinary sxs haven't changed much      Objective: See treatment diary below  Dry needling consent for reviewed and signed by patient. Pt educated on dry needling to include but not limited to: risks/benefits/aftercare instructions. Provided with educational handout on DN. All questions and concerns answered and addressed.     Assessment: Tolerated treatment well. Patient would benefit from continued PT in order to continue to increase bowel motility. Focus of session today on incorporating pelvic floor contractions into daily weight lifting activities. Dry needling performed to down train central nervous system to decrease stress incontinence.   Pt verbally consented to dry needling treatment session. Risks/benefits/aftercare instructions reviewed. All questions/concerns addressed.  Pt in supine position. Hand hygiene performed pre and post DN treatment session. Placement of needles in pathological tissue.   2 feet, 4 tibial nerve distribution (needle location).  Total treatment duration to include set up/break down/in situ: 30 minutes. Patient was supervised by clinician throughout entirety of treatment session to include when DN in situ; clinician next to patient. All needles counted upon insertion and removal-- 6 needles. All accounted for and disposed in appropriate sharp container.     No adverse reaction to treatment. Pt advised may experience muscular fatigue and soreness. Pt verbalized understanding.        Plan: Continue per plan of care.      Precautions:   Past Medical History:    Diagnosis Date    Arthritis     Chronic kidney disease     stage 3    Hyperlipidemia     borderline    Hypertension     borderline    Migraine     Migraine     controlled with verapamil    Osteopenia     Snores    PERFECT Score   Power right: 0/5   Power left: 0/5      Perfect Score: Rectal exam: 2/5      pelvic floor exam consent given by patient    Pelvic exam completed: vaginally and rectally      SMEG Biofeedback   to be assessed next treatment           Insurance:  [place]/CMS Eval/ Re-eval POC expires PFDI Auth #/ Referral # Total     Start date  Expiration date Extension  Visit limitation?  PT only or  PT+OT? Co-Insurance   CMS 8/8/24 10/3/24 [phone]                      9/26/24  11/3/24  121.88                                                                                                       AUTH #:  Date                           Authed: Used                             Remaining                               Precautions: standard        Date: 8/8/24  8/27/24  9/5/24  9/10/24  9/19/24  9/26/24  10/10/24   Session: IE  2  3  4  5  6  7   Manuals                      colonic massage with focus on secum and sigmoid    DN- see above  DN- see above    DN- see above        PEFM lvl 17 15 min while performing abvoe      PEFM lvl 17 15 min while performing abvoe  PEFM lvl 17 15 min while performing above                colonic massage with focus on secum and sigmoid; illiocecal valve  colonic massage with focus on secum and sigmoid; illiocecal valve  colonic massage with focus on secum and sigmoid; illiocecal valve                     Neuro Re-Ed        pelvic floor contractions in conjunction with diaphragmatic breathing  pelvic floor contractions in conjunction with diaphragmatic breathing   pelvic floor contractions in conjunction with diaphragmatic breathing  pelvic floor contractions in conjunction with diaphragmatic breathing   360 breathing                 Diaphragmatic breathing                 TA  engagement    10x +BFB  seated add + TA + PFC 20x           TA+ kegel    10x + BFB             Kegel + SLR    20x b/l   sit to stand + ADD + PFC 20x            kegel + ADD squeeze +bridge    20x  TA + hip burner 5 sec hold 2*10            seated kegel + ADD squeeze    20x  step up+ PFC 20x            sit to stand with PFC    15 x  lateral, monster, reverse speed skater green loop 50'  lateral, monster, reverse speed skater green loop 50'      lateral, monster, reverse speed skater BTB 50'          clamshell 20c green loop      kegel + sit to stand + add 20c  10# bicep curl with PFC 20x          bridge + ADD squeeze 20x  sit to stand with PFC + add squeeze      10# tricep  and row with PFC 20x          SLR + PFC 20x        5# arm flexion and abduction + PFC 2*10   Ther Ex      heel raised squat with PFC 20x  hip burners 2*10  hip burners 2*10       Geno pose      standing hip abd/ ext 20x blue loop    standing hip abd/ ext 20x blue loop  bridges + PFC+ add squeeze  bridges + PFC+ add squeeze     Cat-cow      standing arm ext + PFC BTB 20x           Lord of the half fish                 Happy baby                                                                                         Ther Activity Bowel and bladder education                                                   Gait Training                                                     Modalities

## 2024-10-13 NOTE — PATIENT INSTRUCTIONS
NURSING HOME ORDERS    03/20/2023  [facility] - MED SURG  [address] [address]  Dept: [phone]  Loc: [phone]     Admit to Nursing Home:  long term Nursing Home    Diagnoses:  Active Hospital Problems    Diagnosis  POA    *Acute on chronic respiratory failure with hypoxia [J96.21]  Yes    Subconjunctival hemorrhage of left eye [H11.32]  Unknown    Hyperkalemia [E87.5]  Unknown    Penis abrasion, initial encounter [S30.812A]  Unknown    Type 2 diabetes mellitus with hyperglycemia, with long-term current use of insulin [E11.65, Z79.4]  Not Applicable     Chronic    Hypertensive urgency [I16.0]  Yes    Anemia in ESRD (end-stage renal disease) [N18.6, D63.1]  Yes     Chronic    Renovascular hypertension [I15.0]  Yes     Chronic    HLD (hyperlipidemia) [E78.5]  Yes     Chronic    Multiple subsegmental pulmonary emboli without acute cor pulmonale [I26.94]  Yes     Chronic    Chronic kidney disease-mineral and bone disorder [N18.9, E83.9, M89.9]  Yes    ESRD (end stage renal disease) [N18.6]  Yes      Resolved Hospital Problems   No resolved problems to display.       Patient is homebound due to:  Acute on chronic respiratory failure with hypoxia    Allergies:Review of patient's allergies indicates:  No Known Allergies    Vitals:  Routine    Diet: diabetic diet: 2200 calorie and renal diet    Activities:   Activity as tolerated    Goals of Care Treatment Preferences:  Code Status: Full Code      Labs:  Per protocol    Nursing Precautions:  Aspiration  and Pressure ulcer prevention    Consults:   PT to evaluate and treat- 3 times a week and OT to evaluate and treat- 3 times a week     Miscellaneous Care: Diabetes Care: Diabetes: Check blood sugar. Fingerstick blood sugar AC and HS  Sliding Scale/Hypoglycemia Protocol: For FSG<80, give 1 amp D50 or 1 glucose tablet. For FSG , do nothing. For -200, give 1 unit of novolog in addition to pre-meal  1  Do another set of home blood pressures for four days in a row starting in August or September and send written record of blood pressures taken morning and evening, three readings each time, recorded on blood pressure data sheet given to you by Dr Rose Marie Pereira and e-mail to [email or web address]   2  Continue current medications  3  Cardiology follow-up approximately six months with EKG  done insulin. For -250, give 2 units of novolog in addition to pre-meal insulin. For -300, give 3 units of novolog in addition to pre-meal insulin. For 301-350, give 4 units of novolog in addition to pre-meal insulin. For 351-400, give 5 units of novolog in addition to pre-meal insulin. For FSG >400, give 5 units of novolog in addition to pre-meal insulin and please call MD    Baseline oxygen requirement 3-5L nasal cannula. Goal SpO2 88-92%                 Diabetes Care:  SN to perform and educate Diabetic management with blood glucose monitoring:      Medications: Discontinue all previous medication orders, if any. See new list below.     Medication List        START taking these medications      artificial tears 0.5 % ophthalmic solution  Commonly known as: ISOPTO TEARS  Place 1 drop into both eyes 6 (six) times daily.     vits A and D-white pet-lanolin ointment  Apply topically 2 (two) times daily. Apply twice daily to penis            CHANGE how you take these medications      atorvastatin 40 MG tablet  Commonly known as: LIPITOR  Take 1 tablet (40 mg total) by mouth once daily.  What changed: when to take this     insulin lispro 100 unit/mL injection  Commonly known as: HumaLOG U-100 Insulin  Inject 5 Units into the skin 3 (three) times daily before meals.  What changed: additional instructions            CONTINUE taking these medications      acetaminophen 650 MG Tbsr  Commonly known as: TYLENOL  Take 650 mg by mouth every 8 (eight) hours as needed (pain or fever over 100.4).     albuterol 90 mcg/actuation inhaler  Commonly known as: PROVENTIL/VENTOLIN HFA  Inhale 2 puffs into the lungs 4 (four) times daily as needed (breathing).     albuterol-ipratropium 2.5 mg-0.5 mg/3 mL nebulizer solution  Commonly known as: DUO-NEB  Take 3 mLs by nebulization every 4 (four) hours while awake. Rescue     apixaban 5 mg Tab  Commonly known as: ELIQUIS  Take 5 mg by mouth 2 (two) times daily.     aspirin 81 MG EC  tablet  Commonly known as: ECOTRIN  Take 81 mg by mouth once daily.     carvediloL 3.125 MG tablet  Commonly known as: COREG  Take 2 tablets (6.25 mg total) by mouth 2 (two) times daily.     cetirizine 10 MG tablet  Commonly known as: ZYRTEC  Take 10 mg by mouth daily as needed (itching).     cloNIDine 0.3 mg/24 hr td ptwk 0.3 mg/24 hr  Commonly known as: CATAPRES  Place 1 patch onto the skin every 7 days.     epoetin xavier 10,000 unit/mL injection  Commonly known as: PROCRIT  Inject 0.7 mLs (7,000 Units total) into the skin every Tues, Thurs, Sat.     erythromycin ophthalmic ointment  Commonly known as: ROMYCIN  Place a 1/2 inch ribbon of ointment into the lower eyelid three times a day.     FLUoxetine 40 MG capsule  Take 40 mg by mouth once daily.     fluticasone-salmeterol 250-50 mcg/dose 250-50 mcg/dose diskus inhaler  Commonly known as: ADVAIR  Inhale 1 puff into the lungs 2 (two) times daily.     GLUCAGON EMERGENCY KIT (HUMAN) 1 mg Solr  Generic drug: glucagon  1 mg into the muscle as needed if CBG < 70     hydrALAZINE 100 MG tablet  Commonly known as: APRESOLINE  Take 1 tablet (100 mg total) by mouth every 8 (eight) hours.     HYDROPHILIC CREAM TOP  Apply liberal amount to affected area twice daily for dry skin     insulin detemir U-100 100 unit/mL (3 mL) Inpn pen  Commonly known as: Levemir FLEXTOUCH  Inject 7 Units into the skin 2 (two) times daily.     isosorbide mononitrate 30 MG 24 hr tablet  Commonly known as: IMDUR  Take 30 mg by mouth 2 (two) times daily.     melatonin 3 mg Tbdl  Take 9 mg by mouth nightly as needed (sleep).     NEPRO CARB STEADY ORAL  Give 1 carton by mouth with each meal.     NIFEdipine 90 MG (OSM) 24 hr tablet  Commonly known as: PROCARDIA-XL  Take 1 tablet (90 mg total) by mouth once daily.     sodium chloride 0.65 % nasal spray  Commonly known as: OCEAN  2 sprays by Nasal route every 4 (four) hours as needed for Congestion.     tiotropium bromide 2.5 mcg/actuation  inhaler  Commonly known as: SPIRIVA RESPIMAT  Inhale 2 puffs into the lungs Daily.     triamcinolone acetonide 0.025 % Lotn  Apply to the affected area twice daily     vitamin renal formula (B-complex-vitamin c-folic acid) 1 mg Cap  Commonly known as: NEPHROCAP  Take 1 capsule by mouth once daily.            STOP taking these medications      doxycycline 100 MG tablet  Commonly known as: VIBRA-TABS                Immunizations Administered as of 3/20/2023       Name Date Dose VIS Date Route Exp Date    COVID-19, MRNA, LN-S, PF (Moderna) 1/27/2022 -- -- -- --    COVID-19, MRNA, LN-S, PF (Pfizer) (Purple Cap) 3/2/2021 -- -- -- --    : Pfizer Inc     Lot: [id number]     COVID-19, MRNA, LN-S, PF (Pfizer) (Purple Cap) 2/9/2021 -- -- -- --    : Pfizer Inc     Lot: [id number]             This patient has had both covid vaccinations    Some patients may experience side effects after vaccination.  These may include fever, headache, muscle or joint aches.  Most symptoms resolve with 24-48 hours and do not require urgent medical evaluation unless they persist for more than 72 hours or symptoms are concerning for an unrelated medical condition.          _________________________________  Cecilia Dorman MD  03/20/2023

## 2024-10-17 ENCOUNTER — OFFICE VISIT (OUTPATIENT)
Dept: PHYSICAL THERAPY | Facility: CLINIC | Age: 74
End: 2024-10-17
Payer: MEDICARE

## 2024-10-17 DIAGNOSIS — N81.11 MIDLINE CYSTOCELE: ICD-10-CM

## 2024-10-17 DIAGNOSIS — N39.43 POST-VOID DRIBBLING: Primary | ICD-10-CM

## 2024-10-17 DIAGNOSIS — M54.2 NECK PAIN: ICD-10-CM

## 2024-10-17 DIAGNOSIS — N39.3 STRESS INCONTINENCE OF URINE: ICD-10-CM

## 2024-10-17 PROCEDURE — 97112 NEUROMUSCULAR REEDUCATION: CPT

## 2024-10-17 PROCEDURE — 97110 THERAPEUTIC EXERCISES: CPT

## 2024-10-17 PROCEDURE — 97140 MANUAL THERAPY 1/> REGIONS: CPT

## 2024-10-17 NOTE — PROGRESS NOTES
Daily Note     Today's date: 10/17/2024  Patient name: Evelin Thurston  : 1950  MRN: [id number]  Referring provider: Katiana Rodriguez DO  Dx:   Encounter Diagnosis     ICD-10-CM    1. Post-void dribbling  N39.43       2. Stress incontinence of urine  N39.3       3. Midline cystocele  N81.11       4. Neck pain  M54.2           Start Time: 0800  Stop Time: 0900  Total time in clinic (min): 60 minutes    Patient was co-treated by SPT Lily Rice and Jessica Haines PT, DPT under my direct supervision.     Subjective: Pt stated that she possibly cracked or bruised rib on R side with chair's armrest. Incident occurred this past Saturday, no fall reported. Urinary leakage has been going well except with one incident. Pt was on a bus trip and did not go to bathroom from 8 to 11 am. Attempted to void at 11 am with no success. Went to the bathroom at 3:30 pm and went to urinate and voiding did not stop. After the bus trip, she noticed mild leakage. Pt stated she hasn't had BM since Monday of this week, had 2 BM that day. Morning had a hard consistency and later on was firm. Pt additionally stated that her neck has been bothering her and R side hurts more than L side.     Objective: See treatment diary below      Assessment: Tolerated treatment well. Patient would benefit from continued PT to increase bowel motility and to increase better coordination with DB and PFMC with dynamic activity. Further focus will be on increasing PFMC endurance. Pt had difficulty with performing holding low level PFMC with alternating step ups. PT gave pt chin tucks to perform lying down in bed to help with neck pain.       Plan: Continue per plan of care.      Precautions:   Past Medical History:   Diagnosis Date    Arthritis     Chronic kidney disease     stage 3    Hyperlipidemia     borderline    Hypertension     borderline    Migraine     Migraine     controlled with verapamil    Osteopenia     Snores      PERFECT Score   Power  right: 0/5   Power left: 0/5      Perfect Score: Rectal exam: 2/5      pelvic floor exam consent given by patient    Pelvic exam completed: vaginally and rectally      SMEG Biofeedback   to be assessed next treatment           Insurance:  AMA/CMS Eval/ Re-eval POC expires PFDI Auth #/ Referral # Total     Start date  Expiration date Extension  Visit limitation?  PT only or  PT+OT? Co-Insurance   CMS 8/8/24 10/3/24 221.888                      9/26/24  11/3/24  121.88                                                                                                       AUTH #:  Date                           Authed: Used                             Remaining                               Precautions: standard        Date: 8/8/24  8/27/24  9/5/24  9/10/24  9/19/24  9/26/24  10/10/24 10/17/24   Session: IE  2  3  4  5  6  7 8   Manuals                       colonic massage with focus on secum and sigmoid    DN- see above  DN- see above    DN- see above         PEFM lvl 17 15 min while performing abvoe      PEFM lvl 17 15 min while performing abvoe  PEFM lvl 17 15 min while performing above   PEFM lvl 17   20 min while performing colonic massage               colonic massage with focus on secum and sigmoid; illiocecal valve  colonic massage with focus on secum and sigmoid; illiocecal valve  colonic massage with focus on secum and sigmoid; illiocecal valve colonic massage with focus on secum and sigmoid; illiocecal valve                      Neuro Re-Ed        pelvic floor contractions in conjunction with diaphragmatic breathing  pelvic floor contractions in conjunction with diaphragmatic breathing   pelvic floor contractions in conjunction with diaphragmatic breathing  pelvic floor contractions in conjunction with diaphragmatic breathing    360 breathing                  Diaphragmatic breathing                  TA engagement    10x +BFB  seated add + TA + PFC 20x            TA+ kegel    10x + BFB              Kegel + SLR     20x b/l   sit to stand + ADD + PFC 20x             kegel + ADD squeeze +bridge    20x  TA + hip burner 5 sec hold 2*10             seated kegel + ADD squeeze    20x  step up+ PFC 20x             sit to stand with PFC    15 x  lateral, monster, reverse speed skater green loop 50'  lateral, monster, reverse speed skater green loop 50'      lateral, monster, reverse speed skater BTB 50' Lateral, monster, reverse speed skater BTB 2 rounds            clamshell 20c green loop      kegel + sit to stand + add 20c  10# bicep curl with PFC 20x Squats with OH press (6 lbs)           bridge + ADD squeeze 20x  sit to stand with PFC + add squeeze      10# tricep  and row with PFC 20x Low level kegel with step ups/downs          SLR + PFC 20x        5# arm flexion and abduction + PFC 2*10 Squats on steps 1x10 each side with PFMC   Ther Ex      heel raised squat with PFC 20x  hip burners 2*10  hip burners 2*10     Trunk rotations with 6 lbs + PFMC   Geno pose      standing hip abd/ ext 20x blue loop    standing hip abd/ ext 20x blue loop  bridges + PFC+ add squeeze  bridges + PFC+ add squeeze      Cat-cow      standing arm ext + PFC BTB 20x            Lord of the half fish                  Happy baby                                                                                              Ther Activity Bowel and bladder education                                                      Gait Training                                                        Modalities

## 2024-10-30 ENCOUNTER — HOSPITAL ENCOUNTER (OUTPATIENT)
Dept: RADIOLOGY | Facility: HOSPITAL | Age: 74
Discharge: HOME | End: 2024-10-30
Attending: INTERNAL MEDICINE
Payer: MEDICARE

## 2024-10-30 DIAGNOSIS — M81.0 OSTEOPOROSIS, UNSPECIFIED OSTEOPOROSIS TYPE, UNSPECIFIED PATHOLOGICAL FRACTURE PRESENCE: ICD-10-CM

## 2024-10-30 PROCEDURE — 77080 DXA BONE DENSITY AXIAL: CPT

## 2024-11-04 ENCOUNTER — TELEPHONE (OUTPATIENT)
Age: 74
End: 2024-11-04

## 2024-11-04 ENCOUNTER — TELEPHONE (OUTPATIENT)
Dept: ENDOCRINOLOGY | Facility: CLINIC | Age: 74
End: 2024-11-04

## 2024-11-04 NOTE — RESULT ENCOUNTER NOTE
Please inform pt   There is decrease in BMD at hips, please make appt sooner to discuss the next steps

## 2024-11-04 NOTE — TELEPHONE ENCOUNTER
Left message on pts vm to call our office to sched an appt    There is an opening on nov 26 2024 4 pm with Sumaya walters

## 2024-11-04 NOTE — TELEPHONE ENCOUNTER
----- Message from Edy COLLAZO sent at 11/4/2024  2:21 PM EST -----  Please schedule sooner f/u per provider request  ----- Message -----  From: Ludwin Villafana MD  Sent: 11/4/2024   1:04 PM EST  To: #    Please inform pt   There is decrease in BMD at hips, please make appt sooner to discuss the next steps

## 2024-11-04 NOTE — TELEPHONE ENCOUNTER
Patient return  phone call she will take the November 26,24  at 4:00 pm  tried to call unable  to reach team member .

## 2024-11-04 NOTE — TELEPHONE ENCOUNTER
Patient called back stating that she got a call back back but was not sure what was the reason. I did not locate another reason for call but did inform her that we are just waiting for her to be placed in that new November slot.

## 2024-11-04 NOTE — TELEPHONE ENCOUNTER
Neris Chavarria   Please add pt to Nov 26th 4 pm with Suamya Wheeler  Thank you   And I left message on pts vm that she will be added to sched on nov 26th 4 pm

## 2024-11-05 NOTE — TELEPHONE ENCOUNTER
Called pt at 1022 am and let pt know she has an appt 11/26/2024 4pm with Sumaya Wheeler and to arrive at [phone] and call with any problems

## 2024-11-06 ENCOUNTER — OFFICE VISIT (OUTPATIENT)
Dept: CARDIOLOGY CLINIC | Facility: CLINIC | Age: 74
End: 2024-11-06
Payer: MEDICARE

## 2024-11-06 VITALS
HEART RATE: 57 BPM | BODY MASS INDEX: 23.66 KG/M2 | WEIGHT: 169 LBS | DIASTOLIC BLOOD PRESSURE: 70 MMHG | HEIGHT: 71 IN | SYSTOLIC BLOOD PRESSURE: 144 MMHG | OXYGEN SATURATION: 96 %

## 2024-11-06 DIAGNOSIS — I50.32 CHRONIC DIASTOLIC HF (HEART FAILURE), NYHA CLASS 2 (HCC): ICD-10-CM

## 2024-11-06 DIAGNOSIS — E78.5 HYPERLIPIDEMIA, UNSPECIFIED HYPERLIPIDEMIA TYPE: ICD-10-CM

## 2024-11-06 DIAGNOSIS — R00.2 INTERMITTENT PALPITATIONS: ICD-10-CM

## 2024-11-06 DIAGNOSIS — N18.31 STAGE 3A CHRONIC KIDNEY DISEASE (HCC): ICD-10-CM

## 2024-11-06 DIAGNOSIS — R06.02 EXERTIONAL SHORTNESS OF BREATH: Primary | ICD-10-CM

## 2024-11-06 DIAGNOSIS — I10 ESSENTIAL HYPERTENSION: ICD-10-CM

## 2024-11-06 PROCEDURE — 93000 ELECTROCARDIOGRAM COMPLETE: CPT | Performed by: INTERNAL MEDICINE

## 2024-11-06 PROCEDURE — 99214 OFFICE O/P EST MOD 30 MIN: CPT | Performed by: INTERNAL MEDICINE

## 2024-11-06 RX ORDER — DAPAGLIFLOZIN 10 MG/1
10 TABLET, FILM COATED ORAL EVERY MORNING
Qty: 90 TABLET | Refills: 3 | Status: SHIPPED | OUTPATIENT
Start: 2024-11-06

## 2024-11-06 RX ORDER — SPIRONOLACTONE AND HYDROCHLOROTHIAZIDE 25; 25 MG/1; MG/1
1 TABLET ORAL EVERY MORNING
Qty: 100 TABLET | Refills: 2 | Status: SHIPPED | OUTPATIENT
Start: 2024-11-06

## 2024-11-06 RX ORDER — VERAPAMIL HYDROCHLORIDE 180 MG/1
180 TABLET, EXTENDED RELEASE ORAL 2 TIMES DAILY
Qty: 180 TABLET | Refills: 1 | Status: SHIPPED | OUTPATIENT
Start: 2024-11-06

## 2024-11-06 RX ORDER — ATORVASTATIN CALCIUM 20 MG/1
20 TABLET, FILM COATED ORAL DAILY
Qty: 90 TABLET | Refills: 3 | Status: SHIPPED | OUTPATIENT
Start: 2024-11-06

## 2024-11-06 NOTE — PROGRESS NOTES
[facility]  [address] [place] [address] [address]   [place] [address]  Cardiology Consultation    Evelin Thurston  [id number]  1950      Consult for: Exertional dyspnea  Appreciate consult by: Katiana Rodriguez DO    1. Exertional shortness of breath  dapagliflozin (Farxiga) 10 MG tablet    spironolactone-hydrochlorothiazide (ALDACTAZIDE) 25-25 mg per tablet    Lipid Panel With Apolipoprotein B (ApoB)    Comprehensive metabolic panel      2. Intermittent palpitations  POCT ECG    Lipid Panel With Apolipoprotein B (ApoB)    Comprehensive metabolic panel      3. Stage 3a chronic kidney disease (HCC)  dapagliflozin (Farxiga) 10 MG tablet      4. Essential hypertension  verapamil (CALAN-SR) 180 mg CR tablet      5. Hyperlipidemia, unspecified hyperlipidemia type  atorvastatin (LIPITOR) 20 mg tablet    Lipid Panel With Apolipoprotein B (ApoB)    Comprehensive metabolic panel      6. Chronic diastolic HF (heart failure), NYHA class 2 ([facility])           Discussion/Summary:   Progressive exertional dyspnea-he has a hypertensive blood pressure response on stress test.  Her symptoms are consistent with heart failure with preserved ejection fraction.   Farxiga 10 mg in the morning.  We will switch her chlorthalidone/spironolactone to Aldactazide combination.  She will periodically check her blood pressures-especially after exercise.  We will continue with her verapamil 180 mg twice a day.  She will wear compression when sitting and standing.    Hyperlipidemia well-controlled with the last LDL of 49    History of hypercalcemia status post parathyroidectomy    Osteopenia on DEXA scan    HPI:   73-year-old with a history of hypertension presents for initial consultation.  She reports having some exertional dyspnea at times.  She has noted it has been progressive.  She is now short of breath walking less than a mile.  She reports trying to cut back on sodium.  She is compliant with her antihypertensive  medications.  She denies having any prior history of any irregular heart rhythms.  She denies having chest heaviness.  She denies having any falls.    4/26/2024: We reviewed through her recent exercise stress test.  She continues to have exertional dyspnea.  She is compliant with her medications.    Recent Visit: Denies having chest pain Denies significant palpitations. Compliant with therapy. Reviewed labwork together. She is going to the gym regularly. Breathing is better.    Past Medical History:   Diagnosis Date    Arthritis     Chronic kidney disease     stage 3    Hyperlipidemia     borderline    Hypertension     borderline    Migraine     Migraine     controlled with verapamil    Osteopenia     Snores      Social History     Socioeconomic History    Marital status: /Civil Union     Spouse name: Not on file    Number of children: Not on file    Years of education: Not on file    Highest education level: Not on file   Occupational History    Not on file   Tobacco Use    Smoking status: Never     Passive exposure: Never    Smokeless tobacco: Never   Vaping Use    Vaping status: Never Used   Substance and Sexual Activity    Alcohol use: No    Drug use: No    Sexual activity: Not on file   Other Topics Concern    Not on file   Social History Narrative    Not on file     Social Determinants of Health     Financial Resource Strain: Low Risk  (3/20/2023)    Overall Financial Resource Strain (CARDIA)     Difficulty of Paying Living Expenses: Not hard at all   Food Insecurity: No Food Insecurity (3/25/2024)    Nursing - Inadequate Food Risk Classification     Worried About Running Out of Food in the Last Year: Never true     Ran Out of Food in the Last Year: Never true     Ran Out of Food in the Last Year: Not on file   Transportation Needs: No Transportation Needs (3/25/2024)    PRAPARE - Transportation     Lack of Transportation (Medical): No     Lack of Transportation (Non-Medical): No   Physical Activity:  Not on file   Stress: Not on file   Social Connections: Not on file   Intimate Partner Violence: Not on file   Housing Stability: Low Risk  (3/25/2024)    Housing Stability Vital Sign     Unable to Pay for Housing in the Last Year: No     Number of Times Moved in the Last Year: 1     Homeless in the Last Year: No      Family History   Problem Relation Age of Onset    Angina Mother     Heart failure Mother     Prostate cancer Father     Cancer Father         prostate    Diabetes Sister     Sudden death Sister     Osteoporosis Sister     Heart disease Sister         MI    COPD Sister     Kidney disease Sister     Heart disease Sister         CHF    Hypertension Daughter     Hypertension Brother     Cancer Brother         lung    Diabetes Brother     Cancer Brother         prostate    No Known Problems Son     Lung cancer Maternal Uncle     Breast cancer Cousin 50    Prostate cancer Other 51     Past Surgical History:   Procedure Laterality Date    BREAST BIOPSY Right     benign ~ 1990s    BUNIONECTOMY Left     3 hammertoes, bunion    CATARACT EXTRACTION Bilateral 2012    COLONOSCOPY      PARATHYROID GLAND SURGERY Right     front gland removed       Current Outpatient Medications:     atorvastatin (LIPITOR) 20 mg tablet, Take 1 tablet (20 mg total) by mouth daily, Disp: 90 tablet, Rfl: 3    calcium carbonate (OS-FLORIAN) 600 MG tablet, Take one tablet daily, Disp: , Rfl:     Cholecalciferol (VITAMIN D3) 5000 units CAPS, Take 1 capsule by mouth Daily, Disp: , Rfl:     Coenzyme Q10 (Co Q 10) 100 MG CAPS, Take by mouth every morning, Disp: , Rfl:     dapagliflozin (Farxiga) 10 MG tablet, Take 1 tablet (10 mg total) by mouth every morning, Disp: 30 tablet, Rfl: 3    magnesium oxide (MAG-OX) 400 mg tablet, Take 1 tablet by mouth daily With zinc , Disp: , Rfl:     multivitamin (THERAGRAN) TABS, Take 1 tablet by mouth daily, Disp: , Rfl:     spironolactone-hydrochlorothiazide (ALDACTAZIDE) 25-25 mg per tablet, TAKE 1 TABLET BY  "MOUTH EVERY MORNING, Disp: 100 tablet, Rfl: 1    verapamil (CALAN-SR) 180 mg CR tablet, TAKE 1 TABLET (180 MG TOTAL) BY MOUTH 2 (TWO) TIMES A DAY, Disp: 180 tablet, Rfl: 1    Zinc Sulfate (ZINC 15 PO), Take by mouth daily With copper 1mg, Disp: , Rfl:     doxycycline hyclate (VIBRAMYCIN) 100 mg capsule, , Disp: , Rfl:   No Known Allergies  Vitals:    11/06/24 0825   BP: 144/70   BP Location: Left arm   Patient Position: Sitting   Cuff Size: Standard   Pulse: 57   SpO2: 96%   Weight: 76.7 kg (169 lb)   Height: 5' 10.5\" (1.791 m)       Review of Systems:   Review of Systems   Constitutional:  Positive for fatigue. Negative for activity change, appetite change, chills, diaphoresis, fever and unexpected weight change.   HENT: Negative.  Negative for congestion, dental problem, drooling, ear discharge, ear pain, facial swelling, hearing loss, mouth sores, nosebleeds, postnasal drip, rhinorrhea, sinus pressure, sinus pain, sneezing, sore throat, tinnitus, trouble swallowing and voice change.    Eyes: Negative.  Negative for photophobia, pain, redness, itching and visual disturbance.   Respiratory:  Negative for apnea, cough, choking, chest tightness, wheezing and stridor.    Cardiovascular: Negative.  Negative for chest pain, palpitations and leg swelling.   Gastrointestinal: Negative.  Negative for abdominal distention, abdominal pain, anal bleeding, blood in stool, constipation, diarrhea, nausea, rectal pain and vomiting.   Endocrine: Negative.  Negative for cold intolerance, heat intolerance, polydipsia, polyphagia and polyuria.   Genitourinary: Negative.  Negative for decreased urine volume, difficulty urinating, dyspareunia, dysuria, enuresis, flank pain, frequency, genital sores, hematuria, menstrual problem, pelvic pain, urgency, vaginal bleeding, vaginal discharge and vaginal pain.   Musculoskeletal: Negative.  Negative for arthralgias, back pain, gait problem, joint swelling, myalgias, neck pain and neck " "stiffness.   Skin: Negative.  Negative for color change, pallor, rash and wound.   Allergic/Immunologic: Negative.  Negative for environmental allergies, food allergies and immunocompromised state.   Neurological: Negative.  Negative for dizziness, tremors, seizures, syncope, facial asymmetry, speech difficulty, weakness, light-headedness, numbness and headaches.   Hematological: Negative.  Negative for adenopathy. Does not bruise/bleed easily.   Psychiatric/Behavioral: Negative.  Negative for agitation, behavioral problems, confusion, decreased concentration, dysphoric mood, hallucinations, self-injury, sleep disturbance and suicidal ideas. The patient is not nervous/anxious and is not hyperactive.    All other systems reviewed and are negative.      Vitals:    11/06/24 0825   BP: 144/70   BP Location: Left arm   Patient Position: Sitting   Cuff Size: Standard   Pulse: 57   SpO2: 96%   Weight: 76.7 kg (169 lb)   Height: 5' 10.5\" (1.791 m)     Physical Examination:   Physical Exam  Constitutional:       General: She is not in acute distress.     Appearance: She is well-developed. She is not diaphoretic.   HENT:      Head: Normocephalic and atraumatic.      Right Ear: External ear normal.      Left Ear: External ear normal.   Eyes:      General: No scleral icterus.        Right eye: No discharge.         Left eye: No discharge.      Conjunctiva/sclera: Conjunctivae normal.      Pupils: Pupils are equal, round, and reactive to light.   Neck:      Thyroid: No thyromegaly.      Vascular: No JVD.      Trachea: No tracheal deviation.   Cardiovascular:      Rate and Rhythm: Normal rate and regular rhythm.      Heart sounds: No murmur heard.     No friction rub. Gallop present.   Pulmonary:      Effort: Pulmonary effort is normal. No respiratory distress.      Breath sounds: Normal breath sounds. No stridor. No wheezing or rales.   Chest:      Chest wall: No tenderness.   Abdominal:      General: Bowel sounds are normal. " "There is no distension.      Palpations: Abdomen is soft. There is no mass.      Tenderness: There is no abdominal tenderness. There is no guarding or rebound.   Musculoskeletal:         General: No tenderness or deformity. Normal range of motion.      Cervical back: Normal range of motion and neck supple.   Skin:     General: Skin is warm and dry.      Coloration: Skin is not pale.      Findings: No erythema or rash.   Neurological:      Mental Status: She is alert and oriented to person, place, and time.      Cranial Nerves: No cranial nerve deficit.      Motor: No abnormal muscle tone.      Coordination: Coordination normal.      Deep Tendon Reflexes: Reflexes are normal and symmetric. Reflexes normal.   Psychiatric:         Behavior: Behavior normal.         Thought Content: Thought content normal.         Judgment: Judgment normal.         Labs:     Lab Results   Component Value Date    WBC 6.88 09/13/2024    HGB 14.5 09/13/2024    HCT 45.8 09/13/2024    MCV 87 09/13/2024    RDW 12.1 09/13/2024     09/13/2024     BMP:  Lab Results   Component Value Date    SODIUM 137 09/13/2024    K 4.6 09/13/2024     09/13/2024    CO2 31 09/13/2024    ANIONGAP 11.7 10/09/2015    BUN 27 (H) 09/13/2024    CREATININE 0.98 09/13/2024    GLUC 91 10/23/2019    GLUF 91 09/13/2024    CALCIUM 10.1 09/13/2024    EGFR 56 09/13/2024    MG 2.1 06/30/2019     LFT:  Lab Results   Component Value Date    AST 20 09/13/2024    ALT 21 09/13/2024    ALKPHOS 39 09/13/2024    TP 6.8 09/13/2024    ALB 4.5 09/13/2024      Lab Results   Component Value Date    NWW7GCESRRGA 3.532 11/16/2023     No results found for: \"HGBA1C\"  Lipid Profile:   Lab Results   Component Value Date    CHOLESTEROL 111 09/13/2024    HDL 52 09/13/2024    LDLCALC 48 09/13/2024    TRIG 56 09/13/2024     Lab Results   Component Value Date    CHOLESTEROL 111 09/13/2024    CHOLESTEROL 118 03/11/2024     Lab Results   Component Value Date    TROPONINI <0.02 06/30/2019 " "    No results found for: \"NTBNP\"   No results found for this or any previous visit (from the past 672 hour(s)).      Imaging & Testing   I have personally reviewed pertinent reports.      Cardiac Testing       EKG: Personally reviewed.          Frankie Gary MD [facility]  [phone]  Please call with any questions or suggestions    Counseling :  A description of the counseling:   Goals and Barriers:  Patient's ability to self care:  Medication side effect reviewed with patient in detail and all their questions answered.    \"Portions of the record may have been created with voice recognition software. Occasional wrong word or \"sound a like\" substitutions may have occurred due to the inherent limitations of voice recognition software. Read the chart carefully and recognize, using context, where substitutions have occurred. Please call if you have any questions. \"    "

## 2024-11-26 ENCOUNTER — OFFICE VISIT (OUTPATIENT)
Dept: ENDOCRINOLOGY | Facility: CLINIC | Age: 74
End: 2024-11-26
Payer: MEDICARE

## 2024-11-26 VITALS
WEIGHT: 169 LBS | SYSTOLIC BLOOD PRESSURE: 126 MMHG | OXYGEN SATURATION: 98 % | DIASTOLIC BLOOD PRESSURE: 76 MMHG | BODY MASS INDEX: 23.66 KG/M2 | HEIGHT: 71 IN | HEART RATE: 66 BPM

## 2024-11-26 DIAGNOSIS — E04.2 MULTINODULAR GOITER: ICD-10-CM

## 2024-11-26 DIAGNOSIS — E21.3 HYPERPARATHYROIDISM (HCC): ICD-10-CM

## 2024-11-26 DIAGNOSIS — E55.9 VITAMIN D DEFICIENCY: ICD-10-CM

## 2024-11-26 DIAGNOSIS — M81.0 OSTEOPOROSIS, UNSPECIFIED OSTEOPOROSIS TYPE, UNSPECIFIED PATHOLOGICAL FRACTURE PRESENCE: Primary | ICD-10-CM

## 2024-11-26 PROCEDURE — 99214 OFFICE O/P EST MOD 30 MIN: CPT | Performed by: PHYSICIAN ASSISTANT

## 2024-11-26 NOTE — PROGRESS NOTES
Patient Progress Note    CC: osteoporosis     Referring Provider  Katiana Rodriguez, Do  [address] [facility]   [address]  [place] [address]     History of Present Illness:     Evelin Thurston is a 74 y.o. female here for follow-up of osteoporosis, vitamin-D deficiency, thyroid nodules. She also has a history of primary hyperparathyroidism and underwent a parathyroid adenoma resection in 2011. Last PTH was normal at 49.9 and calcium was normal at 10.1. For osteoporosis she was treated with bisphosphonate therapy for 7 years, so she was given a drug holiday in November 2018. She is taking vitamin D3 5000 International Units daily, calcium 600 mg daily, and a multivitamin daily. Vitamin-D level previously normal at 40.9. Last DEXA scan done in Oct 2024 showed osteoporosis with statistically significant decrease in bone mineral density in the hips. No recent falls or fractures. She is doing weight-bearing exercises routinely.  She also has a history of thyroid nodules. Last thyroid ultrasound was done in May 2019. A left upper pole nodule and left lower pole nodule were measured but did not meet criteria for biopsy. No history of external radiation to head/neck/chest. No family history of thyroid cancer.  Thyroid ultrasound results: May 2019  FINDINGS:  Thyroid parenchyma is diffusely heterogeneous in echotexture with focal nodule(s) as described below.     Right lobe:  4.7 x 1.3 x 1.5 cm.   Left lobe:  4.6 x 1.6 x 1.4 cm.  Isthmus:  0.4 cm.     Nodule #1.  Image 32.  LEFT upper pole nodule measuring 0.9 x 0.6 x 0.8 cm.  Unchanged from prior.   COMPOSITION:  1 point, mixed cystic and solid.  ECHOGENICITY:  1 point, hyperechoic or isoechoic.    SHAPE:  0 points, wider-than-tall.    MARGIN: 0 points, smooth.  ECHOGENIC FOCI:  0 points, none or large comet-tail artifacts.  TI-RADS Classification: TR 2 (2 points), Not suspious. No FNA.       Nodule #2.  Image 39.  LEFT lower pole nodule measuring 0.5 x 0.7 x 0.4 cm.  Unchanged  from prior.   COMPOSITION:  2 points, solid or almost completely solid .  ECHOGENICITY:  1 point, hyperechoic or isoechoic.    SHAPE:  0 points, wider-than-tall.    MARGIN: 0 points, smooth.  ECHOGENIC FOCI:  0 points, none or large comet-tail artifacts.  TI-RADS Classification: TR 3 (3 points), Mildly suspicious. FNA if >2.5 cm.  Follow if >1.5 cm.     There are additional nodules of lesser size and/or TI-RADS score. These do not necessitate additional evaluation based on ACR criteria.      IMPRESSION:     No nodule meets current ACR criteria for requiring biopsy or followup ultrasounds.        Patient Active Problem List   Diagnosis    Chronic migraine without aura without status migrainosus, not intractable    Essential hypertension    Lump of skin    Valgus deformity of both great toes    Pain in both feet    Onychomycosis    Radiculopathy of lumbar region    Metatarsalgia of both feet    Laryngopharyngeal reflux (LPR)    Stage 3a chronic kidney disease (HCC)    Vitamin D deficiency    Hyperparathyroidism (HCC)    Osteoporosis    Multinodular goiter    Dyspnea on exertion    Dyslipidemia    Intermittent palpitations    Primary osteoarthritis of both knees    White coat syndrome with diagnosis of hypertension    Chronic fatigue    Chest pain due to GERD    Non-toxic multinodular goiter    Osteopenia after menopause    Episodic migraine    History of gastroesophageal reflux (GERD)     Past Medical History:   Diagnosis Date    Arthritis     Chronic kidney disease     stage 3    Hyperlipidemia     borderline    Hypertension     borderline    Migraine     Migraine     controlled with verapamil    Osteopenia     Snores       Past Surgical History:   Procedure Laterality Date    BREAST BIOPSY Right     benign ~ 1990s    BUNIONECTOMY Left     3 hammertoes, bunion    CATARACT EXTRACTION Bilateral 2012    COLONOSCOPY      PARATHYROID GLAND SURGERY Right     front gland removed      Family History   Problem Relation Age of  Onset    Angina Mother     Heart failure Mother     Prostate cancer Father     Cancer Father         prostate    Diabetes Sister     Sudden death Sister     Osteoporosis Sister     Heart disease Sister         MI    COPD Sister     Kidney disease Sister     Heart disease Sister         CHF    Hypertension Daughter     Hypertension Brother     Cancer Brother         lung    Diabetes Brother     Cancer Brother         prostate    No Known Problems Son     Lung cancer Maternal Uncle     Breast cancer Cousin 50    Prostate cancer Other 51     Social History     Tobacco Use    Smoking status: Never     Passive exposure: Never    Smokeless tobacco: Never   Substance Use Topics    Alcohol use: No     No Known Allergies  Current Outpatient Medications   Medication Sig Dispense Refill    atorvastatin (LIPITOR) 20 mg tablet Take 1 tablet (20 mg total) by mouth daily 90 tablet 3    calcium carbonate (OS-FLORIAN) 600 MG tablet Take one tablet daily      Cholecalciferol (VITAMIN D3) 5000 units CAPS Take 1 capsule by mouth Daily      Coenzyme Q10 (Co Q 10) 100 MG CAPS Take by mouth every morning      dapagliflozin (Farxiga) 10 MG tablet Take 1 tablet (10 mg total) by mouth every morning 90 tablet 3    magnesium oxide (MAG-OX) 400 mg tablet Take 1 tablet by mouth daily With zinc       multivitamin (THERAGRAN) TABS Take 1 tablet by mouth daily      spironolactone-hydrochlorothiazide (ALDACTAZIDE) 25-25 mg per tablet Take 1 tablet by mouth every morning 100 tablet 2    verapamil (CALAN-SR) 180 mg CR tablet Take 1 tablet (180 mg total) by mouth 2 (two) times a day 180 tablet 1    Zinc Sulfate (ZINC 15 PO) Take by mouth daily With copper 1mg      doxycycline hyclate (VIBRAMYCIN) 100 mg capsule  (Patient not taking: Reported on 11/26/2024)       No current facility-administered medications for this visit.         Review of Systems   Constitutional:  Negative for activity change, appetite change, fatigue and unexpected weight change.  "  HENT:  Negative for trouble swallowing.    Eyes:  Negative for visual disturbance.   Respiratory:  Negative for shortness of breath.    Cardiovascular:  Negative for chest pain and palpitations.   Gastrointestinal:  Positive for constipation. Negative for diarrhea.   Endocrine: Negative for cold intolerance and heat intolerance.   Musculoskeletal:  Positive for arthralgias (arthritis).   Skin: Negative.    Neurological:  Negative for tremors and numbness.   Psychiatric/Behavioral: Negative.         Physical Exam:  Body mass index is 23.91 kg/m².  /76 (BP Location: Left arm, Patient Position: Sitting, Cuff Size: Large)   Pulse 66   Ht 5' 10.5\" (1.791 m)   Wt 76.7 kg (169 lb)   SpO2 98%   BMI 23.91 kg/m²    Wt Readings from Last 3 Encounters:   11/26/24 76.7 kg (169 lb)   11/06/24 76.7 kg (169 lb)   10/04/24 77.1 kg (170 lb)       Physical Exam  Vitals and nursing note reviewed.   Constitutional:       Appearance: She is well-developed.   HENT:      Head: Normocephalic.   Eyes:      General: No scleral icterus.  Neck:      Thyroid: No thyromegaly.   Cardiovascular:      Rate and Rhythm: Normal rate and regular rhythm.      Pulses:           Radial pulses are 2+ on the right side and 2+ on the left side.      Heart sounds: No murmur heard.  Pulmonary:      Effort: Pulmonary effort is normal. No respiratory distress.      Breath sounds: Normal breath sounds. No wheezing.   Musculoskeletal:      Cervical back: Neck supple.   Skin:     General: Skin is warm and dry.   Neurological:      Mental Status: She is alert.           Labs:   No results found for: \"HGBA1C\"    Lab Results   Component Value Date    CHOL 178 10/09/2015    HDL 52 09/13/2024    TRIG 56 09/13/2024    CHOLHDL 2.4 10/23/2019       Lab Results   Component Value Date    GLUCOSE 94 01/03/2017    CALCIUM 10.1 09/13/2024     01/03/2017    K 4.6 09/13/2024    CO2 31 09/13/2024     09/13/2024    BUN 27 (H) 09/13/2024    CREATININE 0.98 " 09/13/2024        eGFR   Date Value Ref Range Status   09/13/2024 56 ml/min/1.73sq m Final       Lab Results   Component Value Date    ALT 21 09/13/2024    AST 20 09/13/2024    ALKPHOS 39 09/13/2024    BILITOT 0.3 01/03/2017       Lab Results   Component Value Date    [id number] 3.532 11/16/2023    TSH 2.590 05/31/2019         Plan:    Diagnoses and all orders for this visit:    Osteoporosis, unspecified osteoporosis type, unspecified pathological fracture presence  DEXA scan showed worsening bone density   Discussed starting Reclast vs Prolia  Discussed SE, safety/efficacy  Discussed no drug holiday with Prolia and abrupt discontinuation of the medication can increase risk of vertebral fractures  She will consider the treatment options and then get back to us with which one she chooses to use  Continue vitamin D and calcium supplementation  Take precautions to avoid falls  Continue weight bearing exercises as tolerated  -     Comprehensive metabolic panel; Future    Vitamin D deficiency  Vitamin-D level previously normal at 40.9.   -     Vitamin D 25 hydroxy; Future    Hyperparathyroidism (HCC)  S/p parathyroid adenoma surgery  Last PTH was normal at 49.9 and calcium was normal at 10.1  -     PTH, intact; Future    Multinodular goiter  US done in 2019 showed 2 subcentimeter nodules that do not meet criteria for biopsy or follow-up ultrasounds        Discussed with the patient and all questions fully answered. She will call me if any problems arise.    Counseled patient on diagnostic results, prognosis, risk and benefit of treatment options, instruction for management, importance of treatment compliance, risk  factor reduction and impressions      Sumaya Wheeler PA-C

## 2024-12-02 ENCOUNTER — APPOINTMENT (OUTPATIENT)
Dept: LAB | Facility: CLINIC | Age: 74
End: 2024-12-02
Payer: MEDICARE

## 2024-12-02 DIAGNOSIS — E21.3 HYPERPARATHYROIDISM (HCC): ICD-10-CM

## 2024-12-02 DIAGNOSIS — E55.9 VITAMIN D DEFICIENCY: ICD-10-CM

## 2024-12-02 LAB
ANION GAP SERPL CALCULATED.3IONS-SCNC: 4 MMOL/L (ref 4–13)
BUN SERPL-MCNC: 19 MG/DL (ref 5–25)
CALCIUM SERPL-MCNC: 9.7 MG/DL (ref 8.4–10.2)
CHLORIDE SERPL-SCNC: 101 MMOL/L (ref 96–108)
CO2 SERPL-SCNC: 33 MMOL/L (ref 21–32)
CREAT SERPL-MCNC: 0.94 MG/DL (ref 0.6–1.3)
GFR SERPL CREATININE-BSD FRML MDRD: 59 ML/MIN/1.73SQ M
GLUCOSE P FAST SERPL-MCNC: 91 MG/DL (ref 65–99)
POTASSIUM SERPL-SCNC: 4.7 MMOL/L (ref 3.5–5.3)
PTH-INTACT SERPL-MCNC: 56.9 PG/ML (ref 12–88)
SODIUM SERPL-SCNC: 138 MMOL/L (ref 135–147)

## 2024-12-02 PROCEDURE — 83970 ASSAY OF PARATHORMONE: CPT

## 2024-12-02 PROCEDURE — 36415 COLL VENOUS BLD VENIPUNCTURE: CPT

## 2024-12-02 PROCEDURE — 80048 BASIC METABOLIC PNL TOTAL CA: CPT

## 2024-12-02 PROCEDURE — 82306 VITAMIN D 25 HYDROXY: CPT

## 2024-12-03 ENCOUNTER — RESULTS FOLLOW-UP (OUTPATIENT)
Dept: ENDOCRINOLOGY | Facility: CLINIC | Age: 74
End: 2024-12-03

## 2024-12-05 LAB — 25(OH)D3 SERPL-MCNC: 57.7 NG/ML (ref 30–100)

## 2024-12-09 ENCOUNTER — TELEPHONE (OUTPATIENT)
Dept: ENDOCRINOLOGY | Facility: CLINIC | Age: 74
End: 2024-12-09

## 2025-01-29 ENCOUNTER — HOSPITAL ENCOUNTER (OUTPATIENT)
Dept: RADIOLOGY | Facility: HOSPITAL | Age: 75
Discharge: HOME | End: 2025-01-29
Payer: MEDICARE

## 2025-01-29 VITALS — WEIGHT: 170 LBS | BODY MASS INDEX: 23.8 KG/M2 | HEIGHT: 71 IN

## 2025-01-29 DIAGNOSIS — Z12.31 ENCOUNTER FOR SCREENING MAMMOGRAM FOR BREAST CANCER: ICD-10-CM

## 2025-01-29 PROCEDURE — 77067 SCR MAMMO BI INCL CAD: CPT

## 2025-01-29 PROCEDURE — 77063 BREAST TOMOSYNTHESIS BI: CPT

## 2025-02-01 ENCOUNTER — OFFICE VISIT (OUTPATIENT)
Dept: SLEEP CENTER | Facility: CLINIC | Age: 75
End: 2025-02-01
Payer: MEDICARE

## 2025-02-01 VITALS
DIASTOLIC BLOOD PRESSURE: 60 MMHG | HEART RATE: 59 BPM | HEIGHT: 71 IN | SYSTOLIC BLOOD PRESSURE: 128 MMHG | WEIGHT: 166.6 LBS | OXYGEN SATURATION: 97 % | BODY MASS INDEX: 23.32 KG/M2

## 2025-02-01 DIAGNOSIS — K21.9 LARYNGOPHARYNGEAL REFLUX (LPR): ICD-10-CM

## 2025-02-01 DIAGNOSIS — I10 ESSENTIAL HYPERTENSION: ICD-10-CM

## 2025-02-01 DIAGNOSIS — G47.19 EXCESSIVE DAYTIME SLEEPINESS: ICD-10-CM

## 2025-02-01 DIAGNOSIS — R06.83 SNORING: Primary | ICD-10-CM

## 2025-02-01 DIAGNOSIS — R06.09 DYSPNEA ON EXERTION: ICD-10-CM

## 2025-02-01 DIAGNOSIS — F51.12 INSUFFICIENT SLEEP SYNDROME: ICD-10-CM

## 2025-02-01 PROCEDURE — 99204 OFFICE O/P NEW MOD 45 MIN: CPT | Performed by: INTERNAL MEDICINE

## 2025-02-01 PROCEDURE — G2211 COMPLEX E/M VISIT ADD ON: HCPCS | Performed by: INTERNAL MEDICINE

## 2025-02-01 NOTE — PROGRESS NOTES
Name: Evelin Thurston      : 1950      MRN: [id number]  Encounter Provider: Nikolay Graham MD  Encounter Date: 2025   Encounter department: [facility]  :  Assessment & Plan  Snoring    Orders:    Ambulatory Referral to Sleep Medicine    Diagnostic Sleep Study; Future    Excessive daytime sleepiness    Orders:    Diagnostic Sleep Study; Future    Insufficient sleep syndrome         Dyspnea on exertion    Orders:    Diagnostic Sleep Study; Future    Essential hypertension         Laryngopharyngeal reflux (LPR)         PLAN:   Problems & Comorbidities Addressed this Visit as listed.  Stable/controlled conditions reviewed in notes.  With respect to above conditions, comprehensive counseling provided on pathophysiology; effects on symptoms and comorbidities, diagnostic strategies & limitations; treatment options; risks or no treament; risks & benefits of the various therapeutic options; costs and insurance aspects.     I advised weight reduction, avoiding sleeping supine, using alcohol or sedating medications close to bed time and on safe driving practices.    Further evaluation is indicated and a sleep study will be scheduled.   Patient is unwilling to try Positive airway pressure therapy.  I also advised allowing sufficient opportunity for sleep targeting upwards of 7 hours     Follow-up to be scheduled after testing/initiation of therapy to review results, further details of treatment options and to adjust therapy.        History of Present Illness   HPI       Consultation - Sleep Center   Evelin Thurston  74 y.o. female  :1950  MRN:[id number]  DOS:2025    Physician Requesting Consult: Katiana Rodriguez DO             Reason for Consult : At your kind request I saw Evelin Thurston for initial sleep evaluPatient's presents with:.    PFSH, Problem List, Medications & Allergies were reviewed in EMR.    Evelin  has a past medical history of Arthritis, Chronic kidney disease, Hyperlipidemia,  "Hypertension, Migraine, Migraine, Osteopenia, and Snores.      She has a current medication list which includes the following prescription(s): atorvastatin, calcium carbonate, vitamin d3, co q 10, dapagliflozin, magnesium oxide, multivitamin, spironolactone-hydrochlorothiazide, verapamil, zinc sulfate, and doxycycline hyclate.      HPI: She is here for complaint of shortness of breath and cardiac/pulmonary workup has been unrevealing.  Evelin is aware of snoring ongoing \"forever \"that disturbs bed partner and at times she awakens herself with snorting. Other complaints: None. Restless Leg Syndrome: Reports no suggestive symptoms, but reports knee and lower extremity pain.  .  Parasomnia: No features reported    Sleep Routine (averaged): Typical Bedtime: 10:30 PM.  Gets OOB: 4 AM. TIB:6.5 hrs.   Sleep latency:< 15 minutes; Sleep Interruptions: Infrequent,. Awakens: Needing an alarm  Upon awakening: never feels rested.  She estimates getting 6 hrs sleep.  Daytime Function:Evelin reports excessive daytime sleepiness feels like napping but avoids but would doze off when sedentary. She rated herself at Total score: 10 /24 on the [place] Sleepiness Scale.     Habits:   reports that she has never smoked. She has never been exposed to tobacco smoke. She has never used smokeless tobacco.;  reports no history of alcohol use.; Reports no history of drug use.;  E-Cigarette/Vaping   Never User; Caffeine use:very little until  ; Exercise routine: regular.    Occupation:   [employer] License:    Family History: Negative for sleep disturbance.  ROS: Significant for around 13 pounds weight reduction since she was started on Farxiga a year ago.  She is on medication for acid reflux..     EXAM:  /60   Pulse 59   Ht 5' 10.5\" (1.791 m)   Wt 75.6 kg (166 lb 9.6 oz)   SpO2 97%   BMI 23.57 kg/m²    General: Well groomed female, well appearing, in no apparent distress.   Neurological: Alert and orientated; cooperative; Cranial nerves " "intact;    Psychiatric: Speech: Clear and coherent; normal mood, affect & thought   Skin: Warm and dry; Color& Hydration good; no facial rashes or lesions   HEENT:  Craniofacial anatomy: normal Sinuses: Non-tender. TMJ: Normal    Eyes: EOM's intact; conjunctiva/corneas clear   Ears: Appear normal     Nasal Airway: is patent Septum: Intact; Turbinates: Normal; Rhinorrhea: None  Mouth: Lips: Normal posture; Dentition: normal . Mucosa: Moist; Hard Palate:normal    Oropharryx: crowded and AP narrowing Tongue: Mallampati:Class IV, Mobile, and MacroglossiaSoft Palate:  redundant  Tonsils: absent  Neck:; Neck Circumference: 15 \"; supple; no abnormal masses; Thyroid: Normal. Trachea: Central.    Lymph: No cervical Lymhadenopathy  Heart: S1,S2 normal; RRR; no gallop; no murmur   Lungs: Respiratory Effort: Normal. Air entry good bilaterally.  No wheezes.  No rales  Abdomen: Obese, soft & non-tender    Extremities: No pedal edema.  No clubbing or cyanosis.    Musculoskeletal:  Motor normal; Gait: Normal.       Sincerely,      Authenticated electronically on 02/01/25   Board Certified Specialist     Portions of the record may have been created with voice recognition software. Occasional wrong word or \"sound a like\" substitutions may have occurred due to the inherent limitations of voice recognition software. There may also be notations and random deletions of words or characters from malfunctioning software. Read the chart carefully and recognize, using context, where substitutions/deletions have occurred.       Sitting and reading: Moderate chance of dozing  Watching TV: Slight chance of dozing  Sitting, inactive in a public place (e.g. a theatre or a meeting): Slight chance of dozing  As a passenger in a car for an hour without a break: Slight chance of dozing  Lying down to rest in the afternoon when circumstances permit: High chance of dozing  Sitting and talking to someone: Slight chance of dozing  Sitting quietly after a " "lunch without alcohol: Slight chance of dozing  In a car, while stopped for a few minutes in traffic: Would never doze  Total score: 10     Review of Systems  Pertinent positives/negatives included in HPI and also as noted:       Objective   /60   Pulse 59   Ht 5' 10.5\" (1.791 m)   Wt 75.6 kg (166 lb 9.6 oz)   SpO2 97%   BMI 23.57 kg/m²     Neck Circumference: 15  Physical Exam    Data  Lab Results   Component Value Date    HGB 14.5 09/13/2024    HCT 45.8 09/13/2024    MCV 87 09/13/2024      Lab Results   Component Value Date    GLUCOSE 94 01/03/2017    CALCIUM 9.7 12/02/2024     01/03/2017    K 4.7 12/02/2024    CO2 33 (H) 12/02/2024     12/02/2024    BUN 19 12/02/2024    CREATININE 0.94 12/02/2024     No results found for: \"IRON\", \"TIBC\", \"FERRITIN\"  Lab Results   Component Value Date    AST 20 09/13/2024    ALT 21 09/13/2024         " <-- Click to add NO pertinent Family History

## 2025-02-01 NOTE — PATIENT INSTRUCTIONS
What is JIGAR?   Obstructive sleep apnea is a common and serious sleep disorder that causes you to stop breathing during sleep. The airway repeatedly becomes blocked, limiting the amount of air that reaches your lungs. When this happens, you may snore loudly or making choking noises as you try to breathe. Your brain and body becomes oxygen deprived and you may wake up. This may happen a few times a night, or in more severe cases, several hundred times a night.   Sleep apnea can make you wake up in the morning feeling tired or unrefreshed even though you have had a full night of sleep. During the day, you may feel fatigued, have difficulty concentrating or you may even unintentionally fall asleep. This is because your body is waking up numerous times throughout the night, even though you might not be conscious of each awakening.  The lack of oxygen your body receives can have negative long-term consequences for your health. This includes:  High blood pressure  Heart disease  Irregular heart rhythms  Stroke  Pre-diabetes and diabetes  Depression  Testing  An objective evaluation of your sleep may be needed before your board certified sleep physician can make a diagnosis. Options include:   In-lab overnight sleep study  This type of sleep study requires you to stay overnight at a sleep center, in a bed that may resemble a hotel room. You will sleep with sensors hooked up to various parts of your body. These sensors record your brain waves, heartbeat, breathing and movement. An overnight sleep study also provides your doctor with the most complete information about your sleep. Learn more about an overnight sleep study.   Home sleep apnea test  Some patients with high risk factors for obstructive sleep apnea and no other medical disorders may be candidates for a home sleep apnea test. The testing equipment differs in that it is less complicated than what is used in an overnight sleep study. As such, does not give all the  data an in-lab will and if negative, may not mean you do not have the problem.  Treatment for sleep apnea includes using a continuous positive airway pressure (CPAP) machine to keep your airway open during sleep. A mask is placed over your nose and mouth, or just your nose. The mask is hooked to the CPAP machine that blows a gentle stream of air into the mask when you breathe. This helps keep your airway open so you can breathe more regularly. Extra oxygen may be given to you through the machine. You may be given a mouth device. It looks like a mouth guard or dental retainer and stops your tongue and mouth tissues from blocking your throat while you sleep. Surgery may be needed to remove extra tissues that block your mouth, throat, or nose.  Manage sleep apnea:   Do not smoke. Nicotine and other chemicals in cigarettes and cigars can cause lung damage. Ask your healthcare provider for information if you currently smoke and need help to quit. E-cigarettes or smokeless tobacco still contain nicotine. Talk to your healthcare provider before you use these products.  Do not drink alcohol or take sedative medicine before you go to sleep. Alcohol and sedatives can relax the muscles and tissues around your throat. This can block the airflow to your lungs.  Maintain a healthy weight. Excess tissue around your throat may restrict your breathing. Ask your healthcare provider for information if you need to lose weight.  Sleep on your side or use pillows designed to prevent sleep apnea. This prevents your tongue or other tissues from blocking your throat. You can also raise the head of your bed.  Driving Safety. Refrain from driving when drowsy.   Follow up with your healthcare provider as directed: Write down your questions so you remember to ask them during your visits.  Go to AASM website for more information: Sleepeducation.org  What is JIGAR?   Obstructive sleep apnea is a common and serious sleep disorder that causes you to  stop breathing during sleep. The airway repeatedly becomes blocked, limiting the amount of air that reaches your lungs. When this happens, you may snore loudly or making choking noises as you try to breathe. Your brain and body becomes oxygen deprived and you may wake up. This may happen a few times a night, or in more severe cases, several hundred times a night.   Sleep apnea can make you wake up in the morning feeling tired or unrefreshed even though you have had a full night of sleep. During the day, you may feel fatigued, have difficulty concentrating or you may even unintentionally fall asleep. This is because your body is waking up numerous times throughout the night, even though you might not be conscious of each awakening.  The lack of oxygen your body receives can have negative long-term consequences for your health. This includes:  High blood pressure  Heart disease  Irregular heart rhythms  Stroke  Pre-diabetes and diabetes  Depression  Testing  An objective evaluation of your sleep may be needed before your board certified sleep physician can make a diagnosis. Options include:   In-lab overnight sleep study  This type of sleep study requires you to stay overnight at a sleep center, in a bed that may resemble a hotel room. You will sleep with sensors hooked up to various parts of your body. These sensors record your brain waves, heartbeat, breathing and movement. An overnight sleep study also provides your doctor with the most complete information about your sleep. Learn more about an overnight sleep study.   Home sleep apnea test  Some patients with high risk factors for obstructive sleep apnea and no other medical disorders may be candidates for a home sleep apnea test. The testing equipment differs in that it is less complicated than what is used in an overnight sleep study. As such, does not give all the data an in-lab will and if negative, may not mean you do not have the problem.  Treatment for  sleep apnea includes using a continuous positive airway pressure (CPAP) machine to keep your airway open during sleep. A mask is placed over your nose and mouth, or just your nose. The mask is hooked to the CPAP machine that blows a gentle stream of air into the mask when you breathe. This helps keep your airway open so you can breathe more regularly. Extra oxygen may be given to you through the machine. You may be given a mouth device. It looks like a mouth guard or dental retainer and stops your tongue and mouth tissues from blocking your throat while you sleep. Surgery may be needed to remove extra tissues that block your mouth, throat, or nose.  Manage sleep apnea:   Do not smoke. Nicotine and other chemicals in cigarettes and cigars can cause lung damage. Ask your healthcare provider for information if you currently smoke and need help to quit. E-cigarettes or smokeless tobacco still contain nicotine. Talk to your healthcare provider before you use these products.  Do not drink alcohol or take sedative medicine before you go to sleep. Alcohol and sedatives can relax the muscles and tissues around your throat. This can block the airflow to your lungs.  Maintain a healthy weight. Excess tissue around your throat may restrict your breathing. Ask your healthcare provider for information if you need to lose weight.  Sleep on your side or use pillows designed to prevent sleep apnea. This prevents your tongue or other tissues from blocking your throat. You can also raise the head of your bed.  Driving Safety. Refrain from driving when drowsy.   Follow up with your healthcare provider as directed: Write down your questions so you remember to ask them during your visits.  Go to AASM website for more information: Sleepeducation.org    Nursing Support:  When: Monday through Friday 7:30A-4:30PM except holidays  Where: Our direct line is [phone]  *3  *1.      If you are having a true emergency please call 911.  In the  event that the line is busy or it is after hours please leave a voice message and we will return your call.  Please speak clearly, leaving your full name, birth date, best number to reach you and the reason for your call.   Medication refills: We will need the name of the medication, the dosage, the ordering provider, whether you get a 30 or 90 day refill, and the pharmacy name and address.  Medications will be ordered by the provider only.  Nurses cannot call in prescriptions.  Please allow 7 days for medication refills.  Physician requested updates: If your provider requested that you call with an update after starting medication, please be ready to provide us the medication and dosage, what time you take your medication, the time you attempt to fall asleep, time you fall asleep, when you wake up, and what time you get out of bed.  Sleep Study Results: We will contact you with sleep study results and/or next steps after the physician has reviewed your testing.

## 2025-02-01 NOTE — ASSESSMENT & PLAN NOTE
PLAN:   Problems & Comorbidities Addressed this Visit as listed.  Stable/controlled conditions reviewed in notes.  With respect to above conditions, comprehensive counseling provided on pathophysiology; effects on symptoms and comorbidities, diagnostic strategies & limitations; treatment options; risks or no treament; risks & benefits of the various therapeutic options; costs and insurance aspects.     I advised weight reduction, avoiding sleeping supine, using alcohol or sedating medications close to bed time and on safe driving practices.    Further evaluation is indicated and a sleep study will be scheduled.   Patient is unwilling to try Positive airway pressure therapy.  I also advised allowing sufficient opportunity for sleep targeting upwards of 7 hours     Follow-up to be scheduled after testing/initiation of therapy to review results, further details of treatment options and to adjust therapy.    
  Orders:    Diagnostic Sleep Study; Future    
techniques to be initiated when appropriate    Objective Measures:   NT     Assessment:      Assessment: Pt progressed through cervical and lower back mobility and strengthening with good tolerance. Additional exercises added to therex program this date with good completion. Pt completed Apollo leg press, resisted bow and arrows, and cervical stretches with good tolerance. Pt reports a very mild cervical irritation at the end of the session.  Treatment Diagnosis: pain, slightly decreased lumbar and cervical AROM, decreased cervical joint mobility, tender to palpation right lumbosacral triangle, left lumbosacral triangle, left gluteus darwin, Mid thoracic paraspinals and rotatores, decreased posture, and decreased flexiblity  Therapy Prognosis: Excellent       Patient Education: [x] NA       Post-Pain Assessment:       Pain Rating (0-10 pain scale):  0-1 /10   Location and pain description same as pre-treatment unless indicated.   Action: [x] NA   [] Perform HEP  [] Meds as prescribed  [] Modalities as prescribed   [] Call Physician     GOALS   Patient Goal(s): Patient Goals : Get back close to PLOF    Long Term Goals Completed by   Goal Status   LTG 1 The pt will have decreased pain </= 0/10 in order to increase ease with ADL's and work activities In progress   LTG 2 The pt will demo improved R cervical rotation and R lumbar sidebending pain-free AROM in order to increase ease of ADL's In progress   LTG 3 The pt will have a decrease in NDI and ALEXANDRE score >/=8 points in order to increase functional activity tolerance In progress   LTG 4 The pt will demo lifting 100 pounds w/ no c/o pain and lift from the ground to his waist. In progress   LTG 5 The pt will demo lifting 100 pounds w/ no c/o pain and lift from the ground to his waist and walk 50ft w/ weight. In progress   LTG 6 The pt will demo pushing/pulling 100 pounds w/ no c/o pain. In progress   LTG 7 The pt will be independent/compliant with PT HEP in order to

## 2025-02-03 ENCOUNTER — HOSPITAL ENCOUNTER (OUTPATIENT)
Dept: SLEEP CENTER | Facility: CLINIC | Age: 75
Discharge: HOME | End: 2025-02-03
Payer: MEDICARE

## 2025-02-03 DIAGNOSIS — R06.83 SNORING: ICD-10-CM

## 2025-02-03 DIAGNOSIS — G47.19 EXCESSIVE DAYTIME SLEEPINESS: ICD-10-CM

## 2025-02-03 DIAGNOSIS — R06.09 DYSPNEA ON EXERTION: ICD-10-CM

## 2025-02-03 PROCEDURE — 95810 POLYSOM 6/> YRS 4/> PARAM: CPT | Performed by: INTERNAL MEDICINE

## 2025-02-03 PROCEDURE — 95810 POLYSOM 6/> YRS 4/> PARAM: CPT

## 2025-02-04 ENCOUNTER — RESULTS FOLLOW-UP (OUTPATIENT)
Dept: FAMILY MEDICINE CLINIC | Facility: CLINIC | Age: 75
End: 2025-02-04

## 2025-02-04 PROBLEM — G47.33 OSA (OBSTRUCTIVE SLEEP APNEA): Status: ACTIVE | Noted: 2025-02-04

## 2025-02-04 NOTE — PROGRESS NOTES
Sleep Study Documentation    Pre-Sleep Study       Sleep testing procedure explained to patient:YES    Patient napped prior to study:NO    Caffeine:Dayshift worker after 12PM.  Caffeine use:YES- chocolate  6 ounces    Alcohol:Dayshift workers after 5PM: Alcohol use:NO    Typical day for patient:YES       Study Documentation    Sleep Study Indications: Loud snoring, EDS    Sleep Study: Diagnostic   Snore:Moderate  Supplemental O2: no    O2 flow rate (L/min) range   O2 flow rate (L/min) final   Minimum SaO2 85%  Baseline SaO2 96%    Mode of Therapy:    EKG abnormalities: no     EEG abnormalities: no    Were abnormal behaviors in sleep observed:NO    Is Total Sleep Study Recording Time < 2 hours: N/A    Is Total Sleep Study Recording Time > 2 hours but study is incomplete: N/A    Is Total Sleep Study Recording Time 6 hours or more but sleep was not obtained: NO    Patient classification: retired       Post-Sleep Study    Medication used at bedtime or during sleep study:YES other prescription medications    Patient reports time it took to fall asleep:less than 20 minutes    Patient reports waking up during study:1 to 2 times.  Patient reports returning to sleep without difficulty.    Patient reports sleeping 6 to 8 hours without dreaming.    Does the Patient feel this is a typical night of sleep:typical    Patient rated sleepiness: Not sleepy or tired    PAP treatment:no.

## 2025-02-13 ENCOUNTER — TELEPHONE (OUTPATIENT)
Dept: SLEEP CENTER | Facility: CLINIC | Age: 75
End: 2025-02-13

## 2025-02-13 NOTE — TELEPHONE ENCOUNTER
Return call from patient.     Patient states she would rather not use CPAP.     Follow up scheduled with Dr. Graham for 3/6/2025 @ 1:15 pm in the [place] office.

## 2025-02-13 NOTE — TELEPHONE ENCOUNTER
Sleep study resulted and shows mild to moderate sleep apnea and significant hypoxia, severe PLM.     Per 2/1/25 sleep consult note:  Patient is unwilling to try Positive airway pressure therapy.    Needs to schedule follow up with Dr. Graham to discuss treatment options.     Called patient and left message advising I will send a MyChart message with the sleep study results and next steps.  Provided sleep center number([phone]) to call if any questions.

## 2025-03-06 ENCOUNTER — OFFICE VISIT (OUTPATIENT)
Dept: SLEEP CENTER | Facility: CLINIC | Age: 75
End: 2025-03-06
Payer: MEDICARE

## 2025-03-06 VITALS
BODY MASS INDEX: 23.46 KG/M2 | HEIGHT: 71 IN | WEIGHT: 167.6 LBS | SYSTOLIC BLOOD PRESSURE: 110 MMHG | HEART RATE: 63 BPM | OXYGEN SATURATION: 96 % | DIASTOLIC BLOOD PRESSURE: 78 MMHG

## 2025-03-06 DIAGNOSIS — G47.33 OSA (OBSTRUCTIVE SLEEP APNEA): Primary | ICD-10-CM

## 2025-03-06 DIAGNOSIS — G47.19 EXCESSIVE DAYTIME SLEEPINESS: ICD-10-CM

## 2025-03-06 DIAGNOSIS — G47.34 SLEEP RELATED HYPOXIA: ICD-10-CM

## 2025-03-06 DIAGNOSIS — I10 ESSENTIAL HYPERTENSION: ICD-10-CM

## 2025-03-06 DIAGNOSIS — R06.09 DYSPNEA ON EXERTION: ICD-10-CM

## 2025-03-06 DIAGNOSIS — K21.9 LARYNGOPHARYNGEAL REFLUX (LPR): ICD-10-CM

## 2025-03-06 DIAGNOSIS — F51.12 INSUFFICIENT SLEEP SYNDROME: ICD-10-CM

## 2025-03-06 PROCEDURE — 99214 OFFICE O/P EST MOD 30 MIN: CPT | Performed by: INTERNAL MEDICINE

## 2025-03-06 PROCEDURE — G2211 COMPLEX E/M VISIT ADD ON: HCPCS | Performed by: INTERNAL MEDICINE

## 2025-03-06 NOTE — ASSESSMENT & PLAN NOTE
PLAN  -  Problem List Items & Comorbidities Addressed this Visit :  1. I reviewed results of the Sleep study with the patient.   2. With respect to above conditions, I counseled on pathophysiology, diagnosis, treatment options, risks and benefits; inter-relationship and effects on symptoms and comorbidities; risks of no treatment; costs and insurance aspects.   3. Patient reluctantly elected positive airway pressure therapy and is to be scheduled for a titration study.   4. I also advised on weight reduction.  5. Follow-up to be scheduled after the study to review results and to initiate therapy.

## 2025-03-06 NOTE — PROGRESS NOTES
Name: Evelin Thurston      : 1950      MRN: [id number]  Encounter Provider: Nikolay Graham MD  Encounter Date: 3/6/2025   Encounter department: [facility]  :  Assessment & Plan  JIGAR (obstructive sleep apnea)    Orders:    CPAP Study; Future    Sleep related hypoxia    Orders:    CPAP Study; Future    Excessive daytime sleepiness    Orders:    CPAP Study; Future    Dyspnea on exertion         Insufficient sleep syndrome         Essential hypertension         Laryngopharyngeal reflux (LPR)       PLAN  -  Problem List Items & Comorbidities Addressed this Visit :  1. I reviewed results of the Sleep study with the patient.   2. With respect to above conditions, I counseled on pathophysiology, diagnosis, treatment options, risks and benefits; inter-relationship and effects on symptoms and comorbidities; risks of no treatment; costs and insurance aspects.   3. Patient reluctantly elected positive airway pressure therapy and is to be scheduled for a titration study.   4. I also advised on weight reduction.  5. Follow-up to be scheduled after the study to review results and to initiate therapy.        History of Present Illness   HPI          Follow-Up Note - Sleep Center   Evelin Thurston  74 y.o. female  :1950  MRN:[id number]  DOS:3/6/2025    CC: I saw this patient for follow-up in clinic today for sleep disordered breathing, Coexisting Sleep and Medical Problems. Interval changes: Patient recently had a diagnostic sleep study and is here to review results / treatment options.      The study demonstrated an apnea/hypopnea index (AHI) of 7.9 events per hour of sleep.  The AHI in the supine position was 120.0.  The AHI during REM sleep was 21.6.  Moderate to loud intensity snoring was noted.  The amount of sleep time below 90% was 19.8 minutes.  The lowest oxygen saturation was 85.0%.    PFSH, Problem List, Medications & Allergies were reviewed in EMR.   She  has a past medical history of Arthritis,  "Chronic kidney disease, Hyperlipidemia, Hypertension, Migraine, Migraine, Osteopenia, and Snores.    She has a current medication list which includes the following prescription(s): atorvastatin, calcium carbonate, vitamin d3, co q 10, dapagliflozin, magnesium oxide, multivitamin, spironolactone-hydrochlorothiazide, verapamil, zinc sulfate, and doxycycline hyclate.    HPI: She has ongoing symptoms as outlined in her initial report.  Sleep Routine: Evelin reports getting 5-7 hrs sleep; she has no difficulty initiating or maintaining sleep . She arises needing an alarm and is not always refreshed.Evelin reports excessive daytime sleepiness, feels like napping but avoids however may doze off when sedentary.  She rated herself at Total score: 9 /24 on the Thompson Sleepiness Scale.   Habits: Reports that she has never smoked. She has never been exposed to tobacco smoke. She has never used smokeless tobacco.,  Reports no history of alcohol use.,  Reports no history of drug use., Caffeine use: very little, Exercise routine: regular.     ROS: reviewed significant for weight is stable within a few pounds.  Acid reflux is controlled.  Review of systems was otherwise negative..        EXAM: /78 (BP Location: Left arm, Patient Position: Sitting, Cuff Size: Adult)   Pulse 63   Ht 5' 10.5\" (1.791 m)   Wt 76 kg (167 lb 9.6 oz)   SpO2 96%   BMI 23.71 kg/m²     Wt Readings from Last 3 Encounters:   03/06/25 76 kg (167 lb 9.6 oz)   02/01/25 75.6 kg (166 lb 9.6 oz)   01/29/25 77.1 kg (170 lb)     Patient is well groomed; well appearing.  CNS: Alert, orientated, clear & coherent speech.  Psych: cooperative and in no distress. Mental state: Appears normal.  H&N: EOMI; NC/AT: No facial pressure marks, no rashes.    Skin/Extrem: col & hydration normal; no edema  Resp: Respiratory effort is normal  Physical findings otherwise essentially unchanged from previous.      Sincerely,     Authenticated electronically on 03/06/25   Board " "Certified Specialist     Portions of the record may have been created with voice recognition software. Occasional wrong word or \"sound a like\" substitutions may have occurred due to the inherent limitations of voice recognition software. There may also be notations and random deletions of words or characters from malfunctioning software. Read the chart carefully and recognize, using context, where substitutions/deletions have occurred.    Sitting and reading: Moderate chance of dozing  Watching TV: Moderate chance of dozing  Sitting, inactive in a public place (e.g. a theatre or a meeting): Slight chance of dozing  As a passenger in a car for an hour without a break: Slight chance of dozing  Lying down to rest in the afternoon when circumstances permit: Moderate chance of dozing  Sitting and talking to someone: Would never doze  Sitting quietly after a lunch without alcohol: Slight chance of dozing  In a car, while stopped for a few minutes in traffic: Would never doze  Total score: 9     Review of Systems  Pertinent positives/negatives included in HPI and also as noted:       Objective   /78 (BP Location: Left arm, Patient Position: Sitting, Cuff Size: Adult)   Pulse 63   Ht 5' 10.5\" (1.791 m)   Wt 76 kg (167 lb 9.6 oz)   SpO2 96%   BMI 23.71 kg/m²        Physical Exam    Data  Lab Results   Component Value Date    HGB 14.5 09/13/2024    HCT 45.8 09/13/2024    MCV 87 09/13/2024      Lab Results   Component Value Date    GLUCOSE 94 01/03/2017    CALCIUM 9.7 12/02/2024     01/03/2017    K 4.7 12/02/2024    CO2 33 (H) 12/02/2024     12/02/2024    BUN 19 12/02/2024    CREATININE 0.94 12/02/2024     No results found for: \"IRON\", \"TIBC\", \"FERRITIN\"  Lab Results   Component Value Date    AST 20 09/13/2024    ALT 21 09/13/2024         "

## 2025-03-31 ENCOUNTER — APPOINTMENT (OUTPATIENT)
Dept: LAB | Facility: CLINIC | Age: 75
End: 2025-03-31
Payer: MEDICARE

## 2025-03-31 DIAGNOSIS — E78.5 DYSLIPIDEMIA: ICD-10-CM

## 2025-03-31 DIAGNOSIS — E21.3 HYPERPARATHYROIDISM (HCC): ICD-10-CM

## 2025-03-31 DIAGNOSIS — I10 ESSENTIAL HYPERTENSION: ICD-10-CM

## 2025-03-31 LAB
ALBUMIN SERPL BCG-MCNC: 4.5 G/DL (ref 3.5–5)
ALP SERPL-CCNC: 53 U/L (ref 34–104)
ALT SERPL W P-5'-P-CCNC: 17 U/L (ref 7–52)
ANION GAP SERPL CALCULATED.3IONS-SCNC: 4 MMOL/L (ref 4–13)
AST SERPL W P-5'-P-CCNC: 20 U/L (ref 13–39)
BILIRUB SERPL-MCNC: 0.49 MG/DL (ref 0.2–1)
BUN SERPL-MCNC: 19 MG/DL (ref 5–25)
CALCIUM SERPL-MCNC: 9.9 MG/DL (ref 8.4–10.2)
CHLORIDE SERPL-SCNC: 103 MMOL/L (ref 96–108)
CHOLEST SERPL-MCNC: 113 MG/DL (ref ?–200)
CO2 SERPL-SCNC: 31 MMOL/L (ref 21–32)
CREAT SERPL-MCNC: 0.85 MG/DL (ref 0.6–1.3)
ERYTHROCYTE [DISTWIDTH] IN BLOOD BY AUTOMATED COUNT: 13.5 % (ref 11.6–15.1)
GFR SERPL CREATININE-BSD FRML MDRD: 66 ML/MIN/1.73SQ M
GLUCOSE P FAST SERPL-MCNC: 95 MG/DL (ref 65–99)
HCT VFR BLD AUTO: 44.5 % (ref 34.8–46.1)
HDLC SERPL-MCNC: 62 MG/DL
HGB BLD-MCNC: 14 G/DL (ref 11.5–15.4)
LDLC SERPL CALC-MCNC: 39 MG/DL (ref 0–100)
MCH RBC QN AUTO: 25.9 PG (ref 26.8–34.3)
MCHC RBC AUTO-ENTMCNC: 31.5 G/DL (ref 31.4–37.4)
MCV RBC AUTO: 82 FL (ref 82–98)
PLATELET # BLD AUTO: 252 THOUSANDS/UL (ref 149–390)
PMV BLD AUTO: 11.3 FL (ref 8.9–12.7)
POTASSIUM SERPL-SCNC: 4.1 MMOL/L (ref 3.5–5.3)
PROT SERPL-MCNC: 6.7 G/DL (ref 6.4–8.4)
PTH-INTACT SERPL-MCNC: 47.7 PG/ML (ref 12–88)
RBC # BLD AUTO: 5.4 MILLION/UL (ref 3.81–5.12)
SODIUM SERPL-SCNC: 138 MMOL/L (ref 135–147)
TRIGL SERPL-MCNC: 60 MG/DL (ref ?–150)
WBC # BLD AUTO: 6.31 THOUSAND/UL (ref 4.31–10.16)

## 2025-03-31 PROCEDURE — 80053 COMPREHEN METABOLIC PANEL: CPT

## 2025-03-31 PROCEDURE — 80061 LIPID PANEL: CPT

## 2025-03-31 PROCEDURE — 85027 COMPLETE CBC AUTOMATED: CPT

## 2025-03-31 PROCEDURE — 83970 ASSAY OF PARATHORMONE: CPT

## 2025-03-31 PROCEDURE — 36415 COLL VENOUS BLD VENIPUNCTURE: CPT

## 2025-04-04 ENCOUNTER — OFFICE VISIT (OUTPATIENT)
Dept: FAMILY MEDICINE CLINIC | Facility: CLINIC | Age: 75
End: 2025-04-04
Payer: MEDICARE

## 2025-04-04 VITALS
HEART RATE: 56 BPM | TEMPERATURE: 97.4 F | BODY MASS INDEX: 23.38 KG/M2 | DIASTOLIC BLOOD PRESSURE: 80 MMHG | OXYGEN SATURATION: 98 % | HEIGHT: 71 IN | WEIGHT: 167 LBS | RESPIRATION RATE: 18 BRPM | SYSTOLIC BLOOD PRESSURE: 130 MMHG

## 2025-04-04 DIAGNOSIS — N18.31 STAGE 3A CHRONIC KIDNEY DISEASE (HCC): ICD-10-CM

## 2025-04-04 DIAGNOSIS — Z00.00 MEDICARE ANNUAL WELLNESS VISIT, SUBSEQUENT: Primary | ICD-10-CM

## 2025-04-04 DIAGNOSIS — E78.5 DYSLIPIDEMIA: ICD-10-CM

## 2025-04-04 DIAGNOSIS — E21.3 HYPERPARATHYROIDISM (HCC): ICD-10-CM

## 2025-04-04 DIAGNOSIS — E55.9 VITAMIN D DEFICIENCY: ICD-10-CM

## 2025-04-04 DIAGNOSIS — Z87.19 HISTORY OF GASTROESOPHAGEAL REFLUX (GERD): ICD-10-CM

## 2025-04-04 DIAGNOSIS — M81.0 OSTEOPOROSIS, UNSPECIFIED OSTEOPOROSIS TYPE, UNSPECIFIED PATHOLOGICAL FRACTURE PRESENCE: ICD-10-CM

## 2025-04-04 DIAGNOSIS — I10 ESSENTIAL HYPERTENSION: ICD-10-CM

## 2025-04-04 PROCEDURE — G2211 COMPLEX E/M VISIT ADD ON: HCPCS | Performed by: FAMILY MEDICINE

## 2025-04-04 PROCEDURE — G0439 PPPS, SUBSEQ VISIT: HCPCS | Performed by: FAMILY MEDICINE

## 2025-04-04 PROCEDURE — 99214 OFFICE O/P EST MOD 30 MIN: CPT | Performed by: FAMILY MEDICINE

## 2025-04-04 NOTE — ASSESSMENT & PLAN NOTE
Lab Results   Component Value Date    EGFR 66 03/31/2025    EGFR 59 12/02/2024    EGFR 56 09/13/2024    CREATININE 0.85 03/31/2025    CREATININE 0.94 12/02/2024    CREATININE 0.98 09/13/2024   GFR has improved

## 2025-04-04 NOTE — PATIENT INSTRUCTIONS
Medicare Preventive Visit Patient Instructions  Thank you for completing your Welcome to Medicare Visit or Medicare Annual Wellness Visit today. Your next wellness visit will be due in one year (4/5/2026).  The screening/preventive services that you may require over the next 5-10 years are detailed below. Some tests may not apply to you based off risk factors and/or age. Screening tests ordered at today's visit but not completed yet may show as past due. Also, please note that scanned in results may not display below.  Preventive Screenings:  Service Recommendations Previous Testing/Comments   Colorectal Cancer Screening  * Colonoscopy    * Fecal Occult Blood Test (FOBT)/Fecal Immunochemical Test (FIT)  * Fecal DNA/Cologuard Test  * Flexible Sigmoidoscopy Age: 45-75 years old   Colonoscopy: every 10 years (may be performed more frequently if at higher risk)  OR  FOBT/FIT: every 1 year  OR  Cologuard: every 3 years  OR  Sigmoidoscopy: every 5 years  Screening may be recommended earlier than age 45 if at higher risk for colorectal cancer. Also, an individualized decision between you and your healthcare provider will decide whether screening between the ages of 76-85 would be appropriate. Colonoscopy: 05/13/2021  FOBT/FIT: Not on file  Cologuard: Not on file  Sigmoidoscopy: Not on file    Screening Current     Breast Cancer Screening Age: 40+ years old  Frequency: every 1-2 years  Not required if history of left and right mastectomy Mammogram: 01/29/2025    Screening Current   Cervical Cancer Screening Between the ages of 21-29, pap smear recommended once every 3 years.   Between the ages of 30-65, can perform pap smear with HPV co-testing every 5 years.   Recommendations may differ for women with a history of total hysterectomy, cervical cancer, or abnormal pap smears in past. Pap Smear: 12/29/2023    Screening Not Indicated   Hepatitis C Screening Once for adults born between 1945 and 1965  More frequently in  patients at high risk for Hepatitis C Hep C Antibody: 01/17/2019    Screening Current   Diabetes Screening 1-2 times per year if you're at risk for diabetes or have pre-diabetes Fasting glucose: 95 mg/dL (3/31/2025)  A1C: No results in last 5 years (No results in last 5 years)  Screening Current   Cholesterol Screening Once every 5 years if you don't have a lipid disorder. May order more often based on risk factors. Lipid panel: 03/31/2025    Screening Not Indicated  History Lipid Disorder     Other Preventive Screenings Covered by Medicare:  Abdominal Aortic Aneurysm (AAA) Screening: covered once if your at risk. You're considered to be at risk if you have a family history of AAA.  Lung Cancer Screening: covers low dose CT scan once per year if you meet all of the following conditions: (1) Age 55-77; (2) No signs or symptoms of lung cancer; (3) Current smoker or have quit smoking within the last 15 years; (4) You have a tobacco smoking history of at least 20 pack years (packs per day multiplied by number of years you smoked); (5) You get a written order from a healthcare provider.  Glaucoma Screening: covered annually if you're considered high risk: (1) You have diabetes OR (2) Family history of glaucoma OR (3)  aged 50 and older OR (4)  American aged 65 and older  Osteoporosis Screening: covered every 2 years if you meet one of the following conditions: (1) You're estrogen deficient and at risk for osteoporosis based off medical history and other findings; (2) Have a vertebral abnormality; (3) On glucocorticoid therapy for more than 3 months; (4) Have primary hyperparathyroidism; (5) On osteoporosis medications and need to assess response to drug therapy.   Last bone density test (DXA Scan): 10/30/2024.  HIV Screening: covered annually if you're between the age of 15-65. Also covered annually if you are younger than 15 and older than 65 with risk factors for HIV infection. For pregnant  patients, it is covered up to 3 times per pregnancy.    Immunizations:  Immunization Recommendations   Influenza Vaccine Annual influenza vaccination during flu season is recommended for all persons aged >= 6 months who do not have contraindications   Pneumococcal Vaccine   * Pneumococcal conjugate vaccine = PCV13 (Prevnar 13), PCV15 (Vaxneuvance), PCV20 (Prevnar 20)  * Pneumococcal polysaccharide vaccine = PPSV23 (Pneumovax) Adults 19-63 yo with certain risk factors or if 65+ yo  If never received any pneumonia vaccine: recommend Prevnar 20 (PCV20)  Give PCV20 if previously received 1 dose of PCV13 or PPSV23   Hepatitis B Vaccine 3 dose series if at intermediate or high risk (ex: diabetes, end stage renal disease, liver disease)   Respiratory syncytial virus (RSV) Vaccine - COVERED BY MEDICARE PART D  * RSVPreF3 (Arexvy) CDC recommends that adults 60 years of age and older may receive a single dose of RSV vaccine using shared clinical decision-making (SCDM)   Tetanus (Td) Vaccine - COST NOT COVERED BY MEDICARE PART B Following completion of primary series, a booster dose should be given every 10 years to maintain immunity against tetanus. Td may also be given as tetanus wound prophylaxis.   Tdap Vaccine - COST NOT COVERED BY MEDICARE PART B Recommended at least once for all adults. For pregnant patients, recommended with each pregnancy.   Shingles Vaccine (Shingrix) - COST NOT COVERED BY MEDICARE PART B  2 shot series recommended in those 19 years and older who have or will have weakened immune systems or those 50 years and older     Health Maintenance Due:      Topic Date Due   • Breast Cancer Screening: Mammogram  01/29/2026   • DXA SCAN  10/30/2026   • Colorectal Cancer Screening  05/13/2028   • Hepatitis C Screening  Completed     Immunizations Due:      Topic Date Due   • Influenza Vaccine (1) 09/01/2024   • COVID-19 Vaccine (3 - 2024-25 season) 09/01/2024     Advance Directives   What are advance directives?   Advance directives are legal documents that state your wishes and plans for medical care. These plans are made ahead of time in case you lose your ability to make decisions for yourself. Advance directives can apply to any medical decision, such as the treatments you want, and if you want to donate organs.   What are the types of advance directives?  There are many types of advance directives, and each state has rules about how to use them. You may choose a combination of any of the following:  Living will:  This is a written record of the treatment you want. You can also choose which treatments you do not want, which to limit, and which to stop at a certain time. This includes surgery, medicine, IV fluid, and tube feedings.   Durable power of  for healthcare (DPAHC):  This is a written record that states who you want to make healthcare choices for you when you are unable to make them for yourself. This person, called a proxy, is usually a family member or a friend. You may choose more than 1 proxy.  Do not resuscitate (DNR) order:  A DNR order is used in case your heart stops beating or you stop breathing. It is a request not to have certain forms of treatment, such as CPR. A DNR order may be included in other types of advance directives.  Medical directive:  This covers the care that you want if you are in a coma, near death, or unable to make decisions for yourself. You can list the treatments you want for each condition. Treatment may include pain medicine, surgery, blood transfusions, dialysis, IV or tube feedings, and a ventilator (breathing machine).  Values history:  This document has questions about your views, beliefs, and how you feel and think about life. This information can help others choose the care that you would choose.  Why are advance directives important?  An advance directive helps you control your care. Although spoken wishes may be used, it is better to have your wishes written down.  Spoken wishes can be misunderstood, or not followed. Treatments may be given even if you do not want them. An advance directive may make it easier for your family to make difficult choices about your care.   Urinary Incontinence   Urinary incontinence (UI)  is when you lose control of your bladder. UI develops because your bladder cannot store or empty urine properly. The 3 most common types of UI are stress incontinence, urge incontinence, or both.  Medicines:   May be given to help strengthen your bladder control. Report any side effects of medication to your healthcare provider.  Do pelvic muscle exercises often:  Your pelvic muscles help you stop urinating. Squeeze these muscles tight for 5 seconds, then relax for 5 seconds. Gradually work up to squeezing for 10 seconds. Do 3 sets of 15 repetitions a day, or as directed. This will help strengthen your pelvic muscles and improve bladder control.  Train your bladder:  Go to the bathroom at set times, such as every 2 hours, even if you do not feel the urge to go. You can also try to hold your urine when you feel the urge to go. For example, hold your urine for 5 minutes when you feel the urge to go. As that becomes easier, hold your urine for 10 minutes.   Self-care:   Keep a UI record.  Write down how often you leak urine and how much you leak. Make a note of what you were doing when you leaked urine.  Drink liquids as directed. You may need to limit the amount of liquid you drink to help control your urine leakage. Do not drink any liquid right before you go to bed. Limit or do not have drinks that contain caffeine or alcohol.   Prevent constipation.  Eat a variety of high-fiber foods. Good examples are high-fiber cereals, beans, vegetables, and whole-grain breads. Walking is the best way to trigger your intestines to have a bowel movement.  Exercise regularly and maintain a healthy weight.  Weight loss and exercise will decrease pressure on your bladder and help  you control your leakage.   Use a catheter as directed  to help empty your bladder. A catheter is a tiny, plastic tube that is put into your bladder to drain your urine.   Go to behavior therapy as directed.  Behavior therapy may be used to help you learn to control your urge to urinate.     © Copyright [employer] 2018 Information is for End User's use only and may not be sold, redistributed or otherwise used for commercial purposes. All illustrations and images included in CareNotes® are the copyrighted property of [employer] or [employer]

## 2025-04-04 NOTE — PROGRESS NOTES
Name: Evelin Thurston      : 1950      MRN: [id number]  Encounter Provider: Katiana Rodriguez DO  Encounter Date: 2025   Encounter department: [facility]  :  Assessment & Plan  Medicare annual wellness visit, subsequent  RSV vaccine recommended        Stage 3a chronic kidney disease (HCC)  Lab Results   Component Value Date    EGFR 66 2025    EGFR 59 2024    EGFR 56 2024    CREATININE 0.85 2025    CREATININE 0.94 2024    CREATININE 0.98 2024   GFR has improved         Essential hypertension  Stable   Continue verapamil 180 mg twice daily  Orders:  •  CBC; Future  •  Comprehensive metabolic panel; Future  •  Lipid Panel with Direct LDL reflex; Future    Vitamin D deficiency  Stable   Orders:  •  Vitamin D 25 hydroxy; Future    Dyslipidemia  Stable   Continue atorvastatin 20 mg a day        Osteoporosis, unspecified osteoporosis type, unspecified pathological fracture presence  Discussed her DEXA, she still doesn't want treatment due to side effect risks   Orders:  •  Vitamin D 25 hydroxy; Future    Hyperparathyroidism (HCC)    Orders:  •  PTH, intact; Future    History of gastroesophageal reflux (GERD)    Orders:  •  Ambulatory referral to Gastroenterology; Future       Preventive health issues were discussed with patient, and age appropriate screening tests were ordered as noted in patient's After Visit Summary. Personalized health advice and appropriate referrals for health education or preventive services given if needed, as noted in patient's After Visit Summary.    Return in about 6 months (around 10/4/2025) for Next scheduled follow up.    History of Present Illness     Patient reports that her reflux acts up sometimes and causes a cough.  She takes pantoprazole and then it goes away.  She is interested in getting another endoscopy.  Would like to follow-up with gastroenterology.    She is also concerned about her osteoporosis.  She did speak to the  endocrinologist regarding this.       Patient Care Team:  Katiana Rodriguez DO as PCP - General  Ludwin Villafana MD as PCP - Endocrinology (Endocrinology)  MD Tremayne Cerna MD Shanker Mukherjee, MD Arthur Popkave, MD (Cardiology)  MD Sumaya Madison PA-C as Physician Assistant (Endocrinology)    Review of Systems  Medical History Reviewed by provider this encounter:  Tobacco  Allergies  Meds  Problems  Med Hx  Surg Hx  Fam Hx       Annual Wellness Visit Questionnaire   Evelin is here for her Subsequent Wellness visit.     Health Risk Assessment:   Patient rates overall health as good. Patient feels that their physical health rating is same. Patient is satisfied with their life. Eyesight was rated as same. Hearing was rated as slightly worse. Patient feels that their emotional and mental health rating is same. Patients states they are never, rarely angry. Patient states they are sometimes unusually tired/fatigued. Pain experienced in the last 7 days has been none. Patient states that she has experienced no weight loss or gain in last 6 months.     Depression Screening:   PHQ-2 Score: 0      Fall Risk Screening:   In the past year, patient has experienced: no history of falling in past year      Urinary Incontinence Screening:   Patient has leaked urine accidently in the last six months.     Home Safety:  Patient does not have trouble with stairs inside or outside of their home. Patient has working smoke alarms and has working carbon monoxide detector. Home safety hazards include: none.     Nutrition:   Current diet is Regular.     Medications:   Patient is not currently taking any over-the-counter supplements. Patient is able to manage medications.     Activities of Daily Living (ADLs)/Instrumental Activities of Daily Living (IADLs):   Walk and transfer into and out of bed and chair?: Yes  Dress and groom yourself?: Yes    Bathe or shower yourself?: Yes    Feed yourself? Yes  Do your  laundry/housekeeping?: Yes  Manage your money, pay your bills and track your expenses?: Yes  Make your own meals?: Yes    Do your own shopping?: Yes    Previous Hospitalizations:   Any hospitalizations or ED visits within the last 12 months?: No      Advance Care Planning:   Living will: No    Durable POA for healthcare: No    Advanced directive: No    Advanced directive counseling given: Yes    ACP document given: Yes    End of Life Decisions reviewed with patient: Yes    Provider agrees with end of life decisions: Yes      Cognitive Screening:   Provider or family/friend/caregiver concerned regarding cognition?: No    PREVENTIVE SCREENINGS      Cardiovascular Screening:    General: Screening Not Indicated and History Lipid Disorder      Diabetes Screening:     General: Screening Current      Colorectal Cancer Screening:     General: Screening Current      Breast Cancer Screening:     General: Screening Current      Cervical Cancer Screening:    General: Screening Not Indicated      Osteoporosis Screening:    General: Screening Not Indicated and History Osteoporosis      Abdominal Aortic Aneurysm (AAA) Screening:        General: Screening Not Indicated      Lung Cancer Screening:     General: Screening Not Indicated      Hepatitis C Screening:    General: Screening Current    Screening, Brief Intervention, and Referral to Treatment (SBIRT)     Screening  Typical number of drinks in a day: 0  Typical number of drinks in a week: 0  Interpretation: Low risk drinking behavior.    AUDIT-C Screenin) How often did you have a drink containing alcohol in the past year? never  2) How many drinks did you have on a typical day when you were drinking in the past year? 0  3) How often did you have 6 or more drinks on one occasion in the past year? never    AUDIT-C Score: 0  Interpretation: Score 0-2 (female): Negative screen for alcohol misuse    Single Item Drug Screening:  How often have you used an illegal drug  "(including marijuana) or a prescription medication for non-medical reasons in the past year? never    Single Item Drug Screen Score: 0  Interpretation: Negative screen for possible drug use disorder    Brief Intervention  Alcohol & drug use screenings were reviewed. No concerns regarding substance use disorder identified.     Social Drivers of Health     Financial Resource Strain: Low Risk  (3/20/2023)    Overall Financial Resource Strain (CARDIA)    • Difficulty of Paying Living Expenses: Not hard at all   Food Insecurity: No Food Insecurity (4/4/2025)    Hunger Vital Sign    • Worried About Running Out of Food in the Last Year: Never true    • Ran Out of Food in the Last Year: Never true   Transportation Needs: No Transportation Needs (4/4/2025)    PRAPARE - Transportation    • Lack of Transportation (Medical): No    • Lack of Transportation (Non-Medical): No   Housing Stability: Unknown (4/4/2025)    Housing Stability Vital Sign    • Unable to Pay for Housing in the Last Year: No    • Homeless in the Last Year: No   Utilities: Not At Risk (4/4/2025)    [facility] Utilities    • Threatened with loss of utilities: No     No results found.    Objective   /80   Pulse 56   Temp (!) 97.4 °F (36.3 °C)   Resp 18   Ht 5' 10.5\" (1.791 m)   Wt 75.8 kg (167 lb)   SpO2 98%   BMI 23.62 kg/m²     Physical Exam  Vitals and nursing note reviewed.   Constitutional:       General: She is not in acute distress.     Appearance: She is well-developed.   HENT:      Head: Normocephalic and atraumatic.      Right Ear: Tympanic membrane normal.      Left Ear: Tympanic membrane normal.   Cardiovascular:      Rate and Rhythm: Normal rate and regular rhythm.      Heart sounds: No murmur heard.  Pulmonary:      Effort: Pulmonary effort is normal. No respiratory distress.      Breath sounds: Normal breath sounds.   Musculoskeletal:      Cervical back: Neck supple.   Neurological:      Mental Status: She is alert.         "

## 2025-04-04 NOTE — ASSESSMENT & PLAN NOTE
Discussed her DEXA, she still doesn't want treatment due to side effect risks   Orders:  •  Vitamin D 25 hydroxy; Future

## 2025-04-04 NOTE — ASSESSMENT & PLAN NOTE
Stable   Continue verapamil 180 mg twice daily  Orders:  •  CBC; Future  •  Comprehensive metabolic panel; Future  •  Lipid Panel with Direct LDL reflex; Future

## 2025-04-23 ENCOUNTER — OFFICE VISIT (OUTPATIENT)
Dept: AUDIOLOGY | Facility: CLINIC | Age: 75
End: 2025-04-23
Payer: COMMERCIAL

## 2025-04-23 DIAGNOSIS — H90.3 SENSORINEURAL HEARING LOSS (SNHL), BILATERAL: Primary | ICD-10-CM

## 2025-04-23 NOTE — PROGRESS NOTES
Hearing Aid Visit:    Name:  Evelin Thurston  :  1950  Age:  74 y.o.  MRN:  [id number]  Date of Evaluation: 25     HISTORY:    Evelin Thurston was seen today (2025) for a(n) out-of-warranty hearing aid check of her bilateral hearing aids. Today, Evelin reported that she has not been wearing the devices often.  She has not been in office since 2024    Most recent Audiogram: 10/4/2023    DEVICE INFORMATION:        Left Device Right Device   Hearing Aid Make: Sandra  [employer]    Hearing Aid Model: Joseph AI 1600 Joseph AI 1600   Serial Number: [id number]   Repair Warranty Date: 24   Loss/Damage Warranty Status:           Length/Output 3/60 3/60   Wax System: HearClear HearClear   Dome Size/Style: 7mm open 6mm open   Battery: Lithium-ion Rechargeable Lithium-ion Rechargeable       Serial Number:   Warranty Date:      Accessories: N/A     ACTION/ADJUSTMENTS:  Canals are clear   Went to step 3 / fine tuned R aid to match L closer   Changed to open domes / added locks     RECOMMENDATIONS:   RTO 10/8/2025 for 2 year audiological evaluation and 6 month HA check   Patient advised to contact us should she need further programming changes     Emperatriz Catalan, CCC-A  Clinical Audiologist  [id number]  [facility] AUDIOLOGY  [address] [facility] [address]

## 2025-05-06 ENCOUNTER — OFFICE VISIT (OUTPATIENT)
Dept: FAMILY MEDICINE CLINIC | Facility: CLINIC | Age: 75
End: 2025-05-06
Payer: MEDICARE

## 2025-05-06 VITALS
HEART RATE: 56 BPM | HEIGHT: 71 IN | DIASTOLIC BLOOD PRESSURE: 70 MMHG | BODY MASS INDEX: 23.38 KG/M2 | TEMPERATURE: 96.1 F | WEIGHT: 167 LBS | RESPIRATION RATE: 20 BRPM | SYSTOLIC BLOOD PRESSURE: 128 MMHG

## 2025-05-06 DIAGNOSIS — L01.00 IMPETIGO: Primary | ICD-10-CM

## 2025-05-06 DIAGNOSIS — I10 ESSENTIAL HYPERTENSION: ICD-10-CM

## 2025-05-06 DIAGNOSIS — N18.31 STAGE 3A CHRONIC KIDNEY DISEASE (HCC): ICD-10-CM

## 2025-05-06 PROBLEM — N39.41 URGE INCONTINENCE: Status: ACTIVE | Noted: 2025-05-06

## 2025-05-06 PROBLEM — N81.11 MIDLINE CYSTOCELE: Status: ACTIVE | Noted: 2025-05-06

## 2025-05-06 PROCEDURE — 99214 OFFICE O/P EST MOD 30 MIN: CPT | Performed by: FAMILY MEDICINE

## 2025-05-06 PROCEDURE — G2211 COMPLEX E/M VISIT ADD ON: HCPCS | Performed by: FAMILY MEDICINE

## 2025-05-06 RX ORDER — METHYLPREDNISOLONE 4 MG/1
TABLET ORAL
Qty: 21 TABLET | Refills: 0 | Status: SHIPPED | OUTPATIENT
Start: 2025-05-06 | End: 2025-05-12

## 2025-05-06 RX ORDER — CEPHALEXIN 500 MG/1
500 CAPSULE ORAL 3 TIMES DAILY
Qty: 30 CAPSULE | Refills: 0 | Status: SHIPPED | OUTPATIENT
Start: 2025-05-06 | End: 2025-05-16

## 2025-05-06 NOTE — PROGRESS NOTES
Name: Evelin Thurston      : 1950      MRN: [id number]  Encounter Provider: Tariq Palomino DO  Encounter Date: 2025   Encounter department: [facility]  :  Assessment & Plan  Impetigo  Suspected  Start keflex w/o medrol  If no better or excessively itchy, then add medrol  F/u if no better  ER if worse  Orders:    methylPREDNISolone 4 MG tablet therapy pack; Use as directed on package    cephalexin (KEFLEX) 500 mg capsule; Take 1 capsule (500 mg total) by mouth 3 (three) times a day for 10 days    Essential hypertension  stable       Stage 3a chronic kidney disease (HCC)  Lab Results   Component Value Date    EGFR 66 2025    EGFR 59 2024    EGFR 56 2024    CREATININE 0.85 2025    CREATININE 0.94 2024    CREATININE 0.98 2024   On Farxiga  Denies diabetes  Last measure with GFR in CKD2 range            History of Present Illness   HPI  Rash  Chest spreading to neck at least 10d  Small spots at first, now larger and blotchy and coalescing   Used itch crean  Itchy  No fever  No sob or wheezing  No new exposures  Started in middle then spreading  Not arms or other arms  Not diabetic    Review of Systems   Constitutional:  Negative for chills and fever.     Family History   Problem Relation Age of Onset    Angina Mother     Heart failure Mother     Prostate cancer Father     Cancer Father         prostate    Diabetes Sister     Sudden death Sister     Osteoporosis Sister     Heart disease Sister         MI    COPD Sister     Kidney disease Sister     Heart disease Sister         CHF    Hypertension Daughter     Hypertension Brother     Cancer Brother         lung    Diabetes Brother     Cancer Brother         prostate    No Known Problems Son     Lung cancer Maternal Uncle     Breast cancer Cousin 50    Colon cancer Other     Prostate cancer Other 51    Breast cancer Paternal Aunt       Social History     Tobacco Use    Smoking status: Never     Passive exposure:  "Never    Smokeless tobacco: Never   Vaping Use    Vaping status: Never Used   Substance Use Topics    Alcohol use: No    Drug use: No      Current Outpatient Medications   Medication Instructions    atorvastatin (LIPITOR) 20 mg, Oral, Daily    calcium carbonate (OS-FLORIAN) 600 MG tablet Take one tablet daily    cephalexin (KEFLEX) 500 mg, Oral, 3 times daily    Cholecalciferol (VITAMIN D3) 5000 units CAPS 1 capsule, Daily    Coenzyme Q10 (Co Q 10) 100 MG CAPS Every morning    dapagliflozin (FARXIGA) 10 mg, Oral, Every morning    magnesium oxide (MAG-OX) 400 mg tablet 1 tablet, Daily    methylPREDNISolone 4 MG tablet therapy pack Use as directed on package    multivitamin (THERAGRAN) TABS 1 tablet, Daily    spironolactone-hydrochlorothiazide (ALDACTAZIDE) 25-25 mg per tablet 1 tablet, Oral, Every morning    verapamil (CALAN-SR) 180 mg, Oral, 2 times daily    Zinc Sulfate (ZINC 15 PO) Daily     >>>>>>>>  Return if symptoms worsen or fail to improve.  >>>>>>>>    [POCT]  No results found for this or any previous visit (from the past 24 hours).    Visit Vitals  /70   Pulse 56   Temp (!) 96.1 °F (35.6 °C)   Resp 20   Ht 5' 10.5\" (1.791 m)   Wt 75.8 kg (167 lb)   BMI 23.62 kg/m²   OB Status Postmenopausal   Smoking Status Never   BSA 1.94 m²        Objective   /70   Pulse 56   Temp (!) 96.1 °F (35.6 °C)   Resp 20   Ht 5' 10.5\" (1.791 m)   Wt 75.8 kg (167 lb)   BMI 23.62 kg/m²      Physical Exam  Vitals and nursing note reviewed.   Constitutional:       General: She is not in acute distress.     Appearance: She is well-developed. She is not ill-appearing.   HENT:      Head: Normocephalic.      Right Ear: Tympanic membrane and ear canal normal.      Left Ear: Tympanic membrane and ear canal normal.      Nose: No congestion.   Eyes:      General: No scleral icterus.     Conjunctiva/sclera: Conjunctivae normal.   Cardiovascular:      Rate and Rhythm: Normal rate and regular rhythm.      Heart sounds: No murmur " heard.  Pulmonary:      Effort: Pulmonary effort is normal. No respiratory distress.      Breath sounds: No wheezing.   Abdominal:      Palpations: Abdomen is soft.   Musculoskeletal:         General: No deformity.      Cervical back: Neck supple.   Skin:     General: Skin is warm and dry.      Coloration: Skin is not pale.      Findings: Rash present.      Comments: Maculopapular rash upper mid chest and under neck, some honey crust   Neurological:      Mental Status: She is alert.      Motor: No weakness.      Gait: Gait normal.   Psychiatric:         Mood and Affect: Mood normal.         Behavior: Behavior normal.         Thought Content: Thought content normal.

## 2025-05-07 ENCOUNTER — APPOINTMENT (OUTPATIENT)
Dept: LAB | Facility: CLINIC | Age: 75
End: 2025-05-07
Attending: INTERNAL MEDICINE
Payer: MEDICARE

## 2025-05-07 DIAGNOSIS — E78.5 HYPERLIPIDEMIA, UNSPECIFIED HYPERLIPIDEMIA TYPE: ICD-10-CM

## 2025-05-07 DIAGNOSIS — R00.2 INTERMITTENT PALPITATIONS: ICD-10-CM

## 2025-05-07 DIAGNOSIS — R06.02 EXERTIONAL SHORTNESS OF BREATH: ICD-10-CM

## 2025-05-07 LAB
ALBUMIN SERPL BCG-MCNC: 4.7 G/DL (ref 3.5–5)
ALP SERPL-CCNC: 56 U/L (ref 34–104)
ALT SERPL W P-5'-P-CCNC: 20 U/L (ref 7–52)
ANION GAP SERPL CALCULATED.3IONS-SCNC: 11 MMOL/L (ref 4–13)
AST SERPL W P-5'-P-CCNC: 20 U/L (ref 13–39)
BILIRUB SERPL-MCNC: 0.49 MG/DL (ref 0.2–1)
BUN SERPL-MCNC: 17 MG/DL (ref 5–25)
CALCIUM SERPL-MCNC: 10 MG/DL (ref 8.4–10.2)
CHLORIDE SERPL-SCNC: 101 MMOL/L (ref 96–108)
CHOLEST SERPL-MCNC: 133 MG/DL (ref ?–200)
CO2 SERPL-SCNC: 26 MMOL/L (ref 21–32)
CREAT SERPL-MCNC: 0.89 MG/DL (ref 0.6–1.3)
GFR SERPL CREATININE-BSD FRML MDRD: 63 ML/MIN/1.73SQ M
GLUCOSE P FAST SERPL-MCNC: 116 MG/DL (ref 65–99)
HDLC SERPL-MCNC: 64 MG/DL
LDLC SERPL CALC-MCNC: 57 MG/DL (ref 0–100)
NONHDLC SERPL-MCNC: 69 MG/DL
POTASSIUM SERPL-SCNC: 4.1 MMOL/L (ref 3.5–5.3)
PROT SERPL-MCNC: 7.4 G/DL (ref 6.4–8.4)
SODIUM SERPL-SCNC: 138 MMOL/L (ref 135–147)
TRIGL SERPL-MCNC: 58 MG/DL (ref ?–150)

## 2025-05-07 PROCEDURE — 80053 COMPREHEN METABOLIC PANEL: CPT

## 2025-05-07 PROCEDURE — 36415 COLL VENOUS BLD VENIPUNCTURE: CPT

## 2025-05-07 PROCEDURE — 82172 ASSAY OF APOLIPOPROTEIN: CPT

## 2025-05-07 PROCEDURE — 80061 LIPID PANEL: CPT

## 2025-05-07 NOTE — ASSESSMENT & PLAN NOTE
Lab Results   Component Value Date    EGFR 66 03/31/2025    EGFR 59 12/02/2024    EGFR 56 09/13/2024    CREATININE 0.85 03/31/2025    CREATININE 0.94 12/02/2024    CREATININE 0.98 09/13/2024   On Farxiga  Denies diabetes  Last measure with GFR in CKD2 range

## 2025-05-07 NOTE — ASSESSMENT & PLAN NOTE
Suspected  Start keflex w/o medrol  If no better or excessively itchy, then add medrol  F/u if no better  ER if worse  Orders:    methylPREDNISolone 4 MG tablet therapy pack; Use as directed on package    cephalexin (KEFLEX) 500 mg capsule; Take 1 capsule (500 mg total) by mouth 3 (three) times a day for 10 days

## 2025-05-09 LAB — APO B SERPL-MCNC: 59 MG/DL

## 2025-05-12 ENCOUNTER — TELEPHONE (OUTPATIENT)
Age: 75
End: 2025-05-12

## 2025-05-12 NOTE — TELEPHONE ENCOUNTER
Patient was seen in the office on 5/6, for a rash.    She mistakenly took the steroid first, instead of the antibiotic.  She has finished the steroid.      She states at first the rash stopped itching and felt better.  She states over the weekend, it started getting itchy again.    She is asking, should she now take the antibiotic?      She says the rash is not as red or large.  Just itchy and uncomfortable.    Please advise.               
Pt reached answering service, she was following up on her earlier phone call--she did not realize a mychart msg was sent. I read msg verbatim, pt was thankful.  
Yes, she should take the antibiotic  Thank you,  Katiana Rodriguez, DO   
No

## 2025-06-03 ENCOUNTER — OFFICE VISIT (OUTPATIENT)
Dept: FAMILY MEDICINE CLINIC | Facility: CLINIC | Age: 75
End: 2025-06-03
Payer: MEDICARE

## 2025-06-03 VITALS
SYSTOLIC BLOOD PRESSURE: 100 MMHG | DIASTOLIC BLOOD PRESSURE: 74 MMHG | OXYGEN SATURATION: 99 % | RESPIRATION RATE: 16 BRPM | HEART RATE: 67 BPM | HEIGHT: 71 IN | TEMPERATURE: 97 F | BODY MASS INDEX: 23.38 KG/M2 | WEIGHT: 167 LBS

## 2025-06-03 DIAGNOSIS — E78.5 DYSLIPIDEMIA: ICD-10-CM

## 2025-06-03 DIAGNOSIS — L30.9 DERMATITIS: Primary | ICD-10-CM

## 2025-06-03 DIAGNOSIS — I10 ESSENTIAL HYPERTENSION: ICD-10-CM

## 2025-06-03 PROCEDURE — 99214 OFFICE O/P EST MOD 30 MIN: CPT | Performed by: NURSE PRACTITIONER

## 2025-06-03 PROCEDURE — G2211 COMPLEX E/M VISIT ADD ON: HCPCS | Performed by: NURSE PRACTITIONER

## 2025-06-03 RX ORDER — PREDNISONE 10 MG/1
TABLET ORAL
Qty: 30 TABLET | Refills: 0 | Status: SHIPPED | OUTPATIENT
Start: 2025-06-03

## 2025-06-03 NOTE — PATIENT INSTRUCTIONS
Will take claritin and pepcid daily for 3 weeks.  Will use aquaphor or eucerin body lotion  Will use white dial soap bar  Adult Patient Counseling Points     Prednisone   What is this drug used for?   It is used for many health problems like allergy signs, asthma, adrenal gland problems, blood problems, skin rashes, or swelling problems. This is not a list of all health problems that this drug may be used for. Talk with the doctor.  All drugs may cause side effects. However, many people have no side effects or only have minor side effects. Call your doctor or get medical help if any of these side effects or any other side effects bother you or do not go away:   Upset stomach or throwing up  Trouble sleeping  Restlessness  Sweating a lot  Dizziness or headache  WARNING/CAUTION: Even though it may be rare, some people may have very bad and sometimes deadly side effects when taking a drug. Tell your doctor or get medical help right away if you have any of the following signs or symptoms that may be related to a very bad side effect:   Infection like fever, chills, flu-like signs, very bad sore throat, ear or sinus pain, cough, more sputum or change in color of sputum, pain with passing urine, mouth sores, or a wound that will not heal  Low potassium levels like muscle pain or weakness, muscle cramps, or a heartbeat that does not feel normal  Pancreas problem (pancreatitis) like very bad stomach pain, very bad back pain, or very bad upset stomach or throwing up  High or low blood pressure like very bad headache or dizziness, passing out, or change in eyesight  Weak adrenal gland like a very bad upset stomach or throwing up, very bad dizziness or passing out, muscle weakness, feeling very tired, mood changes, not hungry, or weight loss  Cushing's disease like weight gain in the upper back or belly, moon face, very bad headache, or slow healing  High blood sugar like confusion, feeling sleepy, more thirst, more hungry,  passing urine more often, flushing, fast breathing, or breath that smells like fruit  Shortness of breath, a big weight gain, or swelling in the arms or legs  Skin changes (pimples, stretch marks, slow healing, hair growth)  Fast, slow, or abnormal heartbeat  Chest pain or pressure  Swelling, warmth, numbness, change of color, or pain in a leg or arm  Period (menstrual) changes  Bone or joint pain  Feeling very tired or weak  Change in eyesight  Feeling confused, not able to focus, or change in behavior  Mood changes  Memory problems or loss  Hallucinations (seeing or hearing things that are not there)  Seizures  A burning, numbness, or tingling feeling that is not normal  Very bad belly pain  Any unexplained bruising or bleeding  Black, tarry, or bloody stools  Throwing up blood or throw up that looks like coffee grounds  Signs of an allergic reaction, like rash; hives; itching; red, swollen, blistered, or peeling skin with or without fever; wheezing; tightness in the chest or throat; trouble breathing, swallowing, or talking; unusual hoarseness; or swelling of the mouth, face, lips, tongue, or throat.  Note: This is not a comprehensive list of all side effects. Talk to your doctor if you have questions.  Consumer Information Use and Disclaimer: This information should not be used to decide whether or not to take this medicine or any other medicine. Only the healthcare provider has the knowledge and training to decide which medicines are right for a specific patient. This information does not endorse any medicine as safe, effective, or approved for treating any patient or health condition. This is only a limited summary of general information about the medicine's uses from the patient education leaflet and is not intended to be comprehensive. This limited summary does NOT include all information available about the possible uses, directions, warnings, precautions, interactions, adverse effects, or risks that may  apply to this medicine. This information is not intended to provide medical advice, diagnosis or treatment and does not replace information you receive from the healthcare provider. For a more detailed summary of information about the risks and benefits of using this medicine, please speak with your healthcare provider and review the entire patient education leaflet.  Copyright   © 2024 [employer] and its affiliates and/or licensors. All rights reserved.  Last Reviewed Date   2021-08-03

## 2025-06-03 NOTE — PROGRESS NOTES
"Name: Evelin Thurston      : 1950      MRN: [id number]  Encounter Provider: JANETT Ding  Encounter Date: 6/3/2025   Encounter department: [facility]  :  Assessment & Plan  Dermatitis  Will take claritin and pepcid daily for 3 weeks.  Will use aquaphor or eucerin body lotion  Will use white dial soap bar  Orders:  •  predniSONE 10 mg tablet; Take 4 pills/d for 3 days then 3 pills/d for 3 days, then 2 pills/d for 3 days then 1 pill/d for 3 days then stop    Essential hypertension  stable       Dyslipidemia  Complaint with statin and tolerating it well              History of Present Illness   Patient stated that was seen about a month ago for rash at upper chest area and was treated with antibiotics and steroid which helped and now rash got worse again.  Denies use of new body products, food and detergents  Denies fever and chills  Complaint with medications for chronic illnesses and tolerating it well      Review of Systems   HENT: Negative.     Respiratory: Negative.     Cardiovascular: Negative.    Gastrointestinal: Negative.    Skin:  Positive for rash.   Neurological: Negative.        Objective   /74   Pulse 67   Temp (!) 97 °F (36.1 °C)   Resp 16   Ht 5' 10.5\" (1.791 m)   Wt 75.8 kg (167 lb)   SpO2 99%   BMI 23.62 kg/m²      Physical Exam  HENT:      Head: Normocephalic.     Eyes:      Conjunctiva/sclera: Conjunctivae normal.       Cardiovascular:      Rate and Rhythm: Normal rate and regular rhythm.      Pulses: Normal pulses.      Heart sounds: Normal heart sounds.   Pulmonary:      Effort: Pulmonary effort is normal.      Breath sounds: Normal breath sounds.     Skin:     General: Skin is warm and dry.      Findings: Rash (scattered erythematous pruritic rash on upper chest and back) present.     Neurological:      Mental Status: She is alert and oriented to person, place, and time.     Psychiatric:         Mood and Affect: Mood normal.         Behavior: Behavior normal.   "       Thought Content: Thought content normal.         Judgment: Judgment normal.

## 2025-06-05 PROBLEM — L01.00 IMPETIGO: Status: RESOLVED | Noted: 2025-05-06 | Resolved: 2025-06-05

## 2025-06-17 ENCOUNTER — OFFICE VISIT (OUTPATIENT)
Dept: CARDIOLOGY CLINIC | Facility: CLINIC | Age: 75
End: 2025-06-17
Payer: MEDICARE

## 2025-06-17 VITALS
HEART RATE: 65 BPM | WEIGHT: 168 LBS | SYSTOLIC BLOOD PRESSURE: 124 MMHG | BODY MASS INDEX: 23.52 KG/M2 | DIASTOLIC BLOOD PRESSURE: 84 MMHG | OXYGEN SATURATION: 99 % | HEIGHT: 71 IN

## 2025-06-17 DIAGNOSIS — I50.32 CHRONIC DIASTOLIC HF (HEART FAILURE), NYHA CLASS 2 (HCC): ICD-10-CM

## 2025-06-17 DIAGNOSIS — E78.5 HYPERLIPIDEMIA, UNSPECIFIED HYPERLIPIDEMIA TYPE: ICD-10-CM

## 2025-06-17 DIAGNOSIS — R06.02 EXERTIONAL SHORTNESS OF BREATH: ICD-10-CM

## 2025-06-17 DIAGNOSIS — R00.2 INTERMITTENT PALPITATIONS: Primary | ICD-10-CM

## 2025-06-17 DIAGNOSIS — I10 ESSENTIAL HYPERTENSION: ICD-10-CM

## 2025-06-17 DIAGNOSIS — N18.31 STAGE 3A CHRONIC KIDNEY DISEASE (HCC): ICD-10-CM

## 2025-06-17 PROCEDURE — 93000 ELECTROCARDIOGRAM COMPLETE: CPT | Performed by: INTERNAL MEDICINE

## 2025-06-17 PROCEDURE — 99214 OFFICE O/P EST MOD 30 MIN: CPT | Performed by: INTERNAL MEDICINE

## 2025-06-17 RX ORDER — ATORVASTATIN CALCIUM 10 MG/1
10 TABLET, FILM COATED ORAL DAILY
Qty: 90 TABLET | Refills: 1 | Status: SHIPPED | OUTPATIENT
Start: 2025-06-17

## 2025-06-17 RX ORDER — SPIRONOLACTONE AND HYDROCHLOROTHIAZIDE 25; 25 MG/1; MG/1
1 TABLET ORAL EVERY MORNING
Qty: 100 TABLET | Refills: 2 | Status: SHIPPED | OUTPATIENT
Start: 2025-06-17

## 2025-06-17 RX ORDER — DAPAGLIFLOZIN 10 MG/1
10 TABLET, FILM COATED ORAL EVERY MORNING
Qty: 90 TABLET | Refills: 3 | Status: SHIPPED | OUTPATIENT
Start: 2025-06-17

## 2025-06-17 RX ORDER — ATORVASTATIN CALCIUM 20 MG/1
20 TABLET, FILM COATED ORAL DAILY
Qty: 90 TABLET | Refills: 3 | Status: SHIPPED | OUTPATIENT
Start: 2025-06-17 | End: 2025-06-17 | Stop reason: SDUPTHER

## 2025-06-17 RX ORDER — VERAPAMIL HYDROCHLORIDE 180 MG/1
180 TABLET, FILM COATED, EXTENDED RELEASE ORAL 2 TIMES DAILY
Qty: 180 TABLET | Refills: 1 | Status: SHIPPED | OUTPATIENT
Start: 2025-06-17

## 2025-06-17 NOTE — PROGRESS NOTES
[facility]  [address] [place] [address] [address]   [place] [address]  Cardiology Consultation    Evelin Thurston  [id number]  1950      Consult for: Exertional dyspnea  Appreciate consult by: Katiana Rodriguez DO    1. Intermittent palpitations  POCT ECG      2. Essential hypertension  POCT ECG    verapamil (CALAN-SR) 180 mg CR tablet      3. Chronic diastolic HF (heart failure), NYHA class 2 (HCC)  POCT ECG      4. Exertional shortness of breath  POCT ECG    dapagliflozin (Farxiga) 10 MG tablet    spironolactone-hydrochlorothiazide (ALDACTAZIDE) 25-25 mg per tablet      5. Hyperlipidemia, unspecified hyperlipidemia type  atorvastatin (LIPITOR) 20 mg tablet      6. Stage 3a chronic kidney disease (HCC)  dapagliflozin (Farxiga) 10 MG tablet         Discussion/Summary:   Progressive exertional dyspnea-he has a hypertensive blood pressure response on stress test.  Her symptoms are consistent with heart failure with preserved ejection fraction.   Farxiga 10 mg in the morning.  Aldactazide combination.  She will periodically check her blood pressures-especially after exercise.  We will continue with her verapamil 180 mg twice a day.  She will wear compression when sitting and standing.    Hyperlipidemia well-controlled with the last LDL of 49. Cut atorvastatin 10mg    History of hypercalcemia status post parathyroidectomy    Osteopenia on DEXA scan    HPI:   73-year-old with a history of hypertension presents for initial consultation.  She reports having some exertional dyspnea at times.  She has noted it has been progressive.  She is now short of breath walking less than a mile.  She reports trying to cut back on sodium.  She is compliant with her antihypertensive medications.  She denies having any prior history of any irregular heart rhythms.  She denies having chest heaviness.  She denies having any falls.    4/26/2024: We reviewed through her recent exercise stress test.  She continues to  have exertional dyspnea.  She is compliant with her medications.    Recent Visit: Denies having chest pain Denies significant palpitations. Compliant with therapy. Reviewed labwork together. She is going to the gym regularly. Breathing is better.    Past Medical History:   Diagnosis Date    Arthritis     Chronic kidney disease     stage 3    Hyperlipidemia     borderline    Hypertension     borderline    Migraine     Migraine     controlled with verapamil    Osteopenia     Snores      Social History     Socioeconomic History    Marital status: /Civil Union     Spouse name: Not on file    Number of children: Not on file    Years of education: Not on file    Highest education level: Not on file   Occupational History    Not on file   Tobacco Use    Smoking status: Never     Passive exposure: Never    Smokeless tobacco: Never   Vaping Use    Vaping status: Never Used   Substance and Sexual Activity    Alcohol use: No    Drug use: No    Sexual activity: Not on file   Other Topics Concern    Not on file   Social History Narrative    Not on file     Social Drivers of Health     Financial Resource Strain: Low Risk  (3/20/2023)    Overall Financial Resource Strain (CARDIA)     Difficulty of Paying Living Expenses: Not hard at all   Food Insecurity: No Food Insecurity (4/4/2025)    Nursing - Inadequate Food Risk Classification     Worried About Running Out of Food in the Last Year: Never true     Ran Out of Food in the Last Year: Never true     Ran Out of Food in the Last Year: Not on file   Transportation Needs: No Transportation Needs (4/4/2025)    PRAPARE - Transportation     Lack of Transportation (Medical): No     Lack of Transportation (Non-Medical): No   Physical Activity: Not on file   Stress: Not on file   Social Connections: Not on file   Intimate Partner Violence: Not on file   Housing Stability: Unknown (4/4/2025)    Housing Stability Vital Sign     Unable to Pay for Housing in the Last Year: No     Number  of Times Moved in the Last Year: Not on file     Homeless in the Last Year: No      Family History   Problem Relation Name Age of Onset    Angina Mother      Heart failure Mother      Prostate cancer Father      Cancer Father          prostate    Diabetes Sister Juliann     Sudden death Sister Juliann     Osteoporosis Sister Juliann     Heart disease Sister Juliann         MI    COPD Sister Sarah     Kidney disease Sister Sarah     Heart disease Sister Sarah         CHF    Hypertension Daughter      Hypertension Brother      Cancer Brother          lung    Diabetes Brother      Cancer Brother          prostate    No Known Problems Son      Lung cancer Maternal Uncle      Breast cancer Cousin maternal 50    Colon cancer Other nephew     Prostate cancer Other nephew 51    Breast cancer Paternal Aunt       Past Surgical History:   Procedure Laterality Date    BREAST BIOPSY Right     benign ~ 1990s    BUNIONECTOMY Left     3 hammertoes, bunion    CATARACT EXTRACTION Bilateral 2012    COLONOSCOPY      PARATHYROID GLAND SURGERY Right     front gland removed       Current Outpatient Medications:     atorvastatin (LIPITOR) 20 mg tablet, Take 1 tablet (20 mg total) by mouth daily, Disp: 90 tablet, Rfl: 3    calcium carbonate (OS-FLORIAN) 600 MG tablet, Take one tablet daily, Disp: , Rfl:     Cholecalciferol (VITAMIN D3) 5000 units CAPS, Take 1 capsule by mouth in the morning., Disp: , Rfl:     Coenzyme Q10 (Co Q 10) 100 MG CAPS, Take by mouth every morning, Disp: , Rfl:     dapagliflozin (Farxiga) 10 MG tablet, Take 1 tablet (10 mg total) by mouth every morning, Disp: 90 tablet, Rfl: 3    magnesium oxide (MAG-OX) 400 mg tablet, Take 1 tablet by mouth in the morning. With zinc., Disp: , Rfl:     multivitamin (THERAGRAN) TABS, Take 1 tablet by mouth in the morning., Disp: , Rfl:     predniSONE 10 mg tablet, Take 4 pills/d for 3 days then 3 pills/d for 3 days, then 2 pills/d for 3 days then 1 pill/d for 3 days then stop, Disp: 30  "tablet, Rfl: 0    spironolactone-hydrochlorothiazide (ALDACTAZIDE) 25-25 mg per tablet, Take 1 tablet by mouth every morning, Disp: 100 tablet, Rfl: 2    verapamil (CALAN-SR) 180 mg CR tablet, Take 1 tablet (180 mg total) by mouth in the morning and 1 tablet (180 mg total) before bedtime., Disp: 180 tablet, Rfl: 1    Zinc Sulfate (ZINC 15 PO), Take by mouth in the morning. With copper 1mg., Disp: , Rfl:   No Known Allergies  Vitals:    06/17/25 0811   BP: 124/84   BP Location: Left arm   Patient Position: Sitting   Cuff Size: Standard   Pulse: 65   SpO2: 99%   Weight: 76.2 kg (168 lb)   Height: 5' 10.5\" (1.791 m)       Review of Systems:   Review of Systems   Constitutional:  Positive for fatigue. Negative for activity change, appetite change, chills, diaphoresis, fever and unexpected weight change.   HENT: Negative.  Negative for congestion, dental problem, drooling, ear discharge, ear pain, facial swelling, hearing loss, mouth sores, nosebleeds, postnasal drip, rhinorrhea, sinus pressure, sinus pain, sneezing, sore throat, tinnitus, trouble swallowing and voice change.    Eyes: Negative.  Negative for photophobia, pain, redness, itching and visual disturbance.   Respiratory:  Negative for apnea, cough, choking, chest tightness, wheezing and stridor.    Cardiovascular: Negative.  Negative for chest pain, palpitations and leg swelling.   Gastrointestinal: Negative.  Negative for abdominal distention, abdominal pain, anal bleeding, blood in stool, constipation, diarrhea, nausea, rectal pain and vomiting.   Endocrine: Negative.  Negative for cold intolerance, heat intolerance, polydipsia, polyphagia and polyuria.   Genitourinary: Negative.  Negative for decreased urine volume, difficulty urinating, dyspareunia, dysuria, enuresis, flank pain, frequency, genital sores, hematuria, menstrual problem, pelvic pain, urgency, vaginal bleeding, vaginal discharge and vaginal pain.   Musculoskeletal: Negative.  Negative for " "arthralgias, back pain, gait problem, joint swelling, myalgias, neck pain and neck stiffness.   Skin: Negative.  Negative for color change, pallor, rash and wound.   Allergic/Immunologic: Negative.  Negative for environmental allergies, food allergies and immunocompromised state.   Neurological: Negative.  Negative for dizziness, tremors, seizures, syncope, facial asymmetry, speech difficulty, weakness, light-headedness, numbness and headaches.   Hematological: Negative.  Negative for adenopathy. Does not bruise/bleed easily.   Psychiatric/Behavioral: Negative.  Negative for agitation, behavioral problems, confusion, decreased concentration, dysphoric mood, hallucinations, self-injury, sleep disturbance and suicidal ideas. The patient is not nervous/anxious and is not hyperactive.    All other systems reviewed and are negative.      Vitals:    06/17/25 0811   BP: 124/84   BP Location: Left arm   Patient Position: Sitting   Cuff Size: Standard   Pulse: 65   SpO2: 99%   Weight: 76.2 kg (168 lb)   Height: 5' 10.5\" (1.791 m)     Physical Examination:   Physical Exam  Constitutional:       General: She is not in acute distress.     Appearance: She is well-developed. She is not diaphoretic.   HENT:      Head: Normocephalic and atraumatic.      Right Ear: External ear normal.      Left Ear: External ear normal.     Eyes:      General: No scleral icterus.        Right eye: No discharge.         Left eye: No discharge.      Conjunctiva/sclera: Conjunctivae normal.      Pupils: Pupils are equal, round, and reactive to light.     Neck:      Thyroid: No thyromegaly.      Vascular: No JVD.      Trachea: No tracheal deviation.     Cardiovascular:      Rate and Rhythm: Normal rate and regular rhythm.      Heart sounds: No murmur heard.     No friction rub. Gallop present.   Pulmonary:      Effort: Pulmonary effort is normal. No respiratory distress.      Breath sounds: Normal breath sounds. No stridor. No wheezing or rales. " "  Chest:      Chest wall: No tenderness.   Abdominal:      General: Bowel sounds are normal. There is no distension.      Palpations: Abdomen is soft. There is no mass.      Tenderness: There is no abdominal tenderness. There is no guarding or rebound.     Musculoskeletal:         General: No tenderness or deformity. Normal range of motion.      Cervical back: Normal range of motion and neck supple.     Skin:     General: Skin is warm and dry.      Coloration: Skin is not pale.      Findings: No erythema or rash.     Neurological:      Mental Status: She is alert and oriented to person, place, and time.      Cranial Nerves: No cranial nerve deficit.      Motor: No abnormal muscle tone.      Coordination: Coordination normal.      Deep Tendon Reflexes: Reflexes are normal and symmetric. Reflexes normal.     Psychiatric:         Behavior: Behavior normal.         Thought Content: Thought content normal.         Judgment: Judgment normal.         Labs:     Lab Results   Component Value Date    WBC 6.31 03/31/2025    HGB 14.0 03/31/2025    HCT 44.5 03/31/2025    MCV 82 03/31/2025    RDW 13.5 03/31/2025     03/31/2025     BMP:  Lab Results   Component Value Date    SODIUM 138 05/07/2025    K 4.1 05/07/2025     05/07/2025    CO2 26 05/07/2025    ANIONGAP 11.7 10/09/2015    BUN 17 05/07/2025    CREATININE 0.89 05/07/2025    GLUC 91 10/23/2019    GLUF 116 (H) 05/07/2025    CALCIUM 10.0 05/07/2025    EGFR 63 05/07/2025    MG 2.1 06/30/2019     LFT:  Lab Results   Component Value Date    AST 20 05/07/2025    ALT 20 05/07/2025    ALKPHOS 56 05/07/2025    TP 7.4 05/07/2025    ALB 4.7 05/07/2025      Lab Results   Component Value Date    YOT8JUUQOSPU 3.532 11/16/2023     No results found for: \"HGBA1C\"  Lipid Profile:   Lab Results   Component Value Date    CHOLESTEROL 133 05/07/2025    HDL 64 05/07/2025    LDLCALC 57 05/07/2025    TRIG 58 05/07/2025     Lab Results   Component Value Date    CHOLESTEROL 133 " "05/07/2025    CHOLESTEROL 113 03/31/2025     Lab Results   Component Value Date    TROPONINI <0.02 06/30/2019     No results found for: \"NTBNP\"   No results found for this or any previous visit (from the past 4 weeks).      Imaging & Testing   I have personally reviewed pertinent reports.      Cardiac Testing       EKG: Personally reviewed.          Frankie Gary MD [facility]  [phone]  Please call with any questions or suggestions    Counseling :  A description of the counseling:   Goals and Barriers:  Patient's ability to self care:  Medication side effect reviewed with patient in detail and all their questions answered.    \"Portions of the record may have been created with voice recognition software. Occasional wrong word or \"sound a like\" substitutions may have occurred due to the inherent limitations of voice recognition software. Read the chart carefully and recognize, using context, where substitutions have occurred. Please call if you have any questions. \"    "

## 2025-06-18 ENCOUNTER — TELEPHONE (OUTPATIENT)
Age: 75
End: 2025-06-18

## 2025-06-23 ENCOUNTER — OFFICE VISIT (OUTPATIENT)
Age: 75
End: 2025-06-23
Payer: MEDICARE

## 2025-06-23 VITALS
DIASTOLIC BLOOD PRESSURE: 70 MMHG | WEIGHT: 168 LBS | HEART RATE: 66 BPM | SYSTOLIC BLOOD PRESSURE: 138 MMHG | HEIGHT: 71 IN | BODY MASS INDEX: 23.52 KG/M2

## 2025-06-23 DIAGNOSIS — K64.9 HEMORRHOIDS, UNSPECIFIED HEMORRHOID TYPE: ICD-10-CM

## 2025-06-23 DIAGNOSIS — R05.3 CHRONIC COUGH: ICD-10-CM

## 2025-06-23 DIAGNOSIS — K59.00 CONSTIPATION, UNSPECIFIED CONSTIPATION TYPE: ICD-10-CM

## 2025-06-23 DIAGNOSIS — Z86.0100 HX OF COLONIC POLYPS: ICD-10-CM

## 2025-06-23 DIAGNOSIS — R13.10 DYSPHAGIA, UNSPECIFIED TYPE: Primary | ICD-10-CM

## 2025-06-23 DIAGNOSIS — K21.9 GASTROESOPHAGEAL REFLUX DISEASE, UNSPECIFIED WHETHER ESOPHAGITIS PRESENT: ICD-10-CM

## 2025-06-23 PROCEDURE — G2211 COMPLEX E/M VISIT ADD ON: HCPCS | Performed by: NURSE PRACTITIONER

## 2025-06-23 PROCEDURE — 99204 OFFICE O/P NEW MOD 45 MIN: CPT | Performed by: NURSE PRACTITIONER

## 2025-06-23 RX ORDER — SODIUM CHLORIDE, SODIUM LACTATE, POTASSIUM CHLORIDE, CALCIUM CHLORIDE 600; 310; 30; 20 MG/100ML; MG/100ML; MG/100ML; MG/100ML
125 INJECTION, SOLUTION INTRAVENOUS CONTINUOUS
OUTPATIENT
Start: 2025-06-23

## 2025-06-23 RX ORDER — FAMOTIDINE 20 MG/1
20 TABLET, FILM COATED ORAL 2 TIMES DAILY
Qty: 180 TABLET | Refills: 2 | Status: SHIPPED | OUTPATIENT
Start: 2025-06-23 | End: 2025-09-21

## 2025-06-23 NOTE — PROGRESS NOTES
Name: Evelin Thurston      : 1950      MRN: [id number]  Encounter Provider: JANETT Hanks  Encounter Date: 2025   Encounter department: [facility]  :  Assessment & Plan  Dysphagia, unspecified type  Patient reports intermittent episodes of dysphagia to solid foods.  Patient denies dysphagia to liquids or pills.  Patient is currently on no medication.  Dysphagia symptoms may be secondary to GERD, gastritis, esophageal narrowing, or stricture.  Orders:    famotidine (PEPCID) 20 mg tablet; Take 1 tablet (20 mg total) by mouth 2 (two) times a day    EGD; Future  Eat slow, chew well, alternate solids and liquids, and remain upright when eating and for 1 hour afterwards.  Constipation, unspecified constipation type  Patient reports constipation.  Patient is taking magnesium but will move her bowels approximately every 3 days.  Patient reports she does not drink enough of fluid.  Continue magnesium  Drink plenty of fluid if not contraindicated by your cardiologist  Start MiraLAX 1 capful in 8 ounces noncarbonated beverage daily may increase to 2 times a day if no improvement in bowel pattern.       Gastroesophageal reflux disease, unspecified whether esophagitis present  Chronic cough  Patient has history of GERD with chronic cough.  Patient reported that she had intermittent chronic cough from December to April.  She was started on omeprazole 40 Mg daily in a.m. and famotidine at bedtime which did relieve her chronic cough.  Patient reports now she has intermittent episodes and is currently not taking any medication.  Patient denies acid reflux, heartburn, nausea, vomiting, epigastric or abdominal pain.    Orders:    famotidine (PEPCID) 20 mg tablet; Take 1 tablet (20 mg total) by mouth 2 (two) times a day  Follow antireflux diet and measures  Hemorrhoids, unspecified hemorrhoid type  Patient reports intermittent episodes of small amount of blood on toilet tissue  when she wipes.  Patient does have known history of hemorrhoids.  Patient currently denies any hemorrhoidal discomfort or pain.  May use Preparation H as needed  If any significant hemorrhoidal discomfort or pain recommend hemorrhoidal banding with Dr. Navarro or Dr. Douglass.       Hx of colonic polyps  Patient has history of colonic polyps.  Colonoscopy done 5/13/2021 showed 3 small flat internal hemorrhoids.  Recurrent polyp not seen.  Surveillance colonoscopy due 05/2028       Follow-up after procedure    History of Present Illness   HPI  Evelin Thurston is a 74 y.o. female with PMH of migraines, HTN, JIGAR, GERD, chronic cough, hyperparathyroidism, osteopenia, osteoarthritis, shortness of breath, history of colon polyps, hyperlipidemia, and vitamin D deficiency who presents to the office as new patient. Patient has history of GERD with chronic cough.  Patient reported that she had intermittent chronic cough from December to April.  She was started on omeprazole 40 Mg daily in a.m. and famotidine at bedtime which did relieve her chronic cough.  Patient reports now she has intermittent episodes and is currently not taking any medication.  Patient denies acid reflux, heartburn, nausea, vomiting, epigastric or abdominal pain.  She does report intermittent episodes of dysphagia with solid foods.  Denies any dysphagia with liquids or pills.  Patient reports constipation despite taking magnesium daily.  She will also use prune juice as needed but will move her bowels approximately once every 3 days.  Patient reports small amount of blood on toilet tissue when she wipes she does have a known history of hemorrhoids.  Lab work done 3/31/2025 showed CMP within normal limits hemoglobin 14.0.    Colonoscopy done 5/13/2021 showed 3 small flat internal hemorrhoids.  Recurrent polyp not seen.  EGD done 12/1/2016 showed hiatal hernia.  Nephews with colon cancer.  Patient does not smoke.  Patient does not drink alcohol.  Denies illicit  drug use.      Review of Systems   Constitutional:  Negative for chills and fever.   HENT:  Positive for trouble swallowing. Negative for ear pain and sore throat.    Eyes:  Negative for pain and visual disturbance.   Respiratory:  Positive for cough. Negative for shortness of breath.    Cardiovascular:  Negative for chest pain and palpitations.   Gastrointestinal:  Positive for constipation. Negative for abdominal pain and vomiting.   Genitourinary:  Negative for dysuria and hematuria.   Musculoskeletal:  Negative for arthralgias and back pain.   Skin:  Negative for color change and rash.   Neurological:  Negative for seizures and syncope.   All other systems reviewed and are negative.    Medical History Reviewed by provider this encounter:  Tobacco  Allergies  Meds  Problems  Med Hx  Surg Hx  Fam Hx     .  Past Medical History   Past Medical History[1]  Past Surgical History[2]  Family History[3]   reports that she has never smoked. She has never been exposed to tobacco smoke. She has never used smokeless tobacco. She reports that she does not drink alcohol and does not use drugs.  Current Outpatient Medications   Medication Instructions    atorvastatin (LIPITOR) 10 mg, Oral, Daily    calcium carbonate (OS-FLORIAN) 600 MG tablet Take one tablet daily    Cholecalciferol (VITAMIN D3) 5000 units CAPS 1 capsule, Daily    Coenzyme Q10 (Co Q 10) 100 MG CAPS Every morning    dapagliflozin (FARXIGA) 10 mg, Oral, Every morning    famotidine (PEPCID) 20 mg, Oral, 2 times daily    magnesium oxide (MAG-OX) 400 mg tablet 1 tablet, Daily    multivitamin (THERAGRAN) TABS 1 tablet, Daily    predniSONE 10 mg tablet Take 4 pills/d for 3 days then 3 pills/d for 3 days, then 2 pills/d for 3 days then 1 pill/d for 3 days then stop    spironolactone-hydrochlorothiazide (ALDACTAZIDE) 25-25 mg per tablet 1 tablet, Oral, Every morning    verapamil (CALAN-SR) 180 mg, Oral, 2 times daily    Zinc Sulfate (ZINC 15 PO) Daily   Allergies[4]  "  Medications Ordered Prior to Encounter[5]   Social History[6]     Objective   /70 (BP Location: Right arm, Patient Position: Sitting, Cuff Size: Standard)   Pulse 66   Ht 5' 10.5\" (1.791 m)   Wt 76.2 kg (168 lb)   BMI 23.76 kg/m²      Physical Exam  Vitals and nursing note reviewed.   Constitutional:       General: She is not in acute distress.     Appearance: She is well-developed.   HENT:      Head: Normocephalic and atraumatic.     Eyes:      Conjunctiva/sclera: Conjunctivae normal.       Cardiovascular:      Rate and Rhythm: Normal rate and regular rhythm.      Pulses: Normal pulses.      Heart sounds: Normal heart sounds. No murmur heard.  Pulmonary:      Effort: Pulmonary effort is normal. No respiratory distress.      Breath sounds: Normal breath sounds. No stridor. No wheezing, rhonchi or rales.   Abdominal:      General: Bowel sounds are normal. There is no distension.      Palpations: Abdomen is soft. There is no mass.      Tenderness: There is no abdominal tenderness. There is no guarding or rebound.     Musculoskeletal:         General: No swelling.      Cervical back: Neck supple.      Right lower leg: No edema.      Left lower leg: No edema.     Skin:     General: Skin is warm and dry.      Capillary Refill: Capillary refill takes less than 2 seconds.      Coloration: Skin is not jaundiced or pale.     Neurological:      Mental Status: She is alert and oriented to person, place, and time.     Psychiatric:         Mood and Affect: Mood normal.                  [1]   Past Medical History:  Diagnosis Date    Arthritis     Chronic kidney disease     stage 3    Hyperlipidemia     borderline    Hypertension     borderline    Migraine     Migraine     controlled with verapamil    Osteopenia     Snores    [2]   Past Surgical History:  Procedure Laterality Date    BREAST BIOPSY Right     benign ~ 1990s    BUNIONECTOMY Left     3 hammertoes, bunion    CATARACT EXTRACTION Bilateral 2012    COLONOSCOPY "      PARATHYROID GLAND SURGERY Right     front gland removed   [3]   Family History  Problem Relation Name Age of Onset    Angina Mother      Heart failure Mother      Prostate cancer Father      Cancer Father          prostate    Diabetes Sister Juliann     Sudden death Sister Juliann     Osteoporosis Sister Juliann     Heart disease Sister Juliann         MI    COPD Sister Sarah     Kidney disease Sister Sarah     Heart disease Sister Sarah         CHF    Hypertension Daughter      Hypertension Brother      Cancer Brother          lung    Diabetes Brother      Cancer Brother          prostate    No Known Problems Son      Lung cancer Maternal Uncle      Breast cancer Cousin maternal 50    Colon cancer Other nephew     Prostate cancer Other nephew 51    Breast cancer Paternal Aunt     [4] No Known Allergies  [5]   Current Outpatient Medications on File Prior to Visit   Medication Sig Dispense Refill    atorvastatin (LIPITOR) 10 mg tablet Take 1 tablet (10 mg total) by mouth before bedtime. 90 tablet 1    calcium carbonate (OS-FLORIAN) 600 MG tablet Take one tablet daily      Cholecalciferol (VITAMIN D3) 5000 units CAPS Take 1 capsule by mouth in the morning.      Coenzyme Q10 (Co Q 10) 100 MG CAPS Take by mouth every morning      dapagliflozin (Farxiga) 10 MG tablet Take 1 tablet (10 mg total) by mouth every morning 90 tablet 3    magnesium oxide (MAG-OX) 400 mg tablet Take 1 tablet by mouth in the morning. With zinc.      multivitamin (THERAGRAN) TABS Take 1 tablet by mouth in the morning.      spironolactone-hydrochlorothiazide (ALDACTAZIDE) 25-25 mg per tablet Take 1 tablet by mouth every morning 100 tablet 2    verapamil (CALAN-SR) 180 mg CR tablet Take 1 tablet (180 mg total) by mouth in the morning and 1 tablet (180 mg total) before bedtime. 180 tablet 1    Zinc Sulfate (ZINC 15 PO) Take by mouth in the morning. With copper 1mg.      predniSONE 10 mg tablet Take 4 pills/d for 3 days then 3 pills/d for 3 days, then 2  pills/d for 3 days then 1 pill/d for 3 days then stop 30 tablet 0     No current facility-administered medications on file prior to visit.   [6]   Social History  Tobacco Use    Smoking status: Never     Passive exposure: Never    Smokeless tobacco: Never   Vaping Use    Vaping status: Never Used   Substance and Sexual Activity    Alcohol use: No    Drug use: No

## 2025-06-23 NOTE — PATIENT INSTRUCTIONS
Scheduled date of EGD(as of today): July 29, 2025  Physician performing EGD: Dr. Navarro  Location of EGD: [facility]  Instructions reviewed with patient by: RW  Clearances: NA    Famotidine 20 mg 2 times a day morning and evening.  Follow antireflux diet and measures-may have 1-2 8 ounce cups of caffeine/tea or coffee daily avoid any additional caffeine, carbonated beverages, spicy fatty foods, and red sauces.  Do not eat 2 to 3 hours before you go to bed and when you sleep at night sleep with your head of bed elevated on multiple pillows.  Eat slow, chew well, alternate solids and liquids and remain upright when eating and for 1 hour afterwards.  Drink plenty of fluid and rest as contraindicated by your cardiologist  Continue magnesium  Start MiraLAX 1 capful daily in 8 ounces noncarbonated beverage may increase to 2 times a day if not having daily bowel movements.

## 2025-06-23 NOTE — ASSESSMENT & PLAN NOTE
Patient has history of GERD with chronic cough.  Patient reported that she had intermittent chronic cough from December to April.  She was started on omeprazole 40 Mg daily in a.m. and famotidine at bedtime which did relieve her chronic cough.  Patient reports now she has intermittent episodes and is currently not taking any medication.  Patient denies acid reflux, heartburn, nausea, vomiting, epigastric or abdominal pain.    Orders:    famotidine (PEPCID) 20 mg tablet; Take 1 tablet (20 mg total) by mouth 2 (two) times a day  Follow antireflux diet and measures  
Patient has history of GERD with chronic cough.  Patient reported that she had intermittent chronic cough from December to April.  She was started on omeprazole 40 Mg daily in a.m. and famotidine at bedtime which did relieve her chronic cough.  Patient reports now she has intermittent episodes and is currently not taking any medication.  Patient denies acid reflux, heartburn, nausea, vomiting, epigastric or abdominal pain.    Orders:    famotidine (PEPCID) 20 mg tablet; Take 1 tablet (20 mg total) by mouth 2 (two) times a day  Follow antireflux diet and measures  
Patient has history of colonic polyps.  Colonoscopy done 5/13/2021 showed 3 small flat internal hemorrhoids.  Recurrent polyp not seen.  Surveillance colonoscopy due 05/2028       
Patient reports constipation.  Patient is taking magnesium but will move her bowels approximately every 3 days.  Patient reports she does not drink enough of fluid.  Continue magnesium  Drink plenty of fluid if not contraindicated by your cardiologist  Start MiraLAX 1 capful in 8 ounces noncarbonated beverage daily may increase to 2 times a day if no improvement in bowel pattern.       
Patient reports intermittent episodes of dysphagia to solid foods.  Patient denies dysphagia to liquids or pills.  Patient is currently on no medication.  Dysphagia symptoms may be secondary to GERD, gastritis, esophageal narrowing, or stricture.  Orders:    famotidine (PEPCID) 20 mg tablet; Take 1 tablet (20 mg total) by mouth 2 (two) times a day    EGD; Future  Eat slow, chew well, alternate solids and liquids, and remain upright when eating and for 1 hour afterwards.  
Patient reports intermittent episodes of small amount of blood on toilet tissue when she wipes.  Patient does have known history of hemorrhoids.  Patient currently denies any hemorrhoidal discomfort or pain.  May use Preparation H as needed  If any significant hemorrhoidal discomfort or pain recommend hemorrhoidal banding with Dr. Navarro or Dr. Douglass.       
No abnormal movements

## 2025-06-30 ENCOUNTER — APPOINTMENT (OUTPATIENT)
Dept: RADIOLOGY | Facility: CLINIC | Age: 75
End: 2025-06-30
Attending: STUDENT IN AN ORGANIZED HEALTH CARE EDUCATION/TRAINING PROGRAM
Payer: MEDICARE

## 2025-06-30 ENCOUNTER — OFFICE VISIT (OUTPATIENT)
Dept: OBGYN CLINIC | Facility: CLINIC | Age: 75
End: 2025-06-30
Payer: MEDICARE

## 2025-06-30 VITALS — BODY MASS INDEX: 24.19 KG/M2 | WEIGHT: 171 LBS

## 2025-06-30 DIAGNOSIS — M79.641 BILATERAL HAND PAIN: Primary | ICD-10-CM

## 2025-06-30 DIAGNOSIS — M67.432 GANGLION CYST OF VOLAR ASPECT OF LEFT WRIST: ICD-10-CM

## 2025-06-30 DIAGNOSIS — M79.642 BILATERAL HAND PAIN: ICD-10-CM

## 2025-06-30 DIAGNOSIS — M79.642 BILATERAL HAND PAIN: Primary | ICD-10-CM

## 2025-06-30 DIAGNOSIS — G56.03 CARPAL TUNNEL SYNDROME, BILATERAL: ICD-10-CM

## 2025-06-30 DIAGNOSIS — M79.641 BILATERAL HAND PAIN: ICD-10-CM

## 2025-06-30 DIAGNOSIS — M18.0 ARTHRITIS OF CARPOMETACARPAL (CMC) JOINT OF BOTH THUMBS: ICD-10-CM

## 2025-06-30 PROCEDURE — 99214 OFFICE O/P EST MOD 30 MIN: CPT | Performed by: STUDENT IN AN ORGANIZED HEALTH CARE EDUCATION/TRAINING PROGRAM

## 2025-06-30 PROCEDURE — 20600 DRAIN/INJ JOINT/BURSA W/O US: CPT | Performed by: STUDENT IN AN ORGANIZED HEALTH CARE EDUCATION/TRAINING PROGRAM

## 2025-06-30 PROCEDURE — 73130 X-RAY EXAM OF HAND: CPT

## 2025-06-30 RX ORDER — LIDOCAINE HYDROCHLORIDE 10 MG/ML
1 INJECTION, SOLUTION INFILTRATION; PERINEURAL
Status: COMPLETED | OUTPATIENT
Start: 2025-06-30 | End: 2025-06-30

## 2025-06-30 RX ORDER — BETAMETHASONE SODIUM PHOSPHATE AND BETAMETHASONE ACETATE 3; 3 MG/ML; MG/ML
6 INJECTION, SUSPENSION INTRA-ARTICULAR; INTRALESIONAL; INTRAMUSCULAR; SOFT TISSUE
Status: COMPLETED | OUTPATIENT
Start: 2025-06-30 | End: 2025-06-30

## 2025-06-30 RX ADMIN — LIDOCAINE HYDROCHLORIDE 1 ML: 10 INJECTION, SOLUTION INFILTRATION; PERINEURAL at 12:00

## 2025-06-30 RX ADMIN — BETAMETHASONE SODIUM PHOSPHATE AND BETAMETHASONE ACETATE 6 MG: 3; 3 INJECTION, SUSPENSION INTRA-ARTICULAR; INTRALESIONAL; INTRAMUSCULAR; SOFT TISSUE at 12:00

## 2025-06-30 NOTE — PROGRESS NOTES
ORTHOPAEDIC HAND, WRIST, AND ELBOW OFFICE  VISIT     Name: Evelin Thurston      : 1950      MRN: [id number]  Encounter Provider: Lupillo Szymanski MD  Encounter Date: 2025   Encounter department: [facility]  :  Assessment & Plan  Bilateral hand pain    Orders:    XR hand 3+ vw left; Future    XR hand 3+ vw right; Future    Arthritis of carpometacarpal (CMC) joint of both thumbs    Orders:    Ambulatory Referral to PT/OT Hand Therapy; Future    Carpal tunnel syndrome, bilateral         Ganglion cyst of volar aspect of left wrist           Assessment & Plan             ASSESSMENT/PLAN:    Evelin Thurston is a 75 y.o.  female who presents with bilateral CMC osteoarthritis and bilateral carpal tunnel syndrome    We discussed the natural history and etiology of this condition detail. We discussed the utility of therapy vs. steroid injection vs. surgery. Specifically we discussed the postop pain and therapy necessity which takes ~3-6 months for patients to return to baseline.        We will initiate a course of conservative treatment to include custom splinting and physical/occupational therapy as well as left CMC corticosteroid injection per procedure note below.  Prescription was provided as well as a list of therapists.       Should the patient's symptoms persist over the next 3 to 4 months she will return to clinic or we can discuss further interventions.      Regards to carpal tunnel symptoms discussed utilizing nighttime splinting explained that if symptoms worsen or fail to improve she will return for repeat evaluation and discussion of possible obtain ultrasound.     Left wrist ganglion cyst discussed continued observation.  Pain present for many years with no increase in size therefore encourage no acute intervention however if patient has developing carpal tunnel and/or CMC arthritis that require surgery this can be addressed at the same time.  We did discuss that  "if the carpal tunnel progresses and she wishes to obtain ultrasounds that we will have this evaluated at the same time.  Patient expressed understanding all questions were answered          Follow Up:  3 months or as needed if symptoms fail to improve or worsen      ____________________________________________________________________________________________________________________________________________      CHIEF COMPLAINT:  Bilateral thumb pain    SUBJECTIVE:  Evelin Thurston is a 75 y.o. female who presents with bilateral thumb pain as well as numbness to the fingers.  States the pain has been present for long period of time primarily left worse than right.  States that the pain is worse with gripping grasping and trying to open up objects.  She feels that her  strength is weak.  Says over the last couple months has progressively worsened therefore she presents to me to establish care.  She also complains of mild nighttime pain and numbness in her fingers that wakes her up in the morning.  She says that this has been off and on for many years but has become progressively more constant down the left worse than the right.  She denies any traumatic injuries.  Denies treatment for this in the past.  Denies pain or injury elsewhere.  I have personally reviewed all the relevant PMH, PSH, SH, FH, Medications and allergies      PAST MEDICAL HISTORY:  Past Medical History[1]    PAST SURGICAL HISTORY:  Past Surgical History[2]    FAMILY HISTORY:  Family History[3]    SOCIAL HISTORY:  Social History[4]    MEDICATIONS:  Current Medications[5]    ALLERGIES:  Allergies[6]      REVIEW OF SYSTEMS:  Pertinent items are noted in HPI.  A comprehensive review of systems was negative.    VITALS:  There were no vitals filed for this visit.    LABS:  HgA1c: No results found for: \"HGBA1C\"  BMP:   Lab Results   Component Value Date    GLUCOSE 94 01/03/2017    CALCIUM 10.0 05/07/2025     01/03/2017    K 4.1 05/07/2025    CO2 26 " 05/07/2025     05/07/2025    BUN 17 05/07/2025    CREATININE 0.89 05/07/2025       _____________________________________________________  PHYSICAL EXAMINATION:  General: well developed and well nourished, alert, oriented times 3, and appears comfortable  Psychiatric: Normal  HEENT: Normocephalic, Atraumatic Trachea Midline, No torticollis  Pulmonary: No audible wheezing or respiratory distress   Abdomen/GI: Non tender, non distended   Cardiovascular: Regular Rate and Rhythm. No pitting edema, 2+ radial pulse   Skin: No masses, erythema, lacerations, fluctation, ulcerations  Neurovascular: Sensation Intact to the Median, Ulnar, Radial Nerve, Motor Intact to the Median, Ulnar, Radial Nerve, and Pulses Intact  Musculoskeletal: Normal, except as noted in detailed exam and in HPI.        FOCUSED MUSCULOSKELETAL EXAMINATION:  Right Upper Extremity  Inspection: Mild thenar atrophy, shoulder sign  Palpation: Mild tenderness to palpation about the right thumb CMC joint  Neurologic: 5/5 elbow flexion, 5/5 elbow extension, 5/5 wrist extension, 5/5 wrist flexion, 5/5 finger flexion, 5/5 finger extension, 5/5 FPL, 5/5 EPL, 5/5 APB, 5/5 intrinsics, sensation intact to median, radial, and ulnar nerve distributions  Vascular: Palpable radial pulse, brisk cap refill <2sec, hand warm and well perfused  MSK:   Positive carpal compression test positive CMC grind, negative elbow flexion compression test    Left Upper Extremity  Inspection: Well-circumscribed mass about the volar radial left wrist, no associated skin changes  Palpation: Mildly painful well-circumscribed mobile mass about the volar radial aspect of the left wrist roughly 1 cm x 1 cm, moderately painful tenderness palpation about the left thumb CMC joint  Neurologic: 5/5 elbow flexion, 5/5 elbow extension, 5/5 wrist extension, 5/5 wrist flexion, 5/5 finger flexion, 5/5 finger extension, 5/5 FPL, 5/5 EPL, 5/5 APB, 5/5 intrinsics, sensation intact to median, radial,  "and ulnar nerve distributions  Vascular: Palpable radial pulse, brisk cap refill <2sec, hand warm and well perfused  MSK:   Positive carpal compression test positive CMC grind  Negative elbow flexion compression test  ___________________________________________________  STUDIES REVIEWED:  Xrays of the left hand were obtained on 6/30/2025 were independently reviewed which demonstrates severe left thumb CMC arthritis Eaton stage 3/4    Full x-ray of right hand obtained on 6/30/2025 independently reviewed which demonstrates moderate to severe right thumb CMC arthritis Eaton stage 2/3    LABS REVIEWED:    HgA1c: No results found for: \"HGBA1C\"  BMP:   Lab Results   Component Value Date    GLUCOSE 94 01/03/2017    CALCIUM 10.0 05/07/2025     01/03/2017    K 4.1 05/07/2025    CO2 26 05/07/2025     05/07/2025    BUN 17 05/07/2025    CREATININE 0.89 05/07/2025               PROCEDURES PERFORMED:  Small joint arthrocentesis: L thumb CMC    Performed by: Lupillo Szymanski MD  Authorized by: Lupillo Szymanski MD    Universal Protocol:  Risks and benefits: risks, benefits and alternatives were discussed  Consent given by: patient  Patient understanding: patient states understanding of the procedure being performed  Supporting Documentation  Indications: pain   Procedure Details  Location: thumb - L thumb CMC  Preparation: Patient was prepped and draped in the usual sterile fashion  Needle size: 25 G  Ultrasound guidance: no  Medications administered: 1 mL lidocaine 1 %; 6 mg betamethasone acetate-betamethasone sodium phosphate 6 (3-3) mg/mL    Patient tolerance: patient tolerated the procedure well with no immediate complications  Dressing:  Sterile dressing applied            _____________________________________________________        I agree with the history, physical examination, assessment and plan of care as documented above.    Lupillo Szymanski M.D.  Attending, Orthopaedic Surgery  Hand, Wrist, " and Elbow Surgery  [facility]           [1]   Past Medical History:  Diagnosis Date    Arthritis     Chronic kidney disease     stage 3    Hyperlipidemia     borderline    Hypertension     borderline    Migraine     Migraine     controlled with verapamil    Osteopenia     Snores    [2]   Past Surgical History:  Procedure Laterality Date    BREAST BIOPSY Right     benign ~ 1990s    BUNIONECTOMY Left     3 hammertoes, bunion    CATARACT EXTRACTION Bilateral 2012    COLONOSCOPY      PARATHYROID GLAND SURGERY Right     front gland removed   [3]   Family History  Problem Relation Name Age of Onset    Angina Mother      Heart failure Mother      Prostate cancer Father      Cancer Father          prostate    Diabetes Sister Juliann     Sudden death Sister Juliann     Osteoporosis Sister Juliann     Heart disease Sister Juliann         MI    COPD Sister Sarah     Kidney disease Sister Sarah     Heart disease Sister Sarah         CHF    Hypertension Daughter      Hypertension Brother      Cancer Brother          lung    Diabetes Brother      Cancer Brother          prostate    No Known Problems Son      Lung cancer Maternal Uncle      Breast cancer Cousin maternal 50    Colon cancer Other nephew     Prostate cancer Other nephew 51    Breast cancer Paternal Aunt     [4]   Social History  Tobacco Use    Smoking status: Never     Passive exposure: Never    Smokeless tobacco: Never   Vaping Use    Vaping status: Never Used   Substance Use Topics    Alcohol use: No    Drug use: No   [5]   Current Outpatient Medications:     atorvastatin (LIPITOR) 10 mg tablet, Take 1 tablet (10 mg total) by mouth before bedtime., Disp: 90 tablet, Rfl: 1    calcium carbonate (OS-FLORIAN) 600 MG tablet, Take one tablet daily, Disp: , Rfl:     Cholecalciferol (VITAMIN D3) 5000 units CAPS, Take 1 capsule by mouth in the morning., Disp: , Rfl:     Coenzyme Q10 (Co Q 10) 100 MG CAPS, Take by mouth every morning, Disp: , Rfl:      dapagliflozin (Farxiga) 10 MG tablet, Take 1 tablet (10 mg total) by mouth every morning, Disp: 90 tablet, Rfl: 3    famotidine (PEPCID) 20 mg tablet, Take 1 tablet (20 mg total) by mouth 2 (two) times a day, Disp: 180 tablet, Rfl: 2    magnesium oxide (MAG-OX) 400 mg tablet, Take 1 tablet by mouth in the morning. With zinc., Disp: , Rfl:     multivitamin (THERAGRAN) TABS, Take 1 tablet by mouth in the morning., Disp: , Rfl:     spironolactone-hydrochlorothiazide (ALDACTAZIDE) 25-25 mg per tablet, Take 1 tablet by mouth every morning, Disp: 100 tablet, Rfl: 2    verapamil (CALAN-SR) 180 mg CR tablet, Take 1 tablet (180 mg total) by mouth in the morning and 1 tablet (180 mg total) before bedtime., Disp: 180 tablet, Rfl: 1    Zinc Sulfate (ZINC 15 PO), Take by mouth in the morning. With copper 1mg., Disp: , Rfl:     predniSONE 10 mg tablet, Take 4 pills/d for 3 days then 3 pills/d for 3 days, then 2 pills/d for 3 days then 1 pill/d for 3 days then stop, Disp: 30 tablet, Rfl: 0  [6] No Known Allergies

## 2025-06-30 NOTE — PROGRESS NOTES
"  ORTHOPAEDIC HAND, WRIST, AND ELBOW OFFICE  VISIT     Name: Evelin Thurston      : 1950      MRN: [id number]  Encounter Provider: Lupillo Szymanski MD  Encounter Date: 2025   Encounter department: [facility]  :  Assessment & Plan             ASSESSMENT/PLAN:    Evelin Thurston is a 75 y.o. ***HD female who presents with ***    - ***          Follow Up:  ***      General Discussions:    {Nonoperative Discussions:11178::\" \"}    Operative Discussions:    {kmsurgicaldiscussionlist:23963::\" \"}    ____________________________________________________________________________________________________________________________________________      CHIEF COMPLAINT:  ***    SUBJECTIVE:  Evelin Thurston is a 75 y.o. female who presents with {Complaint:48558} to the {Operated side:36003} {Surgical location:23304}.  This started *** ago as {Causes:23110}.    Radiation: {radiation of pain:86928}  Previous Treatments: {kmtreatments:27611} {kmrelief:40764}  Associated symptoms: {Complaint:46094}  Handedness: {Operated side:77963}  Work status: ***    I have personally reviewed all the relevant PMH, PSH, SH, FH, Medications and allergies      PAST MEDICAL HISTORY:  Past Medical History[1]    PAST SURGICAL HISTORY:  Past Surgical History[2]    FAMILY HISTORY:  Family History[3]    SOCIAL HISTORY:  Social History[4]    MEDICATIONS:  Current Medications[5]    ALLERGIES:  Allergies[6]      REVIEW OF SYSTEMS:  {KMREVIEWOFSYSTEMS:71956::\"Pertinent items are noted in HPI.\",\"A comprehensive review of systems was negative.\"}    VITALS:  There were no vitals filed for this visit.    LABS:  HgA1c: No results found for: \"HGBA1C\"  BMP:   Lab Results   Component Value Date    GLUCOSE 94 2017    CALCIUM 10.0 2025     2017    K 4.1 2025    CO2 26 2025     2025    BUN 17 2025    CREATININE 0.89 2025 " "      _____________________________________________________  PHYSICAL EXAMINATION:  General: {1234:91368::\"well developed and well nourished\",\"alert\",\"oriented times 3\",\"appears comfortable\"}  Psychiatric: {Psych:48270::\"Normal\"}  HEENT: Normocephalic, Atraumatic Trachea Midline, No torticollis  Pulmonary: No audible wheezing or respiratory distress   Abdomen/GI: Non tender, non distended   Cardiovascular: Regular Rate and Rhythm. No pitting edema, 2+ radial pulse   Skin: {Skin exam:53420}  Neurovascular: {Nerve Exam:66153::\"Sensation Intact to the Median, Ulnar, Radial Nerve\",\"Motor Intact to the Median, Ulnar, Radial Nerve\",\"Pulses Intact\"}  Musculoskeletal: Normal, except as noted in detailed exam and in HPI.        FOCUSED MUSCULOSKELETAL EXAMINATION:  Right Upper Extremity  Inspection: ***  Palpation: ***  Neurologic: 5/5 elbow flexion, 5/5 elbow extension, 5/5 wrist extension, 5/5 wrist flexion, 5/5 finger flexion, 5/5 finger extension, 5/5 FPL, 5/5 EPL, 5/5 APB, 5/5 intrinsics, sensation intact to median, radial, and ulnar nerve distributions  Vascular: Palpable radial pulse, brisk cap refill <2sec, hand warm and well perfused  MSK:   {Side Examined:78961::\"***\"}    Left Upper Extremity  Inspection: ***  Palpation: ***  Neurologic: 5/5 elbow flexion, 5/5 elbow extension, 5/5 wrist extension, 5/5 wrist flexion, 5/5 finger flexion, 5/5 finger extension, 5/5 FPL, 5/5 EPL, 5/5 APB, 5/5 intrinsics, sensation intact to median, radial, and ulnar nerve distributions  Vascular: Palpable radial pulse, brisk cap refill <2sec, hand warm and well perfused  MSK:   {Side Examined:75163::\"***\"}  ___________________________________________________  STUDIES REVIEWED:  Xrays of the *** were obtained on *** were independently reviewed which demonstrates ***    I have personally reviewed and interpreted  US of *** obtained on *** which demonstrates ***     LABS REVIEWED:    HgA1c: No results found for: \"HGBA1C\"  BMP:   Lab " "Results   Component Value Date    GLUCOSE 94 01/03/2017    CALCIUM 10.0 05/07/2025     01/03/2017    K 4.1 05/07/2025    CO2 26 05/07/2025     05/07/2025    BUN 17 05/07/2025    CREATININE 0.89 05/07/2025               PROCEDURES PERFORMED:  Procedures  {Was Procdoc done:03469::\"-\"}    _____________________________________________________      Scribe Attestation    I,:   am acting as a scribe while in the presence of the attending physician.:       I,:   personally performed the services described in this documentation    as scribed in my presence.:             I agree with the history, physical examination, assessment and plan of care as documented above.    Lupillo Szymanski M.D.  Attending, Orthopaedic Surgery  Hand, Wrist, and Elbow Surgery  [facility]            [1]  Past Medical History:  Diagnosis Date   • Arthritis    • Chronic kidney disease     stage 3   • Hyperlipidemia     borderline   • Hypertension     borderline   • Migraine    • Migraine     controlled with verapamil   • Osteopenia    • Snores    [2]  Past Surgical History:  Procedure Laterality Date   • BREAST BIOPSY Right     benign ~ 1990s   • BUNIONECTOMY Left     3 hammertoes, aquilesion   • CATARACT EXTRACTION Bilateral 2012   • COLONOSCOPY     • PARATHYROID GLAND SURGERY Right     front gland removed   [3]  Family History  Problem Relation Name Age of Onset   • Angina Mother     • Heart failure Mother     • Prostate cancer Father     • Cancer Father          prostate   • Diabetes Sister Juliann    • Sudden death Sister Juliann    • Osteoporosis Sister Juliann    • Heart disease Sister Juliann         MI   • COPD Sister Sarah    • Kidney disease Sister Sarah    • Heart disease Sister Sarah         CHF   • Hypertension Daughter     • Hypertension Brother     • Cancer Brother          lung   • Diabetes Brother     • Cancer Brother          prostate   • No Known Problems Son     • Lung cancer Maternal Uncle     • Breast " cancer Cousin maternal 50   • Colon cancer Other nephew    • Prostate cancer Other nephew 51   • Breast cancer Paternal Aunt     [4]  Social History  Tobacco Use   • Smoking status: Never     Passive exposure: Never   • Smokeless tobacco: Never   Vaping Use   • Vaping status: Never Used   Substance Use Topics   • Alcohol use: No   • Drug use: No   [5]    Current Outpatient Medications:   •  atorvastatin (LIPITOR) 10 mg tablet, Take 1 tablet (10 mg total) by mouth before bedtime., Disp: 90 tablet, Rfl: 1  •  calcium carbonate (OS-FLORIAN) 600 MG tablet, Take one tablet daily, Disp: , Rfl:   •  Cholecalciferol (VITAMIN D3) 5000 units CAPS, Take 1 capsule by mouth in the morning., Disp: , Rfl:   •  Coenzyme Q10 (Co Q 10) 100 MG CAPS, Take by mouth every morning, Disp: , Rfl:   •  dapagliflozin (Farxiga) 10 MG tablet, Take 1 tablet (10 mg total) by mouth every morning, Disp: 90 tablet, Rfl: 3  •  famotidine (PEPCID) 20 mg tablet, Take 1 tablet (20 mg total) by mouth 2 (two) times a day, Disp: 180 tablet, Rfl: 2  •  magnesium oxide (MAG-OX) 400 mg tablet, Take 1 tablet by mouth in the morning. With zinc., Disp: , Rfl:   •  multivitamin (THERAGRAN) TABS, Take 1 tablet by mouth in the morning., Disp: , Rfl:   •  spironolactone-hydrochlorothiazide (ALDACTAZIDE) 25-25 mg per tablet, Take 1 tablet by mouth every morning, Disp: 100 tablet, Rfl: 2  •  verapamil (CALAN-SR) 180 mg CR tablet, Take 1 tablet (180 mg total) by mouth in the morning and 1 tablet (180 mg total) before bedtime., Disp: 180 tablet, Rfl: 1  •  Zinc Sulfate (ZINC 15 PO), Take by mouth in the morning. With copper 1mg., Disp: , Rfl:   •  predniSONE 10 mg tablet, Take 4 pills/d for 3 days then 3 pills/d for 3 days, then 2 pills/d for 3 days then 1 pill/d for 3 days then stop, Disp: 30 tablet, Rfl: 0[6]  No Known Allergies

## 2025-07-03 ENCOUNTER — EVALUATION (OUTPATIENT)
Dept: OCCUPATIONAL THERAPY | Facility: CLINIC | Age: 75
End: 2025-07-03
Attending: STUDENT IN AN ORGANIZED HEALTH CARE EDUCATION/TRAINING PROGRAM
Payer: MEDICARE

## 2025-07-03 DIAGNOSIS — M18.0 ARTHRITIS OF CARPOMETACARPAL (CMC) JOINT OF BOTH THUMBS: Primary | ICD-10-CM

## 2025-07-03 PROCEDURE — 97110 THERAPEUTIC EXERCISES: CPT

## 2025-07-03 PROCEDURE — 97166 OT EVAL MOD COMPLEX 45 MIN: CPT

## 2025-07-03 NOTE — PROGRESS NOTES
OT Evaluation     Today's date: 7/3/2025  Patient name: Evelin Thurston  : 1950  MRN: [id number]  Referring provider: Lupillo Szymanski*  Dx:   Encounter Diagnosis     ICD-10-CM    1. Arthritis of carpometacarpal (CMC) joint of both thumbs  M18.0 Ambulatory Referral to PT/OT Hand Therapy     OT Plan of Care Cert/Re-cert          Start Time: 1345  Stop Time: 1430  Total time in clinic (min): 45 minutes      Assessment:   Patient tolerated session well. Patient demonstrates decreased thumb AROM, decreased  strength in L hand compared to contralateral side with patient being LHD upon assessment today as well as pain in bilateral thumb with AROM screen. Patient session focused on patient education on anatomy and physiology concerning current dx, techniques for decreasing deficits through HEP, and appropriate use of modalities. Patient educated on HEP to include thumb AROM and stabilizing exercises, information regarding joint preservation techniques and alternative pre anna comfort cool brand for thumb spica splint for purchasing on [employer] with verbal instructions and handouts for patient reference.     Therapist fabricated custom thumb spica splint for patient's left hand in which the physician's order and diagnosis were confirmed prior evaluation/assessment and placement of orthotic.  This orthotic was deemed medically necessary by the ordering physician.    The orthotic size was determined after performing appropriate custom measurements.    The orthotic was then applied to the patient, securing all straps with moderate tension.    The orthotic was then re-evaluated to confirm fit and verify that no compromise of circulation was occurring.  All bony prominences were evaluated and padding was utilized as needed to further protect bony prominences.      Application instructions and care of the orthotic were reviewed in detail with the patient, including need for regular inspection of the orthotic.  It was  verified that the patient was able to perform independent application and removal of the orthotic with appropriate technique.  Usage instructions were reviewed as per the physician's order.  The patient verbalized an understanding of all instructions. Patient educated on treatment plan at this time. Patient benefiting from skilled hand therapy OT to reduce deficits to improve independence with daily activities.        Plan:   Focus on HEP, A/AA/PROM, TE, TA, stretching, strengthening, ortho fit and train, manual therapy, modalities PRN to improve ability to complete daily activites with ease.  POC 7/3/2025 - 8/14/2025    Assessment  Impairments: activity intolerance, impaired physical strength, lacks appropriate home exercise program, pain with function and weight-bearing intolerance  Symptom irritability: high  Understanding of Dx/Px/POC: good     Prognosis: good    Goals  Short Term Goals by 2 - 4 weeks:    Establish HEP to increase performance with daily activities.     Patient will increase L  strength by at least 5 lbs to assist in completing ADLs.     Patient will increase thumb ROM of LUE by at least 5 degrees to complete ADLs.     Patient will decrease pain rate of UE by at least 2 points per the VAS scale.         Long Term Goals by discharge:    Establish final home exercise program to enhance maximal functional level with ADLs.    Achieve functional active range of motion of LUE for full return to household chores.                             Achieve functional strength of LUE for full return to high level ADLs.       Goals:  Patient to demonstrate ability to independently don and doff splint in clinic following IE.     Patient to demonstrate knowledge and understand of orthotic wear schedule and compliance with use of orthotic.       Plan  Patient would benefit from: orthotics, custom splinting, skilled occupational therapy and OT eval  Planned modality interventions: electrical stimulation/Cambodian  "stimulation, manual electrical stimulation, traction, thermotherapy: hydrocollator packs and high voltage pulsed current: pain management    Planned therapy interventions: IASTM, joint mobilization, kinesiology taping, manual therapy, activity modification, body mechanics training, balance/weight bearing training, patient/caregiver education, orthotic management and training, orthotic fitting/training, neuromuscular re-education, nerve gliding, therapeutic training, therapeutic exercise, therapeutic activities, stretching, strengthening, fine motor coordination training, flexibility, functional ROM exercises, IADL retraining, home exercise program, graded exercise, graded activity and graded motor    Frequency: 1-2x week  Duration in weeks: 6  Plan of Care beginning date: 7/3/2025  Plan of Care expiration date: 8/14/2025  Plan details: Focus on HEP, A/AA/PROM, TE, TA, stretching, strengthening, manual therapy, modalities PRN to improve ability to complete daily activites with ease.  POC 7/3/2025 - 8/14/2025      Subjective Evaluation    History of Present Illness  Mechanism of injury: Patient is a 75 y.o. female who presents for OT IE and treatment for Arthritis of carpometacarpal (CMC) joint of both thumbs. Patient states, \"the pain has been present for long period of time primarily left worse than right side, past 1.5 years and the pain has actually started to go up the arm now\". States that the pain is worse with gripping grasping and trying to open up objects, her  strength is weak and lifting bag of groceries is difficult. She also complains of mild night time pain and numbness in her fingers that sometimes wakes her up in the morning. She did see the orthopedic physician on 6/30/2025 in which she received CSI in her L CMC joint, \"the injection has worn off at this time it doesn't feel much different than prior to the injection\". Patient referred by Dr. Lupillo Szymanski to initiate treatment including hand " "therapy.      Quality of life: good    Patient Goals  Patient goals for therapy: increased strength, independence with ADLs/IADLs, decreased pain, increased motion and return to sport/leisure activities    Pain  Current pain ratin  At worst pain rating: 10  Quality: dull ache, discomfort, sharp and grinding  Relieving factors: ice (tylenol PRN)  Aggravating factors: lifting (\"any kind of twisting motion, using the hair dryer\".)    Social Support  Lives with: spouse    Hand dominance: left    Treatments  Current treatment: injection treatment      Objective     Observations     Additional Observation Details  Patient presents with ganglion cyst on L wrist volar aspect of radial side    Active Range of Motion     Left Wrist   Normal active range of motion    Right Wrist   Normal active range of motion    Left Thumb   Palmar Abduction     CMC: 42 degrees  Radial abduction    CMC: 42 degrees  Kapandji score: 10 degrees      Right Thumb   Palmar Abduction    CMC: 48  Radial Abduction    CMC: 50  Kapandji score: 10 degrees    Left Digits    Flexion   Index     PIP: 70    DIP: 46    Right Digits   Flexion   Index     PIP: 64    DIP: 60    Strength/Myotome Testing     Left Wrist/Hand      (2nd hand position)     Trial 1: 35    Thumb Strength  Key/Lateral Pinch     Trial 1: 8  Palmar/Three-Point Pinch     Trial 1: 9    Right Wrist/Hand      (2nd hand position)     Trial 1: 70    Thumb Strength   Key/Lateral Pinch     Trial 1: 13  Palmar/Three-Point Pinch     Trial 1: 12    Tests     Left Wrist/Hand   Positive CMC grind and CMC lever test positive.     Right Wrist/Hand   Positive CMC grind and CMC lever test positive.              Precautions: Universal  PROTOCOL PER MD:  Bilateral CMC arthritis Left is worse than right, focus on left over right. Can consider splint for both, but more important for left.   Splint: Creation of custom Orthoplast hand based opponens splint with IP joint free        Supervised " "exercises: Tendon gliding and active thumb opposition. Thenar strengthening to promote a stable joint position during pinching activities. Pain-free, isometric thumb flexion and adduction exercises to improve this functional task. Patients are encouraged to determine which joint positions improve stability and decrease pain during functional tasks. Isometric strengthening of the muscles that maintain optimum positioning can then be provided.     -Thumb web space stretching as needed        Visit Tracker - NO AUTH REQ BOMN  Date IE  7/3                                                                                    PMHx:   has a past medical history of Arthritis, Chronic kidney disease, Hyperlipidemia, Hypertension, Migraine, Migraine, Osteopenia, and Snores.         Manuals HEP 7/3/2025                       Ther Ex     Education on HEP and dx  x10min   AROM tendon glides x x10   Thumb flex/ext/palmar abduction  x10   \"C\" shape stabilizing   5 x 5 sec holds   Fabrication of thumb spica splint  X15 min                        Ther Act                     Modalities     MHP  x5 min           "

## 2025-07-09 ENCOUNTER — OFFICE VISIT (OUTPATIENT)
Dept: OCCUPATIONAL THERAPY | Facility: CLINIC | Age: 75
End: 2025-07-09
Attending: STUDENT IN AN ORGANIZED HEALTH CARE EDUCATION/TRAINING PROGRAM
Payer: MEDICARE

## 2025-07-09 DIAGNOSIS — M18.0 ARTHRITIS OF CARPOMETACARPAL (CMC) JOINT OF BOTH THUMBS: Primary | ICD-10-CM

## 2025-07-09 PROCEDURE — 97110 THERAPEUTIC EXERCISES: CPT

## 2025-07-09 NOTE — PROGRESS NOTES
"Daily Note     Today's date: 2025  Patient name: Evelin Thurston  : 1950  MRN: [id number]  Referring provider: Lupillo Szymanski*  Dx:   Encounter Diagnosis     ICD-10-CM    1. Arthritis of carpometacarpal (CMC) joint of both thumbs  M18.0           Start Time: 0915  Stop Time: 1000  Total time in clinic (min): 45 minutes    Subjective: Patient reports compliance with splint, \"I think the shot is working because I haven't had excruciating pain for the past 2 days when I woke up, but Monday I was barely able to hold anything or use my hand properly just using a pepper , it was crazy\".       Objective: See treatment diary below    Assessment:   Patient tolerated session well able to perform all TE with good activity tolerance and minimal reports of muscle fatigue which resolved quickly with use of rest breaks. Patient session focused on patient education on anatomy and physiology concerning current dx, techniques for decreasing deficits through HEP, and appropriate use of modalities. Therapist reviewed HEP to include thumb AROM and stabilizing exercises, information regarding joint preservation techniques with verbal instructions for patient reference. Patient benefiting from skilled hand therapy OT to reduce deficits to improve independence with daily activities.       Plan:   Focus on HEP, A/AA/PROM, TE, TA, stretching, strengthening, ortho fit and train, manual therapy, modalities PRN to improve ability to complete daily activites with ease.  POC 7/3/2025 - 2025           Precautions: Universal  PROTOCOL PER MD:  Bilateral CMC arthritis Left is worse than right, focus on left over right. Can consider splint for both, but more important for left.   Splint: Creation of custom Orthoplast hand based opponens splint with IP joint free        Supervised exercises: Tendon gliding and active thumb opposition. Thenar strengthening to promote a stable joint position during pinching activities. " "Pain-free, isometric thumb flexion and adduction exercises to improve this functional task. Patients are encouraged to determine which joint positions improve stability and decrease pain during functional tasks. Isometric strengthening of the muscles that maintain optimum positioning can then be provided.     -Thumb web space stretching as needed        Visit Tracker - NO AUTH REQ BOMN  Date IE  7/3 7/9                                                                                     PMHx:   has a past medical history of Arthritis, Chronic kidney disease, Hyperlipidemia, Hypertension, Migraine, Migraine, Osteopenia, and Snores.         Manuals HEP 7/9/2025                       Ther Ex     Education on HEP and dx  x10min   AROM tendon glides x x10   Thumb flex/ext/palmar abduction  x10   \"C\" shape stabilizing   5 x 5 sec holds   Fabrication of thumb spica splint     \"C\" shape index finger lifts   X10    First dorsal interossei strengthening   X10    Dice with opposition alternating digits  Whole jar    Index finger lifts  x10                  Ther Act                     Modalities     MHP  x5 min              "

## 2025-07-10 ENCOUNTER — HOSPITAL ENCOUNTER (OUTPATIENT)
Dept: SLEEP CENTER | Facility: CLINIC | Age: 75
Discharge: HOME | End: 2025-07-10
Payer: MEDICARE

## 2025-07-10 DIAGNOSIS — G47.33 OSA (OBSTRUCTIVE SLEEP APNEA): ICD-10-CM

## 2025-07-10 DIAGNOSIS — G47.19 EXCESSIVE DAYTIME SLEEPINESS: ICD-10-CM

## 2025-07-10 DIAGNOSIS — G47.34 SLEEP RELATED HYPOXIA: ICD-10-CM

## 2025-07-10 PROCEDURE — 95811 POLYSOM 6/>YRS CPAP 4/> PARM: CPT

## 2025-07-10 PROCEDURE — 95811 POLYSOM 6/>YRS CPAP 4/> PARM: CPT | Performed by: INTERNAL MEDICINE

## 2025-07-11 DIAGNOSIS — G47.33 OSA (OBSTRUCTIVE SLEEP APNEA): Primary | ICD-10-CM

## 2025-07-11 NOTE — PROGRESS NOTES
Sleep Study Documentation    Pre-Sleep Study       Sleep testing procedure explained to patient:YES    Patient napped prior to study:NO    Caffeine:Dayshift worker after 12PM.  Caffeine use:NO    Alcohol:Dayshift workers after 5PM: Alcohol use:NO    Typical day for patient:YES         Study Documentation    Sleep Study Indications: snoring witnessed apneas    Montage used: Standard    Transcutaneous CO2 used: NO    Sleep Study Type:     CPAP Study     Treatment: Mode of Therapy:CPAP  CPAP changed to BiPAP:No    Optimal PAP pressure: 9 cm h20   Leak:None  Snore:Eliminated  PAP pressure at which snoring was eliminated 7 cm h20       PAP Masks  PAP mask tried Alonso Dreamwear Under Nose  PAP mask choice Alonso Dreamwear Under Nose  PAP mask type:full face      PAP- Post-Study  PAP treatment:yes: Post PAP treatment patient reports feeling unsure if a change is noted and  would wear PAP mask at home.      Oxygen Data  Supplemental O2 used: No      EKG/EEG/Abnormal Behaviors     EKG abnormalities: no None    EEG abnormalities: no    Were abnormal behaviors in sleep observed:NO  No    Snoring    Character:  Loud    Frequency: Frequent      Study Length    Is Total Sleep Study Recording Time < 2 hours: N/A    Is Total Sleep Study Recording Time > 2 hours but study is incomplete: N/A    Is Total Sleep Study Recording Time 6 hours or more but sleep was not obtained: NO        Post-Sleep Study    Medication used at bedtime or during sleep study:NO    Patient reports time it took to fall asleep:greater than 60 minutes    Patient reports waking up during study:3 or more times.  Patient reports returning to sleep in 10 to 30 minutes.    Patient reports sleeping 4 to 6 hours without dreaming.    Does the Patient feel this is a typical night of sleep:worse than usual    Patient rated sleepiness: Very sleepy or tired

## 2025-07-14 ENCOUNTER — ANESTHESIA EVENT (OUTPATIENT)
Dept: ANESTHESIOLOGY | Facility: HOSPITAL | Age: 75
End: 2025-07-14

## 2025-07-14 ENCOUNTER — OFFICE VISIT (OUTPATIENT)
Dept: OCCUPATIONAL THERAPY | Facility: CLINIC | Age: 75
End: 2025-07-14
Attending: STUDENT IN AN ORGANIZED HEALTH CARE EDUCATION/TRAINING PROGRAM
Payer: MEDICARE

## 2025-07-14 ENCOUNTER — ANESTHESIA (OUTPATIENT)
Dept: ANESTHESIOLOGY | Facility: HOSPITAL | Age: 75
End: 2025-07-14

## 2025-07-14 DIAGNOSIS — M18.0 ARTHRITIS OF CARPOMETACARPAL (CMC) JOINT OF BOTH THUMBS: Primary | ICD-10-CM

## 2025-07-14 PROCEDURE — 97110 THERAPEUTIC EXERCISES: CPT

## 2025-07-14 NOTE — PROGRESS NOTES
"Daily Note     Today's date: 2025  Patient name: Evelin Thurston  : 1950  MRN: [id number]  Referring provider: Lupillo Szymanski*  Dx:   Encounter Diagnosis     ICD-10-CM    1. Arthritis of carpometacarpal (CMC) joint of both thumbs  M18.0           Start Time: 0740  Stop Time: 0825  Total time in clinic (min): 45 minutes    Subjective: Patient reports, \"It's still early in the morning but actually R thumb hurts more today than the L thumb. I wear the brace over the weekend it's not bad I think it's working. Sometimes it's difficult for my to manipulate small items at home and pick them up on my hand\".       Objective: See treatment diary below      Assessment:   Patient tolerated session well able to perform all TE with good activity tolerance and in proper form. Patient session focused on patient education on anatomy and physiology concerning current dx, techniques for decreasing deficits through HEP, and appropriate use of modalities. Patient benefiting from skilled hand therapy OT to reduce deficits to improve independence with daily activities.       Plan:   Focus on HEP, A/AA/PROM, TE, TA, stretching, strengthening, ortho fit and train, manual therapy, modalities PRN to improve ability to complete daily activites with ease.  POC 7/3/2025 - 2025           Precautions: Universal  PROTOCOL PER MD:  Bilateral CMC arthritis Left is worse than right, focus on left over right. Can consider splint for both, but more important for left.   Splint: Creation of custom Orthoplast hand based opponens splint with IP joint free        Supervised exercises: Tendon gliding and active thumb opposition. Thenar strengthening to promote a stable joint position during pinching activities. Pain-free, isometric thumb flexion and adduction exercises to improve this functional task. Patients are encouraged to determine which joint positions improve stability and decrease pain during functional tasks. Isometric " "strengthening of the muscles that maintain optimum positioning can then be provided.     -Thumb web space stretching as needed        Visit Tracker - NO AUTH REQ BOMN  Date IE  7/3 7/9 7/14                                                                                    PMHx:   has a past medical history of Arthritis, Chronic kidney disease, Hyperlipidemia, Hypertension, Migraine, Migraine, Osteopenia, and Snores.         Manuals HEP 7/14/2025                       Ther Ex     Education on HEP and dx  x10min   AROM tendon glides x x10   Thumb flex/ext/palmar abduction  x10   \"C\" shape stabilizing   5 x 5 sec holds   Fabrication of thumb spica splint     \"C\" shape index finger lifts   X10    First dorsal interossei strengthening with rubber band   X10 red   Index finger lifts  x10   Isometric holds with lacrosse ball  B/l x5 2sec hold             Ther Act      Dice with opposition alt. Digits and clothespin  B/l  reverse yellow clothespin    Metal pegs IHM  B/l x4 min                   Modalities     MHP  x5 min              "

## 2025-07-15 ENCOUNTER — TELEPHONE (OUTPATIENT)
Age: 75
End: 2025-07-15

## 2025-07-16 ENCOUNTER — OFFICE VISIT (OUTPATIENT)
Dept: OCCUPATIONAL THERAPY | Facility: CLINIC | Age: 75
End: 2025-07-16
Attending: STUDENT IN AN ORGANIZED HEALTH CARE EDUCATION/TRAINING PROGRAM
Payer: MEDICARE

## 2025-07-16 DIAGNOSIS — G43.709 CHRONIC MIGRAINE WITHOUT AURA WITHOUT STATUS MIGRAINOSUS, NOT INTRACTABLE: Primary | ICD-10-CM

## 2025-07-16 PROCEDURE — 97110 THERAPEUTIC EXERCISES: CPT

## 2025-07-16 NOTE — PROGRESS NOTES
"Daily Note     Today's date: 2025  Patient name: Evelin Thurston  : 1950  MRN: [id number]  Referring provider: Lupillo Szymanski*  Dx:   Encounter Diagnosis     ICD-10-CM    1. Chronic migraine without aura without status migrainosus, not intractable  G43.709           Start Time: 0915  Stop Time: 1000  Total time in clinic (min): 45 minutes    Subjective: Patient reports, \"I don't have any pain right now, I wear the brace but by the end of the day my thumb hurts. Sometimes they even switch turns usually the left one hurts more but sometimes the right one ends up hurting I don't remember what I do that makes one hurt worse than the other\".       Objective: See treatment diary below      Assessment:   Patient tolerated session well able to perform all TE with good activity tolerance and in proper form. Patient session focused on patient education on anatomy and physiology concerning current dx, techniques for decreasing deficits through HEP, and appropriate use of modalities. Kinesio tape was placed at the CMC joint of the R hand for support, positioning and protection to facilitate improvement in symptoms. Reviewed with patient about potential adverse effects of the Kinesiotape. Patient benefiting from skilled hand therapy OT to reduce deficits to improve independence with daily activities.       Plan:   Focus on HEP, A/AA/PROM, TE, TA, stretching, strengthening, ortho fit and train, manual therapy, modalities PRN to improve ability to complete daily activites with ease.  POC 7/3/2025 - 2025           Precautions: Universal  PROTOCOL PER MD:  Bilateral CMC arthritis Left is worse than right, focus on left over right. Can consider splint for both, but more important for left.   Splint: Creation of custom Orthoplast hand based opponens splint with IP joint free        Supervised exercises: Tendon gliding and active thumb opposition. Thenar strengthening to promote a stable joint position during " "pinching activities. Pain-free, isometric thumb flexion and adduction exercises to improve this functional task. Patients are encouraged to determine which joint positions improve stability and decrease pain during functional tasks. Isometric strengthening of the muscles that maintain optimum positioning can then be provided.     -Thumb web space stretching as needed        Visit Tracker - NO AUTH REQ BOMN  Date IE  7/3 7/9 7/14 7/16                                                                                   PMHx:   has a past medical history of Arthritis, Chronic kidney disease, Hyperlipidemia, Hypertension, Migraine, Migraine, Osteopenia, and Snores.         Manuals HEP 7/16/2025                       Ther Ex     Education on HEP and dx  x10min   AROM tendon glides x x10   Thumb flex/ext/palmar abduction  x10   \"C\" shape stabilizing   5 x 5 sec holds   Fabrication of thumb spica splint     \"C\" shape index finger lifts   X10    First dorsal interossei strengthening with rubber band   X10 red   Index finger lifts  x10   Isometric holds with lacrosse ball  B/l x5 2 sec hold             Ther Act      Dice with opposition alt. Digits and clothespin  B/l  reverse yellow clothespin    Metal pegs IHM  B/l x4 min                   Modalities     MHP  x5 min    R thumb CMC ktape  X2 min         "

## 2025-07-21 ENCOUNTER — OFFICE VISIT (OUTPATIENT)
Dept: OCCUPATIONAL THERAPY | Facility: CLINIC | Age: 75
End: 2025-07-21
Attending: STUDENT IN AN ORGANIZED HEALTH CARE EDUCATION/TRAINING PROGRAM
Payer: MEDICARE

## 2025-07-21 DIAGNOSIS — M18.0 ARTHRITIS OF CARPOMETACARPAL (CMC) JOINT OF BOTH THUMBS: Primary | ICD-10-CM

## 2025-07-21 PROCEDURE — 97110 THERAPEUTIC EXERCISES: CPT

## 2025-07-21 PROCEDURE — 97530 THERAPEUTIC ACTIVITIES: CPT

## 2025-07-21 NOTE — PROGRESS NOTES
"Daily Note     Today's date: 2025  Patient name: Evelin Thurston  : 1950  MRN: [id number]  Referring provider: Lupillo Szymanski*  Dx:   Encounter Diagnosis     ICD-10-CM    1. Arthritis of carpometacarpal (CMC) joint of both thumbs  M18.0         Start Time: 1300  Stop Time: 1345  Total time in clinic (min): 45 minutes    Subjective: Patient reports, \"I don't have any pain right now in my thumbs but over the weekend I was weeding for 3 hours the next day my hips down were all hurting and sore but overall my hands were pretty good\".       Objective: See treatment diary below      Assessment:   Patient tolerated session well able to perform all TE with good activity tolerance and in proper form. Patient session focused on patient education on anatomy and physiology concerning current dx, techniques for decreasing deficits through HEP, and appropriate use of modalities. Patient benefiting from skilled hand therapy OT to reduce deficits to improve independence with daily activities.       Plan:   Focus on HEP, A/AA/PROM, TE, TA, stretching, strengthening, ortho fit and train, manual therapy, modalities PRN to improve ability to complete daily activites with ease.  POC 7/3/2025 - 2025           Precautions: Universal  PROTOCOL PER MD:  Bilateral CMC arthritis Left is worse than right, focus on left over right. Can consider splint for both, but more important for left.   Splint: Creation of custom Orthoplast hand based opponens splint with IP joint free        Supervised exercises: Tendon gliding and active thumb opposition. Thenar strengthening to promote a stable joint position during pinching activities. Pain-free, isometric thumb flexion and adduction exercises to improve this functional task. Patients are encouraged to determine which joint positions improve stability and decrease pain during functional tasks. Isometric strengthening of the muscles that maintain optimum positioning can then be " "provided.     -Thumb web space stretching as needed        Visit Tracker - NO AUTH REQ BOMN  Date IE  7/3 7/9 7/14 7/16 7/21                                                                                  PMHx:   has a past medical history of Arthritis, Chronic kidney disease, Hyperlipidemia, Hypertension, Migraine, Migraine, Osteopenia, and Snores.         Manuals HEP 7/21/2025                       Ther Ex     Education on HEP and dx  x10min   AROM tendon glides x x10   Thumb flex/ext/palmar abduction  x10   \"C\" shape stabilizing hold  5 x 5 sec holds   Fabrication of thumb spica splint     \"C\" shape index finger lifts   X10    First dorsal interossei strengthening with rubber band   X10 red   Index finger lifts  x10   Isometric holds with lacrosse ball  B/l x5 2 sec hold   Thumb extension with rubber band   B/l x10 yellow                  Ther Act      Dice with opposition alt. Digits and clothespin  B/l  reverse yellow clothespin    Metal pegs IHM  B/l x4 min                   Modalities     MHP  x5 min       "

## 2025-07-22 ENCOUNTER — TELEPHONE (OUTPATIENT)
Age: 75
End: 2025-07-22

## 2025-07-22 NOTE — TELEPHONE ENCOUNTER
Left message for patient reminding them of upcoming appointment with [facility], new office address and phone number.

## 2025-07-23 ENCOUNTER — ANESTHESIA (OUTPATIENT)
Dept: ANESTHESIOLOGY | Facility: HOSPITAL | Age: 75
End: 2025-07-23

## 2025-07-23 ENCOUNTER — ANESTHESIA EVENT (OUTPATIENT)
Dept: ANESTHESIOLOGY | Facility: HOSPITAL | Age: 75
End: 2025-07-23

## 2025-07-23 ENCOUNTER — APPOINTMENT (OUTPATIENT)
Dept: OCCUPATIONAL THERAPY | Facility: CLINIC | Age: 75
End: 2025-07-23
Attending: STUDENT IN AN ORGANIZED HEALTH CARE EDUCATION/TRAINING PROGRAM
Payer: MEDICARE

## 2025-07-24 ENCOUNTER — OFFICE VISIT (OUTPATIENT)
Dept: OBGYN CLINIC | Facility: CLINIC | Age: 75
End: 2025-07-24
Payer: MEDICARE

## 2025-07-24 ENCOUNTER — APPOINTMENT (OUTPATIENT)
Dept: RADIOLOGY | Facility: CLINIC | Age: 75
End: 2025-07-24
Attending: ORTHOPAEDIC SURGERY
Payer: MEDICARE

## 2025-07-24 VITALS — WEIGHT: 167.6 LBS | HEIGHT: 71 IN | BODY MASS INDEX: 23.46 KG/M2

## 2025-07-24 DIAGNOSIS — M25.571 PAIN, JOINT, ANKLE AND FOOT, RIGHT: ICD-10-CM

## 2025-07-24 DIAGNOSIS — M54.16 LUMBAR RADICULOPATHY: Primary | ICD-10-CM

## 2025-07-24 PROCEDURE — 99214 OFFICE O/P EST MOD 30 MIN: CPT | Performed by: ORTHOPAEDIC SURGERY

## 2025-07-24 PROCEDURE — 73610 X-RAY EXAM OF ANKLE: CPT

## 2025-07-24 NOTE — PROGRESS NOTES
Name: Evelin Thurston      : 1950      MRN: [id number]  Encounter Provider: Melchor Moreland DO  Encounter Date: 2025   Encounter department: [facility]  :  Assessment & Plan  Lumbar radiculopathy  Begin PT  Continue activities as tolerated  Follow-up with Dr. Prasanna Fisher for further evaluation and treatment  Orders:    Ambulatory Referral to Physical Therapy; Future    Ambulatory referral to Spine & Pain Management; Future         Evelin Thurston is a pleasant 75 y.o. female who presents for initial evaluation of atraumatic right leg pain. After obtaining a thorough history and orthopedic exam, I believe her pain is stemming from her low back. I recommend she begin skilled physical therapy for this. A referral was provided. She may continue participating in the [facility] program as tolerated. I recommend she follow-up with Dr. Prasanna Fisher for her low back pain if symptoms persist after 4 weeks of physical therapy. A referral was also provided for this; however, she is an established patient of his.    Return for Recheck with Spine and Pain Management.    I have personally reviewed pertinent imaging.  X-rays of the right ankle obtained in the office demonstrate no significant degenerative changes. No acute fractures, dislocations, lytic or blastic lesions present.    History: Evelin Thurston is a 75 y.o. female who presents for initial evaluation of atraumatic right leg pain. This began 2 months ago. She states the pain began in the anterior lower leg and began radiating to the right ankle. About one month ago she began experiencing the pain in the posterior right leg up to the hip. She reports experiencing low back pain at times, but denies any recent low back pain. She states the pain is sporadic and nonspecific. She rates the pain 8/10 at its worst. She denies taking any over the counter pain medications. She participates in the [facility] program regularly.      Estimated body mass  "index is 23.71 kg/m² as calculated from the following:    Height as of this encounter: 5' 10.5\" (1.791 m).    Weight as of this encounter: 76 kg (167 lb 9.6 oz).    No results found for: \"HGBA1C\"    Social History     Occupational History    Not on file   Tobacco Use    Smoking status: Never     Passive exposure: Never    Smokeless tobacco: Never   Vaping Use    Vaping status: Never Used   Substance and Sexual Activity    Alcohol use: No    Drug use: No    Sexual activity: Not on file       Objective:  Right Ankle Exam     Tenderness   Right ankle tenderness location: anterior tibia.  Swelling: none    Range of Motion   Dorsiflexion:  15   Plantar flexion:  normal   Eversion:  normal   Inversion:  normal     Muscle Strength   Dorsiflexion:  5/5  Plantar flexion:  5/5    Tests   Anterior drawer: negative    Other   Erythema: absent  Scars: absent  Sensation: normal  Pulse: present     Comments:  Calf compartments are soft and supple  2+ TP and DP pulses with brisk capillary refill to the toes  Sural, saphenous, tibial, superficial, and deep peroneal motor and sensory distributions intact  Sensation to light touch intact distally       Right Knee Exam     Tenderness   The patient is experiencing no tenderness.     Range of Motion   The patient has normal right knee ROM.    Other   Erythema: absent  Scars: absent  Sensation: normal  Pulse: present  Swelling: none  Effusion: no effusion present    Comments:  Patella tracks centrally  Calf compartments are soft and supple  2+ DP and PT pulses with brisk capillary refill to the toes  Sural, saphenous, tibial, superficial, and deep peroneal motor and sensory distributions intact  Sensation light touch intact distally       Back Exam     Tenderness   The patient is experiencing no tenderness.     Tests   Straight leg raise right: positive    Other   Gait: normal   Erythema: no back redness  Scars: absent    Comments:  Positive slump  Myotomes: 5/5 throughout  Calf " compartments are soft and supple  2+ DP and PT pulses with brisk capillary refill to the toes  Sural, saphenous, tibial, superficial, and deep peroneal motor and sensory distributions intact  Sensation light touch intact distally             Observations     Right Knee   Negative for effusion.     Right Ankle/Foot   Negative for adhesive scar.     Strength/Myotome Testing     Right Ankle/Foot   Dorsiflexion: 5  Plantar flexion: 5      There were no vitals filed for this visit.    Subjective:  Past Medical History[1]    Past Surgical History[2]    Family History[3]    Current Medications[4]    Allergies[5]    Review of Systems   Constitutional:  Positive for activity change. Negative for chills, fever and unexpected weight change.   HENT:  Negative for hearing loss, nosebleeds and sore throat.    Eyes:  Negative for pain, redness and visual disturbance.   Respiratory:  Negative for cough, shortness of breath and wheezing.    Cardiovascular:  Negative for chest pain, palpitations and leg swelling.   Gastrointestinal:  Negative for abdominal pain, nausea and vomiting.   Endocrine: Negative for polydipsia and polyuria.   Genitourinary:  Negative for dysuria and hematuria.   Musculoskeletal:  See HPI  Skin:  Negative for rash and wound.   Neurological:  Negative for dizziness, numbness and headaches.   Psychiatric/Behavioral:  Negative for decreased concentration and suicidal ideas. The patient is not nervous/anxious.      Physical Exam  Vitals and nursing note reviewed.   Constitutional:       Appearance: Normal appearance. She is well-developed.   HENT:      Head: Normocephalic and atraumatic.      Right Ear: External ear normal.      Left Ear: External ear normal.   Eyes:      General: No scleral icterus.     Extraocular Movements: Extraocular movements intact.      Conjunctiva/sclera: Conjunctivae normal.   Cardiovascular:      Rate and Rhythm: Normal rate.   Pulmonary:      Effort: Pulmonary effort is normal. No  respiratory distress.   Musculoskeletal:      Cervical back: Normal range of motion and neck supple.      Comments: See Ortho exam   Skin:     General: Skin is warm and dry.   Neurological:      General: No focal deficit present.      Mental Status: She is alert and oriented to person, place, and time.   Psychiatric:         Behavior: Behavior normal.     Scribe Attestation      I,:  Alanna Bean am acting as a scribe while in the presence of the attending physician.:       I,:  Melchor Moreland, DO personally performed the services described in this documentation    as scribed in my presence.:           This document was created using speech voice recognition software.   Grammatical errors, random word insertions, pronoun errors, and incomplete sentences are an occasional consequence of this system due to software limitations, ambient noise, and hardware issues.   Any formal questions or concerns about content, text, or information contained within the body of this dictation should be directly addressed to the provider for clarification.         [1]   Past Medical History:  Diagnosis Date    Arthritis     Chronic kidney disease     stage 3    Hyperlipidemia     borderline    Hypertension     borderline    Migraine     Migraine     controlled with verapamil    Osteopenia     Snores    [2]   Past Surgical History:  Procedure Laterality Date    BREAST BIOPSY Right     benign ~ 1990s    BUNIONECTOMY Left     3 hammertoes, bunion    CATARACT EXTRACTION Bilateral 2012    COLONOSCOPY      PARATHYROID GLAND SURGERY Right     front gland removed   [3]   Family History  Problem Relation Name Age of Onset    Angina Mother      Heart failure Mother      Prostate cancer Father      Cancer Father          prostate    Diabetes Sister Juliann     Sudden death Sister Juliann     Osteoporosis Sister Juliann     Heart disease Sister Juliann         MI    COPD Sister Sarah     Kidney disease Sister Sarah     Heart disease Sister Sarah          CHF    Hypertension Daughter      Hypertension Brother      Cancer Brother          lung    Diabetes Brother      Cancer Brother          prostate    No Known Problems Son      Lung cancer Maternal Uncle      Breast cancer Cousin maternal 50    Colon cancer Other nephew     Prostate cancer Other nephew 51    Breast cancer Paternal Aunt     [4]   Current Outpatient Medications:     atorvastatin (LIPITOR) 10 mg tablet, Take 1 tablet (10 mg total) by mouth before bedtime., Disp: 90 tablet, Rfl: 1    calcium carbonate (OS-FLORIAN) 600 MG tablet, Take one tablet daily, Disp: , Rfl:     Cholecalciferol (VITAMIN D3) 5000 units CAPS, Take 1 capsule by mouth in the morning., Disp: , Rfl:     Coenzyme Q10 (Co Q 10) 100 MG CAPS, Take by mouth every morning, Disp: , Rfl:     dapagliflozin (Farxiga) 10 MG tablet, Take 1 tablet (10 mg total) by mouth every morning, Disp: 90 tablet, Rfl: 3    famotidine (PEPCID) 20 mg tablet, Take 1 tablet (20 mg total) by mouth 2 (two) times a day, Disp: 180 tablet, Rfl: 2    magnesium oxide (MAG-OX) 400 mg tablet, Take 1 tablet by mouth in the morning. With zinc., Disp: , Rfl:     multivitamin (THERAGRAN) TABS, Take 1 tablet by mouth in the morning., Disp: , Rfl:     spironolactone-hydrochlorothiazide (ALDACTAZIDE) 25-25 mg per tablet, Take 1 tablet by mouth every morning, Disp: 100 tablet, Rfl: 2    verapamil (CALAN-SR) 180 mg CR tablet, Take 1 tablet (180 mg total) by mouth in the morning and 1 tablet (180 mg total) before bedtime., Disp: 180 tablet, Rfl: 1    Zinc Sulfate (ZINC 15 PO), Take by mouth in the morning. With copper 1mg., Disp: , Rfl:     predniSONE 10 mg tablet, Take 4 pills/d for 3 days then 3 pills/d for 3 days, then 2 pills/d for 3 days then 1 pill/d for 3 days then stop, Disp: 30 tablet, Rfl: 0  [5] No Known Allergies

## 2025-07-28 NOTE — TELEPHONE ENCOUNTER
Patient called in and stated that she forgot about the appt on 7/25/25 at 8 am with [facility] as she overslept.  Patient is now r/s for 1/16/26 at 8 am with [facility] office.  pt is on wait list.  She would like to be seen sooner if anything becomes available.   Please send an appt reminder card to home Address.

## 2025-07-30 ENCOUNTER — EVALUATION (OUTPATIENT)
Dept: PHYSICAL THERAPY | Facility: CLINIC | Age: 75
End: 2025-07-30
Attending: ORTHOPAEDIC SURGERY
Payer: MEDICARE

## 2025-07-30 DIAGNOSIS — M54.16 LUMBAR RADICULOPATHY: Primary | ICD-10-CM

## 2025-07-30 PROCEDURE — 97112 NEUROMUSCULAR REEDUCATION: CPT | Performed by: PHYSICAL THERAPIST

## 2025-07-30 PROCEDURE — 97161 PT EVAL LOW COMPLEX 20 MIN: CPT | Performed by: PHYSICAL THERAPIST

## 2025-07-31 ENCOUNTER — OFFICE VISIT (OUTPATIENT)
Dept: PHYSICAL THERAPY | Facility: CLINIC | Age: 75
End: 2025-07-31
Attending: ORTHOPAEDIC SURGERY
Payer: MEDICARE

## 2025-07-31 ENCOUNTER — TELEPHONE (OUTPATIENT)
Dept: SLEEP CENTER | Facility: CLINIC | Age: 75
End: 2025-07-31

## 2025-07-31 DIAGNOSIS — M54.16 LUMBAR RADICULOPATHY: Primary | ICD-10-CM

## 2025-07-31 PROCEDURE — 97112 NEUROMUSCULAR REEDUCATION: CPT

## 2025-07-31 PROCEDURE — 97140 MANUAL THERAPY 1/> REGIONS: CPT

## 2025-07-31 PROCEDURE — 97110 THERAPEUTIC EXERCISES: CPT

## 2025-08-01 ENCOUNTER — TELEPHONE (OUTPATIENT)
Dept: SLEEP CENTER | Facility: CLINIC | Age: 75
End: 2025-08-01

## 2025-08-05 ENCOUNTER — OFFICE VISIT (OUTPATIENT)
Dept: PHYSICAL THERAPY | Facility: CLINIC | Age: 75
End: 2025-08-05
Attending: ORTHOPAEDIC SURGERY
Payer: MEDICARE

## 2025-08-05 DIAGNOSIS — M54.16 LUMBAR RADICULOPATHY: Primary | ICD-10-CM

## 2025-08-05 PROCEDURE — 97112 NEUROMUSCULAR REEDUCATION: CPT | Performed by: PHYSICAL THERAPIST

## 2025-08-06 LAB

## 2025-08-07 ENCOUNTER — HOSPITAL ENCOUNTER (OUTPATIENT)
Dept: GASTROENTEROLOGY | Facility: AMBULARY SURGERY CENTER | Age: 75
Setting detail: OUTPATIENT SURGERY
End: 2025-08-07
Attending: NURSE PRACTITIONER
Payer: MEDICARE

## 2025-08-07 ENCOUNTER — ANESTHESIA EVENT (OUTPATIENT)
Dept: GASTROENTEROLOGY | Facility: AMBULARY SURGERY CENTER | Age: 75
End: 2025-08-07
Payer: MEDICARE

## 2025-08-07 RX ORDER — PROPOFOL 10 MG/ML
INJECTION, EMULSION INTRAVENOUS AS NEEDED
Status: DISCONTINUED | OUTPATIENT
Start: 2025-08-07 | End: 2025-08-07

## 2025-08-07 RX ORDER — SODIUM CHLORIDE, SODIUM LACTATE, POTASSIUM CHLORIDE, CALCIUM CHLORIDE 600; 310; 30; 20 MG/100ML; MG/100ML; MG/100ML; MG/100ML
INJECTION, SOLUTION INTRAVENOUS CONTINUOUS PRN
Status: DISCONTINUED | OUTPATIENT
Start: 2025-08-07 | End: 2025-08-07

## 2025-08-07 RX ORDER — LIDOCAINE HYDROCHLORIDE 10 MG/ML
INJECTION, SOLUTION EPIDURAL; INFILTRATION; INTRACAUDAL; PERINEURAL AS NEEDED
Status: DISCONTINUED | OUTPATIENT
Start: 2025-08-07 | End: 2025-08-07

## 2025-08-07 RX ADMIN — SODIUM CHLORIDE, SODIUM LACTATE, POTASSIUM CHLORIDE, AND CALCIUM CHLORIDE: .6; .31; .03; .02 INJECTION, SOLUTION INTRAVENOUS at 10:19

## 2025-08-07 RX ADMIN — PROPOFOL 150 MG: 10 INJECTION, EMULSION INTRAVENOUS at 10:41

## 2025-08-07 RX ADMIN — PROPOFOL 50 MG: 10 INJECTION, EMULSION INTRAVENOUS at 10:44

## 2025-08-07 RX ADMIN — LIDOCAINE HYDROCHLORIDE 50 MG: 10 INJECTION, SOLUTION EPIDURAL; INFILTRATION; INTRACAUDAL; PERINEURAL at 10:41

## 2025-08-18 ENCOUNTER — OFFICE VISIT (OUTPATIENT)
Dept: PHYSICAL THERAPY | Facility: CLINIC | Age: 75
End: 2025-08-18
Attending: ORTHOPAEDIC SURGERY
Payer: MEDICARE

## 2025-08-18 DIAGNOSIS — M54.16 LUMBAR RADICULOPATHY: ICD-10-CM

## 2025-08-18 PROCEDURE — 97110 THERAPEUTIC EXERCISES: CPT

## 2025-08-18 PROCEDURE — 97112 NEUROMUSCULAR REEDUCATION: CPT

## 2025-08-19 ENCOUNTER — TELEPHONE (OUTPATIENT)
Dept: SLEEP CENTER | Facility: CLINIC | Age: 75
End: 2025-08-19

## 2025-08-19 LAB

## 2025-08-20 ENCOUNTER — OFFICE VISIT (OUTPATIENT)
Dept: PHYSICAL THERAPY | Facility: CLINIC | Age: 75
End: 2025-08-20
Attending: ORTHOPAEDIC SURGERY
Payer: MEDICARE

## 2025-08-20 DIAGNOSIS — M54.16 LUMBAR RADICULOPATHY: Primary | ICD-10-CM

## 2025-08-20 PROCEDURE — 97110 THERAPEUTIC EXERCISES: CPT

## 2025-08-20 PROCEDURE — 97112 NEUROMUSCULAR REEDUCATION: CPT
